# Patient Record
Sex: MALE | Race: WHITE | NOT HISPANIC OR LATINO
[De-identification: names, ages, dates, MRNs, and addresses within clinical notes are randomized per-mention and may not be internally consistent; named-entity substitution may affect disease eponyms.]

---

## 2019-03-07 PROBLEM — Z00.00 ENCOUNTER FOR PREVENTIVE HEALTH EXAMINATION: Status: ACTIVE | Noted: 2019-03-07

## 2019-03-11 ENCOUNTER — APPOINTMENT (OUTPATIENT)
Dept: PULMONOLOGY | Facility: CLINIC | Age: 54
End: 2019-03-11
Payer: COMMERCIAL

## 2019-03-11 VITALS
DIASTOLIC BLOOD PRESSURE: 80 MMHG | OXYGEN SATURATION: 97 % | BODY MASS INDEX: 33.64 KG/M2 | TEMPERATURE: 98.8 F | RESPIRATION RATE: 18 BRPM | WEIGHT: 235 LBS | SYSTOLIC BLOOD PRESSURE: 130 MMHG | HEIGHT: 70 IN | HEART RATE: 84 BPM

## 2019-03-11 DIAGNOSIS — Z86.19 PERSONAL HISTORY OF OTHER INFECTIOUS AND PARASITIC DISEASES: ICD-10-CM

## 2019-03-11 DIAGNOSIS — Z87.891 PERSONAL HISTORY OF NICOTINE DEPENDENCE: ICD-10-CM

## 2019-03-11 DIAGNOSIS — Z82.0 FAMILY HISTORY OF EPILEPSY AND OTHER DISEASES OF THE NERVOUS SYSTEM: ICD-10-CM

## 2019-03-11 PROCEDURE — 99205 OFFICE O/P NEW HI 60 MIN: CPT

## 2019-03-11 NOTE — ASSESSMENT
[FreeTextEntry1] : Most likely this patient has a viral upper respiratory tract infection with subsequent irritation of the lungs with a prolonged asthmatic response. Patient did get moderately better with steroids that were given for 5 days. We'll give prednisone 10 mg daily for 2 weeks then reevaluate him on Singulair. Patient has been asked to stop using Symbicort for now. CT of chest on and swelling of this year revealed some enlarged pulmonary arteries. Patient is to be seen soon by cardiology.

## 2019-03-11 NOTE — REVIEW OF SYSTEMS
[Cough] : cough [Sputum] : not coughing up ~M sputum [Hemoptysis] : no hemoptysis [Dyspnea] : no dyspnea [Chest Tightness] : no chest tightness [Pleuritic Pain] : no pleuritic pain [Negative] : Sleep Disorder

## 2019-03-14 ENCOUNTER — APPOINTMENT (OUTPATIENT)
Dept: CARDIOLOGY | Facility: CLINIC | Age: 54
End: 2019-03-14
Payer: COMMERCIAL

## 2019-03-14 ENCOUNTER — NON-APPOINTMENT (OUTPATIENT)
Age: 54
End: 2019-03-14

## 2019-03-14 VITALS
WEIGHT: 234 LBS | BODY MASS INDEX: 33.5 KG/M2 | HEART RATE: 91 BPM | OXYGEN SATURATION: 96 % | RESPIRATION RATE: 16 BRPM | HEIGHT: 70 IN | DIASTOLIC BLOOD PRESSURE: 94 MMHG | SYSTOLIC BLOOD PRESSURE: 149 MMHG

## 2019-03-14 VITALS — SYSTOLIC BLOOD PRESSURE: 150 MMHG | DIASTOLIC BLOOD PRESSURE: 110 MMHG | HEART RATE: 92 BPM

## 2019-03-14 VITALS — SYSTOLIC BLOOD PRESSURE: 160 MMHG | DIASTOLIC BLOOD PRESSURE: 105 MMHG

## 2019-03-14 DIAGNOSIS — I27.20 PULMONARY HYPERTENSION, UNSPECIFIED: ICD-10-CM

## 2019-03-14 PROCEDURE — 93306 TTE W/DOPPLER COMPLETE: CPT

## 2019-03-14 PROCEDURE — 93000 ELECTROCARDIOGRAM COMPLETE: CPT

## 2019-03-14 PROCEDURE — 99245 OFF/OP CONSLTJ NEW/EST HI 55: CPT

## 2019-03-14 NOTE — REASON FOR VISIT
[Consultation] : a consultation regarding [FreeTextEntry1] : This is a 53-year-old white man who presents for cardiac evaluation. The patient around the end of January developed pneumonia and was ultimately treated. He still however has some persistent cough and some persistent shortness of breath with exertion with very occasional chest tightness occurring at the time of his cough. He he has a history of smoking one pack a day of cigarettes for 20 years stopping 17 years ago he is a rare alcohol user. He has no history of hypertension or diabetes. He does however have severe dyslipidemia a recent nonfasting lipid profile revealed a triglyceride level of 845, total cholesterol of 243 and HDL of 29 family history significant for a mother with hyperlipidemia. He is unaware of his father's health is all as he has not seen him since the age of 1. He has one sister who is alive and well. As part of his pneumonia workup he underwent CAT scanning. The CAT scan was suggestive of foreign hypertension. It was also read as showing "extensive LAD disease", as well as a distally dilated aortic arch  4 cm x 4 cm. Old patient is recovering from a pneumonia, he does not feel as if he would be able to walk on a treadmill. The patient denies palpitations dizziness or syncope there is no history of rheumatic fever myocardial infarction or congestive heart failure

## 2019-03-14 NOTE — ASSESSMENT
[FreeTextEntry1] : The patient was referred to the echocardiography laboratory. Echocardiogram revealed normal left ventricular systolic function, no evidence of significant valvular stenosis or insufficiency, and no evidence of pulmonary hypertension\par \par In summary, the patient is a complex case with multiple issues.\par \par Firstly his dyslipidemia with a markedly elevated triglycerides of 845 even in a nonfasting specimen is quite concerning, more for pancreatitis then for a coronary event. I have urged him to return to his primary physician to get a repeat fasting profile and most probably be started on medication for his triglycerides in order to prevent pancreatitis\par \par Secondly we went over the issue of "extensive LAD disease". I explained to the patient that a CAT scan of the chest uses a different protocol than that used to assess the coronary arteries. Although given the CAT scan result he no doubt does have evidence of coronary disease, the CAT scan is not powered to assess the severity. We have discussed the modalities of coronary disease evaluation and have decided upon vasodilator myocardial perfusion imaging as the best modality. Once again he states he would be unable to walk on a treadmill 2 to his current pulmonary condition. Given the fact that based on the CAT scan he is already diagnosed with coronary disease, the vasodilator study with Regadenosan will assess his physiology and further risk stratify him.\par \par After this is done then consideration could be given to assessing his aorta, all of the aortic size at this time would not appear to be critical.\par \par He has already started on aspirin therapy which is appropriate. He was very reluctant to go on statin therapy I explained to him that it would be indicated given his underlying coronary disease. I have deferred prescribing this however until his repeat lipid profile so that we can assess his triglycerides first. \par \par All questions were answered. He has been scheduled for vasodilator myocardial perfusion imaging at [facility] on March 20, 2019

## 2019-03-14 NOTE — PHYSICAL EXAM
[General Appearance - Well Developed] : well developed [Normal Appearance] : normal appearance [Well Groomed] : well groomed [General Appearance - Well Nourished] : well nourished [No Deformities] : no deformities [General Appearance - In No Acute Distress] : no acute distress [Normal Oral Mucosa] : normal oral mucosa [No Oral Pallor] : no oral pallor [No Oral Cyanosis] : no oral cyanosis [Normal Jugular Venous A Waves Present] : normal jugular venous A waves present [Normal Jugular Venous V Waves Present] : normal jugular venous V waves present [No Jugular Venous Randall A Waves] : no jugular venous randall A waves [Normal Rate] : normal [Normal S1] : normal S1 [Normal S2] : normal S2 [No Murmur] : no murmurs heard [2+] : left 2+ [No Abnormalities] : the abdominal aorta was not enlarged and no bruit was heard [No Pitting Edema] : no pitting edema present [Respiration, Rhythm And Depth] : normal respiratory rhythm and effort [Exaggerated Use Of Accessory Muscles For Inspiration] : no accessory muscle use [Auscultation Breath Sounds / Voice Sounds] : lungs were clear to auscultation bilaterally [Abdomen Soft] : soft [Abdomen Tenderness] : non-tender [Abdomen Mass (___ Cm)] : no abdominal mass palpated [Abnormal Walk] : normal gait [Nail Clubbing] : no clubbing of the fingernails [Cyanosis, Localized] : no localized cyanosis [Petechial Hemorrhages (___cm)] : no petechial hemorrhages [Skin Color & Pigmentation] : normal skin color and pigmentation [] : no rash [No Venous Stasis] : no venous stasis [Skin Lesions] : no skin lesions [No Skin Ulcers] : no skin ulcer [No Xanthoma] : no  xanthoma was observed [Oriented To Time, Place, And Person] : oriented to person, place, and time [Affect] : the affect was normal [Mood] : the mood was normal [No Anxiety] : not feeling anxious [S3] : no S3 [S4] : no S4 [Right Carotid Bruit] : no bruit heard over the right carotid [Left Carotid Bruit] : no bruit heard over the left carotid [Right Femoral Bruit] : no bruit heard over the right femoral artery [Left Femoral Bruit] : no bruit heard over the left femoral artery [FreeTextEntry1] : patient states he would be unable to walk on a treadmill

## 2019-03-19 ENCOUNTER — APPOINTMENT (OUTPATIENT)
Dept: CARDIOLOGY | Facility: CLINIC | Age: 54
End: 2019-03-19
Payer: COMMERCIAL

## 2019-03-19 PROCEDURE — A9500: CPT

## 2019-03-19 PROCEDURE — 78452 HT MUSCLE IMAGE SPECT MULT: CPT

## 2019-03-19 PROCEDURE — 93015 CV STRESS TEST SUPVJ I&R: CPT

## 2019-04-09 ENCOUNTER — FORM ENCOUNTER (OUTPATIENT)
Age: 54
End: 2019-04-09

## 2019-04-10 ENCOUNTER — OUTPATIENT (OUTPATIENT)
Dept: OUTPATIENT SERVICES | Facility: HOSPITAL | Age: 54
LOS: 1 days | End: 2019-04-10
Payer: COMMERCIAL

## 2019-04-10 ENCOUNTER — APPOINTMENT (OUTPATIENT)
Dept: CARDIOLOGY | Facility: CLINIC | Age: 54
End: 2019-04-10

## 2019-04-10 DIAGNOSIS — E78.5 HYPERLIPIDEMIA, UNSPECIFIED: ICD-10-CM

## 2019-04-10 DIAGNOSIS — R07.9 CHEST PAIN, UNSPECIFIED: ICD-10-CM

## 2019-04-10 DIAGNOSIS — I25.10 ATHEROSCLEROTIC HEART DISEASE OF NATIVE CORONARY ARTERY WITHOUT ANGINA PECTORIS: ICD-10-CM

## 2019-04-10 PROCEDURE — 75574 CT ANGIO HRT W/3D IMAGE: CPT | Mod: 26

## 2019-04-10 PROCEDURE — 75574 CT ANGIO HRT W/3D IMAGE: CPT

## 2019-04-23 ENCOUNTER — NON-APPOINTMENT (OUTPATIENT)
Age: 54
End: 2019-04-23

## 2019-04-23 ENCOUNTER — APPOINTMENT (OUTPATIENT)
Dept: CARDIOLOGY | Facility: CLINIC | Age: 54
End: 2019-04-23
Payer: COMMERCIAL

## 2019-04-23 VITALS — DIASTOLIC BLOOD PRESSURE: 108 MMHG | SYSTOLIC BLOOD PRESSURE: 156 MMHG

## 2019-04-23 VITALS
BODY MASS INDEX: 33.5 KG/M2 | OXYGEN SATURATION: 99 % | WEIGHT: 234 LBS | HEIGHT: 70 IN | SYSTOLIC BLOOD PRESSURE: 178 MMHG | HEART RATE: 89 BPM | DIASTOLIC BLOOD PRESSURE: 119 MMHG | RESPIRATION RATE: 15 BRPM

## 2019-04-23 VITALS — SYSTOLIC BLOOD PRESSURE: 142 MMHG | DIASTOLIC BLOOD PRESSURE: 94 MMHG | HEART RATE: 88 BPM

## 2019-04-23 PROCEDURE — 93000 ELECTROCARDIOGRAM COMPLETE: CPT

## 2019-04-23 PROCEDURE — 99215 OFFICE O/P EST HI 40 MIN: CPT

## 2019-04-23 RX ORDER — PREDNISONE 10 MG/1
10 TABLET ORAL DAILY
Qty: 30 | Refills: 0 | Status: DISCONTINUED | COMMUNITY
Start: 2019-03-11 | End: 2019-04-23

## 2019-04-23 RX ORDER — MONTELUKAST 10 MG/1
10 TABLET, FILM COATED ORAL DAILY
Qty: 30 | Refills: 0 | Status: DISCONTINUED | COMMUNITY
Start: 2019-03-11 | End: 2019-04-23

## 2019-04-23 NOTE — REASON FOR VISIT
[Follow-Up - Clinic] : a clinic follow-up of [Coronary Artery Disease] : coronary artery disease [Abnormal Test Result] : an abnormal test result [Fatigue] : feeling tired (fatigue) [FreeTextEntry1] : Patient returns for followup. In general he feels well. His shortness of breath is gone. He still has some fatigue which he  believes may be residual from his pneumonia. He denies chest heaviness. He wishes to return to the gym. He is concerned however over his abnormal CTAwhich revealed a calcium score of 1030 and "at least" moderate narrowing in the LAD and RCA. 'CONSIDER FURTHER TESTING"

## 2019-04-23 NOTE — PHYSICAL EXAM
[General Appearance - Well Developed] : well developed [Normal Appearance] : normal appearance [No Deformities] : no deformities [Well Groomed] : well groomed [General Appearance - Well Nourished] : well nourished [Normal Oral Mucosa] : normal oral mucosa [General Appearance - In No Acute Distress] : no acute distress [No Oral Pallor] : no oral pallor [No Oral Cyanosis] : no oral cyanosis [Normal Jugular Venous A Waves Present] : normal jugular venous A waves present [Normal Jugular Venous V Waves Present] : normal jugular venous V waves present [No Jugular Venous Randall A Waves] : no jugular venous randall A waves [Respiration, Rhythm And Depth] : normal respiratory rhythm and effort [Exaggerated Use Of Accessory Muscles For Inspiration] : no accessory muscle use [Auscultation Breath Sounds / Voice Sounds] : lungs were clear to auscultation bilaterally [Abdomen Soft] : soft [Abdomen Tenderness] : non-tender [FreeTextEntry1] : patient states he would be unable to walk on a treadmill [Abnormal Walk] : normal gait [Abdomen Mass (___ Cm)] : no abdominal mass palpated [Nail Clubbing] : no clubbing of the fingernails [Cyanosis, Localized] : no localized cyanosis [Petechial Hemorrhages (___cm)] : no petechial hemorrhages [Skin Color & Pigmentation] : normal skin color and pigmentation [] : no rash [No Venous Stasis] : no venous stasis [No Skin Ulcers] : no skin ulcer [Skin Lesions] : no skin lesions [Oriented To Time, Place, And Person] : oriented to person, place, and time [No Xanthoma] : no  xanthoma was observed [Mood] : the mood was normal [Affect] : the affect was normal [No Anxiety] : not feeling anxious [Normal S1] : normal S1 [Normal Rate] : normal [S4] : no S4 [S3] : no S3 [Normal S2] : normal S2 [No Murmur] : no murmurs heard [Right Carotid Bruit] : no bruit heard over the right carotid [Left Carotid Bruit] : no bruit heard over the left carotid [Left Femoral Bruit] : no bruit heard over the left femoral artery [Right Femoral Bruit] : no bruit heard over the right femoral artery [No Abnormalities] : the abdominal aorta was not enlarged and no bruit was heard [2+] : left 2+ [No Pitting Edema] : no pitting edema present

## 2019-04-23 NOTE — ASSESSMENT
[FreeTextEntry1] : Impression:\par 1. I've had a long discussion with the patient we have discussed the fact that he does have extensive coronary disease, although nuclear imaging was normal. The patient however does wish to embark on a significant exercise program and would like to have more definitive assessment of his coronary disease and see if he needs any intervention. The possibility that his fatigue may be anginal equivalent is a concern as well. The patient understands the risks and benefits of proceeding in this manner and wishes to do so.

## 2019-04-26 ENCOUNTER — INPATIENT (INPATIENT)
Facility: HOSPITAL | Age: 54
LOS: 0 days | Discharge: HOME | DRG: 247 | End: 2019-04-27
Attending: INTERNAL MEDICINE | Admitting: INTERNAL MEDICINE
Payer: COMMERCIAL

## 2019-04-26 ENCOUNTER — TRANSCRIPTION ENCOUNTER (OUTPATIENT)
Age: 54
End: 2019-04-26

## 2019-04-26 VITALS
RESPIRATION RATE: 16 BRPM | HEIGHT: 70 IN | SYSTOLIC BLOOD PRESSURE: 161 MMHG | HEART RATE: 78 BPM | WEIGHT: 225.97 LBS | TEMPERATURE: 98 F | OXYGEN SATURATION: 98 % | DIASTOLIC BLOOD PRESSURE: 105 MMHG

## 2019-04-26 DIAGNOSIS — Z90.49 ACQUIRED ABSENCE OF OTHER SPECIFIED PARTS OF DIGESTIVE TRACT: Chronic | ICD-10-CM

## 2019-04-26 DIAGNOSIS — R94.39 ABNORMAL RESULT OF OTHER CARDIOVASCULAR FUNCTION STUDY: ICD-10-CM

## 2019-04-26 LAB
ALBUMIN SERPL ELPH-MCNC: 4.9 G/DL — SIGNIFICANT CHANGE UP (ref 3.3–5)
ALP SERPL-CCNC: 50 U/L — SIGNIFICANT CHANGE UP (ref 40–120)
ALT FLD-CCNC: 100 U/L — HIGH (ref 10–45)
ANION GAP SERPL CALC-SCNC: 15 MMOL/L — SIGNIFICANT CHANGE UP (ref 5–17)
AST SERPL-CCNC: 48 U/L — HIGH (ref 10–40)
BILIRUB SERPL-MCNC: 0.6 MG/DL — SIGNIFICANT CHANGE UP (ref 0.2–1.2)
BUN SERPL-MCNC: 12 MG/DL — SIGNIFICANT CHANGE UP (ref 7–23)
CALCIUM SERPL-MCNC: 10.1 MG/DL — SIGNIFICANT CHANGE UP (ref 8.4–10.5)
CHLORIDE SERPL-SCNC: 101 MMOL/L — SIGNIFICANT CHANGE UP (ref 96–108)
CO2 SERPL-SCNC: 23 MMOL/L — SIGNIFICANT CHANGE UP (ref 22–31)
CREAT SERPL-MCNC: 0.88 MG/DL — SIGNIFICANT CHANGE UP (ref 0.5–1.3)
GLUCOSE SERPL-MCNC: 123 MG/DL — HIGH (ref 70–99)
HCT VFR BLD CALC: 40.2 % — SIGNIFICANT CHANGE UP (ref 39–50)
HGB BLD-MCNC: 14.5 G/DL — SIGNIFICANT CHANGE UP (ref 13–17)
MCHC RBC-ENTMCNC: 31.6 PG — SIGNIFICANT CHANGE UP (ref 27–34)
MCHC RBC-ENTMCNC: 36 GM/DL — SIGNIFICANT CHANGE UP (ref 32–36)
MCV RBC AUTO: 87.8 FL — SIGNIFICANT CHANGE UP (ref 80–100)
PLATELET # BLD AUTO: 223 K/UL — SIGNIFICANT CHANGE UP (ref 150–400)
POTASSIUM SERPL-MCNC: 4 MMOL/L — SIGNIFICANT CHANGE UP (ref 3.5–5.3)
POTASSIUM SERPL-SCNC: 4 MMOL/L — SIGNIFICANT CHANGE UP (ref 3.5–5.3)
PROT SERPL-MCNC: 7.7 G/DL — SIGNIFICANT CHANGE UP (ref 6–8.3)
RBC # BLD: 4.58 M/UL — SIGNIFICANT CHANGE UP (ref 4.2–5.8)
RBC # FLD: 13.1 % — SIGNIFICANT CHANGE UP (ref 10.3–14.5)
SODIUM SERPL-SCNC: 139 MMOL/L — SIGNIFICANT CHANGE UP (ref 135–145)
WBC # BLD: 5.2 K/UL — SIGNIFICANT CHANGE UP (ref 3.8–10.5)
WBC # FLD AUTO: 5.2 K/UL — SIGNIFICANT CHANGE UP (ref 3.8–10.5)

## 2019-04-26 PROCEDURE — 93010 ELECTROCARDIOGRAM REPORT: CPT | Mod: 76

## 2019-04-26 RX ORDER — ATORVASTATIN CALCIUM 80 MG/1
20 TABLET, FILM COATED ORAL AT BEDTIME
Qty: 0 | Refills: 0 | Status: DISCONTINUED | OUTPATIENT
Start: 2019-04-26 | End: 2019-04-27

## 2019-04-26 RX ORDER — SODIUM CHLORIDE 9 MG/ML
3 INJECTION INTRAMUSCULAR; INTRAVENOUS; SUBCUTANEOUS EVERY 8 HOURS
Qty: 0 | Refills: 0 | Status: DISCONTINUED | OUTPATIENT
Start: 2019-04-26 | End: 2019-04-27

## 2019-04-26 RX ORDER — METOPROLOL TARTRATE 50 MG
12.5 TABLET ORAL
Qty: 0 | Refills: 0 | Status: DISCONTINUED | OUTPATIENT
Start: 2019-04-26 | End: 2019-04-27

## 2019-04-26 RX ORDER — INFLUENZA VIRUS VACCINE 15; 15; 15; 15 UG/.5ML; UG/.5ML; UG/.5ML; UG/.5ML
0.5 SUSPENSION INTRAMUSCULAR ONCE
Qty: 0 | Refills: 0 | Status: COMPLETED | OUTPATIENT
Start: 2019-04-26 | End: 2019-04-26

## 2019-04-26 RX ORDER — ASPIRIN/CALCIUM CARB/MAGNESIUM 324 MG
81 TABLET ORAL DAILY
Qty: 0 | Refills: 0 | Status: DISCONTINUED | OUTPATIENT
Start: 2019-04-26 | End: 2019-04-27

## 2019-04-26 RX ORDER — CLOPIDOGREL BISULFATE 75 MG/1
75 TABLET, FILM COATED ORAL DAILY
Qty: 0 | Refills: 0 | Status: DISCONTINUED | OUTPATIENT
Start: 2019-04-26 | End: 2019-04-27

## 2019-04-26 RX ORDER — ZOLPIDEM TARTRATE 10 MG/1
5 TABLET ORAL AT BEDTIME
Qty: 0 | Refills: 0 | Status: DISCONTINUED | OUTPATIENT
Start: 2019-04-26 | End: 2019-04-27

## 2019-04-26 RX ADMIN — SODIUM CHLORIDE 3 MILLILITER(S): 9 INJECTION INTRAMUSCULAR; INTRAVENOUS; SUBCUTANEOUS at 14:14

## 2019-04-26 RX ADMIN — CLOPIDOGREL BISULFATE 75 MILLIGRAM(S): 75 TABLET, FILM COATED ORAL at 14:13

## 2019-04-26 RX ADMIN — Medication 81 MILLIGRAM(S): at 14:13

## 2019-04-26 RX ADMIN — SODIUM CHLORIDE 3 MILLILITER(S): 9 INJECTION INTRAMUSCULAR; INTRAVENOUS; SUBCUTANEOUS at 20:19

## 2019-04-26 RX ADMIN — ATORVASTATIN CALCIUM 20 MILLIGRAM(S): 80 TABLET, FILM COATED ORAL at 21:23

## 2019-04-26 RX ADMIN — Medication 12.5 MILLIGRAM(S): at 17:28

## 2019-04-26 RX ADMIN — ZOLPIDEM TARTRATE 5 MILLIGRAM(S): 10 TABLET ORAL at 21:57

## 2019-04-26 NOTE — H&P CARDIOLOGY - HISTORY OF PRESENT ILLNESS
52 yo male pt with PMHX of recent pneumonia   Stress test on 3/19/19: no evidence of infarction or ineduceable ischemia EF 54%  < from: CT Heart with Coronaries (04.10.19 @ 17:06) >  At least moderate (60-70%) narrowing within the LAD and distal RCA from   mixed plaque. Consider further testing.      NONCARDIAC FINDINGS:     No potentially significant noncardiac findings   in the visualized portions of the chest and upper abdomen    < end of copied text > 52 yo male pt with PMHX of recent pneumonia, former smoker, obesity (BMI>32) presents for cardiac cath. Pt reports he is continuously having AMAYA post resolving pneumonia in January, evaluated by Dr. Bey and had abnormal cardiac CT scan (LAD lesion). Stress test on 3/19/19: no evidence of infarction or inducible ischemia EF 54%. Pt denies chest pain, dizziness or palpitation.     < from: CT Heart with Coronaries (04.10.19 @ 17:06) >  At least moderate (60-70%) narrowing within the LAD and distal RCA from mixed plaque. Consider further testing.  NONCARDIAC FINDINGS:     No potentially significant noncardiac findings in the visualized portions of the chest and upper abdomen < end of copied text >

## 2019-04-26 NOTE — H&P CARDIOLOGY - PMH
Cough    AMAYA (dyspnea on exertion)    Pneumonia Cough    AMAYA (dyspnea on exertion)    Former smoker    HTN (hypertension)    Obesity (BMI 30-39.9)    Pneumonia

## 2019-04-27 ENCOUNTER — TRANSCRIPTION ENCOUNTER (OUTPATIENT)
Age: 54
End: 2019-04-27

## 2019-04-27 VITALS
OXYGEN SATURATION: 97 % | TEMPERATURE: 98 F | HEART RATE: 88 BPM | SYSTOLIC BLOOD PRESSURE: 137 MMHG | DIASTOLIC BLOOD PRESSURE: 93 MMHG | RESPIRATION RATE: 17 BRPM

## 2019-04-27 LAB
ANION GAP SERPL CALC-SCNC: 14 MMOL/L — SIGNIFICANT CHANGE UP (ref 5–17)
BUN SERPL-MCNC: 13 MG/DL — SIGNIFICANT CHANGE UP (ref 7–23)
CALCIUM SERPL-MCNC: 9.2 MG/DL — SIGNIFICANT CHANGE UP (ref 8.4–10.5)
CHLORIDE SERPL-SCNC: 101 MMOL/L — SIGNIFICANT CHANGE UP (ref 96–108)
CO2 SERPL-SCNC: 19 MMOL/L — LOW (ref 22–31)
CREAT SERPL-MCNC: 0.82 MG/DL — SIGNIFICANT CHANGE UP (ref 0.5–1.3)
GLUCOSE SERPL-MCNC: 142 MG/DL — HIGH (ref 70–99)
HBA1C BLD-MCNC: 6.2 % — HIGH (ref 4–5.6)
HCT VFR BLD CALC: 38.6 % — LOW (ref 39–50)
HGB BLD-MCNC: 13.5 G/DL — SIGNIFICANT CHANGE UP (ref 13–17)
MCHC RBC-ENTMCNC: 30.8 PG — SIGNIFICANT CHANGE UP (ref 27–34)
MCHC RBC-ENTMCNC: 35.1 GM/DL — SIGNIFICANT CHANGE UP (ref 32–36)
MCV RBC AUTO: 87.7 FL — SIGNIFICANT CHANGE UP (ref 80–100)
PLATELET # BLD AUTO: 198 K/UL — SIGNIFICANT CHANGE UP (ref 150–400)
POTASSIUM SERPL-MCNC: 3.8 MMOL/L — SIGNIFICANT CHANGE UP (ref 3.5–5.3)
POTASSIUM SERPL-SCNC: 3.8 MMOL/L — SIGNIFICANT CHANGE UP (ref 3.5–5.3)
RBC # BLD: 4.4 M/UL — SIGNIFICANT CHANGE UP (ref 4.2–5.8)
RBC # FLD: 13.1 % — SIGNIFICANT CHANGE UP (ref 10.3–14.5)
SODIUM SERPL-SCNC: 134 MMOL/L — LOW (ref 135–145)
WBC # BLD: 5.9 K/UL — SIGNIFICANT CHANGE UP (ref 3.8–10.5)
WBC # FLD AUTO: 5.9 K/UL — SIGNIFICANT CHANGE UP (ref 3.8–10.5)

## 2019-04-27 PROCEDURE — C1725: CPT

## 2019-04-27 PROCEDURE — C1887: CPT

## 2019-04-27 PROCEDURE — 99153 MOD SED SAME PHYS/QHP EA: CPT

## 2019-04-27 PROCEDURE — 83036 HEMOGLOBIN GLYCOSYLATED A1C: CPT

## 2019-04-27 PROCEDURE — 93005 ELECTROCARDIOGRAM TRACING: CPT

## 2019-04-27 PROCEDURE — 99231 SBSQ HOSP IP/OBS SF/LOW 25: CPT

## 2019-04-27 PROCEDURE — C1769: CPT

## 2019-04-27 PROCEDURE — C1874: CPT

## 2019-04-27 PROCEDURE — 80053 COMPREHEN METABOLIC PANEL: CPT

## 2019-04-27 PROCEDURE — 99152 MOD SED SAME PHYS/QHP 5/>YRS: CPT

## 2019-04-27 PROCEDURE — 93458 L HRT ARTERY/VENTRICLE ANGIO: CPT | Mod: XU

## 2019-04-27 PROCEDURE — 80048 BASIC METABOLIC PNL TOTAL CA: CPT

## 2019-04-27 PROCEDURE — 85027 COMPLETE CBC AUTOMATED: CPT

## 2019-04-27 PROCEDURE — C1894: CPT

## 2019-04-27 PROCEDURE — C9600: CPT | Mod: LD

## 2019-04-27 RX ORDER — CLOPIDOGREL BISULFATE 75 MG/1
1 TABLET, FILM COATED ORAL
Qty: 90 | Refills: 3
Start: 2019-04-27 | End: 2020-04-20

## 2019-04-27 RX ORDER — ATORVASTATIN CALCIUM 80 MG/1
1 TABLET, FILM COATED ORAL
Qty: 30 | Refills: 0
Start: 2019-04-27 | End: 2019-05-26

## 2019-04-27 RX ORDER — METOPROLOL TARTRATE 50 MG
0.5 TABLET ORAL
Qty: 30 | Refills: 0 | OUTPATIENT
Start: 2019-04-27 | End: 2019-05-26

## 2019-04-27 RX ORDER — ACETAMINOPHEN 500 MG
2 TABLET ORAL
Qty: 0 | Refills: 0 | DISCHARGE
Start: 2019-04-27

## 2019-04-27 RX ORDER — ACETAMINOPHEN 500 MG
650 TABLET ORAL EVERY 6 HOURS
Qty: 0 | Refills: 0 | Status: DISCONTINUED | OUTPATIENT
Start: 2019-04-27 | End: 2019-04-27

## 2019-04-27 RX ORDER — ASPIRIN/CALCIUM CARB/MAGNESIUM 324 MG
1 TABLET ORAL
Qty: 0 | Refills: 0 | COMMUNITY
Start: 2019-04-27

## 2019-04-27 RX ADMIN — Medication 650 MILLIGRAM(S): at 05:44

## 2019-04-27 RX ADMIN — Medication 12.5 MILLIGRAM(S): at 05:07

## 2019-04-27 RX ADMIN — Medication 650 MILLIGRAM(S): at 05:14

## 2019-04-27 RX ADMIN — Medication 81 MILLIGRAM(S): at 05:07

## 2019-04-27 RX ADMIN — CLOPIDOGREL BISULFATE 75 MILLIGRAM(S): 75 TABLET, FILM COATED ORAL at 05:07

## 2019-04-27 RX ADMIN — SODIUM CHLORIDE 3 MILLILITER(S): 9 INJECTION INTRAMUSCULAR; INTRAVENOUS; SUBCUTANEOUS at 05:03

## 2019-04-27 NOTE — DISCHARGE NOTE PROVIDER - NSDCCPCAREPLAN_GEN_ALL_CORE_FT
PRINCIPAL DISCHARGE DIAGNOSIS  Diagnosis: CAD (coronary artery disease), native coronary artery  Assessment and Plan of Treatment: Do not stop your aspirin or Plavix unless instructed to do so by your cardiologist, they help keep your stented arteries open.   No heavy lifting or pushing/pulling with procedure arm for 2 weeks. No driving for 2 days. You may shower 24 hours following the procedure but avoid baths/swimming for 1 week. Check your wrist site for bleeding and/or swelling daily following procedure and call your doctor immediately if it occurs or if you experience increased pain at the site. Follow up with your cardiologist in 1-2 weeks. You may call [facility] if you have any questions/concerns regarding your procedure [phone].      SECONDARY DISCHARGE DIAGNOSES  Diagnosis: HTN (hypertension)  Assessment and Plan of Treatment: Continue with your blood pressure medications; eat a heart healthy diet with low salt diet; exercise regularly (consult with your physician or cardiologist first); maintain a heart healthy weight; if you smoke - quit (A resource to help you stop smoking is the [facility] [employer] Control – phone number [phone].); include healthy ways to manage stress. Continue to follow with your primary care physician or cardiologist.    Diagnosis: HLD (hyperlipidemia)  Assessment and Plan of Treatment: Continue with your cholesterol medications. Eat a heart healthy diet that is low in saturated fats and salt, and includes whole grains, fruits, vegetables and lean protein; exercise regularly (consult with your physician or cardiologist first); maintain a heart healthy weight; if you smoke - quit (A resource to help you stop smoking is the [facility] [employer] Tobacco Control – phone number [phone].). Continue to follow with your primary physician or cardiologist.

## 2019-04-27 NOTE — DISCHARGE NOTE PROVIDER - HOSPITAL COURSE
HPI:    54 yo male pt with PMHX of recent pneumonia, former smoker, obesity (BMI>32) presents for cardiac cath. Pt reports he is continuously having AMAYA post resolving pneumonia in January, evaluated by Dr. Bey and had abnormal cardiac CT scan (LAD lesion). Stress test on 3/19/19: no evidence of infarction or inducible ischemia EF 54%. Pt denies chest pain, dizziness or palpitation.         4/26 cardiac cath with stent to the mid LAD. Right radial site without swelling, bleeding.

## 2019-04-27 NOTE — DISCHARGE NOTE PROVIDER - CARE PROVIDER_API CALL
John Bey)  Cardiology; Internal Medicine  [address], [address]  [place] [address]  Phone: [phone]  Fax: [phone]  Follow Up Time:

## 2019-04-27 NOTE — DISCHARGE NOTE PROVIDER - NSDCCPTREATMENT_GEN_ALL_CORE_FT
PRINCIPAL PROCEDURE  Procedure: Placement of coronary artery stent  Findings and Treatment: one mid LAD stent

## 2019-04-27 NOTE — DISCHARGE NOTE NURSING/CASE MANAGEMENT/SOCIAL WORK - NSDCDPATPORTLINK_GEN_ALL_CORE
You can access the [email or web address], offered by [facility], by registering with the following website: [email or web address]

## 2019-04-27 NOTE — DISCHARGE NOTE PROVIDER - NSDCPNSUBOBJ_GEN_ALL_CORE
Patient is a 53y old  Male who presents with a chief complaint of abnormal cardiac CT (2019 00:35)                    Allergies        sulfa drugs (Unknown)        Intolerances                Medications:    acetaminophen   Tablet .. 650 milliGRAM(s) Oral every 6 hours PRN    aspirin enteric coated 81 milliGRAM(s) Oral daily    atorvastatin 20 milliGRAM(s) Oral at bedtime    clopidogrel Tablet 75 milliGRAM(s) Oral daily    metoprolol tartrate 12.5 milliGRAM(s) Oral two times a day    sodium chloride 0.9% lock flush 3 milliLiter(s) IV Push every 8 hours    zolpidem 5 milliGRAM(s) Oral at bedtime PRN            Vitals:    T(C): 36.8 (19 @ 05:02), Max: 36.8 (19 @ 05:02)    HR: 88 (19 @ 05:02) (71 - 99)    BP: 137/93 (19 @ 05:02) (137/93 - 164/115)    BP(mean): 123 (19 @ 11:32) (123 - 123)    RR: 17 (19 @ 05:02) (16 - 18)    SpO2: 97% (19 @ 05:02) (95% - 99%)    Wt(kg): --    Daily Height in cm: 177.8 (2019 13:30)      Daily Weight in k.5 (2019 11:32)    I&O's Summary                Physical Exam:    Appearance: Normal    Eyes: PERRL, EOMI    HENT: Normal oral mus[place]a, NC/AT    Cardiovascular: S1S2, RRR, No M/R/G, no JVD, No Lower extremity edema    Procedural Access Site: No hematoma, Non-tender to palpation, 2+ pulse, No bruit, No Ecchymosis    Respiratory: Clear to auscultation bilaterally    Gastrointestinal: Soft, Non tender, Normal Bowel Sounds    Musculoskeletal: No clubbing, No joint deformity     Neurologic: Non-focal    Lymphatic: No lymphadenopathy    Psychiatry: AAOx3, Mood & affect appropriate    Skin: No rashes, No ecchymoses, No cyanosis                134<L>  |  101  |  13    ----------------------------<  142<H>    3.8   |  19<L>  |  0.82        Ca    9.2      2019 05:18        TPro  7.7  /  Alb  4.9  /  TBili  0.6  /  DBili  x   /  AST  48<H>  /  ALT  100<H>  /  AlkPhos  50  04-26                        Lipid panel     Hgb A1c                             13.5     5.9   )-----------( 198      ( 2019 05:18 )               38.6                 ECG: SR 78 bpm        Cath: one mid LAD stent        Imaging:        Interpretation of Telemetry: SR 70-80 bpm

## 2019-04-28 ENCOUNTER — INPATIENT (INPATIENT)
Facility: HOSPITAL | Age: 54
LOS: 0 days | Discharge: HOME | End: 2019-04-29
Attending: FAMILY MEDICINE | Admitting: INTERNAL MEDICINE
Payer: COMMERCIAL

## 2019-04-28 VITALS — HEIGHT: 70 IN | WEIGHT: 225.97 LBS

## 2019-04-28 DIAGNOSIS — Z90.49 ACQUIRED ABSENCE OF OTHER SPECIFIED PARTS OF DIGESTIVE TRACT: Chronic | ICD-10-CM

## 2019-04-28 DIAGNOSIS — Z87.828 PERSONAL HISTORY OF OTHER (HEALED) PHYSICAL INJURY AND TRAUMA: Chronic | ICD-10-CM

## 2019-04-28 DIAGNOSIS — Z98.890 OTHER SPECIFIED POSTPROCEDURAL STATES: Chronic | ICD-10-CM

## 2019-04-28 LAB
ALBUMIN SERPL ELPH-MCNC: 4.3 G/DL — SIGNIFICANT CHANGE UP (ref 3.3–5)
ALP SERPL-CCNC: 58 U/L — SIGNIFICANT CHANGE UP (ref 40–120)
ALT FLD-CCNC: 92 U/L — HIGH (ref 12–78)
ANION GAP SERPL CALC-SCNC: 9 MMOL/L — SIGNIFICANT CHANGE UP (ref 5–17)
APPEARANCE UR: CLEAR — SIGNIFICANT CHANGE UP
AST SERPL-CCNC: 40 U/L — HIGH (ref 15–37)
BACTERIA # UR AUTO: ABNORMAL
BILIRUB SERPL-MCNC: 0.5 MG/DL — SIGNIFICANT CHANGE UP (ref 0.2–1.2)
BILIRUB UR-MCNC: NEGATIVE — SIGNIFICANT CHANGE UP
BUN SERPL-MCNC: 12 MG/DL — SIGNIFICANT CHANGE UP (ref 7–23)
CALCIUM SERPL-MCNC: 9.2 MG/DL — SIGNIFICANT CHANGE UP (ref 8.5–10.1)
CHLORIDE SERPL-SCNC: 105 MMOL/L — SIGNIFICANT CHANGE UP (ref 96–108)
CO2 SERPL-SCNC: 25 MMOL/L — SIGNIFICANT CHANGE UP (ref 22–31)
COLOR SPEC: YELLOW — SIGNIFICANT CHANGE UP
CREAT SERPL-MCNC: 1.1 MG/DL — SIGNIFICANT CHANGE UP (ref 0.5–1.3)
DIFF PNL FLD: ABNORMAL
EPI CELLS # UR: NEGATIVE — SIGNIFICANT CHANGE UP
GLUCOSE SERPL-MCNC: 149 MG/DL — HIGH (ref 70–99)
GLUCOSE UR QL: NEGATIVE MG/DL — SIGNIFICANT CHANGE UP
HCT VFR BLD CALC: 41.1 % — SIGNIFICANT CHANGE UP (ref 39–50)
HGB BLD-MCNC: 14 G/DL — SIGNIFICANT CHANGE UP (ref 13–17)
KETONES UR-MCNC: NEGATIVE — SIGNIFICANT CHANGE UP
LEUKOCYTE ESTERASE UR-ACNC: NEGATIVE — SIGNIFICANT CHANGE UP
LIDOCAIN IGE QN: 107 U/L — SIGNIFICANT CHANGE UP (ref 73–393)
MCHC RBC-ENTMCNC: 30 PG — SIGNIFICANT CHANGE UP (ref 27–34)
MCHC RBC-ENTMCNC: 34.1 GM/DL — SIGNIFICANT CHANGE UP (ref 32–36)
MCV RBC AUTO: 88.2 FL — SIGNIFICANT CHANGE UP (ref 80–100)
NITRITE UR-MCNC: NEGATIVE — SIGNIFICANT CHANGE UP
NRBC # BLD: 0 /100 WBCS — SIGNIFICANT CHANGE UP (ref 0–0)
PH UR: 6 — SIGNIFICANT CHANGE UP (ref 5–8)
PLATELET # BLD AUTO: 217 K/UL — SIGNIFICANT CHANGE UP (ref 150–400)
POTASSIUM SERPL-MCNC: 3.6 MMOL/L — SIGNIFICANT CHANGE UP (ref 3.5–5.3)
POTASSIUM SERPL-SCNC: 3.6 MMOL/L — SIGNIFICANT CHANGE UP (ref 3.5–5.3)
PROT SERPL-MCNC: 7.8 GM/DL — SIGNIFICANT CHANGE UP (ref 6–8.3)
PROT UR-MCNC: NEGATIVE MG/DL — SIGNIFICANT CHANGE UP
RBC # BLD: 4.66 M/UL — SIGNIFICANT CHANGE UP (ref 4.2–5.8)
RBC # FLD: 13.9 % — SIGNIFICANT CHANGE UP (ref 10.3–14.5)
RBC CASTS # UR COMP ASSIST: NEGATIVE /HPF — SIGNIFICANT CHANGE UP (ref 0–4)
SODIUM SERPL-SCNC: 139 MMOL/L — SIGNIFICANT CHANGE UP (ref 135–145)
SP GR SPEC: 1.02 — SIGNIFICANT CHANGE UP (ref 1.01–1.02)
TROPONIN I SERPL-MCNC: 0.13 NG/ML — HIGH (ref 0.01–0.04)
TROPONIN I SERPL-MCNC: 0.14 NG/ML — HIGH (ref 0.01–0.04)
TROPONIN I SERPL-MCNC: 0.14 NG/ML — HIGH (ref 0.01–0.04)
UROBILINOGEN FLD QL: NEGATIVE MG/DL — SIGNIFICANT CHANGE UP
WBC # BLD: 7.16 K/UL — SIGNIFICANT CHANGE UP (ref 3.8–10.5)
WBC # FLD AUTO: 7.16 K/UL — SIGNIFICANT CHANGE UP (ref 3.8–10.5)
WBC UR QL: NEGATIVE — SIGNIFICANT CHANGE UP

## 2019-04-28 PROCEDURE — 93010 ELECTROCARDIOGRAM REPORT: CPT

## 2019-04-28 PROCEDURE — 99285 EMERGENCY DEPT VISIT HI MDM: CPT

## 2019-04-28 PROCEDURE — 71045 X-RAY EXAM CHEST 1 VIEW: CPT | Mod: 26

## 2019-04-28 PROCEDURE — 71046 X-RAY EXAM CHEST 2 VIEWS: CPT | Mod: 26

## 2019-04-28 RX ORDER — METOPROLOL TARTRATE 50 MG
12.5 TABLET ORAL
Qty: 0 | Refills: 0 | Status: DISCONTINUED | OUTPATIENT
Start: 2019-04-28 | End: 2019-04-29

## 2019-04-28 RX ORDER — NITROGLYCERIN 6.5 MG
0.4 CAPSULE, EXTENDED RELEASE ORAL
Qty: 0 | Refills: 0 | Status: COMPLETED | OUTPATIENT
Start: 2019-04-28 | End: 2019-04-28

## 2019-04-28 RX ORDER — ASPIRIN/CALCIUM CARB/MAGNESIUM 324 MG
324 TABLET ORAL ONCE
Qty: 0 | Refills: 0 | Status: COMPLETED | OUTPATIENT
Start: 2019-04-28 | End: 2019-04-28

## 2019-04-28 RX ORDER — METOPROLOL TARTRATE 50 MG
12.5 TABLET ORAL ONCE
Qty: 0 | Refills: 0 | Status: COMPLETED | OUTPATIENT
Start: 2019-04-28 | End: 2019-04-28

## 2019-04-28 RX ORDER — ATORVASTATIN CALCIUM 80 MG/1
20 TABLET, FILM COATED ORAL ONCE
Qty: 0 | Refills: 0 | Status: COMPLETED | OUTPATIENT
Start: 2019-04-28 | End: 2019-04-28

## 2019-04-28 RX ORDER — NITROGLYCERIN 6.5 MG
1 CAPSULE, EXTENDED RELEASE ORAL ONCE
Qty: 0 | Refills: 0 | Status: COMPLETED | OUTPATIENT
Start: 2019-04-28 | End: 2019-04-28

## 2019-04-28 RX ORDER — CLOPIDOGREL BISULFATE 75 MG/1
75 TABLET, FILM COATED ORAL ONCE
Qty: 0 | Refills: 0 | Status: COMPLETED | OUTPATIENT
Start: 2019-04-28 | End: 2019-04-28

## 2019-04-28 RX ORDER — ACETAMINOPHEN 500 MG
650 TABLET ORAL ONCE
Qty: 0 | Refills: 0 | Status: COMPLETED | OUTPATIENT
Start: 2019-04-28 | End: 2019-04-28

## 2019-04-28 RX ORDER — ASPIRIN/CALCIUM CARB/MAGNESIUM 324 MG
81 TABLET ORAL DAILY
Qty: 0 | Refills: 0 | Status: DISCONTINUED | OUTPATIENT
Start: 2019-04-28 | End: 2019-04-29

## 2019-04-28 RX ORDER — CLOPIDOGREL BISULFATE 75 MG/1
75 TABLET, FILM COATED ORAL AT BEDTIME
Qty: 0 | Refills: 0 | Status: DISCONTINUED | OUTPATIENT
Start: 2019-04-28 | End: 2019-04-29

## 2019-04-28 RX ORDER — HEPARIN SODIUM 5000 [USP'U]/ML
5000 INJECTION INTRAVENOUS; SUBCUTANEOUS EVERY 8 HOURS
Qty: 0 | Refills: 0 | Status: DISCONTINUED | OUTPATIENT
Start: 2019-04-28 | End: 2019-04-29

## 2019-04-28 RX ORDER — ACETAMINOPHEN 500 MG
650 TABLET ORAL EVERY 6 HOURS
Qty: 0 | Refills: 0 | Status: DISCONTINUED | OUTPATIENT
Start: 2019-04-28 | End: 2019-04-29

## 2019-04-28 RX ORDER — ATORVASTATIN CALCIUM 80 MG/1
20 TABLET, FILM COATED ORAL AT BEDTIME
Qty: 0 | Refills: 0 | Status: DISCONTINUED | OUTPATIENT
Start: 2019-04-28 | End: 2019-04-29

## 2019-04-28 RX ORDER — INFLUENZA VIRUS VACCINE 15; 15; 15; 15 UG/.5ML; UG/.5ML; UG/.5ML; UG/.5ML
0.5 SUSPENSION INTRAMUSCULAR ONCE
Qty: 0 | Refills: 0 | Status: COMPLETED | OUTPATIENT
Start: 2019-04-28 | End: 2019-04-28

## 2019-04-28 RX ADMIN — Medication 0.4 MILLIGRAM(S): at 16:36

## 2019-04-28 RX ADMIN — Medication 12.5 MILLIGRAM(S): at 21:39

## 2019-04-28 RX ADMIN — ATORVASTATIN CALCIUM 20 MILLIGRAM(S): 80 TABLET, FILM COATED ORAL at 19:59

## 2019-04-28 RX ADMIN — Medication 1 INCH(S): at 17:50

## 2019-04-28 RX ADMIN — Medication 650 MILLIGRAM(S): at 17:45

## 2019-04-28 RX ADMIN — Medication 324 MILLIGRAM(S): at 16:03

## 2019-04-28 RX ADMIN — Medication 0.4 MILLIGRAM(S): at 16:54

## 2019-04-28 RX ADMIN — CLOPIDOGREL BISULFATE 75 MILLIGRAM(S): 75 TABLET, FILM COATED ORAL at 19:59

## 2019-04-28 RX ADMIN — HEPARIN SODIUM 5000 UNIT(S): 5000 INJECTION INTRAVENOUS; SUBCUTANEOUS at 21:39

## 2019-04-28 RX ADMIN — Medication 0.4 MILLIGRAM(S): at 16:48

## 2019-04-28 NOTE — ED PROVIDER NOTE - MUSCULOSKELETAL, MLM
Spine appears normal, range of motion is not limited, no muscle or joint tenderness. NEFF x4, no focal swelling tenderness

## 2019-04-28 NOTE — ED ADULT NURSE REASSESSMENT NOTE - NS ED NURSE REASSESS COMMENT FT1
patient 2nd troponin resulted at 0.145. Dr Campbell notified. No orders received. Patient unchanged from previous assessment, chest pain still present 2/10. Pt still cleared for tele bed.
Assumed care of patient from Nate Salmon RN at 1955. Patient AxOx4. Patient resting comfortably in bed. Patient in no acute signs of distress. Patient complaining of 2/10 left chest pressure, which is improving since arrival to ED. Dr Campbell notified orders received to give stat medications and administered. All needs addressed at this time. Safety and comfort maintained. Will continue to monitor.

## 2019-04-28 NOTE — ED ADULT TRIAGE NOTE - CHIEF COMPLAINT QUOTE
pt presents to ED c/o dizziness and chest pain. pt had stent placement on Friday at [facility]. pt w/ difficulty ambulating at triage 2/2 dizziness, provided w/ wheelchair and sent directly to the main.

## 2019-04-28 NOTE — PATIENT PROFILE ADULT - NSPROMEDSADMININFO_GEN_A_NUR
Writer met with patient over 45 min. Patient took distress and functional tool home with him. Patient will fill out and bring back with him on Thursdays infusion day.
no concerns

## 2019-04-28 NOTE — ED PROVIDER NOTE - CLINICAL SUMMARY MEDICAL DECISION MAKING FREE TEXT BOX
54 y/o male with hx of HTN, HLD, CAD s/p stent placed at [place] by Dr. Dagoberto Toney on 4/26/19 presents to the ED with 2 episodes recurrent left sided chest pressure, radiating down left arm. +associated palpitations, SOB. +near syncopal episode upon ED arrival at [facility]. Pt presently feels better but not chest pain free. Plan EKG, CXR, labs including serial troponin, lipase, PO ASA to chew, sublingual Nitro, monitor, observe, reassess, discuss with cardiology.

## 2019-04-28 NOTE — H&P ADULT - NSICDXPASTSURGICALHX_GEN_ALL_CORE_FT
PAST SURGICAL HISTORY:  History of appendectomy 1994    History of tonsillectomy and adenoidectomy 1968    History of torn meniscus of left knee 1995

## 2019-04-28 NOTE — H&P ADULT - NEUROLOGICAL DETAILS
responds to pain/sensation intact/deep reflexes intact/cranial nerves intact/alert and oriented x 3/responds to verbal commands/normal strength

## 2019-04-28 NOTE — H&P ADULT - NSHPPHYSICALEXAM_GEN_ALL_CORE
Vital Signs Last 24 Hrs  T(C): 37.1 (28 Apr 2019 15:45), Max: 37.1 (28 Apr 2019 15:45)  T(F): 98.8 (28 Apr 2019 15:45), Max: 98.8 (28 Apr 2019 15:45)  HR: 82 (28 Apr 2019 16:37) (80 - 88)  BP: 141/99 (28 Apr 2019 16:37) (138/93 - 173/108)  RR: 16 (28 Apr 2019 16:37) (16 - 20)  SpO2: 98% (28 Apr 2019 16:37) (97% - 98%)

## 2019-04-28 NOTE — ED PROVIDER NOTE - CARE PLAN
Principal Discharge DX:	Chest pain with high risk of acute coronary syndrome  Secondary Diagnosis:	CAD (coronary artery disease)

## 2019-04-28 NOTE — ED PROVIDER NOTE - PROGRESS NOTE DETAILS
Dr. Larry:  Troponin 0.132.  Pt still w/ mild L upper chest pressure discomfort, RN administering SL NTG.  Paging Interventional Cardio MD on call, Dr. Villarreal. Patricia AS for Dr. Larry: case (including EKG and labs) discussed with Teresita Plata NP of cardiology, she will relay info to Dr. Villarreal for him to call ED, so as to discuss further management. I Estevan Jackson attest that this documentation has been prepared under the direction and in the presence of Dr. Larry. Patricia AS for Dr. Larry: case discussed with Dr. Villarreal advises to give the pt Nitro paste, reassess in 1 hour, he will call back in an hour to check in on pt's status so as to determine disposition and further management. Patricia AS for Dr. Larry: pt's chest pain improved, from a 5 to 3 s/p NTG SL, currently down to a 2 on NTP, pt otherwise feels well, this was further discussed with Dr. Villarreal who states he does not feel pt requires emergent cardiac cath this evening, advises tele admission with serial troponin, he will consult in am, will notify hospitalist of tele admission.

## 2019-04-28 NOTE — ED PROVIDER NOTE - ENMT, MLM
Airway patent, Nasal mucosa clear. Throat has no vesicles, no oropharyngeal exudates and uvula is midline. +mucous membranes slightly dry

## 2019-04-28 NOTE — H&P ADULT - NSICDXPASTMEDICALHX_GEN_ALL_CORE_FT
PAST MEDICAL HISTORY:  CAD (coronary artery disease)     HLD (hyperlipidemia)     HTN (hypertension)

## 2019-04-28 NOTE — H&P ADULT - HISTORY OF PRESENT ILLNESS
54 y/o male with PMHx of HTN, HLD, CAD s/p stent placed by Dr. Dagoberto Toney at [place] on 4/26/19 presents to the ED woth complain of chest pain today. States he was moving around this morning when he started to feel flushed, lightheaded, palpitations. Pt felt chest discomfort radiating down LUE, episode lasting 5-10 minutes. Chest discomfort is described as a mild pressure, LUE pain described as burning sensation. Denies SOB. Pt reports feeling near syncope at [facility] near ED pivot. Per pt's wife, pt had an angiogram done at [place] on 4/26/19 with sent placed, discharged on ASA and Plavix.

## 2019-04-28 NOTE — ED ADULT NURSE NOTE - CHIEF COMPLAINT QUOTE
pt presents to ED c/o dizziness and chest pain. pt had stent placement on Friday at [facility]. pt w/ difficulty ambulating at triage 2/2 dizziness, provided w/ wheelchair and sent directly to the main.

## 2019-04-28 NOTE — ED PROVIDER NOTE - OBJECTIVE STATEMENT
54 y/o male with PMHx of HTN, HLD, CAD s/p stent placed by Dr. Dagoberto Toney at [place] on 4/26/19 presents to the ED c/o chest pain today. States he was moving around this morning when he started to feel flushed, lightheaded, palpitations. Pt felt chest discomfort radiating down LUE, episode lasting 5-10 minutes. Chest discomfort is described as a mild pressure, LUE pain described as burning sensation. Denies SOB. Pt reports feeling near syncope at ED near ED pivot. Per pt's wife, pt had an angiogram done at [place] on 4/26/19 with sent placed, discharged on ASA and Plavix. Allergic to Sulfa. Cardio: [facility]. PCP: John Grimm.

## 2019-04-28 NOTE — H&P ADULT - ASSESSMENT
52 yo male presented with chest pain.    A/P:    1.  Chest pain  Recent PCI with stent placement(on last Friday)  Elevated troponin  -chest pain is better with Nitro  -will trend troponin  -continue Aspirin, Statin, plavix and BB  -follow clinically in Telemetry unit  -follow cardiology consult    2.  HTN  HLD  -continue BB, statin.    3.  Heparin for DVT ppx    4.  Code status: Full code. 52 yo male presented with chest pain.    A/P:    1.  Chest pain  Recent PCI with stent placement(on last Friday)  Elevated troponin  near syncope  -chest pain is better with Nitro  -will trend troponin  -continue Aspirin, Statin, plavix and BB  -follow clinically in Telemetry unit  -follow cardiology consult    2.  HTN  HLD  -continue BB, statin.    3.  Heparin for DVT ppx    4.  Code status: Full code.

## 2019-04-28 NOTE — PATIENT PROFILE ADULT - STATED REASON FOR ADMISSION
I felt off today after sitting for an hour after I was supervising moving things around the house and my wife said I should come to the ER and I almost passed out in the waiting room.

## 2019-04-29 ENCOUNTER — TRANSCRIPTION ENCOUNTER (OUTPATIENT)
Age: 54
End: 2019-04-29

## 2019-04-29 VITALS
OXYGEN SATURATION: 96 % | TEMPERATURE: 99 F | SYSTOLIC BLOOD PRESSURE: 152 MMHG | DIASTOLIC BLOOD PRESSURE: 88 MMHG | HEART RATE: 90 BPM

## 2019-04-29 PROBLEM — R05 COUGH: Chronic | Status: ACTIVE | Noted: 2019-04-26

## 2019-04-29 PROBLEM — J18.9 PNEUMONIA, UNSPECIFIED ORGANISM: Chronic | Status: ACTIVE | Noted: 2019-04-26

## 2019-04-29 PROBLEM — E66.9 OBESITY, UNSPECIFIED: Chronic | Status: ACTIVE | Noted: 2019-04-26

## 2019-04-29 PROBLEM — Z87.891 PERSONAL HISTORY OF NICOTINE DEPENDENCE: Chronic | Status: ACTIVE | Noted: 2019-04-26

## 2019-04-29 LAB
HCV AB S/CO SERPL IA: 0.04 S/CO — SIGNIFICANT CHANGE UP (ref 0–0.99)
HCV AB SERPL-IMP: SIGNIFICANT CHANGE UP
TROPONIN I SERPL-MCNC: 0.12 NG/ML — HIGH (ref 0.01–0.04)

## 2019-04-29 RX ORDER — INFLUENZA VIRUS VACCINE 15; 15; 15; 15 UG/.5ML; UG/.5ML; UG/.5ML; UG/.5ML
0.5 SUSPENSION INTRAMUSCULAR ONCE
Qty: 0 | Refills: 0 | Status: DISCONTINUED | OUTPATIENT
Start: 2019-04-29 | End: 2019-04-29

## 2019-04-29 RX ORDER — METOPROLOL TARTRATE 50 MG
12.5 TABLET ORAL
Qty: 0 | Refills: 0 | COMMUNITY

## 2019-04-29 RX ORDER — METOPROLOL TARTRATE 50 MG
1 TABLET ORAL
Qty: 60 | Refills: 0
Start: 2019-04-29 | End: 2019-05-28

## 2019-04-29 RX ORDER — METOPROLOL TARTRATE 50 MG
0.5 TABLET ORAL
Qty: 0 | Refills: 0 | COMMUNITY

## 2019-04-29 RX ORDER — CLOPIDOGREL BISULFATE 75 MG/1
1 TABLET, FILM COATED ORAL
Qty: 0 | Refills: 0 | COMMUNITY

## 2019-04-29 RX ORDER — ATORVASTATIN CALCIUM 80 MG/1
20 TABLET, FILM COATED ORAL
Qty: 0 | Refills: 0 | COMMUNITY

## 2019-04-29 RX ORDER — ATORVASTATIN CALCIUM 80 MG/1
1 TABLET, FILM COATED ORAL
Qty: 0 | Refills: 0 | COMMUNITY

## 2019-04-29 RX ORDER — METOPROLOL TARTRATE 50 MG
25 TABLET ORAL
Qty: 0 | Refills: 0 | Status: DISCONTINUED | OUTPATIENT
Start: 2019-04-29 | End: 2019-04-29

## 2019-04-29 RX ORDER — ATORVASTATIN CALCIUM 80 MG/1
0 TABLET, FILM COATED ORAL
Qty: 0 | Refills: 0 | COMMUNITY

## 2019-04-29 RX ORDER — METOPROLOL TARTRATE 50 MG
0 TABLET ORAL
Qty: 0 | Refills: 0 | COMMUNITY

## 2019-04-29 RX ADMIN — Medication 12.5 MILLIGRAM(S): at 06:18

## 2019-04-29 RX ADMIN — HEPARIN SODIUM 5000 UNIT(S): 5000 INJECTION INTRAVENOUS; SUBCUTANEOUS at 06:18

## 2019-04-29 RX ADMIN — Medication 25 MILLIGRAM(S): at 16:34

## 2019-04-29 RX ADMIN — Medication 81 MILLIGRAM(S): at 13:23

## 2019-04-29 NOTE — DISCHARGE NOTE PROVIDER - NSDCCPCAREPLAN_GEN_ALL_CORE_FT
PRINCIPAL DISCHARGE DIAGNOSIS  Diagnosis: Chest pain with high risk of acute coronary syndrome  Assessment and Plan of Treatment: continue current medicaitons follow up with cardiologist within 1 week      SECONDARY DISCHARGE DIAGNOSES  Diagnosis: Hyperlipidemia  Assessment and Plan of Treatment: take atorvastatin,  follow up with PCP within 1 week    Diagnosis: Prediabetes  Assessment and Plan of Treatment: diet, repeat A1C in 2-3 month    Diagnosis: HTN (hypertension)  Assessment and Plan of Treatment: metoprolol dose increased to 25 twice daily    Diagnosis: CAD (coronary artery disease)  Assessment and Plan of Treatment: s/p LHC - patent stent, follow up with PCP and cardiologist within 1 week

## 2019-04-29 NOTE — DISCHARGE NOTE NURSING/CASE MANAGEMENT/SOCIAL WORK - NSDCDPATPORTLINK_GEN_ALL_CORE
You can access the [facility] Patient Portal, offered by [facility], by registering with the following website: [email or web address]

## 2019-04-29 NOTE — CONSULT NOTE ADULT - SUBJECTIVE AND OBJECTIVE BOX
CHIEF COMPLAINT: Patient is a 53y old  Male who presents with a chief complaint of     HPI: HPI:  54 y/o male with PMHx of HTN, HLD, CAD s/p stent placed by Dr. Dagoberto Toney at [facility] on 4/26/19 presents to the ED woth complain of chest pain today. States he was moving around this morning when he started to feel flushed, lightheaded, palpitations. Pt felt chest discomfort radiating down LUE, episode lasting 5-10 minutes. Chest discomfort is described as a mild pressure, LUE pain described as burning sensation. Denies SOB. Pt reports feeling near syncope at ED near ED pivot. Per pt's wife, pt had an angiogram done at [facility] on 4/26/19 with sent placed, discharged on ASA and Plavix. (28 Apr 2019 19:33)      PMHx: PAST MEDICAL & SURGICAL HISTORY:  CAD (coronary artery disease)  HLD (hyperlipidemia)  HTN (hypertension)  History of tonsillectomy and adenoidectomy: 1968  History of torn meniscus of left knee: 1995  History of appendectomy: 1994        Soc Hx:     FAMILY HISTORY:  No pertinent family history in first degree relatives      Allergies: Allergies    sulfa drugs (Unknown)    Intolerances          REVIEW OF SYSTEMS:    As above  No chest pain or shortness of breath  No lightheadeness or syncope  No leg swelling  No palpitations  No claudication-like symptoms    Vital Signs Last 24 Hrs  T(C): 36.7 (29 Apr 2019 05:08), Max: 37.1 (28 Apr 2019 15:45)  T(F): 98 (29 Apr 2019 05:08), Max: 98.8 (28 Apr 2019 15:45)  HR: 70 (29 Apr 2019 05:08) (70 - 88)  BP: 136/91 (29 Apr 2019 05:08) (118/83 - 173/108)  BP(mean): --  RR: 18 (29 Apr 2019 05:08) (16 - 20)  SpO2: 97% (29 Apr 2019 05:08) (96% - 98%)    I&O's Summary      CAPILLARY BLOOD GLUCOSE          PHYSICAL EXAM:   Patient in NAD  Neck: No JVD; Carotids:  2+ without bruits  Respiratory:  Clear to A&P  Cardiovascular: S1 and S2, regular rate and rhythm, no Murmurs, gallops or rubs  Gastrointestinal:  Soft, non-tender; BS positive  Extremities: No peripheral edema  Vascular: 2+ peripheral pulses  Neurological: A/O x 3, no focal deficits      MEDICATIONS:  MEDICATIONS  (STANDING):  aspirin enteric coated 81 milliGRAM(s) Oral daily  atorvastatin 20 milliGRAM(s) Oral at bedtime  clopidogrel Tablet 75 milliGRAM(s) Oral at bedtime  heparin  Injectable 5000 Unit(s) SubCutaneous every 8 hours  influenza   Vaccine 0.5 milliLiter(s) IntraMuscular once  metoprolol tartrate 12.5 milliGRAM(s) Oral two times a day      LABS: All Labs Reviewed:  Blood Culture:   BNP   CBC             WBC Count: 7.16 K/uL (04-28 @ 15:40)              Hemoglobin: 14.0 g/dL (04-28 @ 15:40)              Hematocrit: 41.1 % (04-28 @ 15:40)              Mean Cell Volume: 88.2 fl (04-28 @ 15:40)              Platelet Count - Automated: 217 K/uL (04-28 @ 15:40)                            Cardiac markers             Troponin I, Serum: 0.121 ng/mL (04-29 @ 05:55)  Troponin I, Serum: 0.144 ng/mL (04-28 @ 22:28)  Troponin I, Serum: 0.145 ng/mL (04-28 @ 19:13)  Troponin I, Serum: 0.132 ng/mL (04-28 @ 15:40)                             Chems        Sodium, Serum: 139 mmol/L (04-28 @ 15:40)          Potassium, Serum: 3.6 mmol/L (04-28 @ 15:40)          Blood Urea Nitrogen, Serum: 12 mg/dL (04-28 @ 15:40)          Creatinine 1.10                  Protein Total, Serum: 7.8 gm/dL (04-28 @ 15:40)                  Calcium, Total Serum: 9.2 mg/dL (04-28 @ 15:40)                  Bilirubin Total, Serum: 0.5 mg/dL (04-28 @ 15:40)          Alanine Aminotransferase (ALT/SGPT): 92 U/L (04-28 @ 15:40)          Aspartate Aminotransferase (AST/SGOT): 40 U/L (04-28 @ 15:40)                            RADIOLOGY:    EKG:  NSR; mild t changes    Telemetry:    ECHO:

## 2019-04-29 NOTE — PACU DISCHARGE NOTE - COMMENTS
report to Mahin rn/pt verbalizes understanding of limitations in activity. Right groin without bleeding or hematoma

## 2019-04-29 NOTE — PROGRESS NOTE ADULT - SUBJECTIVE AND OBJECTIVE BOX
Cath Lab Nurse Practitioner Note  HPI:  52 y/o male with PMHx of HTN, HLD, CAD s/p stent placed by Dr. Dagoberto Toney at [facility] on 4/26/19 presents to the ED woth complain of chest pain today. States he was moving around this morning when he started to feel flushed, lightheaded, palpitations. Pt felt chest discomfort radiating down LUE, episode lasting 5-10 minutes. Chest discomfort is described as a mild pressure, LUE pain described as burning sensation. Denies SOB. Pt reports feeling near syncope at [facility] near ED pivot. Per pt's wife, pt had an angiogram done at [facility] on 4/26/19 with sent placed, discharged on ASA and Plavix.  Pt referred for LHC with possible intervention.    s/p LHC : patent stent in LAD  Pt denies chest pain/SOB/palpitations post cath.    Physical exam:  Vital Signs Last 24 Hrs  T(C): 36.1 (29 Apr 2019 10:03), Max: 37.1 (28 Apr 2019 15:45)  T(F): 97 (29 Apr 2019 10:03), Max: 98.8 (28 Apr 2019 15:45)  HR: 79 (29 Apr 2019 10:03) (70 - 88)  BP: 155/99 (29 Apr 2019 10:03) (118/83 - 173/108)  RR: 84 (29 Apr 2019 10:03) (16 - 84)  SpO2: 95% (29 Apr 2019 10:03) (94% - 98%)  Cardiac : (+)S1S2, RRR  Lungs: CTA B/L  Abd: soft, NT, (+)BS  Ext: Rt groin, soft, no hematoma, 2+ Rt DP    Labs:                        14.0   7.16  )-----------( 217      ( 28 Apr 2019 15:40 )             41.1     04-28    139  |  105  |  12  ----------------------------<  149<H>  3.6   |  25  |  1.10    Ca    9.2      28 Apr 2019 15:40    TPro  7.8  /  Alb  4.3  /  TBili  0.5  /  DBili  x   /  AST  40<H>  /  ALT  92<H>  /  AlkPhos  58  04-28      MEDICATIONS  (STANDING):  aspirin enteric coated 81 milliGRAM(s) Oral daily  atorvastatin 20 milliGRAM(s) Oral at bedtime  clopidogrel Tablet 75 milliGRAM(s) Oral at bedtime  heparin  Injectable 5000 Unit(s) SubCutaneous every 8 hours  influenza   Vaccine 0.5 milliLiter(s) IntraMuscular once  metoprolol tartrate 12.5 milliGRAM(s) Oral two times a day      A/P : CAD, s/p LHC: patent stent in LAD  -encourage po hydration  -medical Mx for CAD, continue ASA/plavix/b-blocker/statin  -f/u with PMD in 1-2 weeks  -Plan discussed with pt/Dr. Rebolledo/hospitalist. Cath Lab Nurse Practitioner Note  HPI:  52 y/o male with PMHx of HTN, HLD, CAD s/p stent placed by Dr. Dagoberto Toney at [facility] on 4/26/19 presents to the ED woth complain of chest pain today. States he was moving around this morning when he started to feel flushed, lightheaded, palpitations. Pt felt chest discomfort radiating down LUE, episode lasting 5-10 minutes. Chest discomfort is described as a mild pressure, LUE pain described as burning sensation. Denies SOB. Pt reports feeling near syncope at [facility] near ED pivot. Per pt's wife, pt had an angiogram done at [facility] on 4/26/19 with sent placed, discharged on ASA and Plavix.  Pt referred for LHC with possible intervention.  Calculated pt's bleeding risk score is 0.6%.      s/p LHC : patent stent in LAD  Pt denies chest pain/SOB/palpitations post cath.    Physical exam:  Vital Signs Last 24 Hrs  T(C): 36.1 (29 Apr 2019 10:03), Max: 37.1 (28 Apr 2019 15:45)  T(F): 97 (29 Apr 2019 10:03), Max: 98.8 (28 Apr 2019 15:45)  HR: 79 (29 Apr 2019 10:03) (70 - 88)  BP: 155/99 (29 Apr 2019 10:03) (118/83 - 173/108)  RR: 84 (29 Apr 2019 10:03) (16 - 84)  SpO2: 95% (29 Apr 2019 10:03) (94% - 98%)  Cardiac : (+)S1S2, RRR  Lungs: CTA B/L  Abd: soft, NT, (+)BS  Ext: Rt groin, soft, no hematoma, 2+ Rt DP    Labs:                        14.0   7.16  )-----------( 217      ( 28 Apr 2019 15:40 )             41.1     04-28    139  |  105  |  12  ----------------------------<  149<H>  3.6   |  25  |  1.10    Ca    9.2      28 Apr 2019 15:40    TPro  7.8  /  Alb  4.3  /  TBili  0.5  /  DBili  x   /  AST  40<H>  /  ALT  92<H>  /  AlkPhos  58  04-28      MEDICATIONS  (STANDING):  aspirin enteric coated 81 milliGRAM(s) Oral daily  atorvastatin 20 milliGRAM(s) Oral at bedtime  clopidogrel Tablet 75 milliGRAM(s) Oral at bedtime  heparin  Injectable 5000 Unit(s) SubCutaneous every 8 hours  influenza   Vaccine 0.5 milliLiter(s) IntraMuscular once  metoprolol tartrate 12.5 milliGRAM(s) Oral two times a day      A/P : CAD, s/p LHC: patent stent in LAD  -encourage po hydration  -medical Mx for CAD, continue ASA/plavix/b-blocker/statin  -f/u with PMD in 1-2 weeks  -Plan discussed with pt/Dr. Rebolledo/hospitalist.

## 2019-04-29 NOTE — DISCHARGE NOTE PROVIDER - HOSPITAL COURSE
Subjective:    Patient is a 53y old  Male who presents with a chief complaint of chest pain     HPI:        54 y/o male with PMHx of HTN, HLD, CAD s/p stent placed by Dr. Dagoberto Toney at [facility] on 4/26/19 presents to the ED on 4/28/19 with complain of chest pain . States he was moving around this morning when he started to feel flushed, lightheaded, palpitations. Pt felt chest discomfort radiating down LUE, episode lasting 5-10 minutes. Chest discomfort is described as a mild pressure, LUE pain described as burning sensation. Denies SOB. Pt reports feeling near syncope at ED near ED pivot. Per pt's wife, pt had an angiogram done at [facility] on 4/26/19 with sent placed, discharged on ASA and Plavix.         4/29 - Patient seen and examined at bedside earlier today, s/p LHC - stent is patent, cleared for discharge by cardiology team, has slight left sided chest discomfort, denies dyspnea, dizziness, palpitations         Review of system- Rest of the review of system are negative except mentioned in HPI        OBJECTIVE:     T(C): 36.3 (04-29-19 @ 13:00), Max: 37.1 (04-28-19 @ 15:45)    HR: 75 (04-29-19 @ 13:30) (70 - 88)    BP: 155/94 (04-29-19 @ 13:30) (118/83 - 173/108)    RR: 18 (04-29-19 @ 13:00) (16 - 20)    SpO2: 98% (04-29-19 @ 13:00) (94% - 98%)    Wt(kg): --    Daily Height in cm: 180.34 (29 Apr 2019 09:58)      Daily         PHYSICAL EXAM:    GENERAL: NAD    NERVOUS SYSTEM:  Alert & Oriented X3, non- focal exam, Motor Strength 5/5 B/L upper and lower extremities; DTRs 2+ intact and symmetric    HEAD:  Atraumatic, Normocephalic    EYES: EOMI, PERRLA, conjunctiva and sclera clear    HEENT: Moist mucous membranes    NECK: Supple, No JVD    CHEST/LUNG: Clear to auscultation bilaterally; No rales, no rhonchi, no wheezing, or rubs    HEART: Regular rate and rhythm; No murmurs, rubs, or gallops    ABDOMEN: Soft, Nontender, Nondistended; Bowel sounds present    GENITOURINARY- Voiding, no suprapubic tenderness    EXTREMITIES:  2+ Peripheral Pulses, No clubbing, cyanosis, or edema    MUSCULOSKELETAL:- No muscle tenderness, Muscle tone normal, No joint tenderness, no Joint swelling, Joint range of motion-normal    SKIN-no rash, no lesion        0.144 ng/mL / x     / x     / x     / x        CARDIAC MARKERS ( 28 Apr 2019 19:13 )    0.145 ng/mL / x     / x     / x     / x        CARDIAC MARKERS ( 28 Apr 2019 15:40 )    0.132 ng/mL / x     / x     / x     / x        < from: 12 Lead ECG (04.28.19 @ 15:32) >         Line Normal sinus rhythm    Nonspecific T wave abnormality    Abnormal ECG    No previous ECGs available    < end of copied text >        * Chest pain s/p Recent PCI with stent placement(on last Friday)    * Mildly Elevated troponin due to demand ischemia s/p recent [facility]  rulled out MI     * Near syncope    -chest pain is better with Nitro    -continue Aspirin, Statin, plavix and BB    -follow clinically in Telemetry unit - no events    - 4/29 - s/p LHC - stent patent, d/w Dr. Rebolledo stable for discharge     -follow cardiology consult        * HTN, poorly controlled    - increase BB to 25 bid        * HLD    -statin.         * Prediabetes with A1C 6.2    - diet, repeat in 3 month         Heparin for DVT ppx    Code status: Full code.         Disposition - medically optimized to be discharged home with close follow up with PCP     return to ED if fever, abdominal pain, nausea, vomiting, chest pain, dyspnea    Discharge plan discussed with patient, RN    Patient advised to follow up with PCP within 3-7 days    time spend 40 min    Discharge note faxed to PCP with my contact information to call me back     PCP Dr. Grimm cardiology Dr. Cook

## 2019-04-29 NOTE — DISCHARGE NOTE PROVIDER - CARE PROVIDER_API CALL
John Grimm (MD)  Internal Medicine  [facility]ly [facility], 1930[facility] [address]  Phone: [phone]  Fax: [phone]  Follow Up Time:     cardiologist,   Phone: (   )    -  Fax: (   )    -  Follow Up Time:

## 2019-05-07 ENCOUNTER — NON-APPOINTMENT (OUTPATIENT)
Age: 54
End: 2019-05-07

## 2019-05-07 ENCOUNTER — APPOINTMENT (OUTPATIENT)
Dept: CARDIOLOGY | Facility: CLINIC | Age: 54
End: 2019-05-07
Payer: COMMERCIAL

## 2019-05-07 VITALS
DIASTOLIC BLOOD PRESSURE: 117 MMHG | SYSTOLIC BLOOD PRESSURE: 159 MMHG | BODY MASS INDEX: 33.79 KG/M2 | HEART RATE: 89 BPM | OXYGEN SATURATION: 99 % | WEIGHT: 236 LBS | RESPIRATION RATE: 15 BRPM | HEIGHT: 70 IN

## 2019-05-07 VITALS — DIASTOLIC BLOOD PRESSURE: 112 MMHG | SYSTOLIC BLOOD PRESSURE: 160 MMHG

## 2019-05-07 VITALS — DIASTOLIC BLOOD PRESSURE: 80 MMHG | SYSTOLIC BLOOD PRESSURE: 148 MMHG

## 2019-05-07 VITALS — HEART RATE: 90 BPM

## 2019-05-07 PROBLEM — I10 ESSENTIAL (PRIMARY) HYPERTENSION: Chronic | Status: ACTIVE | Noted: 2019-04-28

## 2019-05-07 PROBLEM — E78.5 HYPERLIPIDEMIA, UNSPECIFIED: Chronic | Status: ACTIVE | Noted: 2019-04-28

## 2019-05-07 PROBLEM — I25.10 ATHEROSCLEROTIC HEART DISEASE OF NATIVE CORONARY ARTERY WITHOUT ANGINA PECTORIS: Chronic | Status: ACTIVE | Noted: 2019-04-28

## 2019-05-07 PROCEDURE — 93000 ELECTROCARDIOGRAM COMPLETE: CPT

## 2019-05-07 PROCEDURE — 99214 OFFICE O/P EST MOD 30 MIN: CPT

## 2019-05-07 NOTE — ASSESSMENT
[FreeTextEntry1] : Impression:\par 1. Coronary disease with obstructive disease in the LAD status post stenting currently doing well\par 2. Dyslipidemia with significantly elevated triglycerides necessitating gemfibrozil therapy and now on low-dose statin therapy due to his CAD\par \par Plan:\par 1. Continue current medical regimen

## 2019-05-07 NOTE — PHYSICAL EXAM
[General Appearance - Well Developed] : well developed [Normal Appearance] : normal appearance [General Appearance - Well Nourished] : well nourished [Well Groomed] : well groomed [General Appearance - In No Acute Distress] : no acute distress [No Deformities] : no deformities [Normal Oral Mucosa] : normal oral mucosa [No Oral Pallor] : no oral pallor [No Oral Cyanosis] : no oral cyanosis [Normal Jugular Venous V Waves Present] : normal jugular venous V waves present [No Jugular Venous Randall A Waves] : no jugular venous randall A waves [Normal Jugular Venous A Waves Present] : normal jugular venous A waves present [Exaggerated Use Of Accessory Muscles For Inspiration] : no accessory muscle use [Auscultation Breath Sounds / Voice Sounds] : lungs were clear to auscultation bilaterally [Respiration, Rhythm And Depth] : normal respiratory rhythm and effort [Abdomen Soft] : soft [Abdomen Tenderness] : non-tender [Abnormal Walk] : normal gait [FreeTextEntry1] : patient states he would be unable to walk on a treadmill [Abdomen Mass (___ Cm)] : no abdominal mass palpated [Nail Clubbing] : no clubbing of the fingernails [Petechial Hemorrhages (___cm)] : no petechial hemorrhages [Cyanosis, Localized] : no localized cyanosis [] : no rash [Skin Color & Pigmentation] : normal skin color and pigmentation [No Skin Ulcers] : no skin ulcer [Skin Lesions] : no skin lesions [No Xanthoma] : no  xanthoma was observed [No Venous Stasis] : no venous stasis [Oriented To Time, Place, And Person] : oriented to person, place, and time [Affect] : the affect was normal [No Anxiety] : not feeling anxious [Normal Rate] : normal [Mood] : the mood was normal [Normal S1] : normal S1 [S3] : no S3 [Normal S2] : normal S2 [No Murmur] : no murmurs heard [S4] : no S4 [Right Carotid Bruit] : no bruit heard over the right carotid [Left Carotid Bruit] : no bruit heard over the left carotid [Right Femoral Bruit] : no bruit heard over the right femoral artery [Left Femoral Bruit] : no bruit heard over the left femoral artery [2+] : left 2+ [No Abnormalities] : the abdominal aorta was not enlarged and no bruit was heard [No Pitting Edema] : no pitting edema present

## 2019-05-07 NOTE — REASON FOR VISIT
[Follow-Up - Clinic] : a clinic follow-up of [Dizziness] : dizziness [Coronary Artery Disease] : coronary artery disease [Hyperlipidemia] : hyperlipidemia [FreeTextEntry1] : Patient returns for followup. He is now status post stenting to an 80% LAD lesion. He has been placed on Plavix and atorvastatin in addition to his other medications. Of note is the fact that he developed some dizziness about a week post procedure and went to [facility]. He underwent repeat cardiac catheterization at that time which revealed patency of the stent.

## 2019-05-08 DIAGNOSIS — I25.10 ATHEROSCLEROTIC HEART DISEASE OF NATIVE CORONARY ARTERY WITHOUT ANGINA PECTORIS: ICD-10-CM

## 2019-05-08 DIAGNOSIS — R07.9 CHEST PAIN, UNSPECIFIED: ICD-10-CM

## 2019-05-08 DIAGNOSIS — Z95.5 PRESENCE OF CORONARY ANGIOPLASTY IMPLANT AND GRAFT: ICD-10-CM

## 2019-05-08 DIAGNOSIS — I24.8 OTHER FORMS OF ACUTE ISCHEMIC HEART DISEASE: ICD-10-CM

## 2019-05-08 DIAGNOSIS — R55 SYNCOPE AND COLLAPSE: ICD-10-CM

## 2019-05-08 DIAGNOSIS — E78.5 HYPERLIPIDEMIA, UNSPECIFIED: ICD-10-CM

## 2019-05-08 DIAGNOSIS — I10 ESSENTIAL (PRIMARY) HYPERTENSION: ICD-10-CM

## 2019-05-08 DIAGNOSIS — Z88.2 ALLERGY STATUS TO SULFONAMIDES: ICD-10-CM

## 2019-05-08 DIAGNOSIS — R73.03 PREDIABETES: ICD-10-CM

## 2019-06-18 ENCOUNTER — APPOINTMENT (OUTPATIENT)
Dept: CARDIOLOGY | Facility: CLINIC | Age: 54
End: 2019-06-18

## 2021-09-03 NOTE — PATIENT PROFILE ADULT - NSTRANSFERBELONGINGSDISPO_GEN_A_NUR
with patient Finasteride Pregnancy And Lactation Text: This medication is absolutely contraindicated during pregnancy. It is unknown if it is excreted in breast milk.

## 2022-12-28 ENCOUNTER — NON-APPOINTMENT (OUTPATIENT)
Age: 57
End: 2022-12-28

## 2024-02-04 ENCOUNTER — INPATIENT (INPATIENT)
Facility: HOSPITAL | Age: 59
LOS: 2 days | Discharge: HOME | DRG: 657 | End: 2024-02-07
Attending: HOSPITALIST | Admitting: INTERNAL MEDICINE
Payer: COMMERCIAL

## 2024-02-04 ENCOUNTER — RESULT REVIEW (OUTPATIENT)
Age: 59
End: 2024-02-04

## 2024-02-04 VITALS — HEIGHT: 70 IN | WEIGHT: 195.11 LBS

## 2024-02-04 DIAGNOSIS — Z90.49 ACQUIRED ABSENCE OF OTHER SPECIFIED PARTS OF DIGESTIVE TRACT: Chronic | ICD-10-CM

## 2024-02-04 DIAGNOSIS — Z87.828 PERSONAL HISTORY OF OTHER (HEALED) PHYSICAL INJURY AND TRAUMA: Chronic | ICD-10-CM

## 2024-02-04 DIAGNOSIS — Z98.890 OTHER SPECIFIED POSTPROCEDURAL STATES: Chronic | ICD-10-CM

## 2024-02-04 DIAGNOSIS — N28.89 OTHER SPECIFIED DISORDERS OF KIDNEY AND URETER: ICD-10-CM

## 2024-02-04 LAB
ADD ON TEST-SPECIMEN IN LAB: SIGNIFICANT CHANGE UP
ALBUMIN SERPL ELPH-MCNC: 3.6 G/DL — SIGNIFICANT CHANGE UP (ref 3.3–5)
ALP SERPL-CCNC: 63 U/L — SIGNIFICANT CHANGE UP (ref 40–120)
ALT FLD-CCNC: 21 U/L — SIGNIFICANT CHANGE UP (ref 12–78)
ANION GAP SERPL CALC-SCNC: 6 MMOL/L — SIGNIFICANT CHANGE UP (ref 5–17)
APPEARANCE UR: ABNORMAL
AST SERPL-CCNC: 12 U/L — LOW (ref 15–37)
BACTERIA # UR AUTO: NEGATIVE /HPF — SIGNIFICANT CHANGE UP
BASOPHILS # BLD AUTO: 0.03 K/UL — SIGNIFICANT CHANGE UP (ref 0–0.2)
BASOPHILS NFR BLD AUTO: 0.5 % — SIGNIFICANT CHANGE UP (ref 0–2)
BILIRUB SERPL-MCNC: 0.5 MG/DL — SIGNIFICANT CHANGE UP (ref 0.2–1.2)
BILIRUB UR-MCNC: NEGATIVE — SIGNIFICANT CHANGE UP
BUN SERPL-MCNC: 13 MG/DL — SIGNIFICANT CHANGE UP (ref 7–23)
CALCIUM SERPL-MCNC: 9.8 MG/DL — SIGNIFICANT CHANGE UP (ref 8.5–10.1)
CAST: 0 /LPF — SIGNIFICANT CHANGE UP (ref 0–4)
CHLORIDE SERPL-SCNC: 104 MMOL/L — SIGNIFICANT CHANGE UP (ref 96–108)
CO2 SERPL-SCNC: 26 MMOL/L — SIGNIFICANT CHANGE UP (ref 22–31)
COLOR SPEC: ABNORMAL
CREAT SERPL-MCNC: 1.28 MG/DL — SIGNIFICANT CHANGE UP (ref 0.5–1.3)
DIFF PNL FLD: ABNORMAL
EGFR: 64 ML/MIN/1.73M2 — SIGNIFICANT CHANGE UP
EOSINOPHIL # BLD AUTO: 0.02 K/UL — SIGNIFICANT CHANGE UP (ref 0–0.5)
EOSINOPHIL NFR BLD AUTO: 0.3 % — SIGNIFICANT CHANGE UP (ref 0–6)
GLUCOSE BLDC GLUCOMTR-MCNC: 128 MG/DL — HIGH (ref 70–99)
GLUCOSE BLDC GLUCOMTR-MCNC: 178 MG/DL — HIGH (ref 70–99)
GLUCOSE BLDC GLUCOMTR-MCNC: 205 MG/DL — HIGH (ref 70–99)
GLUCOSE BLDC GLUCOMTR-MCNC: 242 MG/DL — HIGH (ref 70–99)
GLUCOSE BLDC GLUCOMTR-MCNC: 259 MG/DL — HIGH (ref 70–99)
GLUCOSE BLDC GLUCOMTR-MCNC: 267 MG/DL — HIGH (ref 70–99)
GLUCOSE BLDC GLUCOMTR-MCNC: 272 MG/DL — HIGH (ref 70–99)
GLUCOSE SERPL-MCNC: 300 MG/DL — HIGH (ref 70–99)
GLUCOSE UR QL: 500 MG/DL
HCT VFR BLD CALC: 34.4 % — LOW (ref 39–50)
HGB BLD-MCNC: 11.8 G/DL — LOW (ref 13–17)
IMM GRANULOCYTES NFR BLD AUTO: 0.2 % — SIGNIFICANT CHANGE UP (ref 0–0.9)
KETONES UR-MCNC: 15 MG/DL
LEUKOCYTE ESTERASE UR-ACNC: NEGATIVE — SIGNIFICANT CHANGE UP
LIDOCAIN IGE QN: 58 U/L — SIGNIFICANT CHANGE UP (ref 13–75)
LYMPHOCYTES # BLD AUTO: 0.97 K/UL — LOW (ref 1–3.3)
LYMPHOCYTES # BLD AUTO: 15.7 % — SIGNIFICANT CHANGE UP (ref 13–44)
MCHC RBC-ENTMCNC: 29.3 PG — SIGNIFICANT CHANGE UP (ref 27–34)
MCHC RBC-ENTMCNC: 34.3 GM/DL — SIGNIFICANT CHANGE UP (ref 32–36)
MCV RBC AUTO: 85.4 FL — SIGNIFICANT CHANGE UP (ref 80–100)
MONOCYTES # BLD AUTO: 0.65 K/UL — SIGNIFICANT CHANGE UP (ref 0–0.9)
MONOCYTES NFR BLD AUTO: 10.6 % — SIGNIFICANT CHANGE UP (ref 2–14)
NEUTROPHILS # BLD AUTO: 4.48 K/UL — SIGNIFICANT CHANGE UP (ref 1.8–7.4)
NEUTROPHILS NFR BLD AUTO: 72.7 % — SIGNIFICANT CHANGE UP (ref 43–77)
NITRITE UR-MCNC: NEGATIVE — SIGNIFICANT CHANGE UP
PH UR: 6 — SIGNIFICANT CHANGE UP (ref 5–8)
PLATELET # BLD AUTO: 196 K/UL — SIGNIFICANT CHANGE UP (ref 150–400)
POTASSIUM SERPL-MCNC: 3.9 MMOL/L — SIGNIFICANT CHANGE UP (ref 3.5–5.3)
POTASSIUM SERPL-SCNC: 3.9 MMOL/L — SIGNIFICANT CHANGE UP (ref 3.5–5.3)
PROT SERPL-MCNC: 7.4 GM/DL — SIGNIFICANT CHANGE UP (ref 6–8.3)
PROT UR-MCNC: 30 MG/DL
RBC # BLD: 4.03 M/UL — LOW (ref 4.2–5.8)
RBC # FLD: 12.8 % — SIGNIFICANT CHANGE UP (ref 10.3–14.5)
RBC CASTS # UR COMP ASSIST: 794 /HPF — HIGH (ref 0–4)
SODIUM SERPL-SCNC: 136 MMOL/L — SIGNIFICANT CHANGE UP (ref 135–145)
SP GR SPEC: 1.02 — SIGNIFICANT CHANGE UP (ref 1–1.03)
SQUAMOUS # UR AUTO: 0 /HPF — SIGNIFICANT CHANGE UP (ref 0–5)
UROBILINOGEN FLD QL: 0.2 MG/DL — SIGNIFICANT CHANGE UP (ref 0.2–1)
WBC # BLD: 6.16 K/UL — SIGNIFICANT CHANGE UP (ref 3.8–10.5)
WBC # FLD AUTO: 6.16 K/UL — SIGNIFICANT CHANGE UP (ref 3.8–10.5)
WBC UR QL: 2 /HPF — SIGNIFICANT CHANGE UP (ref 0–5)

## 2024-02-04 PROCEDURE — 76000 FLUOROSCOPY <1 HR PHYS/QHP: CPT

## 2024-02-04 PROCEDURE — 71250 CT THORAX DX C-: CPT | Mod: 26

## 2024-02-04 PROCEDURE — 36415 COLL VENOUS BLD VENIPUNCTURE: CPT

## 2024-02-04 PROCEDURE — 93306 TTE W/DOPPLER COMPLETE: CPT

## 2024-02-04 PROCEDURE — 88108 CYTOPATH CONCENTRATE TECH: CPT

## 2024-02-04 PROCEDURE — 83550 IRON BINDING TEST: CPT

## 2024-02-04 PROCEDURE — 82746 ASSAY OF FOLIC ACID SERUM: CPT

## 2024-02-04 PROCEDURE — 93010 ELECTROCARDIOGRAM REPORT: CPT

## 2024-02-04 PROCEDURE — 82728 ASSAY OF FERRITIN: CPT

## 2024-02-04 PROCEDURE — 88108 CYTOPATH CONCENTRATE TECH: CPT | Mod: 26

## 2024-02-04 PROCEDURE — 71250 CT THORAX DX C-: CPT

## 2024-02-04 PROCEDURE — 84443 ASSAY THYROID STIM HORMONE: CPT

## 2024-02-04 PROCEDURE — 83540 ASSAY OF IRON: CPT

## 2024-02-04 PROCEDURE — 83036 HEMOGLOBIN GLYCOSYLATED A1C: CPT

## 2024-02-04 PROCEDURE — 99223 1ST HOSP IP/OBS HIGH 75: CPT

## 2024-02-04 PROCEDURE — 85014 HEMATOCRIT: CPT

## 2024-02-04 PROCEDURE — 74177 CT ABD & PELVIS W/CONTRAST: CPT | Mod: 26,MA

## 2024-02-04 PROCEDURE — 82607 VITAMIN B-12: CPT

## 2024-02-04 PROCEDURE — 74183 MRI ABD W/O CNTR FLWD CNTR: CPT

## 2024-02-04 PROCEDURE — 93005 ELECTROCARDIOGRAM TRACING: CPT

## 2024-02-04 PROCEDURE — 83735 ASSAY OF MAGNESIUM: CPT

## 2024-02-04 PROCEDURE — 85018 HEMOGLOBIN: CPT

## 2024-02-04 PROCEDURE — 99285 EMERGENCY DEPT VISIT HI MDM: CPT

## 2024-02-04 PROCEDURE — A9579: CPT

## 2024-02-04 PROCEDURE — 82962 GLUCOSE BLOOD TEST: CPT

## 2024-02-04 PROCEDURE — 84480 ASSAY TRIIODOTHYRONINE (T3): CPT

## 2024-02-04 PROCEDURE — 52332 CYSTOSCOPY AND TREATMENT: CPT | Mod: 79,RT

## 2024-02-04 PROCEDURE — 84436 ASSAY OF TOTAL THYROXINE: CPT

## 2024-02-04 PROCEDURE — 84100 ASSAY OF PHOSPHORUS: CPT

## 2024-02-04 PROCEDURE — C2617: CPT

## 2024-02-04 PROCEDURE — 85027 COMPLETE CBC AUTOMATED: CPT

## 2024-02-04 PROCEDURE — 80048 BASIC METABOLIC PNL TOTAL CA: CPT

## 2024-02-04 RX ORDER — METOPROLOL TARTRATE 50 MG
25 TABLET ORAL
Refills: 0 | Status: DISCONTINUED | OUTPATIENT
Start: 2024-02-04 | End: 2024-02-07

## 2024-02-04 RX ORDER — ASPIRIN/CALCIUM CARB/MAGNESIUM 324 MG
81 TABLET ORAL DAILY
Refills: 0 | Status: DISCONTINUED | OUTPATIENT
Start: 2024-02-04 | End: 2024-02-07

## 2024-02-04 RX ORDER — INSULIN LISPRO 100/ML
3 VIAL (ML) SUBCUTANEOUS ONCE
Refills: 0 | Status: COMPLETED | OUTPATIENT
Start: 2024-02-04 | End: 2024-02-04

## 2024-02-04 RX ORDER — DEXTROSE 50 % IN WATER 50 %
25 SYRINGE (ML) INTRAVENOUS ONCE
Refills: 0 | Status: DISCONTINUED | OUTPATIENT
Start: 2024-02-04 | End: 2024-02-07

## 2024-02-04 RX ORDER — GLUCAGON INJECTION, SOLUTION 0.5 MG/.1ML
1 INJECTION, SOLUTION SUBCUTANEOUS ONCE
Refills: 0 | Status: DISCONTINUED | OUTPATIENT
Start: 2024-02-04 | End: 2024-02-07

## 2024-02-04 RX ORDER — FENTANYL CITRATE 50 UG/ML
50 INJECTION INTRAVENOUS
Refills: 0 | Status: DISCONTINUED | OUTPATIENT
Start: 2024-02-04 | End: 2024-02-04

## 2024-02-04 RX ORDER — SODIUM CHLORIDE 9 MG/ML
1000 INJECTION, SOLUTION INTRAVENOUS
Refills: 0 | Status: DISCONTINUED | OUTPATIENT
Start: 2024-02-04 | End: 2024-02-07

## 2024-02-04 RX ORDER — ONDANSETRON 8 MG/1
4 TABLET, FILM COATED ORAL ONCE
Refills: 0 | Status: COMPLETED | OUTPATIENT
Start: 2024-02-04 | End: 2024-02-04

## 2024-02-04 RX ORDER — FENTANYL CITRATE 50 UG/ML
25 INJECTION INTRAVENOUS
Refills: 0 | Status: DISCONTINUED | OUTPATIENT
Start: 2024-02-04 | End: 2024-02-04

## 2024-02-04 RX ORDER — MORPHINE SULFATE 50 MG/1
4 CAPSULE, EXTENDED RELEASE ORAL ONCE
Refills: 0 | Status: DISCONTINUED | OUTPATIENT
Start: 2024-02-04 | End: 2024-02-04

## 2024-02-04 RX ORDER — KETOROLAC TROMETHAMINE 30 MG/ML
15 SYRINGE (ML) INJECTION ONCE
Refills: 0 | Status: DISCONTINUED | OUTPATIENT
Start: 2024-02-04 | End: 2024-02-04

## 2024-02-04 RX ORDER — MORPHINE SULFATE 50 MG/1
4 CAPSULE, EXTENDED RELEASE ORAL EVERY 4 HOURS
Refills: 0 | Status: DISCONTINUED | OUTPATIENT
Start: 2024-02-04 | End: 2024-02-07

## 2024-02-04 RX ORDER — ONDANSETRON 8 MG/1
4 TABLET, FILM COATED ORAL EVERY 4 HOURS
Refills: 0 | Status: DISCONTINUED | OUTPATIENT
Start: 2024-02-04 | End: 2024-02-07

## 2024-02-04 RX ORDER — ACETAMINOPHEN 500 MG
650 TABLET ORAL EVERY 6 HOURS
Refills: 0 | Status: DISCONTINUED | OUTPATIENT
Start: 2024-02-04 | End: 2024-02-07

## 2024-02-04 RX ORDER — INSULIN LISPRO 100/ML
5 VIAL (ML) SUBCUTANEOUS ONCE
Refills: 0 | Status: COMPLETED | OUTPATIENT
Start: 2024-02-04 | End: 2024-02-04

## 2024-02-04 RX ORDER — SODIUM CHLORIDE 9 MG/ML
1000 INJECTION INTRAMUSCULAR; INTRAVENOUS; SUBCUTANEOUS ONCE
Refills: 0 | Status: COMPLETED | OUTPATIENT
Start: 2024-02-04 | End: 2024-02-04

## 2024-02-04 RX ORDER — ONDANSETRON 8 MG/1
4 TABLET, FILM COATED ORAL ONCE
Refills: 0 | Status: DISCONTINUED | OUTPATIENT
Start: 2024-02-04 | End: 2024-02-04

## 2024-02-04 RX ORDER — INSULIN LISPRO 100/ML
VIAL (ML) SUBCUTANEOUS
Refills: 0 | Status: DISCONTINUED | OUTPATIENT
Start: 2024-02-04 | End: 2024-02-07

## 2024-02-04 RX ORDER — DEXTROSE 50 % IN WATER 50 %
15 SYRINGE (ML) INTRAVENOUS ONCE
Refills: 0 | Status: DISCONTINUED | OUTPATIENT
Start: 2024-02-04 | End: 2024-02-07

## 2024-02-04 RX ORDER — DEXTROSE 50 % IN WATER 50 %
12.5 SYRINGE (ML) INTRAVENOUS ONCE
Refills: 0 | Status: DISCONTINUED | OUTPATIENT
Start: 2024-02-04 | End: 2024-02-07

## 2024-02-04 RX ORDER — LANOLIN ALCOHOL/MO/W.PET/CERES
3 CREAM (GRAM) TOPICAL AT BEDTIME
Refills: 0 | Status: DISCONTINUED | OUTPATIENT
Start: 2024-02-04 | End: 2024-02-07

## 2024-02-04 RX ORDER — SODIUM CHLORIDE 9 MG/ML
1000 INJECTION, SOLUTION INTRAVENOUS
Refills: 0 | Status: DISCONTINUED | OUTPATIENT
Start: 2024-02-04 | End: 2024-02-04

## 2024-02-04 RX ADMIN — Medication 5 UNIT(S): at 12:49

## 2024-02-04 RX ADMIN — MORPHINE SULFATE 4 MILLIGRAM(S): 50 CAPSULE, EXTENDED RELEASE ORAL at 19:43

## 2024-02-04 RX ADMIN — MORPHINE SULFATE 4 MILLIGRAM(S): 50 CAPSULE, EXTENDED RELEASE ORAL at 20:13

## 2024-02-04 RX ADMIN — ONDANSETRON 4 MILLIGRAM(S): 8 TABLET, FILM COATED ORAL at 03:33

## 2024-02-04 RX ADMIN — MORPHINE SULFATE 4 MILLIGRAM(S): 50 CAPSULE, EXTENDED RELEASE ORAL at 10:59

## 2024-02-04 RX ADMIN — Medication 25 MILLIGRAM(S): at 22:08

## 2024-02-04 RX ADMIN — MORPHINE SULFATE 4 MILLIGRAM(S): 50 CAPSULE, EXTENDED RELEASE ORAL at 11:14

## 2024-02-04 RX ADMIN — Medication 15 MILLIGRAM(S): at 03:33

## 2024-02-04 RX ADMIN — ONDANSETRON 4 MILLIGRAM(S): 8 TABLET, FILM COATED ORAL at 10:59

## 2024-02-04 RX ADMIN — Medication 3 UNIT(S): at 15:23

## 2024-02-04 RX ADMIN — Medication 3: at 12:36

## 2024-02-04 RX ADMIN — MORPHINE SULFATE 4 MILLIGRAM(S): 50 CAPSULE, EXTENDED RELEASE ORAL at 04:39

## 2024-02-04 RX ADMIN — SODIUM CHLORIDE 2000 MILLILITER(S): 9 INJECTION INTRAMUSCULAR; INTRAVENOUS; SUBCUTANEOUS at 03:33

## 2024-02-04 RX ADMIN — MORPHINE SULFATE 4 MILLIGRAM(S): 50 CAPSULE, EXTENDED RELEASE ORAL at 15:24

## 2024-02-04 RX ADMIN — MORPHINE SULFATE 4 MILLIGRAM(S): 50 CAPSULE, EXTENDED RELEASE ORAL at 15:39

## 2024-02-04 NOTE — ED PROVIDER NOTE - IV ALTEPLASE ADMIN OUTSIDE HIDDEN
A&O x4.  ABC's intact.      Pt arrives with c/o leg & feet redness x 3-4 months, swelling 2 weeks ago and blisters started couple weeks ago. Sent from his doctors office for issue.   
show

## 2024-02-04 NOTE — H&P ADULT - ASSESSMENT
57 yo male with Hx. of CAD s/p stent placed on 4/26/2019, readmitted on 4/28/2019 with CP had  LHC stent patent,  The patient denies taking his medications exept ASA 81 mg, Hx of kidney stone  10 years ago, HTN, HLD , Prediabetes  last A1c 6.2 in 2019, presented in the ED for R flank pain, blood in the urine. The patient symptoms started 6 days ago with mild pain, noted some blood in the urine , flew to [place], returned yesterday, he developed worsened R flank pain, nausea, and blood in the urine.  CT abd shows R renal mass , hydroureteronephrosis , portocaval ,retroperitoneal lymphadenopathy,  RLL 9mm pulmonary nodule.   assessment Dx:                       1. Renal mass                       2. Gross hematuria                        3. Pulmonary metastasis suspected                       4. Cystitis                       5. DM2  not on meds                        6. CAD s/p stent,                       7. HTN                       8. HLD       Plan:    admit to medicine                medications Started on Morphine for pain  and Zofran for nausea, start insulin coverage,                VTEP: Venodynes                Labs: cbc ,bmp,               Radiology: CT abd / pelvis                Cardiac diagnostics: EKG done NSR               Consults: Urology, Oncology, Cardiology                Advance Directive: full code,                                  59 yo male with Hx. of CAD s/p stent placed on 4/26/2019, readmitted on 4/28/2019 with CP had  LHC stent patent,  The patient denies taking his medications exept ASA 81 mg, Hx of kidney stone  10 years ago, HTN, HLD , Prediabetes  last A1c 6.2 in 2019, presented in the ED for R flank pain, blood in the urine. The patient symptoms started 6 days ago with mild pain, noted some blood in the urine , flew to [place], returned yesterday, he developed worsened R flank pain, nausea, and blood in the urine.  CT abd shows R renal mass , hydroureteronephrosis , portocaval ,retroperitoneal lymphadenopathy,  RLL 9mm pulmonary nodule.   assessment Dx:                       1. Renal mass                       2. Gross hematuria                        3. Pulmonary metastasis suspected                       4. Cystitis                       5. DM2  not on meds                        6. CAD s/p stent,                       7. HTN                       8. HLD       Plan:    admit to medicine                medications Started on Morphine for pain  and Zofran for nausea, start insulin coverage,                VTEP: Venodynes                Labs: cbc ,bmp,               Radiology: CT abd / pelvis                Cardiac diagnostics: EKG done NSR               Total time spent 75 minutes.               Consults: Urology, Oncology, Cardiology                Advance Directive: full code,

## 2024-02-04 NOTE — PATIENT PROFILE ADULT - VISION (WITH CORRECTIVE LENSES IF THE PATIENT USUALLY WEARS THEM):
Normal vision: sees adequately in most situations; can see medication labels, newsprint Valtrex Counseling: I discussed with the patient the risks of valacyclovir including but not limited to kidney damage, nausea, vomiting and severe allergy.  The patient understands that if the infection seems to be worsening or is not improving, they are to call.

## 2024-02-04 NOTE — ED ADULT NURSE NOTE - OBJECTIVE STATEMENT
The patient is a 58y Male complaining of flank pain. Pt also endorsing blood in urine. Pt has hx of kidney stones. 20 g IV inserted labs drawn.

## 2024-02-04 NOTE — ED PROVIDER NOTE - OBJECTIVE STATEMENT
58-year-old man past medical history of renal stones presents with right flank pain.  Patient also notes blood in the urine for the past few days.  No fevers or chills.  No dysuria frequency or urgency.  Feels similar to previous stones.

## 2024-02-04 NOTE — ED PROVIDER NOTE - CLINICAL SUMMARY MEDICAL DECISION MAKING FREE TEXT BOX
Patient with concern for renal stones will CT pain control reassess.    I discussed the case with hospitalist, Dr Alonso, Patient accepted for admission for renal mass ureteral mass and lymphadenopathy, evaluation by urology.

## 2024-02-04 NOTE — H&P ADULT - NSHPLABSRESULTS_GEN_ALL_CORE
11.8   6.16  )-----------( 196      ( 04 Feb 2024 03:27 )             34.4       02-04    136  |  104  |  13  ----------------------------<  300<H>  3.9   |  26  |  1.28    Ca    9.8      04 Feb 2024 03:27    TPro  7.4  /  Alb  3.6  /  TBili  0.5  /  DBili  x   /  AST  12<L>  /  ALT  21  /  AlkPhos  63  02-04      UA Lg bld  CT A/P :1. Moderate to large heterogeneous enhancing mass in the right renal   midpole concerning for a renal neoplasm possibly renal cell carcinoma.  2. Mild hydroureteronephrosis to the level of the distal right ureter   with moderate right perinephric inflammatory change and a delayed   nephrogram. Questionable lesion within the distal right ureter which   could represent a ureteral mass.  3. Circumferential urinary bladder wall thickening which could be due to   underdistention versus a cystitis. Correlate with urinalysis.  4. Portacaval and right retroperitoneal lymphadenopathy.  5. Right lower lobe 9 mm pulmonary nodule for which a metastatic lesion   is not excluded.      EKG NSR

## 2024-02-04 NOTE — ED PROVIDER NOTE - PHYSICAL EXAMINATION
GEN - Appears uncomfortable  HEAD - NC/AT    EYES - EOMI, no conjunctival pallor, no scleral icterus  ENT -   mucous membranes  moist , no discharge  PULM - CTA b/l,  symmetric breath sounds  COR -  RRR, S1 S2, no murmurs  ABD - ND, NT, soft, no guarding, no rebound, no masses  , No CVA tenderness  EXTREMS -no edema, no deformity, warm and well perfused   SKIN - no rash or bruising      NEUROLOGIC - alert, sensation nl, motor 5/5 RUE/LUE/RLE/LLE

## 2024-02-04 NOTE — H&P ADULT - HISTORY OF PRESENT ILLNESS
HPI:   The patient is a 57 yo male with Hx. of CAD s/p stent placed on 4/26/2019, readmitted on 4/28/2019 with CP had  LHC stent patent,  The patient denies taking his medications exept ASA 81 mg, Hx of kidney stone  10 years ago, HTN, HLD , Prediabetes  last A1c 6.2 in 2019, presented in the ED for R flank pain, blood in the urine. The patient symptoms started 6 days ago with mild pain, noted some blood in the urine , flew to [place], returned yesterday, he developed worsened R flank pain, nausea, and blood in the urine.     PMHx:  CAD s/p stent placed on 4/26/2019, readmitted on 4/28/2019 with CP had  LHC stent patent, HTN, HLD, prediabetes,     PSHx: appendectomy    Family Hx: mother 82 Dx. with breast CA last week, Father medical Hx. unknown.    Social Hx.: not smoking, no alcohol use

## 2024-02-04 NOTE — PATIENT PROFILE ADULT - FALL HARM RISK - UNIVERSAL INTERVENTIONS
Bed in lowest position, wheels locked, appropriate side rails in place/Call bell, personal items and telephone in reach/Instruct patient to call for assistance before getting out of bed or chair/Non-slip footwear when patient is out of bed/[place] to call system/Physically safe environment - no spills, clutter or unnecessary equipment/Purposeful Proactive Rounding/Room/bathroom lighting operational, light cord in reach

## 2024-02-04 NOTE — H&P ADULT - NSHPREVIEWOFSYSTEMS_GEN_ALL_CORE
ROS: as above   Eyes: no changes in vision  ENT/Mouth: no changes    Cardiovascular: no chest pain    Respiratory: no SOB    Gastrointestinal: no diarrhea, no nausea, no vomiting    Genitourinary: blood in the urine , R flak pain,    Musculoskeletal: no pain    Integumentary: no itching    Neurological: No Headache, no tremor,    Endocrine: no excessive thirst,     Allergic/Immunologic: no itching

## 2024-02-04 NOTE — CONSULT NOTE ADULT - ASSESSMENT
57 y/o male with MHx of renal stones, HTN, HLD, CAD s/p stent p/w progressive right flank pain and now with hematuria. CT imaging noted moderate to large heterogeneous enhancing mass in the right renal midpole concerning for a renal neoplasm, questionable lesion within the distal right ureter which could represent a ureteral mass and portacaval and right retroperitoneal lymphadenopathy.    # Right renal midpole heterogeneous mass with associated portacaval and right retroperitoneal lymphadenopathy.    -p/w right flank pain and hematuria, with Hx of renal stones  -CT-A/P- 2/4/24-->Moderate to large heterogeneous enhancing mass in the right renal midpole concerning for a renal neoplasm possibly renal cell carcinoma. Mild hydroureteronephrosis to the level of the distal right ureter with moderate right perinephric inflammatory change and a delayed nephrogram. Questionable lesion within the distal right ureter which could represent a ureteral mass. Portacaval and right retroperitoneal lymphadenopathy. Right lower lobe 9 mm pulmonary nodule for which a metastatic lesion   is not excluded.  -CT-chest ordered for staging and completion purposes  -Urology following-->per patient Dr. Ovalle and team plans for a procedure today likely Ureteroscopy  -Will need a tissue Bx for a definitive diagnosis  -Hem/Onc will follow closely. await full w/u    # Anemia    -Hgb-11.8, chart review noted last normal at 14 g/dl in 4/2019  -Likely acute from hematuria, questionable underlying malignancy  -Unlikely from non hypoproliferative causes such as hemolysis or hypoproliferative due to nutritional deficiencies or bone marrow failure  -Serial CBC  -Supportive measures  -Transfuse if Hgb continue to drop or patient becomes symptomatic or hemodynamically unstable       57 y/o male with MHx of renal stones, HTN, HLD, CAD s/p stent p/w progressive right flank pain and now with hematuria. CT imaging noted moderate to large heterogeneous enhancing mass in the right renal midpole concerning for a renal neoplasm, questionable lesion within the distal right ureter which could represent a ureteral mass and portacaval and right retroperitoneal lymphadenopathy.    # Right renal midpole heterogeneous mass with associated portacaval and right retroperitoneal lymphadenopathy.    -p/w right flank pain and hematuria, with Hx of renal stones  -CT-A/P- 2/4/24-->Moderate to large heterogeneous enhancing mass in the right renal midpole concerning for a renal neoplasm possibly renal cell carcinoma. Mild hydroureteronephrosis to the level of the distal right ureter with moderate right perinephric inflammatory change and a delayed nephrogram. Questionable lesion within the distal right ureter which could represent a ureteral mass. Portacaval and right retroperitoneal lymphadenopathy. Right lower lobe 9 mm pulmonary nodule for which a metastatic lesion   is not excluded.  -CT-chest ordered for staging and completion purposes  -CT-a/p also noted enlarged prostate--->PSA ordered.  -Urology following-->per patient Dr. Ovalle and team plans for a procedure today likely Ureteroscopy  -Will need a tissue Bx for a definitive diagnosis  -Hem/Onc will follow closely. await full w/u    # Anemia    -Hgb-11.8, chart review noted last normal at 14 g/dl in 4/2019  -Likely acute from hematuria, questionable underlying malignancy  -Unlikely from non hypoproliferative causes such as hemolysis or hypoproliferative due to nutritional deficiencies or bone marrow failure  -Serial CBC  -Supportive measures  -Transfuse if Hgb continue to drop or patient becomes symptomatic or hemodynamically unstable       57 y/o male with MHx of renal stones, HTN, HLD, CAD s/p stent p/w progressive right flank pain and now with hematuria. CT imaging noted moderate to large heterogeneous enhancing mass in the right renal midpole concerning for a renal neoplasm, questionable lesion within the distal right ureter which could represent a ureteral mass and portacaval and right retroperitoneal lymphadenopathy.    # Right renal midpole heterogeneous mass with associated portacaval and right retroperitoneal lymphadenopathy.    -p/w right flank pain and hematuria, with Hx of renal stones  -CT-A/P- 2/4/24-->Moderate to large heterogeneous enhancing mass in the right renal midpole concerning for a renal neoplasm possibly renal cell carcinoma. Mild hydroureteronephrosis to the level of the distal right ureter with moderate right perinephric inflammatory change and a delayed nephrogram. Questionable lesion within the distal right ureter which could represent a ureteral mass. Portacaval and right retroperitoneal lymphadenopathy. Right lower lobe 9 mm pulmonary nodule for which a metastatic lesion   is not excluded.  -CT-chest ordered for staging and completion purposes  -CT-a/p also noted enlarged prostate--->PSA ordered.  -Urology following-->per patient Dr. Ovalle and team plans for a procedure today likely Ureteroscopy  -Will need a tissue Bx for a definitive diagnosis  -Hem/Onc will follow closely    # Anemia    -Hgb-11.8, chart review noted last normal at 14 g/dl in 4/2019  -Likely acute from hematuria, questionable underlying malignancy  -Unlikely from non hypoproliferative causes such as hemolysis or hypoproliferative due to nutritional deficiencies or bone marrow failure  -Serial CBC  -Supportive measures  -Transfuse if Hgb continue to drop or patient becomes symptomatic or hemodynamically unstable       57 y/o male with MHx of renal stones, HTN, HLD, CAD s/p stent p/w progressive right flank pain and now with hematuria. CT imaging noted moderate to large heterogeneous enhancing mass in the right renal midpole concerning for a renal neoplasm, questionable lesion within the distal right ureter which could represent a ureteral mass and portacaval and right retroperitoneal lymphadenopathy.    # Right renal midpole heterogeneous mass with associated portacaval and right retroperitoneal lymphadenopathy.  -p/w right flank pain and hematuria, with Hx of renal stones  -CT-A/P- 2/4/24-->Moderate to large heterogeneous enhancing mass in the right renal midpole concerning for a renal neoplasm possibly renal cell carcinoma. Mild hydroureteronephrosis to the level of the distal right ureter with moderate right perinephric inflammatory change and a delayed nephrogram. Questionable lesion within the distal right ureter which could represent a ureteral mass. Portacaval and right retroperitoneal lymphadenopathy. Right lower lobe 9 mm pulmonary nodule for which a metastatic lesion   is not excluded.  -CT-chest ordered for staging and completion purposes  -CT-a/p also noted enlarged prostate--->PSA ordered.  -Urology following-->per patient Dr. Ovalle and team plans for a procedure today likely Ureteroscopy  -Will need a tissue Bx for a definitive diagnosis  -Hem/Onc will follow closely    # Anemia  -Hgb-11.8, chart review noted last normal at 14 g/dl in 4/2019  -Likely acute from hematuria, questionable underlying malignancy  -Unlikely from non hypoproliferative causes such as hemolysis or hypoproliferative due to nutritional deficiencies or bone marrow failure  -Serial CBC  -Supportive measures  -Transfuse if Hgb continue to drop or patient becomes symptomatic or hemodynamically unstable

## 2024-02-04 NOTE — BRIEF OPERATIVE NOTE - NSICDXBRIEFPROCEDURE_GEN_ALL_CORE_FT
PROCEDURES:  Cystoscopy, with retrograde pyelogram and ureteral stent insertion 04-Feb-2024 18:00:59  Paramjit Ovalle

## 2024-02-04 NOTE — ED PROVIDER NOTE - CCCP TRG CHIEF CMPLNT
History


Report prepared by Samantha:  Joshua Sims


Under the Supervision of:  Dr. Riley Zhang D.O.


First contact with patient:  18:19


Chief Complaint:  HYPERTENSION


Stated Complaint:  HPB





History of Present Illness


The patient is a 78 year old female who presents to the Emergency Room with 

complaints of a worsening headache that began 90 minutes ago. She describes the 

pain as a pounding sensation in her forehead region. The patient states it is 

not the worst headache of her life and reports it is not as severe as a 7/10 in 

severity.  She states that since the onset, she believes the pain may have 

become slightly worse. She notes that she does not typically experience 

headaches. The patient states that after developing her headache, she checked 

her blood pressure. She notes that her blood pressure was "rose high". The 

patient denies change in vision, fevers, chest pain, shortness of breath, nausea

, vomiting, numbness, lower extremity swelling, diarrhea, pain with urination, 

and melena. The patient reports she is unsure if she took her medications 

today. The patient's son notes that the patient does have memory issues at 

baseline.





   Source of History:  patient, family


   Onset:  90 minutes ago


   Position:  head (forehead region)


   Quality:  other (pounding)


   Timing:  worsening


   Associated Symptoms:  No fevers, No chest pain, No SOB, No nausea, No 

vomiting, No melena, No urinary symptoms, No numbness


Note:


Associated symptoms: hypertension


Denies: change in vision, lower extremity swelling





Review of Systems


See HPI for pertinent positives & negatives. A total of 10 systems reviewed and 

were otherwise negative.





Past Medical & Surgical


Medical Problems:


(1) Atrial Fibrillation


(2) Breast cancer


(3) Esophageal Reflux


(4) Hyperlipidemia Nec/Nos


(5) Hypertension


(6) Hypertension Nos


(7) Hyponatremia


(8) Stenosis of right carotid artery without infarction








Family History





Cancer


Diabetes mellitus


Gallbladder disease


Hypertension


Lung disease





Social History


Smoking Status:  Never Smoker


Alcohol Use:  none


Drug Use:  none


Marital Status:  


Housing Status:  lives with significant other


Occupation Status:  retired





Current/Historical Medications


Scheduled


Aspirin (Aspirin Ec), 81 MG PO QAM


Clopidogrel Bisulfate (Clopidogrel), 75 MG PO DAILY


Diltiazem Hcl Extended Release (Tiazac 360 Mg), 360 MG PO QAM


Hydralazine Hcl (Apresoline), 25 MG PO TID


Lisinopril (Lisinopril), 20 MG PO AMHS


Simvastatin (Simvastatin), 20 MG PO Q2D





Allergies


Coded Allergies:  


     Penicillins (Verified  Allergy, Intermediate, "swelling" (pt does not 

remember where), 5/13/18)





Physical Exam


Vital Signs











  Date Time  Temp Pulse Resp B/P (MAP) Pulse Ox O2 Delivery O2 Flow Rate FiO2


 


7/25/18 21:16  68 18 187/87 97 Room Air  


 


7/25/18 19:58    178/92    


 


7/25/18 18:15 36.8 70 18 205/79 97 Room Air  











Physical Exam


GENERAL: Sitting up in bed, alert, well appearing, well nourished, no distress, 

non-toxic 


EYE EXAM: normal conjunctiva. PERRL and EOM's intact.


OROPHARYNX: no exudate, no erythema, lips, buccal mucosa, and tongue normal and 

mucous membranes are moist


NECK: supple, no nuchal rigidity, no adenopathy, non-tender


LUNGS: Clear to auscultation. Normal chest wall mechanics


HEART:  Systolic ejection murmur, S1 normal and S2 normal 


ABDOMEN: abdomen soft, non-tender, normo-active bowel sounds, no masses, no 

rebound or guarding. 


BACK: Back is symmetrical on inspection and there is no deformity, no midline 

tenderness, no CVA tenderness. 


SKIN: no rashes and no bruising 


UPPER EXTREMITIES: upper extremities are grossly normal. 


LOWER EXTREMITIES: No pitting edema. 


NEURO EXAM: Awake, alert, and oriented to person, place, but not year. Does not 

remember taking medications baseline per family. cranial nerves II-XII intact, 

normal speech,  no weakness of arms, no weakness of legs. No drift. Finger to 

nose intact. Gross sensation intact. Ambulated without difficulty.





Medical Decision & Procedures


ER Provider


Diagnostic Interpretation:


Radiology results as stated below per my review and the radiologist's 

interpretation: 





HEAD WITHOUT CONTRAST (CT)





CLINICAL HISTORY: 78 years-old Female with HA .  Acute headache  





TECHNIQUE: Multiple axial CT images of the head were obtained without contrast. 


A dose lowering technique was utilized adhering to the principles of ALARA.





CT DOSE:  537.48 mGy.cm





COMPARISON: CT head 8/29/2017.





FINDINGS: 





No acute intracranial hemorrhage, midline shift, intracranial mass,


hydrocephalus, territorial ischemia or abnormal extra-axial collection.


Age-related involutional changes. Patchy areas of low-attenuation throughout the


white matter redemonstrated suggesting chronic microvascular ischemic changes.


Remote appearing lacunar infarction about the left caudate head.





The calvarium is intact.  The paranasal sinuses, mastoid air cells, and middle


ear cavities are clear.





IMPRESSION:  No acute intracranial abnormality.











The above report was generated using voice recognition software. It may contain


grammatical, syntax or spelling errors.











Electronically signed by:  Francisco Charles M.D.


7/25/2018 7:10 PM





Dictated Date/Time:  7/25/2018 7:07 PM





CHEST ONE VIEW PORTABLE





HISTORY:  78 years-old Female htn  acute hypertension





COMPARISON: Chest radiograph 8/11/2017





TECHNIQUE: Portable AP view of the chest





FINDINGS: 


Cardiomediastinal and hilar silhouettes are within normal limits. Calcification


of the aorta. Linear subsegmental left basilar opacities suggest atelectasis.


There is no pneumothorax, pleural effusion or overt pulmonary edema.


Periarticular calcifications about the bilateral shoulders suggests calcific


tendinosis or calcific bursitis. Degenerative changes of the shoulders and


spine.





IMPRESSION: No acute process. 











The above report was generated using voice recognition software. It may contain


grammatical, syntax or spelling errors.











Electronically signed by:  Francisco Charles M.D.


7/25/2018 7:03 PM





Dictated Date/Time:  7/25/2018 7:02 PM





Laboratory Results


7/25/18 18:42








Red Blood Count 4.31, Mean Corpuscular Volume 86.8, Mean Corpuscular Hemoglobin 

29.7, Mean Corpuscular Hemoglobin Concent 34.2, Mean Platelet Volume 9.1, 

Neutrophils (%) (Auto) 54.1, Lymphocytes (%) (Auto) 37.1, Monocytes (%) (Auto) 

7.7, Eosinophils (%) (Auto) 0.9, Basophils (%) (Auto) 0.1, Neutrophils # (Auto) 

3.66, Lymphocytes # (Auto) 2.51, Monocytes # (Auto) 0.52, Eosinophils # (Auto) 

0.06, Basophils # (Auto) 0.01





7/25/18 18:42

















Test


  7/25/18


18:42 7/25/18


19:11


 


White Blood Count


  6.77 K/uL


(4.8-10.8) 


 


 


Red Blood Count


  4.31 M/uL


(4.2-5.4) 


 


 


Hemoglobin


  12.8 g/dL


(12.0-16.0) 


 


 


Hematocrit 37.4 % (37-47)  


 


Mean Corpuscular Volume


  86.8 fL


() 


 


 


Mean Corpuscular Hemoglobin


  29.7 pg


(25-34) 


 


 


Mean Corpuscular Hemoglobin


Concent 34.2 g/dl


(32-36) 


 


 


Platelet Count


  279 K/uL


(130-400) 


 


 


Mean Platelet Volume


  9.1 fL


(7.4-10.4) 


 


 


Neutrophils (%) (Auto) 54.1 %  


 


Lymphocytes (%) (Auto) 37.1 %  


 


Monocytes (%) (Auto) 7.7 %  


 


Eosinophils (%) (Auto) 0.9 %  


 


Basophils (%) (Auto) 0.1 %  


 


Neutrophils # (Auto)


  3.66 K/uL


(1.4-6.5) 


 


 


Lymphocytes # (Auto)


  2.51 K/uL


(1.2-3.4) 


 


 


Monocytes # (Auto)


  0.52 K/uL


(0.11-0.59) 


 


 


Eosinophils # (Auto)


  0.06 K/uL


(0-0.5) 


 


 


Basophils # (Auto)


  0.01 K/uL


(0-0.2) 


 


 


RDW Standard Deviation


  40.7 fL


(36.4-46.3) 


 


 


RDW Coefficient of Variation


  12.6 %


(11.5-14.5) 


 


 


Immature Granulocyte % (Auto) 0.1 %  


 


Immature Granulocyte # (Auto)


  0.01 K/uL


(0.00-0.02) 


 


 


Anion Gap


  11.0 mmol/L


(3-11) 


 


 


Est Creatinine Clear Calc


Drug Dose 29.7 ml/min 


  


 


 


Estimated GFR (


American) 59.5 


  


 


 


Estimated GFR (Non-


American 51.3 


  


 


 


BUN/Creatinine Ratio 12.6 (10-20)  


 


Osmolality


  251 mOsm/kg


(280-300) 


 


 


Calcium Level


  8.9 mg/dl


(8.5-10.1) 


 


 


Total Bilirubin


  0.8 mg/dl


(0.2-1) 


 


 


Direct Bilirubin


  0.2 mg/dl


(0-0.2) 


 


 


Aspartate Amino Transf


(AST/SGOT) 22 U/L (15-37) 


  


 


 


Alanine Aminotransferase


(ALT/SGPT) 19 U/L (12-78) 


  


 


 


Alkaline Phosphatase


  53 U/L


() 


 


 


Total Protein


  7.4 gm/dl


(6.4-8.2) 


 


 


Albumin


  4.3 gm/dl


(3.4-5.0) 


 


 


Ethyl Alcohol mg/dL


  


  < 3.0 mg/dl


(0-3)





Laboratory results per my review.





Medications Administered











 Medications


  (Trade)  Dose


 Ordered  Sig/Queenie


 Route  Start Time


 Stop Time Status Last Admin


Dose Admin


 


 Acetaminophen


  (Tylenol Tab)  1,000 mg  NOW  STAT


 PO  7/25/18 18:33


 7/25/18 18:34 DC 7/25/18 21:12


1,000 MG


 


 Sodium Chloride  500 ml @ 


 999 mls/hr  Q31M STAT


 IV  7/25/18 20:47


 7/25/18 21:17 DC 7/25/18 21:14


999 MLS/HR











ECG Per My Interpretation


Indication:  other (Hypertension)


Rate (beats per minute):  68


Rhythm:  sinus rhythm


Findings:  RBBB (incomplete), other (Normal axis, No PVCs)





ED Course


ED COURSE: 


Vital signs were reviewed and showed hypertension


The patients medical record was reviewed


The above diagnostic studies were performed and reviewed.


ED treatments and interventions as stated above. 





1820: The patient was evaluated in room B06. A complete history and physical 

examination was performed.





1833: Ordered Tylenol Tab 1000 mg PO.





1951: I reevaluated the patient and updated her. Her blood pressure is down to 

170s.





2047: Ordered Sodium Chloride 500 ml @ 999 mls/hr IV.





2051: Upon reevaluation, the patient is resting comfortably. I discussed my 

findings with the patient and she understands and agrees with the treatment 

plan.   


Based on the patients age, coexisting illnesses, exam and lab findings the 

decision to treat as an inpatient was made.


The patient remained stable while under my care.


The patient will be evaluated for further management.





2125: I discussed the patients case with Dr. Abdul, [facility] Hospitalist. He 

understands the patients condition and agrees to accept the patient. The 

patient will be evaluated for further management and care.





Medical Decision


Differential diagnosis benign hypertension, hypertensive emergency, 

cardiovascular pathology, pheochromocytoma, electrolyte abnormality, renal 

disease, endorgan damage, as well as others were entertained.





Patient is an 78-year-old female who presents the ER for an elevated blood 

pressure.  Patient has no other complaints at this time.  She does have 

dementia.  Patient does not remember taking her medications and son notes that 

this is baseline.  CBC and BMP were obtained.  She was given her oral dose of 

her hydralazine that she normally takes.  Sodium was 123.  This is down from 

140.  Patient was given fluids and was discussed with internal medicine for 

observation for hyponatremia.





Medication Reconcilliation


Current Medication List:  was personally reviewed by me





Blood Pressure Screening


Patient's blood pressure:  Elevated blood pressure


Referred to Hospitalist





Consults


Time Called:  2053


Consulting Physician:  Dr. Abdul [facility] Hospitalist


Returned Call:  2125


 I discussed the patients case with Dr. Abdul [facility] Hospitalist. He 

understands the patients condition and agrees to accept the patient. The 

patient will be evaluated for further management and care.





Impression





 Primary Impression:  


 Hyponatremia


 Additional Impression:  


 Hypertension





Scribe Attestation


The scribe's documentation has been prepared under my direction and personally 

reviewed by me in its entirety. I confirm that the note above accurately 

reflects all work, treatment, procedures, and medical decision making performed 

by me.





Departure Information


Dispostion


Being Evaluated By Hospitalist





Referrals


Burak Monique III, CRNP (PCP)





Patient Instructions


[facility]





Problem Qualifiers








 Additional Impression:  


 Hypertension


 Hypertension type:  unspecified  Qualified Codes:  I10 - Essential (primary) 

hypertension flank pain

## 2024-02-04 NOTE — ED ADULT NURSE NOTE - NSFALLUNIVINTERV_ED_ALL_ED
Bed/Stretcher in lowest position, wheels locked, appropriate side rails in place/Call bell, personal items and telephone in reach/Instruct patient to call for assistance before getting out of bed/chair/stretcher/Non-slip footwear applied when patient is off stretcher/[place] to call system/Physically safe environment - no spills, clutter or unnecessary equipment/Purposeful proactive rounding/Room/bathroom lighting operational, light cord in reach

## 2024-02-04 NOTE — H&P ADULT - NSHPPHYSICALEXAM_GEN_ALL_CORE
Physical Exam: Vital Signs Last 24 Hrs  T(C): 37.1 (04 Feb 2024 08:57), Max: 37.6 (04 Feb 2024 02:38)  T(F): 98.7 (04 Feb 2024 08:57), Max: 99.7 (04 Feb 2024 06:17)  HR: 73 (04 Feb 2024 08:57) (73 - 94)  BP: 132/79 (04 Feb 2024 08:57) (132/79 - 165/95)  BP(mean): 114 (04 Feb 2024 02:38) (114 - 114)  RR: 18 (04 Feb 2024 08:57) (17 - 20)  SpO2: 97% (04 Feb 2024 08:57) (94% - 100%)    Parameters below as of 04 Feb 2024 08:57  Patient On (Oxygen Delivery Method): room air            HEENT: PRRL EOMI    MOUTH/TEETH/GUMS: Clear    NECK: no JVD    LUNGS: Clear    HEART: S1,S2 RR    ABDOMEN: soft nontender    EXTREMITIES:  no pedal edema    MUSCULOSKELETAL: no joint swelling     NEURO: no tremor, no focal signs.    SKIN: no rash    : R CVA tenderness

## 2024-02-04 NOTE — CONSULT NOTE ADULT - NS ATTEND AMEND GEN_ALL_CORE FT
57 y/o male with MHx of renal stones, HTN, HLD, CAD s/p stent p/w progressive right flank pain and now with hematuria.  CT A/P on 2/4/24 revealed a 7.3cm R renal mass and RP lymphadenopathy. RLL small nodule also seen.   Concern for renal neoplasm.   Urology following- plan for procedure today- note pending for final recommendations.   Renal function is normal.   Oncology will continue to follow up after urology evaluation.

## 2024-02-04 NOTE — ED ADULT TRIAGE NOTE - CHIEF COMPLAINT QUOTE
pt bib spouse c/o right flank pain and blood in urine x 1 week, but worse tonight. - meds pta. Pt A&ox4, ambulatory, no s/s of distress. Hx of kidney stones

## 2024-02-05 DIAGNOSIS — I25.10 ATHEROSCLEROTIC HEART DISEASE OF NATIVE CORONARY ARTERY WITHOUT ANGINA PECTORIS: ICD-10-CM

## 2024-02-05 DIAGNOSIS — Z01.818 ENCOUNTER FOR OTHER PREPROCEDURAL EXAMINATION: ICD-10-CM

## 2024-02-05 DIAGNOSIS — N28.89 OTHER SPECIFIED DISORDERS OF KIDNEY AND URETER: ICD-10-CM

## 2024-02-05 LAB
A1C WITH ESTIMATED AVERAGE GLUCOSE RESULT: 10.4 % — HIGH (ref 4–5.6)
ANION GAP SERPL CALC-SCNC: 2 MMOL/L — LOW (ref 5–17)
BUN SERPL-MCNC: 18 MG/DL — SIGNIFICANT CHANGE UP (ref 7–23)
CALCIUM SERPL-MCNC: 9.2 MG/DL — SIGNIFICANT CHANGE UP (ref 8.5–10.1)
CHLORIDE SERPL-SCNC: 104 MMOL/L — SIGNIFICANT CHANGE UP (ref 96–108)
CO2 SERPL-SCNC: 29 MMOL/L — SIGNIFICANT CHANGE UP (ref 22–31)
CREAT SERPL-MCNC: 1.11 MG/DL — SIGNIFICANT CHANGE UP (ref 0.5–1.3)
CULTURE RESULTS: SIGNIFICANT CHANGE UP
EGFR: 76 ML/MIN/1.73M2 — SIGNIFICANT CHANGE UP
ESTIMATED AVERAGE GLUCOSE: 252 MG/DL — HIGH (ref 68–114)
FERRITIN SERPL-MCNC: 611 NG/ML — HIGH (ref 30–400)
FOLATE SERPL-MCNC: 8.1 NG/ML — SIGNIFICANT CHANGE UP
GLUCOSE BLDC GLUCOMTR-MCNC: 169 MG/DL — HIGH (ref 70–99)
GLUCOSE BLDC GLUCOMTR-MCNC: 187 MG/DL — HIGH (ref 70–99)
GLUCOSE BLDC GLUCOMTR-MCNC: 220 MG/DL — HIGH (ref 70–99)
GLUCOSE BLDC GLUCOMTR-MCNC: 257 MG/DL — HIGH (ref 70–99)
GLUCOSE SERPL-MCNC: 273 MG/DL — HIGH (ref 70–99)
HCT VFR BLD CALC: 35.3 % — LOW (ref 39–50)
HGB BLD-MCNC: 11.6 G/DL — LOW (ref 13–17)
IRON SATN MFR SERPL: 14 % — LOW (ref 16–55)
IRON SATN MFR SERPL: 29 UG/DL — LOW (ref 45–165)
MCHC RBC-ENTMCNC: 29.1 PG — SIGNIFICANT CHANGE UP (ref 27–34)
MCHC RBC-ENTMCNC: 32.9 GM/DL — SIGNIFICANT CHANGE UP (ref 32–36)
MCV RBC AUTO: 88.5 FL — SIGNIFICANT CHANGE UP (ref 80–100)
NON-GYNECOLOGICAL CYTOLOGY STUDY: SIGNIFICANT CHANGE UP
PLATELET # BLD AUTO: 201 K/UL — SIGNIFICANT CHANGE UP (ref 150–400)
POTASSIUM SERPL-MCNC: 4.2 MMOL/L — SIGNIFICANT CHANGE UP (ref 3.5–5.3)
POTASSIUM SERPL-SCNC: 4.2 MMOL/L — SIGNIFICANT CHANGE UP (ref 3.5–5.3)
RBC # BLD: 3.99 M/UL — LOW (ref 4.2–5.8)
RBC # FLD: 13.3 % — SIGNIFICANT CHANGE UP (ref 10.3–14.5)
SODIUM SERPL-SCNC: 135 MMOL/L — SIGNIFICANT CHANGE UP (ref 135–145)
SPECIMEN SOURCE: SIGNIFICANT CHANGE UP
TIBC SERPL-MCNC: 205 UG/DL — LOW (ref 220–430)
UIBC SERPL-MCNC: 176 UG/DL — SIGNIFICANT CHANGE UP (ref 110–370)
VIT B12 SERPL-MCNC: 543 PG/ML — SIGNIFICANT CHANGE UP (ref 232–1245)
WBC # BLD: 6.45 K/UL — SIGNIFICANT CHANGE UP (ref 3.8–10.5)
WBC # FLD AUTO: 6.45 K/UL — SIGNIFICANT CHANGE UP (ref 3.8–10.5)

## 2024-02-05 PROCEDURE — 99221 1ST HOSP IP/OBS SF/LOW 40: CPT

## 2024-02-05 PROCEDURE — 99233 SBSQ HOSP IP/OBS HIGH 50: CPT

## 2024-02-05 PROCEDURE — 99223 1ST HOSP IP/OBS HIGH 75: CPT

## 2024-02-05 PROCEDURE — 74183 MRI ABD W/O CNTR FLWD CNTR: CPT | Mod: 26

## 2024-02-05 RX ORDER — PANTOPRAZOLE SODIUM 20 MG/1
40 TABLET, DELAYED RELEASE ORAL
Refills: 0 | Status: DISCONTINUED | OUTPATIENT
Start: 2024-02-05 | End: 2024-02-07

## 2024-02-05 RX ADMIN — MORPHINE SULFATE 4 MILLIGRAM(S): 50 CAPSULE, EXTENDED RELEASE ORAL at 23:40

## 2024-02-05 RX ADMIN — Medication 2: at 08:20

## 2024-02-05 RX ADMIN — Medication 1: at 11:35

## 2024-02-05 RX ADMIN — Medication 650 MILLIGRAM(S): at 22:09

## 2024-02-05 RX ADMIN — Medication 30 MILLILITER(S): at 11:31

## 2024-02-05 RX ADMIN — Medication 25 MILLIGRAM(S): at 09:20

## 2024-02-05 RX ADMIN — Medication 650 MILLIGRAM(S): at 23:39

## 2024-02-05 RX ADMIN — Medication 25 MILLIGRAM(S): at 22:09

## 2024-02-05 RX ADMIN — MORPHINE SULFATE 4 MILLIGRAM(S): 50 CAPSULE, EXTENDED RELEASE ORAL at 11:47

## 2024-02-05 RX ADMIN — Medication 1: at 18:33

## 2024-02-05 RX ADMIN — Medication 81 MILLIGRAM(S): at 09:20

## 2024-02-05 RX ADMIN — MORPHINE SULFATE 4 MILLIGRAM(S): 50 CAPSULE, EXTENDED RELEASE ORAL at 12:17

## 2024-02-05 RX ADMIN — MORPHINE SULFATE 4 MILLIGRAM(S): 50 CAPSULE, EXTENDED RELEASE ORAL at 22:10

## 2024-02-05 RX ADMIN — PANTOPRAZOLE SODIUM 40 MILLIGRAM(S): 20 TABLET, DELAYED RELEASE ORAL at 11:30

## 2024-02-05 NOTE — CONSULT NOTE ADULT - ASSESSMENT
57yo M with right renal mass, lymphadenopathy referred to IR for biopsy 57yo M with right renal mass, lymphadenopathy referred to IR for biopsy  - Pt had ASA today at 9AM  - RP LAD is centrally located  - MRI Pending

## 2024-02-05 NOTE — DIETITIAN INITIAL EVALUATION ADULT - PERTINENT MEDS FT
MEDICATIONS  (STANDING):  aspirin enteric coated 81 milliGRAM(s) Oral daily  dextrose 5%. 1000 milliLiter(s) (50 mL/Hr) IV Continuous <Continuous>  dextrose 5%. 1000 milliLiter(s) (100 mL/Hr) IV Continuous <Continuous>  dextrose 50% Injectable 12.5 Gram(s) IV Push once  dextrose 50% Injectable 25 Gram(s) IV Push once  dextrose 50% Injectable 25 Gram(s) IV Push once  glucagon  Injectable 1 milliGRAM(s) IntraMuscular once  insulin lispro (ADMELOG) corrective regimen sliding scale   SubCutaneous three times a day before meals  metoprolol tartrate 25 milliGRAM(s) Oral two times a day  pantoprazole    Tablet 40 milliGRAM(s) Oral before breakfast    MEDICATIONS  (PRN):  acetaminophen     Tablet .. 650 milliGRAM(s) Oral every 6 hours PRN Temp greater or equal to 38C (100.4F), Mild Pain (1 - 3)  aluminum hydroxide/magnesium hydroxide/simethicone Suspension 30 milliLiter(s) Oral every 4 hours PRN Dyspepsia  dextrose Oral Gel 15 Gram(s) Oral once PRN Blood Glucose LESS THAN 70 milliGRAM(s)/deciliter  melatonin 3 milliGRAM(s) Oral at bedtime PRN Insomnia  morphine  - Injectable 4 milliGRAM(s) IV Push every 4 hours PRN Moderate Pain (4 - 6)  ondansetron Injectable 4 milliGRAM(s) IV Push every 4 hours PRN Nausea and/or Vomiting  ondansetron Injectable 4 milliGRAM(s) IV Push every 4 hours PRN Nausea and/or Vomiting

## 2024-02-05 NOTE — CONSULT NOTE ADULT - PROBLEM SELECTOR RECOMMENDATION 9
-tolerated surgery w/o problems  -no cardiac symptoms postop.  -no further reccs.
- Reviewed with Dr. Lei, recommend completing MRI, as RCC is imaging diagnostic and may not require a biopsy. If a biopsy is still needed post MRI, Lymphadenopathy is not accessible due to overlying organs/vessels. Biopsy of left pulmonary lesion is possible, but pt would need to hold ASA x5 days prior. Please refer pt to IR for outpatient biopsy after discharge if it is deemed necessary. Heme/Onc NP aware.

## 2024-02-05 NOTE — CONSULT NOTE ADULT - PROBLEM SELECTOR RECOMMENDATION 2
-cont. ASA  -Should followup w/ cardio 2-3 weeks post d/c  to consider PCSK9 inhibitor based on lipids as he cannot tolerate statin  -may follow w/ n[facility] cardio at [facility] or [place].  Will sign off

## 2024-02-05 NOTE — DIETITIAN INITIAL EVALUATION ADULT - OTHER INFO
The patient is a 57 yo male with Hx. of CAD s/p stent placed on 4/26/2019, readmitted on 4/28/2019 with CP had  LHC stent patent,  The patient denies taking his medications exept ASA 81 mg, Hx of kidney stone  10 years ago, HTN, HLD , Prediabetes  last A1c 6.2 in 2019, presented in the ED for R flank pain, blood in the urine. The patient symptoms started 6 days ago with mild pain, noted some blood in the urine , flew to [place], returned yesterday, he developed worsened R flank pain, nausea, and blood in the urine. Admit for renal mass.     Upon RD visit this morning, reports improved appetite, ate dinner last night and breakfast this morning, meeting at least 75% ENN. Currently on consistent carb diet. Labs reviewed: POCT variable x24 hrs and HgA1c elevated at 10.4% on 2/5; received 15U Admelog x24 hours - continue to monitor and optimize BG levels between 140-180 mg/dL by medical management. Reports # x10 mo ago w/ ~40# intentional wt loss. RD unable to otbain bed scale wt 2/2 bed scale error upon visit. EMR wt 195# obtained by RN on 2/4, appears accurate. Intentional wt loss 42# / 17.7% x10 mo - not clin sig at this time. Appears overweight w/ NFPE revealing no muscle/fat wasting at this time. Recommend to c/w consistent carb diet for now 2/2 variable POCT, however, if maintained between 140-180 mg/dL, recommend to liberalize diet to regular to maximize caloric and protein intake. Will trial Premier protein shake once daily to optimize nutritional needs (provides 160 kcal, 30 g protein/ shake), pt receptive. Oncology following, as per MD, CT shows renal mass "concerning for a renal neoplasm possibly renal cell CA," now s/p cystoscopy w/ retrograde pyelogram and ureteral stent insertion on 2/4. RD did not feel DM education appropriate at time of visit 2/2 new findings of CA upon admit - please re-consult RD if pt requesting DM education, otherwise, strongly recommend pt f/u w/ outpt RD and endocrinologist to assist w/ glycemic control. See below for other recs.

## 2024-02-05 NOTE — DIETITIAN INITIAL EVALUATION ADULT - ORAL NUTRITION SUPPLEMENTS
Premier protein shake once daily to optimize nutritional needs (provides 160 kcal, 30 g protein/ shake)

## 2024-02-05 NOTE — PROGRESS NOTE ADULT - SUBJECTIVE AND OBJECTIVE BOX
HPI:  57 y/o male with MHx of renal stones, HTN, HLD, CAD s/p stent p/w progressive right flank pain and now with hematuria.    2/4/24; Seen at bedside accompanied by spouse, reports right flank pain of 10/10, when pain medications wear off. Currently pain upon coughing. Also reports hematuria fo few days and at times also passes clots.     2/5/24: Seen at bedside along with Dr. Senior, in no acute distress.    PAST MEDICAL & SURGICAL HISTORY:    Pneumonia    HTN (hypertension)    HLD (hyperlipidemia)    CAD (coronary artery disease)    appendectomy-1994    torn meniscus of left knee  1995    tonsillectomy and adenoidectomy  1968    Former Smoker    FAMILY HISTORY:    No pertinent family history in first degree relatives      MEDICATIONS  (STANDING):    metoprolol tartrate 25 milliGRAM(s) Oral two times a day    MEDICATIONS  (PRN):    aspirin enteric coated 81 milliGRAM(s) Oral daily  dextrose 5%. 1000 milliLiter(s) (50 mL/Hr) IV Continuous <Continuous>  dextrose 5%. 1000 milliLiter(s) (100 mL/Hr) IV Continuous <Continuous>  dextrose 50% Injectable 12.5 Gram(s) IV Push once  dextrose 50% Injectable 25 Gram(s) IV Push once  dextrose 50% Injectable 25 Gram(s) IV Push once  glucagon  Injectable 1 milliGRAM(s) IntraMuscular once  insulin lispro (ADMELOG) corrective regimen sliding scale   SubCutaneous three times a day before meals  metoprolol tartrate 25 milliGRAM(s) Oral two times a day  pantoprazole    Tablet 40 milliGRAM(s) Oral before breakfast    MEDICATIONS  (PRN):  acetaminophen     Tablet .. 650 milliGRAM(s) Oral every 6 hours PRN Temp greater or equal to 38C (100.4F), Mild Pain (1 - 3)  aluminum hydroxide/magnesium hydroxide/simethicone Suspension 30 milliLiter(s) Oral every 4 hours PRN Dyspepsia  dextrose Oral Gel 15 Gram(s) Oral once PRN Blood Glucose LESS THAN 70 milliGRAM(s)/deciliter  melatonin 3 milliGRAM(s) Oral at bedtime PRN Insomnia  morphine  - Injectable 4 milliGRAM(s) IV Push every 4 hours PRN Moderate Pain (4 - 6)  ondansetron Injectable 4 milliGRAM(s) IV Push every 4 hours PRN Nausea and/or Vomiting  ondansetron Injectable 4 milliGRAM(s) IV Push every 4 hours PRN Nausea and/or Vomiting      Allergies    sulfa drugs (Unknown)    REVIEW OF SYSTEM:    Constitutional, Eyes, ENT, Cardiovascular, Respiratory, Gastrointestinal, Genitourinary, Musculoskeletal, Integumentary, Neurological, Psychiatric, Endocrine, Heme/Lymph and Allergic/Immunologic review of systems are otherwise negative except as noted in HPI.     Vital Signs Last 24 Hrs  T(C): 36.7 (05 Feb 2024 09:09), Max: 37.4 (04 Feb 2024 18:46)  T(F): 98 (05 Feb 2024 09:09), Max: 99.4 (04 Feb 2024 18:46)  HR: 98 (05 Feb 2024 09:09) (78 - 98)  BP: 153/81 (05 Feb 2024 09:09) (115/81 - 153/81)  BP(mean): --  RR: 18 (05 Feb 2024 09:09) (14 - 20)  SpO2: 99% (05 Feb 2024 09:09) (94% - 99%)    Parameters below as of 05 Feb 2024 09:09  Patient On (Oxygen Delivery Method): room air    PHYSICAL EXAM:    Constitutional: no acute distress, but appears in distress and anxious about the unknown  Eyes: no conjunctival infection, anicteric.   ENT: pharynx is unremarkable  Neck: supple without JVD  Pulmonary: clear to auscultation bilaterally   Cardiac: RRR  Vascular: no calf tenderness, venous stasis changes, varices  Abdomen: normoactive bowel sounds, soft and nontender, no hepatosplenomegaly or masses appreciated  Lymphatic: no peripheral adenopathy appreciated  Musculoskeletal: full range of motion and no deformities appreciated  Skin: normal appearance, no rash/erythema  Neurology: awake, alert, oriented; no focal deficits    LABS:                          11.6   6.45  )-----------( 201      ( 05 Feb 2024 06:37 )             35.3     02-05    135  |  104  |  18  ----------------------------<  273<H>  4.2   |  29  |  1.11    Ca    9.2      05 Feb 2024 06:37    TPro  7.4  /  Alb  3.6  /  TBili  0.5  /  DBili  x   /  AST  12<L>  /  ALT  21  /  AlkPhos  63  02-04      Urinalysis Basic - ( 04 Feb 2024 03:27 )    Color: x / Appearance: x / SG: x / pH: x  Gluc: 300 mg/dL / Ketone: x  / Bili: x / Urobili: x   Blood: x / Protein: x / Nitrite: x   Leuk Esterase: x / RBC: x / WBC x   Sq Epi: x / Non Sq Epi: x / Bacteria: x    RADIOLOGY & ADDITIONAL STUDIES:    EXAM:  CT ABDOMEN AND PELVIS IC     PROCEDURE DATE:  02/04/2024      INTERPRETATION:  CLINICAL INFORMATION: Right flank pain and blood in the   urine for one week, worse tonight. History of kidney stones. Rule out   pyelonephritis.    COMPARISON: None.    CONTRAST/COMPLICATIONS:  IV Contrast: Omnipaque 350  90 cc administered   0 cc discarded  Oral Contrast: NONE  Complications: None reported at time of study completion    PROCEDURE:  CT of the Abdomen and Pelvis was performed.  Sagittal and coronal reformats were performed.    FINDINGS:  LOWER CHEST: 9 mm nodule in the right lower lobe (series 2, image 10).   Bibasilar subsegmental atelectasis.    LIVER: Within normal limits.  BILE DUCTS: Normal caliber.  GALLBLADDER: Cholelithiasis.  SPLEEN: Within normal limits.  PANCREAS: Within normal limits.  ADRENALS: Within normal limits.  KIDNEYS/URETERS: Heterogeneous enhancing mass measuring 7.3 x 5.8 x 7.2   cm (AP x TRV x CC) in the right renal midpole concerning for a renal   neoplasm. Mild hydroureteronephrosis to the level of the distal right   ureter with moderate right perinephric inflammatory change and a delayed   nephrogram. Questionable 4 mm lesion within the distal right ureter. No   left hydronephrosis. Mild nonspecific left perinephric fat stranding.    BLADDER: Circumferential wall thickening.  REPRODUCTIVE ORGANS: Enlarged prostate to 5.0 cm.    BOWEL: Small hiatal hernia. No bowel obstruction or inflammation. Colonic   diverticulosis without diverticulitis. Appendix is not visualized. No   evidence of inflammation in the pericecal region.  PERITONEUM: No ascites.  VESSELS: Within normal limits.  RETROPERITONEUM/LYMPH NODES: Enlarged portacaval lymph node measuring 2.2   x 1.0 cm. Enlarged right retroperitoneal lymph node measuring 1.5 x 1.2   cm.  ABDOMINAL WALL: Small fat-containing umbilical and right inguinal hernias.  BONES: Degenerative changes of the spine. Bilateral femoral head bone   islands.    IMPRESSION:  1. Moderate to large heterogeneous enhancing mass in the right renal   midpole concerning for a renal neoplasm possibly renal cell carcinoma.  2. Mild hydroureteronephrosis to the level of the distal right ureter   with moderate right perinephric inflammatory change and a delayed   nephrogram. Questionable lesion within the distal right ureter which   could represent a ureteral mass.  3. Circumferential urinary bladder wall thickening which could be due to   underdistention versus a cystitis. Correlate with urinalysis.  4. Portacaval and right retroperitoneal lymphadenopathy.  5. Right lower lobe 9 mm pulmonary nodule for which a metastatic lesion   is not excluded.      < from: CT Chest No Cont (02.04.24 @ 11:35) >    ACC: [id number] EXAM:  CT CHEST   ORDERED BY: DAYAMI FIERRO     PROCEDURE DATE:  02/04/2024          INTERPRETATION:  CLINICAL INDICATION: RENAL MASS, STAGING.    Axial CT images of the chest are obtained without intravenous   administration of contrast.    Correlation is made with the prior cardiac CT of April 10, 2019. No prior   chest CTs are available for comparison.    No enlarged axillary, mediastinal or hilar lymph nodes.    Bilateral gynecomastia.    No pericardial effusion. Left atrium is enlarged measuring about 5 cm in   anteroposterior dimension. Coronary artery calcifications and stent. The   aorta is tortuous with focal area of kinking involving the aortic arch.    For complete evaluation of the abdomen, please refer to dedicated   contrast-enhanced abdominal CT performed earlier on the same day. Delayed   renal parenchymal enhancement which is partially imaged sequela of the   prior contrast-enhanced CT images suggestive of renal dysfunction.   Partially imaged right renal mass is noted.    Evaluation of the lungs demonstrate left upper lobe lobulated pulmonary   nodule measuring up to about 1.6 cm new since April 10, 2019.  2 adjacent pulmonary nodules within the peripheral aspect of the right   lower lobe largest measuring about 5 mm. This area was not completely   imaged on the prior cardiac CT of April 10, 2019.    No central endobronchial lesions.    Degenerative changes of the spine.    IMPRESSION: Dominant left upper lobe lobulated 1.6 cm nodule new since   April 10, 2019. Primary differential diagnostic consideration is   metastatic disease given the abdominal findings.    2 adjacent right lower lobe pulmonary nodules measuring up to about 5 mm   are indeterminate.

## 2024-02-05 NOTE — DIETITIAN INITIAL EVALUATION ADULT - DATE OF WEIGHT PRIOR TO ADM
----- Message from Jimbo Reynolds sent at 7/17/2022 12:10 PM EDT -----  Regarding: Refill   Dr Joseph    Not sure you got the refill request. Another doctor in your office wrote the last 1 for 1 month and thought I had a refill. Could you check and call it in on Monday (my shot day). Thank you sir  
05-Apr-2023

## 2024-02-05 NOTE — DIETITIAN INITIAL EVALUATION ADULT - NSFNSGIIOFT_GEN_A_CORE
I&O's Detail    04 Feb 2024 07:01  -  05 Feb 2024 07:00  --------------------------------------------------------  IN:    Oral Fluid: 120 mL    Other (mL): 600 mL  Total IN: 720 mL    OUT:    Indwelling Catheter - Urethral (mL): 600 mL  Total OUT: 600 mL    Total NET: 120 mL

## 2024-02-05 NOTE — CONSULT NOTE ADULT - SUBJECTIVE AND OBJECTIVE BOX
HPI:  59 y/o male with MHx of renal stones, HTN, HLD, CAD s/p stent p/w progressive right flank pain and now with hematuria.    2/4/24; Seen at bedside accompanied by spouse, reports right flank pain of 10/10, when pain medications wear off. Currently pain upon coughing. Also reports hematuria fo few days and at times also passes clots.     PAST MEDICAL & SURGICAL HISTORY:    Pneumonia    HTN (hypertension)    HLD (hyperlipidemia)    CAD (coronary artery disease)    appendectomy-1994    torn meniscus of left knee  1995    tonsillectomy and adenoidectomy  1968    Former Smoker    FAMILY HISTORY:    No pertinent family history in first degree relatives      MEDICATIONS  (STANDING):    metoprolol tartrate 25 milliGRAM(s) Oral two times a day    MEDICATIONS  (PRN):    acetaminophen     Tablet .. 650 milliGRAM(s) Oral every 6 hours PRN Temp greater or equal to 38C (100.4F), Mild Pain (1 - 3)  aluminum hydroxide/magnesium hydroxide/simethicone Suspension 30 milliLiter(s) Oral every 4 hours PRN Dyspepsia  melatonin 3 milliGRAM(s) Oral at bedtime PRN Insomnia  morphine  - Injectable 4 milliGRAM(s) IV Push every 4 hours PRN Moderate Pain (4 - 6)  ondansetron Injectable 4 milliGRAM(s) IV Push every 4 hours PRN Nausea and/or Vomiting  ondansetron Injectable 4 milliGRAM(s) IV Push every 4 hours PRN Nausea and/or Vomiting    Allergies    sulfa drugs (Unknown)    REVIEW OF SYSTEM:    Constitutional, Eyes, ENT, Cardiovascular, Respiratory, Gastrointestinal, Genitourinary, Musculoskeletal, Integumentary, Neurological, Psychiatric, Endocrine, Heme/Lymph and Allergic/Immunologic review of systems are otherwise negative except as noted in HPI.     Vital Signs Last 24 Hrs  T(C): 37.1 (04 Feb 2024 08:57), Max: 37.6 (04 Feb 2024 02:38)  T(F): 98.7 (04 Feb 2024 08:57), Max: 99.7 (04 Feb 2024 06:17)  HR: 73 (04 Feb 2024 08:57) (73 - 94)  BP: 132/79 (04 Feb 2024 08:57) (132/79 - 165/95)  BP(mean): 114 (04 Feb 2024 02:38) (114 - 114)  RR: 18 (04 Feb 2024 08:57) (17 - 20)  SpO2: 97% (04 Feb 2024 08:57) (94% - 100%)    Parameters below as of 04 Feb 2024 08:57  Patient On (Oxygen Delivery Method): room air    PHYSICAL EXAM:    Constitutional: no acute distress, but appears in distress and anxious about the unknown  Eyes: no conjunctival infection, anicteric.   ENT: pharynx is unremarkable  Neck: supple without JVD  Pulmonary: clear to auscultation bilaterally   Cardiac: RRR  Vascular: no calf tenderness, venous stasis changes, varices  Abdomen: normoactive bowel sounds, soft and nontender, no hepatosplenomegaly or masses appreciated  Lymphatic: no peripheral adenopathy appreciated  Musculoskeletal: full range of motion and no deformities appreciated  Skin: normal appearance, no rash/erythema  Neurology: awake, alert, oriented; no focal deficits    LABS:    CBC Full  -  ( 04 Feb 2024 03:27 )  WBC Count : 6.16 K/uL  RBC Count : 4.03 M/uL  Hemoglobin : 11.8 g/dL  Hematocrit : 34.4 %  Platelet Count - Automated : 196 K/uL  Mean Cell Volume : 85.4 fl  Mean Cell Hemoglobin : 29.3 pg  Mean Cell Hemoglobin Concentration : 34.3 gm/dL  Auto Neutrophil # : 4.48 K/uL  Auto Lymphocyte # : 0.97 K/uL  Auto Monocyte # : 0.65 K/uL  Auto Eosinophil # : 0.02 K/uL  Auto Basophil # : 0.03 K/uL  Auto Neutrophil % : 72.7 %  Auto Lymphocyte % : 15.7 %  Auto Monocyte % : 10.6 %  Auto Eosinophil % : 0.3 %  Auto Basophil % : 0.5 %    02-04    136  |  104  |  13  ----------------------------<  300<H>  3.9   |  26  |  1.28    Ca    9.8      04 Feb 2024 03:27    TPro  7.4  /  Alb  3.6  /  TBili  0.5  /  DBili  x   /  AST  12<L>  /  ALT  21  /  AlkPhos  63  02-04      Urinalysis Basic - ( 04 Feb 2024 03:27 )    Color: x / Appearance: x / SG: x / pH: x  Gluc: 300 mg/dL / Ketone: x  / Bili: x / Urobili: x   Blood: x / Protein: x / Nitrite: x   Leuk Esterase: x / RBC: x / WBC x   Sq Epi: x / Non Sq Epi: x / Bacteria: x    RADIOLOGY & ADDITIONAL STUDIES:    EXAM:  CT ABDOMEN AND PELVIS IC     PROCEDURE DATE:  02/04/2024      INTERPRETATION:  CLINICAL INFORMATION: Right flank pain and blood in the   urine for one week, worse tonight. History of kidney stones.Rule out   pyelonephritis.    COMPARISON: None.    CONTRAST/COMPLICATIONS:  IV Contrast: Omnipaque 350  90 cc administered   0 cc discarded  Oral Contrast: NONE  Complications: None reported at time of study completion    PROCEDURE:  CT of the Abdomen and Pelvis was performed.  Sagittal and coronal reformats were performed.    FINDINGS:  LOWER CHEST: 9 mm nodule in the right lower lobe (series 2, image 10).   Bibasilar subsegmental atelectasis.    LIVER: Within normal limits.  BILE DUCTS: Normal caliber.  GALLBLADDER: Cholelithiasis.  SPLEEN: Within normal limits.  PANCREAS: Within normal limits.  ADRENALS: Within normal limits.  KIDNEYS/URETERS: Heterogeneous enhancing mass measuring 7.3 x 5.8 x 7.2   cm (AP x TRV x CC) in the right renal midpole concerning for a renal   neoplasm. Mild hydroureteronephrosis to the level of the distal right   ureter with moderate right perinephric inflammatory change and a delayed   nephrogram. Questionable 4 mm lesion within the distal right ureter. No   left hydronephrosis. Mild nonspecific left perinephric fat stranding.    BLADDER: Circumferential wall thickening.  REPRODUCTIVE ORGANS: Enlarged prostate to 5.0 cm.    BOWEL: Small hiatal hernia. No bowel obstruction or inflammation. Colonic   diverticulosis without diverticulitis. Appendix is not visualized. No   evidence of inflammation in the pericecal region.  PERITONEUM: No ascites.  VESSELS: Within normal limits.  RETROPERITONEUM/LYMPH NODES: Enlarged portacaval lymph node measuring 2.2   x 1.0 cm. Enlarged right retroperitoneal lymph node measuring 1.5 x 1.2   cm.  ABDOMINAL WALL: Small fat-containing umbilical and right inguinal hernias.  BONES: Degenerative changes of the spine. Bilateral femoral head bone   islands.    IMPRESSION:  1. Moderate to large heterogeneous enhancing mass in the right renal   midpole concerning for a renal neoplasm possibly renal cell carcinoma.  2. Mild hydroureteronephrosis to the level of the distal right ureter   with moderate right perinephric inflammatory change and a delayed   nephrogram. Questionable lesion within the distal right ureter which   could represent a ureteral mass.  3. Circumferential urinary bladder wall thickening which could be due to   underdistention versus a cystitis. Correlate with urinalysis.  4. Portacaval and right retroperitoneal lymphadenopathy.  5. Right lower lobe 9 mm pulmonary nodule for which a metastatic lesion   is not excluded.      
Chief Complaint:  Patient is a 58y old  Male who presents with a chief complaint of R renal mass , Gross hematuria      HPI:   The patient is a 57 yo male with Hx. of CAD s/p stent placed on 4/26/2019, readmitted on 4/28/2019 with CP had  LHC stent patent,  The patient denies taking his medications exept ASA 81 mg, Hx of kidney stone  10 years ago, HTN, HLD , Prediabetes  last A1c 6.2 in 2019, presented in the ED for R flank pain, blood in the urine. The patient symptoms started 6 days ago with mild pain, noted some blood in the urine , flew to [place], returned yesterday, he developed worsened R flank pain, nausea, and blood in the urine. Imaging significant for right renal mass, LAD, and left pulmonary lesion. IR consulted for biopsy.     Allergies  sulfa drugs (Unknown)      MEDICATIONS  (STANDING):  aspirin enteric coated 81 milliGRAM(s) Oral daily  dextrose 5%. 1000 milliLiter(s) (100 mL/Hr) IV Continuous <Continuous>  dextrose 5%. 1000 milliLiter(s) (50 mL/Hr) IV Continuous <Continuous>  dextrose 50% Injectable 25 Gram(s) IV Push once  dextrose 50% Injectable 25 Gram(s) IV Push once  dextrose 50% Injectable 12.5 Gram(s) IV Push once  glucagon  Injectable 1 milliGRAM(s) IntraMuscular once  insulin lispro (ADMELOG) corrective regimen sliding scale   SubCutaneous three times a day before meals  metoprolol tartrate 25 milliGRAM(s) Oral two times a day  pantoprazole    Tablet 40 milliGRAM(s) Oral before breakfast    MEDICATIONS  (PRN):  acetaminophen     Tablet .. 650 milliGRAM(s) Oral every 6 hours PRN Temp greater or equal to 38C (100.4F), Mild Pain (1 - 3)  aluminum hydroxide/magnesium hydroxide/simethicone Suspension 30 milliLiter(s) Oral every 4 hours PRN Dyspepsia  dextrose Oral Gel 15 Gram(s) Oral once PRN Blood Glucose LESS THAN 70 milliGRAM(s)/deciliter  melatonin 3 milliGRAM(s) Oral at bedtime PRN Insomnia  morphine  - Injectable 4 milliGRAM(s) IV Push every 4 hours PRN Moderate Pain (4 - 6)  ondansetron Injectable 4 milliGRAM(s) IV Push every 4 hours PRN Nausea and/or Vomiting  ondansetron Injectable 4 milliGRAM(s) IV Push every 4 hours PRN Nausea and/or Vomiting      PAST MEDICAL & SURGICAL HISTORY:  Pneumonia      Cough      AMAYA (dyspnea on exertion)      HTN (hypertension)      Former smoker      Obesity (BMI 30-39.9)      HTN (hypertension)      HLD (hyperlipidemia)      CAD (coronary artery disease)      History of appendectomy      History of appendectomy  1994      History of torn meniscus of left knee  1995      History of tonsillectomy and adenoidectomy  1968          FAMILY HISTORY:  No pertinent family history in first degree relatives    No pertinent family history in first degree relatives      Vital Signs Last 24 Hrs  T(C): 36.7 (05 Feb 2024 09:09), Max: 37.4 (04 Feb 2024 18:46)  T(F): 98 (05 Feb 2024 09:09), Max: 99.4 (04 Feb 2024 18:46)  HR: 98 (05 Feb 2024 09:09) (78 - 98)  BP: 153/81 (05 Feb 2024 09:09) (115/81 - 153/81)  BP(mean): --  RR: 18 (05 Feb 2024 09:09) (14 - 20)  SpO2: 99% (05 Feb 2024 09:09) (94% - 99%)    Parameters below as of 05 Feb 2024 09:09  Patient On (Oxygen Delivery Method): room air    CBC                        11.6   6.45  )-----------( 201      ( 05 Feb 2024 06:37 )             35.3       Chemistry  02-05    135  |  104  |  18  ----------------------------<  273<H>  4.2   |  29  |  1.11    Ca    9.2      05 Feb 2024 06:37    TPro  7.4  /  Alb  3.6  /  TBili  0.5  /  DBili  x   /  AST  12<L>  /  ALT  21  /  AlkPhos  63  02-04      < from: CT Abdomen and Pelvis w/ IV Cont (02.04.24 @ 04:11) >  IMPRESSION:  1. Moderate to large heterogeneous enhancing mass in the right renal   midpole concerning for a renal neoplasm possibly renal cell carcinoma.  2. Mild hydroureteronephrosis to the level of the distal right ureter   with moderate right perinephric inflammatory change and a delayed   nephrogram. Questionable lesion within the distal right ureter which   could represent a ureteral mass.  3. Circumferential urinary bladder wall thickening which could be due to   underdistention versus a cystitis. Correlate with urinalysis.  4. Portacaval and right retroperitoneal lymphadenopathy.  5. Right lower lobe 9 mm pulmonary nodule for which a metastatic lesion   is not excluded.    < end of copied text >      
The patient is a 59 yo male with Hx. of CAD s/p stent placed on 4/26/2019, readmitted on 4/28/2019 with CP had  LHC stent patent,  The patient denies taking his medications exept ASA 81 mg, Hx of kidney stone  10 years ago, HTN, HLD , Prediabetes  last A1c 6.2 in 2019, presented in the ED for R flank pain, blood in the urine. The patient symptoms started 6 days ago with mild pain, noted some blood in the urine , flew to [place], returned yesterday, he developed worsened R flank pain, nausea, and blood in the urine.     PMHx:  CAD s/p stent placed on 4/26/2019, readmitted on 4/28/2019 with CP had  LHC stent patent, HTN, HLD, prediabetes,     PSHx: appendectomy    Family Hx: mother 82 Dx. with breast CA last week, Father medical Hx. unknown.    Social Hx.: not smoking, no alcohol use   (04 Feb 2024 14:04)    Consulted for preop clearance  went to OR prior to cardiac evaluation.  no cardiac symptoms post op.  H/o PCI as noted above not following w/ cardio.  not on statin due to intolerance - tried multiple statins.    PAST MEDICAL AND SURGICAL HISTORY:  PAST MEDICAL & SURGICAL HISTORY:  Pneumonia      Cough      AMAYA (dyspnea on exertion)      HTN (hypertension)      Former smoker      Obesity (BMI 30-39.9)      HTN (hypertension)      HLD (hyperlipidemia)      CAD (coronary artery disease)      History of appendectomy      History of appendectomy  1994      History of torn meniscus of left knee  1995      History of tonsillectomy and adenoidectomy  1968          ALLERGIES:  Allergies    sulfa drugs (Unknown)    Intolerances        SOCIAL HISTORY:  Social History:      FAMILY  HISTORY:  FAMILY HISTORY:  No pertinent family history in first degree relatives    No pertinent family history in first degree relatives        MEDICATIONS:  OUTPATIENT:  Home Medications:  Aspir 81 oral delayed release tablet: 1 tab(s) orally once a day (04 Feb 2024 16:11)      INPATIENT:  MEDICATIONS  (STANDING):  aspirin enteric coated 81 milliGRAM(s) Oral daily  dextrose 5%. 1000 milliLiter(s) (100 mL/Hr) IV Continuous <Continuous>  dextrose 5%. 1000 milliLiter(s) (50 mL/Hr) IV Continuous <Continuous>  dextrose 50% Injectable 25 Gram(s) IV Push once  dextrose 50% Injectable 25 Gram(s) IV Push once  dextrose 50% Injectable 12.5 Gram(s) IV Push once  glucagon  Injectable 1 milliGRAM(s) IntraMuscular once  insulin lispro (ADMELOG) corrective regimen sliding scale   SubCutaneous three times a day before meals  metoprolol tartrate 25 milliGRAM(s) Oral two times a day    MEDICATIONS  (PRN):  acetaminophen     Tablet .. 650 milliGRAM(s) Oral every 6 hours PRN Temp greater or equal to 38C (100.4F), Mild Pain (1 - 3)  aluminum hydroxide/magnesium hydroxide/simethicone Suspension 30 milliLiter(s) Oral every 4 hours PRN Dyspepsia  dextrose Oral Gel 15 Gram(s) Oral once PRN Blood Glucose LESS THAN 70 milliGRAM(s)/deciliter  melatonin 3 milliGRAM(s) Oral at bedtime PRN Insomnia  morphine  - Injectable 4 milliGRAM(s) IV Push every 4 hours PRN Moderate Pain (4 - 6)  ondansetron Injectable 4 milliGRAM(s) IV Push every 4 hours PRN Nausea and/or Vomiting  ondansetron Injectable 4 milliGRAM(s) IV Push every 4 hours PRN Nausea and/or Vomiting    MEDICATIONS  (PRN):  acetaminophen     Tablet .. 650 milliGRAM(s) Oral every 6 hours PRN Temp greater or equal to 38C (100.4F), Mild Pain (1 - 3)  aluminum hydroxide/magnesium hydroxide/simethicone Suspension 30 milliLiter(s) Oral every 4 hours PRN Dyspepsia  dextrose Oral Gel 15 Gram(s) Oral once PRN Blood Glucose LESS THAN 70 milliGRAM(s)/deciliter  melatonin 3 milliGRAM(s) Oral at bedtime PRN Insomnia  morphine  - Injectable 4 milliGRAM(s) IV Push every 4 hours PRN Moderate Pain (4 - 6)  ondansetron Injectable 4 milliGRAM(s) IV Push every 4 hours PRN Nausea and/or Vomiting  ondansetron Injectable 4 milliGRAM(s) IV Push every 4 hours PRN Nausea and/or Vomiting      REVIEW OF SYSTEMS:  ===============================  ===============================  CONSTITUTIONAL: No weakness, fevers or chills  EYES/ENT: No visual changes;  No vertigo or throat pain   NECK: No pain or stiffness  RESPIRATORY: No cough, wheezing, hemoptysis; No shortness of breath  CARDIOVASCULAR: No chest pain or palpitations  GASTROINTESTINAL: No abdominal or epigastric pain. No nausea, vomiting, or hematemesis;   No diarrhea or constipation. No melena or hematochezia.  GENITOURINARY: No dysuria, frequency or hematuria  NEUROLOGICAL: No numbness or weakness  SKIN: No itching, burning, rashes, or lesions   All other review of systems is negative unless indicated above    Vital Signs Last 24 Hrs  T(C): 36.7 (05 Feb 2024 09:09), Max: 37.4 (04 Feb 2024 18:46)  T(F): 98 (05 Feb 2024 09:09), Max: 99.4 (04 Feb 2024 18:46)  HR: 98 (05 Feb 2024 09:09) (78 - 98)  BP: 153/81 (05 Feb 2024 09:09) (115/81 - 153/81)  BP(mean): --  RR: 18 (05 Feb 2024 09:09) (14 - 20)  SpO2: 99% (05 Feb 2024 09:09) (94% - 99%)    Parameters below as of 05 Feb 2024 09:09  Patient On (Oxygen Delivery Method): room air        I&O's Summary    04 Feb 2024 07:01  -  05 Feb 2024 07:00  --------------------------------------------------------  IN: 720 mL / OUT: 600 mL / NET: 120 mL        I&O's Detail    04 Feb 2024 07:01  -  05 Feb 2024 07:00  --------------------------------------------------------  IN:    Oral Fluid: 120 mL    Other (mL): 600 mL  Total IN: 720 mL    OUT:    Indwelling Catheter - Urethral (mL): 600 mL  Total OUT: 600 mL    Total NET: 120 mL          PHYSICAL EXAM:    Constitutional: NAD, awake and alert, well-developed  HEENT: PERR, EOMI,  No oral cyananosis.  Neck:  supple,  No JVD  Respiratory: Breath sounds are clear bilaterally, No wheezing, rales or rhonchi  Cardiovascular: S1 and S2, regular rate and rhythm, no Murmurs, gallops or rubs  Gastrointestinal: Bowel Sounds present, soft, nontender.   Extremities: No peripheral edema. No clubbing or cyanosis.  Vascular: 2+ peripheral pulses  Neurological: A/O x 3, no focal deficits  Musculoskeletal: no calf tenderness.  Skin: No rashes.    ===============================  ===============================  LABS:                         11.6   6.45  )-----------( 201      ( 05 Feb 2024 06:37 )             35.3                         11.8   6.16  )-----------( 196      ( 04 Feb 2024 03:27 )             34.4     05 Feb 2024 06:37    135    |  104    |  18     ----------------------------<  273    4.2     |  29     |  1.11   04 Feb 2024 03:27    136    |  104    |  13     ----------------------------<  300    3.9     |  26     |  1.28     Ca    9.2        05 Feb 2024 06:37  Ca    9.8        04 Feb 2024 03:27    TPro  7.4    /  Alb  3.6    /  TBili  0.5    /  DBili  x      /  AST  12     /  ALT  21     /  AlkPhos  63     04 Feb 2024 03:27        THYROID STUDIES:    ===============================  ===============================  CARDIAC BIOMARKERS:  -------  -BNP VALUES:    -------  -TROPONIN VALUES:   Troponin I, Serum: 0.121 ng/mL *H* [0.015 - 0.045] (04-29-19 @ 05:55)  Troponin I, Serum: 0.144 ng/mL *H* [0.015 - 0.045] (04-28-19 @ 22:28)  Troponin I, Serum: 0.145 ng/mL *H* [0.015 - 0.045] (04-28-19 @ 19:13)  Troponin I, Serum: 0.132 ng/mL *H* [0.015 - 0.045] (04-28-19 @ 15:40)      ===============================  ===============================  EKG: NSR no ischemic changes.

## 2024-02-05 NOTE — PROGRESS NOTE ADULT - ASSESSMENT
57 yo male with right renal mass with hydronephrosis, retroperitoneal lymphadenopathy is POD 1 s/p Cystoscopy right retrograde pyelogram and right ureteral stent placement by Dr. Ovalle. Pt is doing well post op.   CT imaging- "moderate to large heterogeneous enhancing mass in the right renal midpole concerning for a renal neoplasm, questionable lesion within the distal right ureter which could represent a ureteral mass and portacaval and right retroperitoneal lymphadenopathy."    Pt being followed by oncology to assess for metastatic disease.    Recommend  - D/C chapman, perform trial of void  - Trend H/H and creatinine  - F/U MR renal mass protocol  - Patient can follow up with Dr. Kaplan as outpatient to discuss further management    Case discussed with Dr. Ovalle

## 2024-02-05 NOTE — PROGRESS NOTE ADULT - SUBJECTIVE AND OBJECTIVE BOX
Reason for Admission: R renal mass , Gross hematuria  History of Present Illness:   HPI:   The patient is a 57 yo male with Hx. of CAD s/p stent placed on 4/26/2019, readmitted on 4/28/2019 with CP had  LHC stent patent,  The patient denies taking his medications exept ASA 81 mg, Hx of kidney stone  10 years ago, HTN, HLD , Prediabetes  last A1c 6.2 in 2019, presented in the ED for R flank pain, blood in the urine. The patient symptoms started 6 days ago with mild pain, noted some blood in the urine , flew to [place], returned yesterday, he developed worsened R flank pain, nausea, and blood in the urine.     2/5 - pt s/p Cystoscopy, with retrograde pyelogram and ureteral stent insertion yesterday.  hematuria without clots in chapman bag today. no fever or chills    ROS:   All 10 systems reviewed and found to be negative with the exception of what has been described above.    Vital Signs Last 24 Hrs  T(C): 36.7 (05 Feb 2024 09:09), Max: 37.4 (04 Feb 2024 18:46)  T(F): 98 (05 Feb 2024 09:09), Max: 99.4 (04 Feb 2024 18:46)  HR: 98 (05 Feb 2024 09:09) (78 - 98)  BP: 153/81 (05 Feb 2024 09:09) (115/81 - 153/81)  BP(mean): --  RR: 18 (05 Feb 2024 09:09) (14 - 20)  SpO2: 99% (05 Feb 2024 09:09) (94% - 99%)    Parameters below as of 05 Feb 2024 09:09  Patient On (Oxygen Delivery Method): room air    GEN: lying in bed, NAD  HEENT:   NC/AT, pupils equal and reactive, EOMI  CV:  +S1, +S2, RRR  RESP:   lungs clear to auscultation bilaterally, no wheeze, rales, rhonchi   BREAST:  not examined  GI:  abdomen soft, non-tender, non-distended, normoactive BS  : chapman with hematuria no clots  MSK:   normal muscle tone  EXT:  no edema  NEURO:  AAOX3, no focal neurological deficits  SKIN:  no rashes                              11.6   6.45  )-----------( 201      ( 05 Feb 2024 06:37 )             35.3     02-05    135  |  104  |  18  ----------------------------<  273<H>  4.2   |  29  |  1.11    Ca    9.2      05 Feb 2024 06:37    TPro  7.4  /  Alb  3.6  /  TBili  0.5  /  DBili  x   /  AST  12<L>  /  ALT  21  /  AlkPhos  63  02-04        LIVER FUNCTIONS - ( 04 Feb 2024 03:27 )  Alb: 3.6 g/dL / Pro: 7.4 gm/dL / ALK PHOS: 63 U/L / ALT: 21 U/L / AST: 12 U/L / GGT: x             Urinalysis Basic - ( 05 Feb 2024 06:37 )    Color: x / Appearance: x / SG: x / pH: x  Gluc: 273 mg/dL / Ketone: x  / Bili: x / Urobili: x   Blood: x / Protein: x / Nitrite: x   Leuk Esterase: x / RBC: x / WBC x   Sq Epi: x / Non Sq Epi: x / Bacteria: x              CT A/P :1. Moderate to large heterogeneous enhancing mass in the right renal   midpole concerning for a renal neoplasm possibly renal cell carcinoma.  2. Mild hydroureteronephrosis to the level of the distal right ureter   with moderate right perinephric inflammatory change and a delayed   nephrogram. Questionable lesion within the distal right ureter which   could represent a ureteral mass.  3. Circumferential urinary bladder wall thickening which could be due to   underdistention versus a cystitis. Correlate with urinalysis.  4. Portacaval and right retroperitoneal lymphadenopathy.  5. Right lower lobe 9 mm pulmonary nodule for which a metastatic lesion   is not excluded.      EKG NSR       Assessment:  · Assessment	  57 yo male with Hx. of CAD s/p stent placed on 4/26/2019, readmitted on 4/28/2019 with CP had  LHC stent patent,  The patient denies taking his medications exept ASA 81 mg, Hx of kidney stone  10 years ago, HTN, HLD , Prediabetes  last A1c 6.2 in 2019, presented in the ED for R flank pain, blood in the urine. The patient symptoms started 6 days ago with mild pain, noted some blood in the urine , flew to [place], returned yesterday, he developed worsened R flank pain, nausea, and blood in the urine.  CT abd shows R renal mass , hydroureteronephrosis , portocaval ,retroperitoneal lymphadenopathy,  RLL 9mm pulmonary nodule.   assessment Dx:    # renal mass/gross hematuria/ABL anemia   - pt s/y cysto yesterday  - urology input ntoed and plan to dc chapman and TOV today  - plan for MRI abd to eval renal mass with outpt f/u wtih dr vora for management.  possible bx based on results.   - pulm nodule. heme/onc eval noted and outtp f/u. possible metastatic disease     # CAD s/p stent/HTN/HL  - cardio input noted  - continue aspirin upon dc  - outpt cardio f/u re lipid management  - continue home meds    # DM 2  - continue ISS  - likely dc on metformin        Advance Directive: full code,

## 2024-02-05 NOTE — PROGRESS NOTE ADULT - SUBJECTIVE AND OBJECTIVE BOX
Patient seen at bedside pt is POD 1 s/p Cystoscopy right retrograde pyelogram and right ureteral stent placement by Dr. Ovalle. Pt is doing well post op. He notes some right flank discomfort but denies any severe abd/flank pain, fevers, chills, n/v. Pt reports he started noticing gross hematuria about 10 days ago and it was intermittent. He also notes a 45 lb weight loss but attributes to being very active at work over the past few months.     PE    VITALS  T(C): 36.7 (02-05-24 @ 09:09), Max: 37.4 (02-04-24 @ 18:46)  HR: 98 (02-05-24 @ 09:09) (78 - 98)  BP: 153/81 (02-05-24 @ 09:09) (115/81 - 153/81)  RR: 18 (02-05-24 @ 09:09) (14 - 20)  SpO2: 99% (02-05-24 @ 09:09) (94% - 99%)               Lung    : No resp distress  Abdo:   : Soft, Non tender, No guarding, No distension   Back    : No CVAT b/l  Genitalia Male: chapman with pink to red tinge urine no clots  Neuro   : A&Ox3      LABS                           11.6   6.45  )-----------( 201      ( 05 Feb 2024 06:37 )             35.3   02-05    135  |  104  |  18  ----------------------------<  273<H>  4.2   |  29  |  1.11    Ca    9.2      05 Feb 2024 06:37    TPro  7.4  /  Alb  3.6  /  TBili  0.5  /  DBili  x   /  AST  12<L>  /  ALT  21  /  AlkPhos  63  02-04

## 2024-02-05 NOTE — DIETITIAN INITIAL EVALUATION ADULT - NSFNSGIASSESSMENTFT_GEN_A_CORE
Last Bowel Movement: 04-Feb-2024 (02-05-24 @ 08:32)  Last Bowel Movement: 03-Feb-2024 (02-04-24 @ 19:45)  *Not on bowel regimen

## 2024-02-05 NOTE — DIETITIAN INITIAL EVALUATION ADULT - ADD RECOMMEND
1. C/w consistent carb diet for now 2/2 variable POCTs, however, once BG maintained between 140-180 mg/dL consider to liberalize diet to regular to maximize caloric and protein intake   2. Encourage protein-rich foods, maximize food preferences   3. Trial Premier protein shake once daily to optimize nutritional needs (provides 160 kcal, 30 g protein/ shake)   4. Monitor and optimize BG levels between 140-180 mg/dL by medical management   5. Consider obtaining vitamin D 25OH level to assess nutriture   6. Monitor bowel movements, if no BM for >3 days, consider implementing bowel regimen.   7. Recommend MVI w/ minerals daily to ensure 100% RDA met   8. Confirm goals of care regarding nutrition support   9. Recommend pt f/u w/ outpt RD and endocrinologist to assist w/ glycemic control   RD will continue to monitor PO intake, labs, hydration, and wt prn.

## 2024-02-05 NOTE — DIETITIAN INITIAL EVALUATION ADULT - PERTINENT LABORATORY DATA
02-05    135  |  104  |  18  ----------------------------<  273<H>  4.2   |  29  |  1.11    Ca    9.2      05 Feb 2024 06:37    TPro  7.4  /  Alb  3.6  /  TBili  0.5  /  DBili  x   /  AST  12<L>  /  ALT  21  /  AlkPhos  63  02-04  POCT Blood Glucose.: 169 mg/dL (02-05-24 @ 11:29)  A1C with Estimated Average Glucose Result: 10.4 % (02-05-24 @ 06:37)

## 2024-02-05 NOTE — DIETITIAN INITIAL EVALUATION ADULT - ORAL INTAKE PTA/DIET HISTORY
Reports "fine" appetite, eating 2 meals/day plus snacks and states he was intentionally attempting to lose wt by being "busier at work" which lead to initial wt loss and then reports "eating healthier." C/o vomiting 2/2 severe abdominal pain x1 day PTA, consuming <50% ENN.

## 2024-02-05 NOTE — PROGRESS NOTE ADULT - ASSESSMENT
59 y/o male with MHx of renal stones, HTN, HLD, CAD s/p stent p/w progressive right flank pain and now with hematuria. CT imaging noted moderate to large heterogeneous enhancing mass in the right renal midpole concerning for a renal neoplasm, questionable lesion within the distal right ureter which could represent a ureteral mass and portacaval and right retroperitoneal lymphadenopathy.    # Right renal midpole heterogeneous mass with associated portacaval and right retroperitoneal lymphadenopathy.  -p/w right flank pain and hematuria, with Hx of renal stones  -CT-A/P- 2/4/24-->Moderate to large heterogeneous enhancing mass in the right renal midpole concerning for a renal neoplasm possibly renal cell carcinoma. Mild hydroureteronephrosis to the level of the distal right ureter with moderate right perinephric inflammatory change and a delayed nephrogram. Questionable lesion within the distal right ureter which could represent a ureteral mass. Portacaval and right retroperitoneal lymphadenopathy. Right lower lobe 9 mm pulmonary nodule for which a metastatic lesion   is not excluded.  -CT-chest-2/4/24: Dominant left upper lobe lobulated 1.6 cm nodule new since April 10, 2019. Primary differential diagnostic consideration is   metastatic disease given the abdominal findings. Adjacent right lower lobe pulmonary nodules measuring up to about 5 mm are indeterminate.  -CT-a/p also noted enlarged prostate--->PSA ordered.  -Urology following-->s/p Cystoscopy, with retrograde pyelogram and ureteral stent insertion-2/4/24  -Will need a tissue Bx for a definitive diagnosis--IR consulted for possible RP ELSI or PRISCILLA lung nodule  -Hem/Onc will follow closely    # Anemia  -Hgb-11.6, chart review noted last normal at 14 g/dl in 4/2019  -Likely acute from hematuria, questionable underlying malignancy  -Unlikely from non hypoproliferative causes such as hemolysis or hypoproliferative due to nutritional deficiencies or bone marrow failure- but will send for cmpletion and record purposes.  -Serial CBC  -Supportive measures  -Transfuse if Hgb continue to drop or patient becomes symptomatic or hemodynamically unstable       59 y/o male with MHx of renal stones, HTN, HLD, CAD s/p stent p/w progressive right flank pain and now with hematuria. CT imaging noted moderate to large heterogeneous enhancing mass in the right renal midpole concerning for a renal neoplasm, questionable lesion within the distal right ureter which could represent a ureteral mass and portacaval and right retroperitoneal lymphadenopathy.    # Right renal midpole heterogeneous mass with associated portacaval and right retroperitoneal lymphadenopathy.  -p/w right flank pain and hematuria, with Hx of renal stones  -CT-A/P- 2/4/24-->Moderate to large heterogeneous enhancing mass in the right renal midpole concerning for a renal neoplasm possibly renal cell carcinoma. Mild hydroureteronephrosis to the level of the distal right ureter with moderate right perinephric inflammatory change and a delayed nephrogram. Questionable lesion within the distal right ureter which could represent a ureteral mass. Portacaval and right retroperitoneal lymphadenopathy. Right lower lobe 9 mm pulmonary nodule for which a metastatic lesion   is not excluded.  -CT-chest-2/4/24: Dominant left upper lobe lobulated 1.6 cm nodule new since April 10, 2019. Primary differential diagnostic consideration is   metastatic disease given the abdominal findings. Adjacent right lower lobe pulmonary nodules measuring up to about 5 mm are indeterminate.  -CT-a/p also noted enlarged prostate--->PSA ordered.  -Urology following-->s/p Cystoscopy, with retrograde pyelogram and ureteral stent insertion-2/4/24  -Will need a tissue Bx for a definitive diagnosis--IR consulted for possible RP ELSI or PRISCILLA lung nodule  -Hem/Onc will follow closely    # Anemia  -Hgb-11.6, chart review noted last normal at 14 g/dl in 4/2019  -Likely acute from hematuria, questionable underlying malignancy, cannot rule out iron deficiency   - will send anemia work up  -Serial CBC  -Supportive measures  -Transfuse if Hgb continue to drop or patient becomes symptomatic or hemodynamically unstable       59 y/o male with MHx of renal stones, HTN, HLD, CAD s/p stent p/w progressive right flank pain and now with hematuria. CT imaging noted moderate to large heterogeneous enhancing mass in the right renal midpole concerning for a renal neoplasm, questionable lesion within the distal right ureter which could represent a ureteral mass and portacaval and right retroperitoneal lymphadenopathy.    # Right renal midpole heterogeneous mass with associated portacaval and right retroperitoneal lymphadenopathy.  -p/w right flank pain and hematuria, with Hx of renal stones  -CT-A/P- 2/4/24-->Moderate to large heterogeneous enhancing mass in the right renal midpole concerning for a renal neoplasm possibly renal cell carcinoma. Mild hydroureteronephrosis to the level of the distal right ureter with moderate right perinephric inflammatory change and a delayed nephrogram. Questionable lesion within the distal right ureter which could represent a ureteral mass. Portacaval and right retroperitoneal lymphadenopathy. Right lower lobe 9 mm pulmonary nodule for which a metastatic lesion   is not excluded.  -CT-chest-2/4/24: Dominant left upper lobe lobulated 1.6 cm nodule new since April 10, 2019. Primary differential diagnostic consideration is   metastatic disease given the abdominal findings. Adjacent right lower lobe pulmonary nodules measuring up to about 5 mm are indeterminate.  -CT-a/p also noted enlarged prostate--->PSA ordered.  -Urology following-->s/p Cystoscopy, with retrograde pyelogram and ureteral stent insertion-2/4/24, follow up o/p  -awaiting MRI-renal protocol  -Will need a tissue Bx for a definitive diagnosis--IR consulted for possible RP ELSI or PRISCILLA lung nodule  -Hem/Onc will follow closely    # Anemia  -Hgb-11.6, chart review noted last normal at 14 g/dl in 4/2019  -Likely acute from hematuria, questionable underlying malignancy, cannot rule out iron deficiency   - will send anemia work up  -Serial CBC  -Supportive measures  -Transfuse if Hgb continue to drop or patient becomes symptomatic or hemodynamically unstable

## 2024-02-05 NOTE — DIETITIAN INITIAL EVALUATION ADULT - ETIOLOGY
r/t decreased ability to meet increased nutrient needs 2/2 CA w/ associated vomiting and abdominal pain

## 2024-02-06 DIAGNOSIS — I48.91 UNSPECIFIED ATRIAL FIBRILLATION: ICD-10-CM

## 2024-02-06 LAB
ANION GAP SERPL CALC-SCNC: 5 MMOL/L — SIGNIFICANT CHANGE UP (ref 5–17)
BUN SERPL-MCNC: 16 MG/DL — SIGNIFICANT CHANGE UP (ref 7–23)
CALCIUM SERPL-MCNC: 9.5 MG/DL — SIGNIFICANT CHANGE UP (ref 8.5–10.1)
CHLORIDE SERPL-SCNC: 103 MMOL/L — SIGNIFICANT CHANGE UP (ref 96–108)
CO2 SERPL-SCNC: 26 MMOL/L — SIGNIFICANT CHANGE UP (ref 22–31)
CREAT SERPL-MCNC: 1.04 MG/DL — SIGNIFICANT CHANGE UP (ref 0.5–1.3)
EGFR: 82 ML/MIN/1.73M2 — SIGNIFICANT CHANGE UP
GLUCOSE BLDC GLUCOMTR-MCNC: 234 MG/DL — HIGH (ref 70–99)
GLUCOSE BLDC GLUCOMTR-MCNC: 239 MG/DL — HIGH (ref 70–99)
GLUCOSE BLDC GLUCOMTR-MCNC: 239 MG/DL — HIGH (ref 70–99)
GLUCOSE BLDC GLUCOMTR-MCNC: 260 MG/DL — HIGH (ref 70–99)
GLUCOSE BLDC GLUCOMTR-MCNC: 300 MG/DL — HIGH (ref 70–99)
GLUCOSE SERPL-MCNC: 234 MG/DL — HIGH (ref 70–99)
HCT VFR BLD CALC: 38 % — LOW (ref 39–50)
HGB BLD-MCNC: 12.5 G/DL — LOW (ref 13–17)
MCHC RBC-ENTMCNC: 28.9 PG — SIGNIFICANT CHANGE UP (ref 27–34)
MCHC RBC-ENTMCNC: 32.9 GM/DL — SIGNIFICANT CHANGE UP (ref 32–36)
MCV RBC AUTO: 88 FL — SIGNIFICANT CHANGE UP (ref 80–100)
PLATELET # BLD AUTO: 217 K/UL — SIGNIFICANT CHANGE UP (ref 150–400)
POTASSIUM SERPL-MCNC: 3.7 MMOL/L — SIGNIFICANT CHANGE UP (ref 3.5–5.3)
POTASSIUM SERPL-SCNC: 3.7 MMOL/L — SIGNIFICANT CHANGE UP (ref 3.5–5.3)
RBC # BLD: 4.32 M/UL — SIGNIFICANT CHANGE UP (ref 4.2–5.8)
RBC # FLD: 13.3 % — SIGNIFICANT CHANGE UP (ref 10.3–14.5)
SODIUM SERPL-SCNC: 134 MMOL/L — LOW (ref 135–145)
WBC # BLD: 6.15 K/UL — SIGNIFICANT CHANGE UP (ref 3.8–10.5)
WBC # FLD AUTO: 6.15 K/UL — SIGNIFICANT CHANGE UP (ref 3.8–10.5)

## 2024-02-06 PROCEDURE — 99233 SBSQ HOSP IP/OBS HIGH 50: CPT

## 2024-02-06 PROCEDURE — 93010 ELECTROCARDIOGRAM REPORT: CPT

## 2024-02-06 PROCEDURE — 93306 TTE W/DOPPLER COMPLETE: CPT | Mod: 26

## 2024-02-06 RX ORDER — INSULIN GLARGINE 100 [IU]/ML
8 INJECTION, SOLUTION SUBCUTANEOUS EVERY MORNING
Refills: 0 | Status: DISCONTINUED | OUTPATIENT
Start: 2024-02-06 | End: 2024-02-07

## 2024-02-06 RX ORDER — INSULIN LISPRO 100/ML
VIAL (ML) SUBCUTANEOUS AT BEDTIME
Refills: 0 | Status: DISCONTINUED | OUTPATIENT
Start: 2024-02-06 | End: 2024-02-07

## 2024-02-06 RX ORDER — INSULIN LISPRO 100/ML
3 VIAL (ML) SUBCUTANEOUS
Refills: 0 | Status: DISCONTINUED | OUTPATIENT
Start: 2024-02-06 | End: 2024-02-07

## 2024-02-06 RX ORDER — DILTIAZEM HCL 120 MG
5 CAPSULE, EXT RELEASE 24 HR ORAL
Qty: 125 | Refills: 0 | Status: DISCONTINUED | OUTPATIENT
Start: 2024-02-06 | End: 2024-02-06

## 2024-02-06 RX ORDER — DILTIAZEM HCL 120 MG
30 CAPSULE, EXT RELEASE 24 HR ORAL EVERY 6 HOURS
Refills: 0 | Status: DISCONTINUED | OUTPATIENT
Start: 2024-02-06 | End: 2024-02-07

## 2024-02-06 RX ORDER — METOPROLOL TARTRATE 50 MG
5 TABLET ORAL ONCE
Refills: 0 | Status: COMPLETED | OUTPATIENT
Start: 2024-02-06 | End: 2024-02-06

## 2024-02-06 RX ADMIN — Medication 3 MILLIGRAM(S): at 22:46

## 2024-02-06 RX ADMIN — Medication 81 MILLIGRAM(S): at 09:59

## 2024-02-06 RX ADMIN — Medication 25 MILLIGRAM(S): at 09:59

## 2024-02-06 RX ADMIN — Medication 3 UNIT(S): at 18:11

## 2024-02-06 RX ADMIN — PANTOPRAZOLE SODIUM 40 MILLIGRAM(S): 20 TABLET, DELAYED RELEASE ORAL at 05:21

## 2024-02-06 RX ADMIN — Medication 2: at 18:11

## 2024-02-06 RX ADMIN — Medication 2: at 12:44

## 2024-02-06 RX ADMIN — Medication 5 MILLIGRAM(S): at 12:02

## 2024-02-06 RX ADMIN — INSULIN GLARGINE 8 UNIT(S): 100 INJECTION, SOLUTION SUBCUTANEOUS at 10:00

## 2024-02-06 RX ADMIN — Medication 1: at 23:10

## 2024-02-06 RX ADMIN — Medication 25 MILLIGRAM(S): at 22:46

## 2024-02-06 RX ADMIN — Medication 2: at 08:21

## 2024-02-06 RX ADMIN — Medication 5 MG/HR: at 12:37

## 2024-02-06 NOTE — CHART NOTE - NSCHARTNOTEFT_GEN_A_CORE
Received a call that patient converted to NSR about 1 hour ago patients HR 90's. Will D/C Cardizem drip and start Cardizem 30mg PO q 6 hours.   Discussed case with Dr. Diaz.

## 2024-02-06 NOTE — PROGRESS NOTE ADULT - PROBLEM SELECTOR PLAN 1
new onset , rapid rate , without active cardiac symptoms , started on IV cardizem drip , titrate as needed ,   patient was explained about prevention of thromboembolic events  recommend IV heparin  , as patient had hematuria , anemia ,  however patient refused any AC , follow up echo ,     patient anticipating to have biopsy    tried to explain about risk of stroke vs benefits , he does not want to take any AC

## 2024-02-06 NOTE — PROGRESS NOTE ADULT - SUBJECTIVE AND OBJECTIVE BOX
HPI:  59 y/o male with MHx of renal stones, HTN, HLD, CAD s/p stent p/w progressive right flank pain and now with hematuria.    2/4/24; Seen at bedside accompanied by spouse, reports right flank pain of 10/10, when pain medications wear off. Currently pain upon coughing. Also reports hematuria fo few days and at times also passes clots.     2/5/24: Seen at bedside along with Dr. Senior, in no acute distress.    2/6/24: Seen at bedside along with our team, in no acute distress, OOB to chair, underwent MRI-Renal protocol yesterday.    PAST MEDICAL & SURGICAL HISTORY:    Pneumonia    HTN (hypertension)    HLD (hyperlipidemia)    CAD (coronary artery disease)    appendectomy-1994    torn meniscus of left knee  1995    tonsillectomy and adenoidectomy  1968    Former Smoker    FAMILY HISTORY:    No pertinent family history in first degree relatives      MEDICATIONS  (STANDING):    aspirin enteric coated 81 milliGRAM(s) Oral daily  dextrose 5%. 1000 milliLiter(s) (50 mL/Hr) IV Continuous <Continuous>  dextrose 5%. 1000 milliLiter(s) (100 mL/Hr) IV Continuous <Continuous>  dextrose 50% Injectable 12.5 Gram(s) IV Push once  dextrose 50% Injectable 25 Gram(s) IV Push once  dextrose 50% Injectable 25 Gram(s) IV Push once  glucagon  Injectable 1 milliGRAM(s) IntraMuscular once  insulin glargine Injectable (LANTUS) 8 Unit(s) SubCutaneous every morning  insulin lispro (ADMELOG) corrective regimen sliding scale   SubCutaneous three times a day before meals  metoprolol tartrate 25 milliGRAM(s) Oral two times a day  pantoprazole    Tablet 40 milliGRAM(s) Oral before breakfast    MEDICATIONS  (PRN):    acetaminophen     Tablet .. 650 milliGRAM(s) Oral every 6 hours PRN Temp greater or equal to 38C (100.4F), Mild Pain (1 - 3)  aluminum hydroxide/magnesium hydroxide/simethicone Suspension 30 milliLiter(s) Oral every 4 hours PRN Dyspepsia  dextrose Oral Gel 15 Gram(s) Oral once PRN Blood Glucose LESS THAN 70 milliGRAM(s)/deciliter  melatonin 3 milliGRAM(s) Oral at bedtime PRN Insomnia  morphine  - Injectable 4 milliGRAM(s) IV Push every 4 hours PRN Moderate Pain (4 - 6)  ondansetron Injectable 4 milliGRAM(s) IV Push every 4 hours PRN Nausea and/or Vomiting  ondansetron Injectable 4 milliGRAM(s) IV Push every 4 hours PRN Nausea and/or Vomiting    Allergies    sulfa drugs (Unknown)    REVIEW OF SYSTEM:    Constitutional, Eyes, ENT, Cardiovascular, Respiratory, Gastrointestinal, Genitourinary, Musculoskeletal, Integumentary, Neurological, Psychiatric, Endocrine, Heme/Lymph and Allergic/Immunologic review of systems are otherwise negative except as noted in HPI.     Vital Signs Last 24 Hrs    Vital Signs Last 24 Hrs  T(C): 37.1 (06 Feb 2024 09:35), Max: 38.1 (05 Feb 2024 21:40)  T(F): 98.7 (06 Feb 2024 09:35), Max: 100.6 (05 Feb 2024 21:40)  HR: 129 (06 Feb 2024 09:35) (77 - 129)  BP: 100/77 (06 Feb 2024 09:35) (100/77 - 128/84)  BP(mean): --  RR: 18 (06 Feb 2024 09:35) (18 - 18)  SpO2: 99% (06 Feb 2024 09:35) (95% - 99%)    Parameters below as of 06 Feb 2024 09:35  Patient On (Oxygen Delivery Method): room air      PHYSICAL EXAM:    Constitutional: no acute distress, but appears in distress and anxious about the unknown  Eyes: no conjunctival infection, anicteric.   ENT: pharynx is unremarkable  Neck: supple without JVD  Pulmonary: clear to auscultation bilaterally   Cardiac: RRR  Vascular: no calf tenderness, venous stasis changes, varices  Abdomen: normoactive bowel sounds, soft and nontender, no hepatosplenomegaly or masses appreciated  Lymphatic: no peripheral adenopathy appreciated  Musculoskeletal: full range of motion and no deformities appreciated  Skin: normal appearance, no rash/erythema  Neurology: awake, alert, oriented; no focal deficits    LABS:                                     12.5   6.15  )-----------( 217      ( 06 Feb 2024 06:38 )             38.0     02-06    134<L>  |  103  |  16  ----------------------------<  234<H>  3.7   |  26  |  1.04    Ca    9.5      06 Feb 2024 06:38          Urinalysis Basic - ( 04 Feb 2024 03:27 )    Color: x / Appearance: x / SG: x / pH: x  Gluc: 300 mg/dL / Ketone: x  / Bili: x / Urobili: x   Blood: x / Protein: x / Nitrite: x   Leuk Esterase: x / RBC: x / WBC x   Sq Epi: x / Non Sq Epi: x / Bacteria: x    RADIOLOGY & ADDITIONAL STUDIES:    EXAM:  MR ABDOMEN WAW IC     PROCEDURE DATE:  02/05/2024      INTERPRETATION:  CLINICAL INFORMATION: Right renal mass    COMPARISON: Abdominal CT scan from February 04, 2024    CONTRAST/COMPLICATIONS:  IV Contrast: Gadavist  8.5 cc administered   1.5 cc discarded  Oral Contrast: NONE  Complications: None reported at time of study completion    PROCEDURE:  MRI of the abdomen was performed.    FINDINGS:  LOWER CHEST: Within normal limits.    LIVER: Normal morphology. No steatosis or liver lesion.  BILE DUCTS: Normal caliber.  GALLBLADDER: Small stones in the gallbladder neck.  SPLEEN: Within normal limits.  PANCREAS: Within normal limits.  ADRENALS: Within normal limits.  KIDNEYS/URETERS: 9.2 x 6.2 cm heterogeneous mass in the interpolar region   of the right kidney with a small exophytic component and no macroscopic   fat. The lesion primarily enhances, however there are irregular   nonenhancing areas compatible with necrosis. The right renal vein and   inferior vena cava are patent. The mass is inseparable from the posterior   surface of the right hepatic lobe. The left kidney is normal.    VISUALIZED PORTIONS:  BOWEL: Moderate colonic diverticulosis.  PERITONEUM: No ascites.  VESSELS: Within normal limits.  RETROPERITONEUM/LYMPH NODES: There are a few retrocaval lymph nodes   measuring up to 1.1 x 1.5 cm.  ABDOMINAL WALL: Within normal limits.  BONES: There is no suspicious or aggressive bony lesion.    IMPRESSION:  9.6 cm solid partially necrotic right renal mass compatible with renal   cell carcinoma.    Several retroperitoneal lymph nodes suspect for metastatic disease.        EXAM:  CT ABDOMEN AND PELVIS IC     PROCEDURE DATE:  02/04/2024      INTERPRETATION:  CLINICAL INFORMATION: Right flank pain and blood in the   urine for one week, worse tonight. History of kidney stones. Rule out   pyelonephritis.    COMPARISON: None.    CONTRAST/COMPLICATIONS:  IV Contrast: Omnipaque 350  90 cc administered   0 cc discarded  Oral Contrast: NONE  Complications: None reported at time of study completion    PROCEDURE:  CT of the Abdomen and Pelvis was performed.  Sagittal and coronal reformats were performed.    FINDINGS:  LOWER CHEST: 9 mm nodule in the right lower lobe (series 2, image 10).   Bibasilar subsegmental atelectasis.    LIVER: Within normal limits.  BILE DUCTS: Normal caliber.  GALLBLADDER: Cholelithiasis.  SPLEEN: Within normal limits.  PANCREAS: Within normal limits.  ADRENALS: Within normal limits.  KIDNEYS/URETERS: Heterogeneous enhancing mass measuring 7.3 x 5.8 x 7.2   cm (AP x TRV x CC) in the right renal midpole concerning for a renal   neoplasm. Mild hydroureteronephrosis to the level of the distal right   ureter with moderate right perinephric inflammatory change and a delayed   nephrogram. Questionable 4 mm lesion within the distal right ureter. No   left hydronephrosis. Mild nonspecific left perinephric fat stranding.    BLADDER: Circumferential wall thickening.  REPRODUCTIVE ORGANS: Enlarged prostate to 5.0 cm.    BOWEL: Small hiatal hernia. No bowel obstruction or inflammation. Colonic   diverticulosis without diverticulitis. Appendix is not visualized. No   evidence of inflammation in the pericecal region.  PERITONEUM: No ascites.  VESSELS: Within normal limits.  RETROPERITONEUM/LYMPH NODES: Enlarged portacaval lymph node measuring 2.2   x 1.0 cm. Enlarged right retroperitoneal lymph node measuring 1.5 x 1.2   cm.  ABDOMINAL WALL: Small fat-containing umbilical and right inguinal hernias.  BONES: Degenerative changes of the spine. Bilateral femoral head bone   islands.    IMPRESSION:  1. Moderate to large heterogeneous enhancing mass in the right renal   midpole concerning for a renal neoplasm possibly renal cell carcinoma.  2. Mild hydroureteronephrosis to the level of the distal right ureter   with moderate right perinephric inflammatory change and a delayed   nephrogram. Questionable lesion within the distal right ureter which   could represent a ureteral mass.  3. Circumferential urinary bladder wall thickening which could be due to   underdistention versus a cystitis. Correlate with urinalysis.  4. Portacaval and right retroperitoneal lymphadenopathy.  5. Right lower lobe 9 mm pulmonary nodule for which a metastatic lesion   is not excluded.      < from: CT Chest No Cont (02.04.24 @ 11:35) >    ACC: [id number] EXAM:  CT CHEST   ORDERED BY: DAYAMI FIERRO     PROCEDURE DATE:  02/04/2024          INTERPRETATION:  CLINICAL INDICATION: RENAL MASS, STAGING.    Axial CT images of the chest are obtained without intravenous   administration of contrast.    Correlation is made with the prior cardiac CT of April 10, 2019. No prior   chest CTs are available for comparison.    No enlarged axillary, mediastinal or hilar lymph nodes.    Bilateral gynecomastia.    No pericardial effusion. Left atrium is enlarged measuring about 5 cm in   anteroposterior dimension. Coronary artery calcifications and stent. The   aorta is tortuous with focal area of kinking involving the aortic arch.    For complete evaluation of the abdomen, please refer to dedicated   contrast-enhanced abdominal CT performed earlier on the same day. Delayed   renal parenchymal enhancement which is partially imaged sequela of the   prior contrast-enhanced CT images suggestive of renal dysfunction.   Partially imaged right renal mass is noted.    Evaluation of the lungs demonstrate left upper lobe lobulated pulmonary   nodule measuring up to about 1.6 cm new since April 10, 2019.  2 adjacent pulmonary nodules within the peripheral aspect of the right   lower lobe largest measuring about 5 mm. This area was not completely   imaged on the prior cardiac CT of April 10, 2019.    No central endobronchial lesions.    Degenerative changes of the spine.    IMPRESSION: Dominant left upper lobe lobulated 1.6 cm nodule new since   April 10, 2019. Primary differential diagnostic consideration is   metastatic disease given the abdominal findings.    2 adjacent right lower lobe pulmonary nodules measuring up to about 5 mm   are indeterminate.

## 2024-02-06 NOTE — PROGRESS NOTE ADULT - SUBJECTIVE AND OBJECTIVE BOX
Reason for Admission: R renal mass , Gross hematuria  History of Present Illness:   HPI:   The patient is a 59 yo male with Hx. of CAD s/p stent placed on 4/26/2019, readmitted on 4/28/2019 with CP had  LHC stent patent,  The patient denies taking his medications exept ASA 81 mg, Hx of kidney stone  10 years ago, HTN, HLD , Prediabetes  last A1c 6.2 in 2019, presented in the ED for R flank pain, blood in the urine. The patient symptoms started 6 days ago with mild pain, noted some blood in the urine , flew to [place], returned yesterday, he developed worsened R flank pain, nausea, and blood in the urine.     2/5 - pt s/p Cystoscopy, with retrograde pyelogram and ureteral stent insertion yesterday.  hematuria without clots in chapman bag today. no fever or chills    2/6: Tachycardi->EKG->Afib RVR->transfer to [facility]->PO lopressor->IVP lopressor->Cardizem ggt started. TTE ordered. Cardiology reconsulted. Patient refused AC.  MRI finds suggestive of RCC. Biopsy site TBD. Uptitrate insulin for better glycemic control,     ROS:   All 10 systems reviewed and found to be negative with the exception of what has been described above.    Vital Signs Last 24 Hrs  T(C): 36.7 (02-06-24 @ 12:40), Max: 38.1 (02-05-24 @ 21:40)  HR: 116 (02-06-24 @ 13:40) (77 - 172)  BP: 119/75 (02-06-24 @ 13:40) (100/77 - 128/84)  RR: 16 (02-06-24 @ 13:40) (16 - 18)  SpO2: 98% (02-06-24 @ 13:40) (95% - 99%)  Parameters below as of 05 Feb 2024 09:09  Patient On (Oxygen Delivery Method): room air    GEN: lying in bed, NAD  HEENT:   NC/AT, pupils equal and reactive, EOMI  CV:  +S1, +S2, RRR  RESP:   lungs clear to auscultation bilaterally, no wheeze, rales, rhonchi   BREAST:  not examined  GI:  abdomen soft, non-tender, non-distended, normoactive BS  : chapman with hematuria no clots  MSK:   normal muscle tone  EXT:  no edema  NEURO:  AAOX3, no focal neurological deficits  SKIN:  no rashes                            12.5   6.15  )-----------( 217      ( 06 Feb 2024 06:38 )             38.0     02-06    134<L>  |  103  |  16  ----------------------------<  234<H>  3.7   |  26  |  1.04    Ca    9.5      06 Feb 2024 06:38        CAPILLARY BLOOD GLUCOSE      POCT Blood Glucose.: 239 mg/dL (06 Feb 2024 11:50)  POCT Blood Glucose.: 260 mg/dL (06 Feb 2024 09:58)  POCT Blood Glucose.: 234 mg/dL (06 Feb 2024 08:01)  POCT Blood Glucose.: 257 mg/dL (05 Feb 2024 22:15)  POCT Blood Glucose.: 187 mg/dL (05 Feb 2024 18:00)      Culture - Urine (collected 04 Feb 2024 03:23)  Source: Clean Catch Clean Catch (Midstream)  Final Report (05 Feb 2024 09:40):    <10,000 CFU/mL Normal Urogenital Carito                            11.6   6.45  )-----------( 201      ( 05 Feb 2024 06:37 )             35.3     02-05    135  |  104  |  18  ----------------------------<  273<H>  4.2   |  29  |  1.11    Ca    9.2      05 Feb 2024 06:37    TPro  7.4  /  Alb  3.6  /  TBili  0.5  /  DBili  x   /  AST  12<L>  /  ALT  21  /  AlkPhos  63  02-04        LIVER FUNCTIONS - ( 04 Feb 2024 03:27 )  Alb: 3.6 g/dL / Pro: 7.4 gm/dL / ALK PHOS: 63 U/L / ALT: 21 U/L / AST: 12 U/L / GGT: x             Urinalysis Basic - ( 05 Feb 2024 06:37 )    Color: x / Appearance: x / SG: x / pH: x  Gluc: 273 mg/dL / Ketone: x  / Bili: x / Urobili: x   Blood: x / Protein: x / Nitrite: x   Leuk Esterase: x / RBC: x / WBC x   Sq Epi: x / Non Sq Epi: x / Bacteria: x              CT A/P :1. Moderate to large heterogeneous enhancing mass in the right renal   midpole concerning for a renal neoplasm possibly renal cell carcinoma.  2. Mild hydroureteronephrosis to the level of the distal right ureter   with moderate right perinephric inflammatory change and a delayed   nephrogram. Questionable lesion within the distal right ureter which   could represent a ureteral mass.  3. Circumferential urinary bladder wall thickening which could be due to   underdistention versus a cystitis. Correlate with urinalysis.  4. Portacaval and right retroperitoneal lymphadenopathy.  5. Right lower lobe 9 mm pulmonary nodule for which a metastatic lesion   is not excluded.    < from: MR Abdomen w/wo IV Cont (02.05.24 @ 15:11) >    IMPRESSION:  9.6 cm solid partially necrotic right renal mass compatible with renal   cell carcinoma.    Several retroperitoneal lymph nodes suspect for metastatic disease.    < end of copied text >  < from: 12 Lead ECG (02.06.24 @ 09:45) >  Diagnosis Line Atrial fibrillation with rapid ventricular response with premature ventricular or aberrantly conducted complexes  Nonspecific ST abnormality  Abnormal ECG  When compared with ECG of 04-FEB-2024 13:31,  Atrial fibrillation has replaced Sinus rhythm  Vent. rate has increased BY  83 BPM  ST now depressed in Anterolateral leads  Nonspecific T wave abnormality now evident in Inferior leads    < end of copied text >

## 2024-02-06 NOTE — PROGRESS NOTE ADULT - ASSESSMENT
57 y/o male with MHx of renal stones, HTN, HLD, CAD s/p stent p/w progressive right flank pain and now with hematuria. CT imaging noted moderate to large heterogeneous enhancing mass in the right renal midpole concerning for a renal neoplasm, questionable lesion within the distal right ureter which could represent a ureteral mass and portacaval and right retroperitoneal lymphadenopathy.    # Right renal midpole heterogeneous mass with associated portacaval and right retroperitoneal lymphadenopathy.    -p/w right flank pain and hematuria, with Hx of renal stones  -CT-A/P- 2/4/24-->Moderate to large heterogeneous enhancing mass in the right renal midpole concerning for a renal neoplasm possibly renal cell carcinoma. Mild hydroureteronephrosis to the level of the distal right ureter with moderate right perinephric inflammatory change and a delayed nephrogram. Questionable lesion within the distal right ureter which could represent a ureteral mass. Portacaval and right retroperitoneal lymphadenopathy. Right lower lobe 9 mm pulmonary nodule for which a metastatic lesion   is not excluded.  -CT-chest-2/4/24: Dominant left upper lobe lobulated 1.6 cm nodule new since April 10, 2019. Primary differential diagnostic consideration is   metastatic disease given the abdominal findings. Adjacent right lower lobe pulmonary nodules measuring up to about 5 mm are indeterminate.  -CT-a/p also noted enlarged prostate--->PSA ordered.  -Urology following-->s/p Cystoscopy, with retrograde pyelogram and ureteral stent insertion-2/4/24, ureter was-Negative for high grade urothelial carcinoma.  -MRI-2/5/24-->9.6 cm solid partially necrotic right renal mass compatible with renal cell carcinoma. Several retroperitoneal lymph nodes suspect for metastatic disease.  -Will need a tissue Bx to r/o metastases--->Per IR Lymphadenopathy is not accessible due to overlying organs/vessels. Biopsy of left pulmonary lesion is possible, but pt would need to hold ASA x 5 days prior. Will need outpatient appointment IR procedure.   -will need a Hem/Onc outpatient follow up.    # Anemia    -Hgb-12.5<---11.6 g/dl, chart review noted last normal at 14 g/dl in 4/2019  -Likely acute from hematuria, concern underlying malignancy, iron deficiency   -can add PO Ferrous Sulfate  -Serial CBC  -Supportive measures  -Transfuse if Hgb continue to drop or patient becomes symptomatic or hemodynamically unstable

## 2024-02-06 NOTE — PROGRESS NOTE ADULT - SUBJECTIVE AND OBJECTIVE BOX
The patient is a 57 yo male with Hx. of CAD s/p stent placed on 4/26/2019, readmitted on 4/28/2019 with CP had  LHC stent patent,  The patient denies taking his medications exept ASA 81 mg, Hx of kidney stone  10 years ago, HTN, HLD , Prediabetes  last A1c 6.2 in 2019, presented in the ED for R flank pain, blood in the urine. The patient symptoms started 6 days ago with mild pain, noted some blood in the urine , flew to [place], returned yesterday, he developed worsened R flank pain, nausea, and blood in the urine.           Consulted for preop clearance  went to OR prior to cardiac evaluation.  no cardiac symptoms post op.  H/o PCI as noted above not following w/ cardio.  not on statin due to intolerance - tried multiple statins.    2/6/24 Patient is feeling ok , developed new onset atrial fibrillation with rVR  patient denies any chest pain or shortness of breath or dizziness   patient was given cardizem ,  his bP is stable .      PAST MEDICAL & SURGICAL HISTORY:    CAD s/p stent placed on 4/26/2019, readmitted on 4/28/2019 with CP had  LHC stent patent, HTN, HLD, prediabetes        AMAYA (dyspnea on exertion)      HTN (hypertension)      Former smoker      Obesity (BMI 30-39.9)      HTN (hypertension)      HLD (hyperlipidemia)      CAD (coronary artery disease)      History of appendectomy      History of appendectomy  1994      History of torn meniscus of left knee  1995      History of tonsillectomy and adenoidectomy  1968          ALLERGIES:  Allergies    sulfa drugs (Unknown)    Intolerances        Family Hx: mother 82 Dx. with breast CA last week, Father medical Hx. unknown.    Social Hx.: not smoking, no alcohol use    MEDICATIONS Home Medications:  Aspir 81 oral delayed release tablet: 1 tab(s) orally once a day (04 Feb 2024 16:11)       (STANDING):  aspirin enteric coated 81 milliGRAM(s) Oral daily  dextrose 5%. 1000 milliLiter(s) (100 mL/Hr) IV Continuous <Continuous>  dextrose 5%. 1000 milliLiter(s) (50 mL/Hr) IV Continuous <Continuous>  dextrose 50% Injectable 25 Gram(s) IV Push once  dextrose 50% Injectable 25 Gram(s) IV Push once  dextrose 50% Injectable 12.5 Gram(s) IV Push once  diltiazem Infusion 5 mG/Hr (5 mL/Hr) IV Continuous <Continuous>  glucagon  Injectable 1 milliGRAM(s) IntraMuscular once  insulin glargine Injectable (LANTUS) 8 Unit(s) SubCutaneous every morning  insulin lispro (ADMELOG) corrective regimen sliding scale   SubCutaneous three times a day before meals  insulin lispro Injectable (ADMELOG) 3 Unit(s) SubCutaneous three times a day before meals  metoprolol tartrate 25 milliGRAM(s) Oral two times a day  pantoprazole    Tablet 40 milliGRAM(s) Oral before breakfast    MEDICATIONS  (PRN):  acetaminophen     Tablet .. 650 milliGRAM(s) Oral every 6 hours PRN Temp greater or equal to 38C (100.4F), Mild Pain (1 - 3)  aluminum hydroxide/magnesium hydroxide/simethicone Suspension 30 milliLiter(s) Oral every 4 hours PRN Dyspepsia  dextrose Oral Gel 15 Gram(s) Oral once PRN Blood Glucose LESS THAN 70 milliGRAM(s)/deciliter  melatonin 3 milliGRAM(s) Oral at bedtime PRN Insomnia  morphine  - Injectable 4 milliGRAM(s) IV Push every 4 hours PRN Moderate Pain (4 - 6)  ondansetron Injectable 4 milliGRAM(s) IV Push every 4 hours PRN Nausea and/or Vomiting  ondansetron Injectable 4 milliGRAM(s) IV Push every 4 hours PRN Nausea and/or Vomiting      Vital Signs Last 24 Hrs  T(C): 36.7 (06 Feb 2024 12:40), Max: 38.1 (05 Feb 2024 21:40)  T(F): 98 (06 Feb 2024 12:40), Max: 100.6 (05 Feb 2024 21:40)  HR: 126 (06 Feb 2024 12:40) (77 - 172)  BP: 117/81 (06 Feb 2024 12:40) (100/77 - 128/84)  BP(mean): --  RR: 16 (06 Feb 2024 12:40) (16 - 18)  SpO2: 97% (06 Feb 2024 12:40) (95% - 99%)    Parameters below as of 06 Feb 2024 12:40  Patient On (Oxygen Delivery Method): room air        I&O's Summary      Vital Signs Last 24 Hrs  T(C): 36.7 (06 Feb 2024 12:40), Max: 38.1 (05 Feb 2024 21:40)  T(F): 98 (06 Feb 2024 12:40), Max: 100.6 (05 Feb 2024 21:40)  HR: 126 (06 Feb 2024 12:40) (77 - 172)  BP: 117/81 (06 Feb 2024 12:40) (100/77 - 128/84)  BP(mean): --  RR: 16 (06 Feb 2024 12:40) (16 - 18)  SpO2: 97% (06 Feb 2024 12:40) (95% - 99%)    Parameters below as of 06 Feb 2024 12:40  Patient On (Oxygen Delivery Method): room air        I&O's Summary            PHYSICAL EXAM:    Constitutional: NAD, awake and alert, well-developed  HEENT: PERR, EOMI,  No oral cyananosis.  Neck:  supple,  No JVD  Respiratory: Breath sounds are clear bilaterally, No wheezing, rales or rhonchi  Cardiovascular: S1 and S2,IR IR tachycardic   Gastrointestinal: Bowel Sounds present, soft, nontender.   Extremities: No peripheral edema. No clubbing or cyanosis.  Vascular: 2+ peripheral pulses  Neurological: A/O x 3, no focal deficits  Musculoskeletal: no calf tenderness.  Skin: No rashes.    ===============================  ===============================  LABS:                                      12.5   6.15  )-----------( 217      ( 06 Feb 2024 06:38 )             38.0     02-06    134<L>  |  103  |  16  ----------------------------<  234<H>  3.7   |  26  |  1.04    Ca    9.5      06 Feb 2024 06:38      Urinalysis Basic - ( 06 Feb 2024 06:38 )    Color: x / Appearance: x / SG: x / pH: x  Gluc: 234 mg/dL / Ketone: x  / Bili: x / Urobili: x   Blood: x / Protein: x / Nitrite: x   Leuk Esterase: x / RBC: x / WBC x   Sq Epi: x / Non Sq Epi: x / Bacteria: x        Culture Results:   <10,000 CFU/mL Normal Urogenital Carito (02-04-24 @ 03:23)          ===============================  ===============================  EKG: NSR no ischemic changes.    Monitor atrial fibrillation with RVR

## 2024-02-06 NOTE — PROGRESS NOTE ADULT - ASSESSMENT
Assessment:  · Assessment	  59 yo male with Hx. of CAD s/p stent placed on 4/26/2019, readmitted on 4/28/2019 with CP had  LHC stent patent,  The patient denies taking his medications exept ASA 81 mg, Hx of kidney stone  10 years ago, HTN, HLD , Prediabetes  last A1c 6.2 in 2019, presented in the ED for R flank pain, blood in the urine. The patient symptoms started 6 days ago with mild pain, noted some blood in the urine , flew to [place], returned yesterday, he developed worsened R flank pain, nausea, and blood in the urine.  CT abd shows R renal mass , hydroureteronephrosis , portocaval ,retroperitoneal lymphadenopathy,  RLL 9mm pulmonary nodule.   assessment Dx:    # renal mass/gross hematuria/ABL anemia   #Imaging findings consistent with Metastatic RCC. Tissue biopsy site TBD  - pt s/y cysto 2/4  - urology input ntoed and plan to dc chapman and TOV today->Still with hematuria with interval improvement. Cr stable. Continue to montir.   - MRI findings suggestive of RCC  - pulm nodule. heme/onc eval noted and outtp f/u. possible metastatic disease     #New onset Afib with RVR  # CAD s/p stent/HTN/HL  - cardio input noted  - ASA continued. Needs to be held for 5 days for biopsy?  - New onset Afib with RVR identified 2/6->transferred to [facility]  -[facility]  -Patient defers AC  - outpt cardio f/u re lipid management  - continue home meds    # DM 2  - continue ISS  - A1c 10.4%  -Lantus/Premeals/ISS. Titrate accordingly.        Advance Directive: full code,

## 2024-02-06 NOTE — PROGRESS NOTE ADULT - PROBLEM SELECTOR PLAN 2
cont. ASA  -Should followup w/ cardio 2-3 weeks post d/c  to consider PCSK9 inhibitor based on lipids as he cannot tolerate statin

## 2024-02-07 ENCOUNTER — TRANSCRIPTION ENCOUNTER (OUTPATIENT)
Age: 59
End: 2024-02-07

## 2024-02-07 VITALS
RESPIRATION RATE: 16 BRPM | TEMPERATURE: 98 F | SYSTOLIC BLOOD PRESSURE: 123 MMHG | OXYGEN SATURATION: 97 % | HEART RATE: 75 BPM | DIASTOLIC BLOOD PRESSURE: 87 MMHG

## 2024-02-07 LAB
ANION GAP SERPL CALC-SCNC: 5 MMOL/L — SIGNIFICANT CHANGE UP (ref 5–17)
BUN SERPL-MCNC: 18 MG/DL — SIGNIFICANT CHANGE UP (ref 7–23)
CALCIUM SERPL-MCNC: 9.9 MG/DL — SIGNIFICANT CHANGE UP (ref 8.5–10.1)
CHLORIDE SERPL-SCNC: 106 MMOL/L — SIGNIFICANT CHANGE UP (ref 96–108)
CO2 SERPL-SCNC: 25 MMOL/L — SIGNIFICANT CHANGE UP (ref 22–31)
CREAT SERPL-MCNC: 0.93 MG/DL — SIGNIFICANT CHANGE UP (ref 0.5–1.3)
EGFR: 94 ML/MIN/1.73M2 — SIGNIFICANT CHANGE UP
GLUCOSE BLDC GLUCOMTR-MCNC: 190 MG/DL — HIGH (ref 70–99)
GLUCOSE BLDC GLUCOMTR-MCNC: 220 MG/DL — HIGH (ref 70–99)
GLUCOSE SERPL-MCNC: 224 MG/DL — HIGH (ref 70–99)
HCT VFR BLD CALC: 35.5 % — LOW (ref 39–50)
HGB BLD-MCNC: 11.8 G/DL — LOW (ref 13–17)
MAGNESIUM SERPL-MCNC: 1.8 MG/DL — SIGNIFICANT CHANGE UP (ref 1.6–2.6)
PHOSPHATE SERPL-MCNC: 3.6 MG/DL — SIGNIFICANT CHANGE UP (ref 2.5–4.5)
POTASSIUM SERPL-MCNC: 3.5 MMOL/L — SIGNIFICANT CHANGE UP (ref 3.5–5.3)
POTASSIUM SERPL-SCNC: 3.5 MMOL/L — SIGNIFICANT CHANGE UP (ref 3.5–5.3)
SODIUM SERPL-SCNC: 136 MMOL/L — SIGNIFICANT CHANGE UP (ref 135–145)
T3 SERPL-MCNC: 100 NG/DL — SIGNIFICANT CHANGE UP (ref 80–200)
T4 AB SER-ACNC: 10 UG/DL — SIGNIFICANT CHANGE UP (ref 4.6–12)
TSH SERPL-MCNC: 4.52 UU/ML — SIGNIFICANT CHANGE UP (ref 0.34–4.82)

## 2024-02-07 PROCEDURE — 93010 ELECTROCARDIOGRAM REPORT: CPT

## 2024-02-07 PROCEDURE — 99239 HOSP IP/OBS DSCHRG MGMT >30: CPT

## 2024-02-07 PROCEDURE — 99233 SBSQ HOSP IP/OBS HIGH 50: CPT

## 2024-02-07 RX ORDER — PANTOPRAZOLE SODIUM 20 MG/1
1 TABLET, DELAYED RELEASE ORAL
Qty: 30 | Refills: 0
Start: 2024-02-07 | End: 2024-03-07

## 2024-02-07 RX ORDER — INSULIN GLARGINE 100 [IU]/ML
10 INJECTION, SOLUTION SUBCUTANEOUS
Qty: 3 | Refills: 0
Start: 2024-02-07

## 2024-02-07 RX ORDER — DILTIAZEM HCL 120 MG
120 CAPSULE, EXT RELEASE 24 HR ORAL DAILY
Refills: 0 | Status: DISCONTINUED | OUTPATIENT
Start: 2024-02-07 | End: 2024-02-07

## 2024-02-07 RX ORDER — METOPROLOL TARTRATE 50 MG
1 TABLET ORAL
Qty: 60 | Refills: 0
Start: 2024-02-07 | End: 2024-03-07

## 2024-02-07 RX ORDER — DILTIAZEM HCL 120 MG
1 CAPSULE, EXT RELEASE 24 HR ORAL
Qty: 30 | Refills: 0
Start: 2024-02-07 | End: 2024-03-07

## 2024-02-07 RX ORDER — METFORMIN HYDROCHLORIDE 850 MG/1
1 TABLET ORAL
Qty: 60 | Refills: 0
Start: 2024-02-07 | End: 2024-03-07

## 2024-02-07 RX ORDER — INSULIN GLARGINE 100 [IU]/ML
15 INJECTION, SOLUTION SUBCUTANEOUS
Qty: 3 | Refills: 0 | DISCHARGE
Start: 2024-02-07

## 2024-02-07 RX ORDER — INSULIN GLARGINE 100 [IU]/ML
10 INJECTION, SOLUTION SUBCUTANEOUS EVERY MORNING
Refills: 0 | Status: DISCONTINUED | OUTPATIENT
Start: 2024-02-07 | End: 2024-02-07

## 2024-02-07 RX ORDER — METOPROLOL TARTRATE 100 MG/1
1 TABLET ORAL
Qty: 60 | Refills: 0 | DISCHARGE
Start: 2024-02-07 | End: 2024-03-07

## 2024-02-07 RX ORDER — APIXABAN 2.5 MG/1
1 TABLET, FILM COATED ORAL
Qty: 60 | Refills: 0
Start: 2024-02-07 | End: 2024-03-07

## 2024-02-07 RX ADMIN — Medication 3 UNIT(S): at 11:42

## 2024-02-07 RX ADMIN — INSULIN GLARGINE 10 UNIT(S): 100 INJECTION, SOLUTION SUBCUTANEOUS at 08:38

## 2024-02-07 RX ADMIN — Medication 25 MILLIGRAM(S): at 09:32

## 2024-02-07 RX ADMIN — Medication 1: at 11:41

## 2024-02-07 RX ADMIN — Medication 30 MILLIGRAM(S): at 06:30

## 2024-02-07 RX ADMIN — Medication 30 MILLIGRAM(S): at 03:20

## 2024-02-07 RX ADMIN — Medication 3 UNIT(S): at 08:11

## 2024-02-07 RX ADMIN — PANTOPRAZOLE SODIUM 40 MILLIGRAM(S): 20 TABLET, DELAYED RELEASE ORAL at 06:30

## 2024-02-07 RX ADMIN — Medication 2: at 08:10

## 2024-02-07 RX ADMIN — Medication 120 MILLIGRAM(S): at 09:32

## 2024-02-07 NOTE — PHARMACOTHERAPY INTERVENTION NOTE - COMMENTS
Medication history complete, reviewed medications with patient spouse and confirmed with doctor first med hx.
New medications reviewed w/ patient, provided w/ educational handouts, all questions answered 
As per Macarena @ [facility]'s pharmacy: Eliquis copay $40, Lantus $64.64, CGM-74.99

## 2024-02-07 NOTE — DISCHARGE NOTE PROVIDER - CARE PROVIDERS DIRECT ADDRESSES
[email or web address],DirectAddress_Unknown,DirectAddress_Unknown

## 2024-02-07 NOTE — DISCHARGE NOTE NURSING/CASE MANAGEMENT/SOCIAL WORK - NSDCPEFALRISK_GEN_ALL_CORE
For information on Fall & Injury Prevention, visit: [email or web address] OR  [email or web address] OR  https://www.cdc.gov/steadi/patient.html

## 2024-02-07 NOTE — DISCHARGE NOTE NURSING/CASE MANAGEMENT/SOCIAL WORK - PATIENT PORTAL LINK FT
You can access the FollowMyHealth Patient Portal offered by [facility] by registering at the following website: [email or web address]. By joining [facility]’s FollowMyHealth portal, you will also be able to view your health information using other applications (apps) compatible with our system.

## 2024-02-07 NOTE — PROGRESS NOTE ADULT - NS ATTEND AMEND GEN_ALL_CORE FT
Patient found to have a large right renal midpole mass, also with portacaval and right RP LAD.  CT chest with PRISCILLA nodule-1.6cm, RLL nodule 5mm.     MRI of his abdomen with 9.6 cm solid partially necrotic right renal mass compatible with renal cell carcinoma.  The right renal vein and inferior vena cava are patent. The mass is inseparable from the posterior surface of the right hepatic lobe. The left kidney is normal. There are a few retrocaval lymph nodes measuring up to 1.1 x 1.5 cm.    IR consult placed, was relayed that the lymphadenopathy was not amenable to biopsy.  He should have a biopsy of the PRISCILLA nodule to document if positive metastatic disease, but will need to be off aspirin for 5 days.      Outpatient follow up.
Patient found to have a large right renal midpole mass, also with portacaval and right RP LAD.  CT chest with PRISCILLA nodule-1.6cm, RLL nodule 5mm.   Underwent cystoscopy yesterday with ureteral washings sent.  He likely will need tissue diagnosis/confirmation of metastatic disease.    Awaiting MRI of his abdomen, would also consult IR to see if RP node/lung nodule are amenable to biopsy.
Plan of care discussed with NP. Agree with plan of care.

## 2024-02-07 NOTE — DISCHARGE NOTE NURSING/CASE MANAGEMENT/SOCIAL WORK - NSDCPEELIQUISREACT_GEN_ALL_CORE
Critical Care Critical Care ENT Pain Medicine Pain Medicine Thoracic Surgery Critical Care Critical Care Critical Care Critical Care Pain Medicine Pain Medicine Pain Medicine Pain Medicine Pain Medicine Pain Medicine Thoracic Surgery Critical Care Infectious Disease Pain Medicine Pain Medicine Thoracic Surgery Thoracic Surgery CT Surgery Critical Care Apixaban/Eliquis increases your risk for bleeding. Notify your doctor if you experience any of the following side effects: bleeding, coughing or vomiting blood, red or black stool, unexpected pain or swelling, itching or hives, chest pain, chest tightness, trouble breathing, changes in how much or how often you urinate, red or pink urine, numbness or tingling in your feet, or unusual muscle weakness. When Apixaban/Eliquis is taken with other medicines, they can affect how it works. Taking other medications such as aspirin, blood thinners, nonsteroidal anti-inflammatories, and medications that treat depression can increase your risk of bleeding. It is very important to tell your health care provider about all of the other medicines, including over-the-counter medications, herbs, and vitamins you are taking. DO NOT start, stop, or change the dosage of any medicine, including over-the-counter medicines, vitamins, and herbal products without your doctor’s approval. Any products containing aspirin or are nonsteroidal anti-inflammatories lessen the blood’s ability to form clots and add to the effect of Apixaban/Eliquis. Never take aspirin or medicines that contain aspirin without speaking to your doctor.

## 2024-02-07 NOTE — DISCHARGE NOTE PROVIDER - NSDCCPCAREPLAN_GEN_ALL_CORE_FT
PRINCIPAL DISCHARGE DIAGNOSIS  Diagnosis: Renal mass  Assessment and Plan of Treatment: Large Mass in the right kidney highly suggestive of underlying malignancy/cancer. Imaging also suggests that the suspected cancer has traveled to other locations likey the lung. Requires biopsy for further evaluation. Coordinate with your Oncologist/Hematologist to coordinate outpatient biopsy. Hold aspirin for 5 days prior to planned biopsy. In addition started on other blood thinner (eliquis) for stroke prevention for underlying atrial fibrillation. This medication is to be held 48 hours before any planned procedure/biopsy.      SECONDARY DISCHARGE DIAGNOSES  Diagnosis: Paroxysmal atrial fibrillation with RVR  Assessment and Plan of Treatment:     Diagnosis: Type 2 diabetes mellitus with hyperglycemia  Assessment and Plan of Treatment:     Diagnosis: Hyperlipidemia  Assessment and Plan of Treatment:     Diagnosis: Gross hematuria  Assessment and Plan of Treatment:     Diagnosis: CAD (coronary artery disease)  Assessment and Plan of Treatment:      PRINCIPAL DISCHARGE DIAGNOSIS  Diagnosis: Renal mass  Assessment and Plan of Treatment: Large Mass in the right kidney highly suggestive of underlying malignancy/cancer. Imaging also suggests that the suspected cancer has traveled to other locations likey the lung. Requires biopsy for further evaluation. Coordinate with your Oncologist/Hematologist to coordinate outpatient biopsy. Hold aspirin for 5 days prior to planned biopsy. In addition started on other blood thinner (eliquis) for stroke prevention for underlying atrial fibrillation. This medication is to be held 48 hours before any planned procedure/biopsy.      SECONDARY DISCHARGE DIAGNOSES  Diagnosis: Paroxysmal atrial fibrillation with RVR  Assessment and Plan of Treatment: Abnormal Heart Rythym leading to more inefficient blood flow at higher heart rates. Medications adjusted to control heart rate. Take medications as prescribed. In addition, with atrial fibrillation, increased risk of developing clots in the heart which can travel to the rest of the body and cause trouble (example is stroke). In order to prevent blood clots, you are on a blood thinner that helps prevent blood clot formation. Take medication as prescribed.   Being on a blood thinner will make cuts bleeding quicker/easier/longer and can cause bruising. Maintain prolonged pressure on any bleeding cuts. Caution and avoid trauma as major and minor bleeding can be potentiated by being on a blood thinner. If you notice any prolonged/persistent bleeding, seek medical attention.  Eliquis has been prescribed to you. When your urine grossly doesn't appear to have any blood, start taking eliquis as prescribed. As stated above, it can potentiate bleeding including recurrence of hematuria (blood in the urine) and if this happens, hydrate well and hold blood thinner eliquis and seek further evaluation with your urology and cardiologist.    Diagnosis: Type 2 diabetes mellitus with hyperglycemia  Assessment and Plan of Treatment: The blood test we use to check for diabetes is called A1c. Value equal to or greater than 6.5% gives the diagnosis of diabetes. Value at and between 5.7% and 6.4% is considered to be prediabetes. Your blood test resulted with 10.3%. Individuals with A1c above 9.0%, it is recommended to be on insulin therapy to adequately control your blood sugar/diabetes.   For your diabetes you are going to be using long acting insulin (Name).  The dose of which will be a moving target based on your diet and lifestyle.   Starting dose is: 10 Units once daily  Take your blood sugar 4 times daily  -Once in the morning FASTING (FASTING defined as no calorie intake for 8 hours) before breakfast  -Once before lunch  -Once before dinner   -Once before bedtime  Maintain a log of your blood sugar readings to be reviewed by the outpatient doctor/primary medical doctor managing your diabetes   For each day your FASTING blood sugar is greater than 150, increase the subsequent/next dose of long acting insulin by 1 unit  For each day your FASTING blood sugar is less than 100, decrease the subsequent/next dose of long acting insulin by 1 unit  In addition started on oral diabetic medication (metformin). Take as prescribed. Most common adverse effect of this medication is GI upset. It is usually better tolerated when taken with meals. If you do not tolerate, discontinue taking and follow up with your primary medical doctor for further evaluation/directions.  Continue current medications as instructed/prescribed and maintain low carbohydrate diet and follow up with your primary medical doctor.    Diagnosis: Hyperlipidemia  Assessment and Plan of Treatment: With a history of coronary artery disease and diabetes, it is recommended to be on a medication that controls your high cholesterol. You have reported intolerance to class of medications called statins so this has not be started. Follow up with your primary medical doctor and cardiologist for possible initiation of other form of cholesterol control (example: Repatha; Class of medication PCSK9 inhibitor). Follow up accordingly.    Diagnosis: Gross hematuria  Assessment and Plan of Treatment: Gross hematuria=Grossly visualized blood in the urine. Suspected to be from right kidney mass. This has improved after undergoing procedure (cystoscopy) with urologist.    Diagnosis: CAD (coronary artery disease)  Assessment and Plan of Treatment: Continue aspirin daily (hold 5 days prior to any planned biopsy for further evaluation of suspected underlying malignancy/cancer). Follow up in regards to cholesterol management as stated above. Follow up with your Cardiologist for continued evaluation.

## 2024-02-07 NOTE — DISCHARGE NOTE PROVIDER - NSDCMRMEDTOKEN_GEN_ALL_CORE_FT
Aspir 81 oral delayed release tablet: 1 tab(s) orally once a day Hold for 5 days before any planned biopsy (discuss with your oncologist as to when that is planned for)  dilTIAZem 120 mg/24 hours oral capsule, extended release: 1 cap(s) orally once a day  Eliquis 5 mg oral tablet: 1 tab(s) orally every 12 hours  Freestyle Markie 2 Augusta: Dispense 1 Augusta  Freestyle Markie 2 Sensors: Apply 1 sensor every 14 days  Freestyle Precision Arnoldo Strips: Test blood sugar four times a day when you see check blood glucose symbol or when symptoms don&#x27;t match Markie reading.  glucose tablets: Follow instructions on bottle when sugar is low.  Insulin Pen Needles, 4mm: 1 application subcutaneously once day. ** Use with insulin pen **  Lantus Solostar Pen 100 units/mL subcutaneous solution: 10 unit(s) subcutaneous once a day (in the morning)  metFORMIN 500 mg oral tablet: 1 tab(s) orally 2 times a day (with meals)  metoprolol tartrate 25 mg oral tablet: 1 tab(s) orally 2 times a day  pantoprazole 40 mg oral delayed release tablet: 1 tab(s) orally once a day (before a meal) as needed for  heartburn

## 2024-02-07 NOTE — DISCHARGE NOTE PROVIDER - CARE PROVIDER_API CALL
Lucas Mai)  Cardiology  [address] [facility] [address]  [place] [address]  Phone: [phone]  Fax: [phone]  Established Patient  Follow Up Time: 2 weeks    Paramjit Ovalle  Urology  [address] [place] [address]  Phone: [phone]  Fax: [phone]  Established Patient  Follow Up Time: 2 weeks    John Grimm  Internal Medicine  [address] [place] [address]  Phone: [phone]  Fax: [phone]  Established Patient  Follow Up Time: 1 week    Haven Bell Nury  Medical Oncology  [address] [place] [address]  Phone: [phone]  Fax: [phone]  Established Patient  Follow Up Time: 1 week

## 2024-02-07 NOTE — DISCHARGE NOTE PROVIDER - NSDCCAREPROVSEEN_GEN_ALL_CORE_FT
Quique, Deepak Ovalle, Paramjit Fontenot, Emma Clayton, Yomi Lowry, Cecile Mai, Anil G Palla, Varun Crain, Amadeo Avila, Alina Senior, Gaye Harley, Bruce Olmstead, Brittny Chen, Lan

## 2024-02-07 NOTE — PROGRESS NOTE ADULT - REASON FOR ADMISSION
R renal mass , Gross hematuria

## 2024-02-07 NOTE — PROGRESS NOTE ADULT - PROBLEM SELECTOR PLAN 2
cont. ASA  -Should followup w/ cardio 2-3 weeks post d/c  to consider PCSK9 inhibitor based on lipids as he cannot tolerate statin cont. ASA  -Should followup w/ cardio 2-3 weeks post d/c  to consider PCSK9 inhibitor based on lipids as he cannot tolerate statin    Will sign off. Please call with any questions

## 2024-02-07 NOTE — DISCHARGE NOTE PROVIDER - PROVIDER TOKENS
PROVIDER:[TOKEN:[[id number]],FOLLOWUP:[2 weeks],ESTABLISHEDPATIENT:[T]],PROVIDER:[TOKEN:[[id number]],FOLLOWUP:[2 weeks],ESTABLISHEDPATIENT:[T]],PROVIDER:[TOKEN:[[id number]],FOLLOWUP:[1 week],ESTABLISHEDPATIENT:[T]],PROVIDER:[TOKEN:[[id number]],FOLLOWUP:[1 week],ESTABLISHEDPATIENT:[T]]

## 2024-02-07 NOTE — DISCHARGE NOTE PROVIDER - HOSPITAL COURSE
FROM H&P:    "The patient is a 59 yo male with Hx. of CAD s/p stent placed on 4/26/2019, readmitted on 4/28/2019 with CP had  LHC stent patent,  The patient denies taking his medications exept ASA 81 mg, Hx of kidney stone  10 years ago, HTN, HLD , Prediabetes  last A1c 6.2 in 2019, presented in the ED for R flank pain, blood in the urine. The patient symptoms started 6 days ago with mild pain, noted some blood in the urine , flew to [place], returned yesterday, he developed worsened R flank pain, nausea, and blood in the urine.     PMHx:  CAD s/p stent placed on 4/26/2019, readmitted on 4/28/2019 with CP had  LHC stent patent, HTN, HLD, prediabetes, "    2/5 - pt s/p Cystoscopy, with retrograde pyelogram and ureteral stent insertion yesterday.  hematuria without clots in chapman bag today. no fever or chills    2/6: Tachycardi->EKG->Afib RVR->transfer to tele->PO lopressor->IVP lopressor->Cardizem ggt started. TTE ordered. Cardiology reconsulted. Patient refused AC.  MRI finds suggestive of RCC. Biopsy site TBD. Uptitrate insulin for better glycemic control."    # renal mass/gross hematuria/ABL anemia   #Imaging findings consistent with Metastatic RCC. Tissue biopsy site TBD  - pt s/y cysto 2/4  - urology input ntoed and plan to dc chapman and TOV today->Still with hematuria with interval improvement. Cr stable. Continue to montir.   - MRI findings suggestive of RCC. Biopsy of lung likely outpatient. OUtpatient follow up with Hematology/Oncology and Urology.   - pulm nodule. heme/onc eval noted and outtp f/u. possible metastatic disease     #New onset Paroxysmal Afib with RVR. Back to normal sinus Rhythm   # CAD s/p stent/HTN/HL  - cardio input noted  - ASA continued. Needs to be held for 5 days for biopsy?  - New onset Afib with RVR identified 2/6->transferred to tele  -Cardizem ggt->Converted to sinus->PO Cardizem IR->PO Cardizem CD  -Patient defers AC initially. Now agreeable to AC. Still with some hematuria. Patient instructed to start eliquis when hematuria visually resolves.   - outpt cardio f/u re lipid management  - continue home meds    # DM 2  - continue ISS  - A1c 10.4%  -Lantus/Premeals/ISS. Titrate accordingly.     Back in NSR and stable. Labs stable. Gluc improved. Cleared by Cardiology. Cleared by Hem/Onc for outpatient follow up. Discharge home in stable condition and close outpatient follow up with PMD, Cardiology, Urology, Hematology/Oncology. Diabetes education (diet, lifestyle, glucose monitoring, insulin administration, hypoglycemia, etc) printed and provided to patient.;     T(C): 36.6 (02-07-24 @ 08:34), Max: 36.7 (02-06-24 @ 12:40)  HR: 75 (02-07-24 @ 08:34) (68 - 172)  BP: 123/87 (02-07-24 @ 08:34) (104/79 - 136/89)  RR: 16 (02-07-24 @ 08:34) (16 - 18)  SpO2: 97% (02-07-24 @ 08:34) (97% - 98%)  AAOx3; NAD  No JVD  RRR  Lungs CTA bilaterally; Normal respiratory effort  Abdomen benign   No edema  Discharge Management: 51 minutes  Date of Discharge/Service: 2/7/2024

## 2024-02-07 NOTE — PROGRESS NOTE ADULT - SUBJECTIVE AND OBJECTIVE BOX
The patient is a 59 yo male with Hx. of CAD s/p stent placed on 4/26/2019, readmitted on 4/28/2019 with CP had  LHC stent patent,  The patient denies taking his medications exept ASA 81 mg, Hx of kidney stone  10 years ago, HTN, HLD , Prediabetes  last A1c 6.2 in 2019, presented in the ED for R flank pain, blood in the urine. The patient symptoms started 6 days ago with mild pain, noted some blood in the urine , flew to [place], returned yesterday, he developed worsened R flank pain, nausea, and blood in the urine.           Consulted for preop clearance  went to OR prior to cardiac evaluation.  no cardiac symptoms post op.  H/o PCI as noted above not following w/ cardio.  not on statin due to intolerance - tried multiple statins.    2/6/24 Patient is feeling ok , developed new onset atrial fibrillation with rVR  patient denies any chest pain or shortness of breath or dizziness   patient was given cardizem ,  his bP is stable .  2/7/24: Pt denies cp or sob.  Converted to SR 2/6/24.  Tele: RSR      PAST MEDICAL & SURGICAL HISTORY:    CAD s/p stent placed on 4/26/2019, readmitted on 4/28/2019 with CP had  LHC stent patent, HTN, HLD, prediabetes        AMAYA (dyspnea on exertion)      HTN (hypertension)      Former smoker      Obesity (BMI 30-39.9)      HTN (hypertension)      HLD (hyperlipidemia)      CAD (coronary artery disease)      History of appendectomy      History of appendectomy  1994      History of torn meniscus of left knee  1995      History of tonsillectomy and adenoidectomy  1968          ALLERGIES:  Allergies    sulfa drugs (Unknown)    Intolerances        Family Hx: mother 82 Dx. with breast CA last week, Father medical Hx. unknown.    Social Hx.: not smoking, no alcohol use    MEDICATIONS Home Medications:  Aspir 81 oral delayed release tablet: 1 tab(s) orally once a day (04 Feb 2024 16:11)    MEDICATIONS  (STANDING):  aspirin enteric coated 81 milliGRAM(s) Oral daily  dextrose 5%. 1000 milliLiter(s) (50 mL/Hr) IV Continuous <Continuous>  dextrose 5%. 1000 milliLiter(s) (100 mL/Hr) IV Continuous <Continuous>  dextrose 50% Injectable 12.5 Gram(s) IV Push once  dextrose 50% Injectable 25 Gram(s) IV Push once  dextrose 50% Injectable 25 Gram(s) IV Push once  diltiazem    milliGRAM(s) Oral daily  glucagon  Injectable 1 milliGRAM(s) IntraMuscular once  insulin glargine Injectable (LANTUS) 10 Unit(s) SubCutaneous every morning  insulin lispro (ADMELOG) corrective regimen sliding scale   SubCutaneous at bedtime  insulin lispro (ADMELOG) corrective regimen sliding scale   SubCutaneous three times a day before meals  insulin lispro Injectable (ADMELOG) 3 Unit(s) SubCutaneous three times a day before meals  metoprolol tartrate 25 milliGRAM(s) Oral two times a day  pantoprazole    Tablet 40 milliGRAM(s) Oral before breakfast    MEDICATIONS  (PRN):  acetaminophen     Tablet .. 650 milliGRAM(s) Oral every 6 hours PRN Temp greater or equal to 38C (100.4F), Mild Pain (1 - 3)  aluminum hydroxide/magnesium hydroxide/simethicone Suspension 30 milliLiter(s) Oral every 4 hours PRN Dyspepsia  dextrose Oral Gel 15 Gram(s) Oral once PRN Blood Glucose LESS THAN 70 milliGRAM(s)/deciliter  melatonin 3 milliGRAM(s) Oral at bedtime PRN Insomnia  morphine  - Injectable 4 milliGRAM(s) IV Push every 4 hours PRN Moderate Pain (4 - 6)  ondansetron Injectable 4 milliGRAM(s) IV Push every 4 hours PRN Nausea and/or Vomiting  ondansetron Injectable 4 milliGRAM(s) IV Push every 4 hours PRN Nausea and/or Vomiting      Vital Signs Last 24 Hrs  T(C): 36.7 (06 Feb 2024 12:40), Max: 38.1 (05 Feb 2024 21:40)  T(F): 98 (06 Feb 2024 12:40), Max: 100.6 (05 Feb 2024 21:40)  HR: 126 (06 Feb 2024 12:40) (77 - 172)  BP: 117/81 (06 Feb 2024 12:40) (100/77 - 128/84)  BP(mean): --  RR: 16 (06 Feb 2024 12:40) (16 - 18)  SpO2: 97% (06 Feb 2024 12:40) (95% - 99%)    Parameters below as of 06 Feb 2024 12:40  Patient On (Oxygen Delivery Method): room air        I&O's Summary      Vital Signs Last 24 Hrs  T(C): 36.7 (06 Feb 2024 12:40), Max: 38.1 (05 Feb 2024 21:40)  T(F): 98 (06 Feb 2024 12:40), Max: 100.6 (05 Feb 2024 21:40)  HR: 126 (06 Feb 2024 12:40) (77 - 172)  BP: 117/81 (06 Feb 2024 12:40) (100/77 - 128/84)  BP(mean): --  RR: 16 (06 Feb 2024 12:40) (16 - 18)  SpO2: 97% (06 Feb 2024 12:40) (95% - 99%)    Parameters below as of 06 Feb 2024 12:40  Patient On (Oxygen Delivery Method): room air        I&O's Summary            PHYSICAL EXAM:    Constitutional: NAD, awake and alert, well-developed  HEENT: PERR, EOMI,  No oral cyananosis.  Neck:  supple,  No JVD  Respiratory: Breath sounds are clear bilaterally, No wheezing, rales or rhonchi  Cardiovascular: S1 and S2,IR IR tachycardic   Gastrointestinal: Bowel Sounds present, soft, nontender.   Extremities: No peripheral edema. No clubbing or cyanosis.  Vascular: 2+ peripheral pulses  Neurological: A/O x 3, no focal deficits  Musculoskeletal: no calf tenderness.  Skin: No rashes.    ===============================  ===============================  LABS:                                      12.5   6.15  )-----------( 217      ( 06 Feb 2024 06:38 )             38.0     02-06    134<L>  |  103  |  16  ----------------------------<  234<H>  3.7   |  26  |  1.04    Ca    9.5      06 Feb 2024 06:38      Urinalysis Basic - ( 06 Feb 2024 06:38 )    Color: x / Appearance: x / SG: x / pH: x  Gluc: 234 mg/dL / Ketone: x  / Bili: x / Urobili: x   Blood: x / Protein: x / Nitrite: x   Leuk Esterase: x / RBC: x / WBC x   Sq Epi: x / Non Sq Epi: x / Bacteria: x        Culture Results:   <10,000 CFU/mL Normal Urogenital Carito (02-04-24 @ 03:23)          ===============================  ===============================  EKG: NSR no ischemic changes.    Monitor atrial fibrillation with RVR      The patient is a 57 yo male with Hx. of CAD s/p stent placed on 4/26/2019, readmitted on 4/28/2019 with CP had  LHC stent patent,  The patient denies taking his medications exept ASA 81 mg, Hx of kidney stone  10 years ago, HTN, HLD , Prediabetes  last A1c 6.2 in 2019, presented in the ED for R flank pain, blood in the urine. The patient symptoms started 6 days ago with mild pain, noted some blood in the urine , flew to [place], returned yesterday, he developed worsened R flank pain, nausea, and blood in the urine.           Consulted for preop clearance  went to OR prior to cardiac evaluation.  no cardiac symptoms post op.  H/o PCI as noted above not following w/ cardio.  not on statin due to intolerance - tried multiple statins.    2/6/24 Patient is feeling ok , developed new onset atrial fibrillation with rVR  patient denies any chest pain or shortness of breath or dizziness   patient was given cardizem ,  his bP is stable .  2/7/24: Pt denies cp or sob.  Converted to SR 2/6/24.  Tele: RSR      PAST MEDICAL & SURGICAL HISTORY:    CAD s/p stent placed on 4/26/2019, readmitted on 4/28/2019 with CP had  LHC stent patent, HTN, HLD, prediabetes        AMAYA (dyspnea on exertion)      HTN (hypertension)      Former smoker      Obesity (BMI 30-39.9)      HTN (hypertension)      HLD (hyperlipidemia)      CAD (coronary artery disease)      History of appendectomy      History of appendectomy  1994      History of torn meniscus of left knee  1995      History of tonsillectomy and adenoidectomy  1968          ALLERGIES:  Allergies    sulfa drugs (Unknown)    Intolerances        Family Hx: mother 82 Dx. with breast CA last week, Father medical Hx. unknown.    Social Hx.: not smoking, no alcohol use    MEDICATIONS Home Medications:  Aspir 81 oral delayed release tablet: 1 tab(s) orally once a day (04 Feb 2024 16:11)    MEDICATIONS  (STANDING):  aspirin enteric coated 81 milliGRAM(s) Oral daily  dextrose 5%. 1000 milliLiter(s) (50 mL/Hr) IV Continuous <Continuous>  dextrose 5%. 1000 milliLiter(s) (100 mL/Hr) IV Continuous <Continuous>  dextrose 50% Injectable 12.5 Gram(s) IV Push once  dextrose 50% Injectable 25 Gram(s) IV Push once  dextrose 50% Injectable 25 Gram(s) IV Push once  diltiazem    milliGRAM(s) Oral daily  glucagon  Injectable 1 milliGRAM(s) IntraMuscular once  insulin glargine Injectable (LANTUS) 10 Unit(s) SubCutaneous every morning  insulin lispro (ADMELOG) corrective regimen sliding scale   SubCutaneous at bedtime  insulin lispro (ADMELOG) corrective regimen sliding scale   SubCutaneous three times a day before meals  insulin lispro Injectable (ADMELOG) 3 Unit(s) SubCutaneous three times a day before meals  metoprolol tartrate 25 milliGRAM(s) Oral two times a day  pantoprazole    Tablet 40 milliGRAM(s) Oral before breakfast    MEDICATIONS  (PRN):  acetaminophen     Tablet .. 650 milliGRAM(s) Oral every 6 hours PRN Temp greater or equal to 38C (100.4F), Mild Pain (1 - 3)  aluminum hydroxide/magnesium hydroxide/simethicone Suspension 30 milliLiter(s) Oral every 4 hours PRN Dyspepsia  dextrose Oral Gel 15 Gram(s) Oral once PRN Blood Glucose LESS THAN 70 milliGRAM(s)/deciliter  melatonin 3 milliGRAM(s) Oral at bedtime PRN Insomnia  morphine  - Injectable 4 milliGRAM(s) IV Push every 4 hours PRN Moderate Pain (4 - 6)  ondansetron Injectable 4 milliGRAM(s) IV Push every 4 hours PRN Nausea and/or Vomiting  ondansetron Injectable 4 milliGRAM(s) IV Push every 4 hours PRN Nausea and/or Vomiting    Vital Signs Last 24 Hrs  T(C): 36.6 (07 Feb 2024 08:34), Max: 37.1 (06 Feb 2024 09:35)  T(F): 97.8 (07 Feb 2024 08:34), Max: 98.7 (06 Feb 2024 09:35)  HR: 75 (07 Feb 2024 08:34) (68 - 172)  BP: 123/87 (07 Feb 2024 08:34) (100/77 - 136/89)  BP(mean): --  RR: 16 (07 Feb 2024 08:34) (16 - 18)  SpO2: 97% (07 Feb 2024 08:34) (97% - 99%)    Parameters below as of 07 Feb 2024 08:34  Patient On (Oxygen Delivery Method): room air        I&O's Summary        I&O's Summary        PHYSICAL EXAM:    Constitutional: NAD, awake and alert, well-developed  HEENT: PERR, EOMI,  No oral cyananosis.  Neck:  supple,  No JVD  Respiratory: Breath sounds are clear bilaterally, No wheezing, rales or rhonchi  Cardiovascular: NL S1 and S2, No S3/S4, No R/C/G/M   Gastrointestinal: Bowel Sounds present, soft, nontender.   Extremities: No peripheral edema. No clubbing or cyanosis.  Vascular: 2+ peripheral pulses  Neurological: A/O x 3, no focal deficits  Musculoskeletal: no calf tenderness.  Skin: No rashes.    ===============================  ===============================  LABS:                           11.8   x     )-----------( x        ( 07 Feb 2024 08:16 )             35.5                                        12.5   6.15  )-----------( 217      ( 06 Feb 2024 06:38 )             38.0       02-06    134<L>  |  103  |  16  ----------------------------<  234<H>  3.7   |  26  |  1.04    Ca    9.5      06 Feb 2024 06:38      Urinalysis Basic - ( 06 Feb 2024 06:38 )    Color: x / Appearance: x / SG: x / pH: x  Gluc: 234 mg/dL / Ketone: x  / Bili: x / Urobili: x   Blood: x / Protein: x / Nitrite: x   Leuk Esterase: x / RBC: x / WBC x   Sq Epi: x / Non Sq Epi: x / Bacteria: x        Culture Results:   <10,000 CFU/mL Normal Urogenital Carito (02-04-24 @ 03:23)      ===============================  ===============================  EKG: NSR no ischemic changes.    Monitor atrial fibrillation with RVR     < from: TTE Echo Complete w/o Contrast w/ Doppler (02.06.24 @ 14:02) >   Impression     Summary     Estimated left ventricular ejection fraction is 55-60%.   The left ventricle is normal in size, wall motion and contractility as   seen in limited views.   Mild concentric left ventricular hypertrophy is present.   The left atrium is mildly dilated.   The aortic valve is trileaflet with thin pliable leaflets.   Mild to Moderate aortic regurgitation is present.   The mitral valve leaflets appear thin and normal.   Mild (1+) mitral regurgitation is present.   Normal appearing tricuspid valve structure and function.   Trace tricuspid valve regurgitation is present.    < end of copied text >

## 2024-02-07 NOTE — PROGRESS NOTE ADULT - PROBLEM SELECTOR PLAN 1
new onset , rapid rate , without active cardiac symptoms , started on IV cardizem drip , titrate as needed ,   patient was explained about prevention of thromboembolic events  recommend IV heparin  , as patient had hematuria , anemia ,  however patient refused any AC , follow up echo ,     patient anticipating to have biopsy    tried to explain about risk of stroke vs benefits , he does not want to take any AC new onset , rapid rate , without active cardiac symptoms , started on IV cardizem drip , converted to sinus rhythm 2/6/24.  Cardizem drip DC'd.  Now on cardizem CD 120mg QD and metoprolol tartrate 25mg PO BID.       patient was explained about prevention of thromboembolic events.  IV heparin was recommended however pt refused.  Discussed again today and he is willing to consider PO AC upon DC.  Does not want IV heparin though.  Pt currently still with hematuria.  Anticipating to have biopsy.    Echo shows NL LVF.       D/W hospitalist new onset , rapid rate , without active cardiac symptoms , started on IV cardizem drip , converted to sinus rhythm 2/6/24.  Cardizem drip DC'd.  Now on cardizem CD 120mg QD and metoprolol tartrate 25mg PO BID.       patient was explained about prevention of thromboembolic events.  IV heparin was recommended however pt refused.  Discussed again today and he is willing to consider PO AC upon DC.  Does not want IV heparin though.  Pt currently still with hematuria.  Anticipating to have biopsy.  If pt agrees to start oral AC prior to biopsy, AC may be held for 2 days prior to procedure.  It is preferable that aspirin be continued uninterrupted however if absolutely necessary, ASA may be held for 5 days prior to biopsy as PCI was in 2019.  Asa should be resumed post biopsy.    Echo shows NL LVF.       D/W hospitalist

## 2024-02-12 ENCOUNTER — OUTPATIENT (OUTPATIENT)
Dept: OUTPATIENT SERVICES | Facility: HOSPITAL | Age: 59
LOS: 1 days | Discharge: HOME | End: 2024-02-12

## 2024-02-12 DIAGNOSIS — Z98.890 OTHER SPECIFIED POSTPROCEDURAL STATES: Chronic | ICD-10-CM

## 2024-02-12 DIAGNOSIS — Z87.828 PERSONAL HISTORY OF OTHER (HEALED) PHYSICAL INJURY AND TRAUMA: Chronic | ICD-10-CM

## 2024-02-12 DIAGNOSIS — Z90.49 ACQUIRED ABSENCE OF OTHER SPECIFIED PARTS OF DIGESTIVE TRACT: Chronic | ICD-10-CM

## 2024-02-12 DIAGNOSIS — N28.89 OTHER SPECIFIED DISORDERS OF KIDNEY AND URETER: ICD-10-CM

## 2024-02-12 PROBLEM — N40.0 ENLARGED PROSTATE: Status: ACTIVE | Noted: 2024-02-12

## 2024-02-12 PROBLEM — Z95.5 H/O HEART ARTERY STENT: Status: ACTIVE | Noted: 2024-02-12

## 2024-02-12 PROBLEM — E78.5 DYSLIPIDEMIA: Status: RESOLVED | Noted: 2019-03-14 | Resolved: 2024-02-12

## 2024-02-12 PROBLEM — I25.10 ARTERIOSCLEROTIC CORONARY ARTERY DISEASE: Status: RESOLVED | Noted: 2019-03-14 | Resolved: 2024-02-12

## 2024-02-12 PROBLEM — I77.819 AORTIC DILATATION: Status: RESOLVED | Noted: 2019-03-14 | Resolved: 2024-02-12

## 2024-02-13 ENCOUNTER — RESULT REVIEW (OUTPATIENT)
Age: 59
End: 2024-02-13

## 2024-02-13 ENCOUNTER — APPOINTMENT (OUTPATIENT)
Dept: HEMATOLOGY ONCOLOGY | Facility: CLINIC | Age: 59
End: 2024-02-13
Payer: COMMERCIAL

## 2024-02-13 VITALS
DIASTOLIC BLOOD PRESSURE: 90 MMHG | BODY MASS INDEX: 28.35 KG/M2 | HEART RATE: 76 BPM | OXYGEN SATURATION: 97 % | TEMPERATURE: 97 F | HEIGHT: 70 IN | SYSTOLIC BLOOD PRESSURE: 143 MMHG | WEIGHT: 198 LBS

## 2024-02-13 DIAGNOSIS — E78.5 HYPERLIPIDEMIA, UNSPECIFIED: ICD-10-CM

## 2024-02-13 DIAGNOSIS — N40.0 BENIGN PROSTATIC HYPERPLASIA WITHOUT LOWER URINARY TRACT SYMPMS: ICD-10-CM

## 2024-02-13 DIAGNOSIS — I77.819 AORTIC ECTASIA, UNSPECIFIED SITE: ICD-10-CM

## 2024-02-13 DIAGNOSIS — Z80.3 FAMILY HISTORY OF MALIGNANT NEOPLASM OF BREAST: ICD-10-CM

## 2024-02-13 DIAGNOSIS — I25.10 ATHEROSCLEROTIC HEART DISEASE OF NATIVE CORONARY ARTERY W/OUT ANGINA PECTORIS: ICD-10-CM

## 2024-02-13 DIAGNOSIS — Z95.5 PRESENCE OF CORONARY ANGIOPLASTY IMPLANT AND GRAFT: ICD-10-CM

## 2024-02-13 LAB
ALBUMIN SERPL ELPH-MCNC: 4.8 G/DL — SIGNIFICANT CHANGE UP (ref 3.3–5)
ALP SERPL-CCNC: 65 U/L — SIGNIFICANT CHANGE UP (ref 40–120)
ALT FLD-CCNC: 13 U/L — SIGNIFICANT CHANGE UP (ref 10–45)
ANION GAP SERPL CALC-SCNC: 17 MMOL/L — SIGNIFICANT CHANGE UP (ref 5–17)
AST SERPL-CCNC: 13 U/L — SIGNIFICANT CHANGE UP (ref 10–40)
BASOPHILS # BLD AUTO: 0.03 K/UL — SIGNIFICANT CHANGE UP (ref 0–0.2)
BASOPHILS NFR BLD AUTO: 0.5 % — SIGNIFICANT CHANGE UP (ref 0–2)
BILIRUB SERPL-MCNC: 0.4 MG/DL — SIGNIFICANT CHANGE UP (ref 0.2–1.2)
BUN SERPL-MCNC: 14 MG/DL — SIGNIFICANT CHANGE UP (ref 7–23)
CALCIUM SERPL-MCNC: 10.4 MG/DL — SIGNIFICANT CHANGE UP (ref 8.4–10.5)
CHLORIDE SERPL-SCNC: 102 MMOL/L — SIGNIFICANT CHANGE UP (ref 96–108)
CO2 SERPL-SCNC: 20 MMOL/L — LOW (ref 22–31)
CREAT SERPL-MCNC: 0.87 MG/DL — SIGNIFICANT CHANGE UP (ref 0.5–1.3)
EGFR: 99 ML/MIN/1.73M2 — SIGNIFICANT CHANGE UP
EOSINOPHIL # BLD AUTO: 0.1 K/UL — SIGNIFICANT CHANGE UP (ref 0–0.5)
EOSINOPHIL NFR BLD AUTO: 1.7 % — SIGNIFICANT CHANGE UP (ref 0–6)
GLUCOSE SERPL-MCNC: 161 MG/DL — HIGH (ref 70–99)
HCT VFR BLD CALC: 35.9 % — LOW (ref 39–50)
HGB BLD-MCNC: 12.3 G/DL — LOW (ref 13–17)
IMM GRANULOCYTES NFR BLD AUTO: 0.5 % — SIGNIFICANT CHANGE UP (ref 0–0.9)
LDH SERPL L TO P-CCNC: 174 U/L — SIGNIFICANT CHANGE UP (ref 50–242)
LYMPHOCYTES # BLD AUTO: 1.32 K/UL — SIGNIFICANT CHANGE UP (ref 1–3.3)
LYMPHOCYTES # BLD AUTO: 22.4 % — SIGNIFICANT CHANGE UP (ref 13–44)
MCHC RBC-ENTMCNC: 29.1 PG — SIGNIFICANT CHANGE UP (ref 27–34)
MCHC RBC-ENTMCNC: 34.3 GM/DL — SIGNIFICANT CHANGE UP (ref 32–36)
MCV RBC AUTO: 84.9 FL — SIGNIFICANT CHANGE UP (ref 80–100)
MONOCYTES # BLD AUTO: 0.36 K/UL — SIGNIFICANT CHANGE UP (ref 0–0.9)
MONOCYTES NFR BLD AUTO: 6.1 % — SIGNIFICANT CHANGE UP (ref 2–14)
NEUTROPHILS # BLD AUTO: 4.04 K/UL — SIGNIFICANT CHANGE UP (ref 1.8–7.4)
NEUTROPHILS NFR BLD AUTO: 68.8 % — SIGNIFICANT CHANGE UP (ref 43–77)
NRBC # BLD: 0 /100 WBCS — SIGNIFICANT CHANGE UP (ref 0–0)
PLATELET # BLD AUTO: 261 K/UL — SIGNIFICANT CHANGE UP (ref 150–400)
POTASSIUM SERPL-MCNC: 4.4 MMOL/L — SIGNIFICANT CHANGE UP (ref 3.5–5.3)
POTASSIUM SERPL-SCNC: 4.4 MMOL/L — SIGNIFICANT CHANGE UP (ref 3.5–5.3)
PROT SERPL-MCNC: 7.4 G/DL — SIGNIFICANT CHANGE UP (ref 6–8.3)
PSA SERPL-MCNC: 1.06 NG/ML — SIGNIFICANT CHANGE UP (ref 0–4)
RBC # BLD: 4.23 M/UL — SIGNIFICANT CHANGE UP (ref 4.2–5.8)
RBC # FLD: 12.7 % — SIGNIFICANT CHANGE UP (ref 10.3–14.5)
SODIUM SERPL-SCNC: 139 MMOL/L — SIGNIFICANT CHANGE UP (ref 135–145)
WBC # BLD: 5.88 K/UL — SIGNIFICANT CHANGE UP (ref 3.8–10.5)
WBC # FLD AUTO: 5.88 K/UL — SIGNIFICANT CHANGE UP (ref 3.8–10.5)

## 2024-02-13 PROCEDURE — 99215 OFFICE O/P EST HI 40 MIN: CPT

## 2024-02-14 ENCOUNTER — NON-APPOINTMENT (OUTPATIENT)
Age: 59
End: 2024-02-14

## 2024-02-14 ENCOUNTER — APPOINTMENT (OUTPATIENT)
Dept: CARDIOLOGY | Facility: CLINIC | Age: 59
End: 2024-02-14
Payer: COMMERCIAL

## 2024-02-14 VITALS
WEIGHT: 199 LBS | HEART RATE: 83 BPM | SYSTOLIC BLOOD PRESSURE: 122 MMHG | BODY MASS INDEX: 28.55 KG/M2 | OXYGEN SATURATION: 98 % | DIASTOLIC BLOOD PRESSURE: 70 MMHG

## 2024-02-14 DIAGNOSIS — D62 ACUTE POSTHEMORRHAGIC ANEMIA: ICD-10-CM

## 2024-02-14 DIAGNOSIS — Z79.02 LONG TERM (CURRENT) USE OF ANTITHROMBOTICS/ANTIPLATELETS: ICD-10-CM

## 2024-02-14 DIAGNOSIS — I10 ESSENTIAL (PRIMARY) HYPERTENSION: ICD-10-CM

## 2024-02-14 DIAGNOSIS — Z79.82 LONG TERM (CURRENT) USE OF ASPIRIN: ICD-10-CM

## 2024-02-14 DIAGNOSIS — E66.9 OBESITY, UNSPECIFIED: ICD-10-CM

## 2024-02-14 DIAGNOSIS — N28.9 DISORDER OF KIDNEY AND URETER, UNSPECIFIED: ICD-10-CM

## 2024-02-14 DIAGNOSIS — C77.2 SECONDARY AND UNSPECIFIED MALIGNANT NEOPLASM OF INTRA-ABDOMINAL LYMPH NODES: ICD-10-CM

## 2024-02-14 DIAGNOSIS — Z87.442 PERSONAL HISTORY OF URINARY CALCULI: ICD-10-CM

## 2024-02-14 DIAGNOSIS — N40.0 BENIGN PROSTATIC HYPERPLASIA WITHOUT LOWER URINARY TRACT SYMPTOMS: ICD-10-CM

## 2024-02-14 DIAGNOSIS — R63.4 ABNORMAL WEIGHT LOSS: ICD-10-CM

## 2024-02-14 DIAGNOSIS — I25.10 ATHEROSCLEROTIC HEART DISEASE OF NATIVE CORONARY ARTERY WITHOUT ANGINA PECTORIS: ICD-10-CM

## 2024-02-14 DIAGNOSIS — R31.0 GROSS HEMATURIA: ICD-10-CM

## 2024-02-14 DIAGNOSIS — E78.5 HYPERLIPIDEMIA, UNSPECIFIED: ICD-10-CM

## 2024-02-14 DIAGNOSIS — Z90.49 ACQUIRED ABSENCE OF OTHER SPECIFIED PARTS OF DIGESTIVE TRACT: ICD-10-CM

## 2024-02-14 DIAGNOSIS — E11.65 TYPE 2 DIABETES MELLITUS WITH HYPERGLYCEMIA: ICD-10-CM

## 2024-02-14 DIAGNOSIS — C64.1 MALIGNANT NEOPLASM OF RIGHT KIDNEY, EXCEPT RENAL PELVIS: ICD-10-CM

## 2024-02-14 DIAGNOSIS — Z87.891 PERSONAL HISTORY OF NICOTINE DEPENDENCE: ICD-10-CM

## 2024-02-14 DIAGNOSIS — Z87.01 PERSONAL HISTORY OF PNEUMONIA (RECURRENT): ICD-10-CM

## 2024-02-14 DIAGNOSIS — R00.0 TACHYCARDIA, UNSPECIFIED: ICD-10-CM

## 2024-02-14 DIAGNOSIS — Z88.2 ALLERGY STATUS TO SULFONAMIDES: ICD-10-CM

## 2024-02-14 DIAGNOSIS — Z95.5 PRESENCE OF CORONARY ANGIOPLASTY IMPLANT AND GRAFT: ICD-10-CM

## 2024-02-14 DIAGNOSIS — Q54.9 HYPOSPADIAS, UNSPECIFIED: ICD-10-CM

## 2024-02-14 DIAGNOSIS — C78.01 SECONDARY MALIGNANT NEOPLASM OF RIGHT LUNG: ICD-10-CM

## 2024-02-14 DIAGNOSIS — N13.30 UNSPECIFIED HYDRONEPHROSIS: ICD-10-CM

## 2024-02-14 DIAGNOSIS — I48.0 PAROXYSMAL ATRIAL FIBRILLATION: ICD-10-CM

## 2024-02-14 PROBLEM — Z80.3 FAMILY HISTORY OF BREAST CANCER: Status: ACTIVE | Noted: 2024-02-14

## 2024-02-14 PROCEDURE — 93000 ELECTROCARDIOGRAM COMPLETE: CPT

## 2024-02-14 PROCEDURE — 99215 OFFICE O/P EST HI 40 MIN: CPT

## 2024-02-14 PROCEDURE — G2211 COMPLEX E/M VISIT ADD ON: CPT

## 2024-02-14 RX ORDER — ASPIRIN 81 MG/1
81 TABLET, CHEWABLE ORAL
Refills: 0 | Status: DISCONTINUED | COMMUNITY
Start: 2019-04-23 | End: 2024-02-14

## 2024-02-14 RX ORDER — GEMFIBROZIL 600 MG/1
600 TABLET, FILM COATED ORAL
Qty: 180 | Refills: 3 | Status: DISCONTINUED | COMMUNITY
Start: 2019-03-19 | End: 2024-02-14

## 2024-02-14 RX ORDER — CLOPIDOGREL BISULFATE 75 MG/1
75 TABLET, FILM COATED ORAL DAILY
Qty: 90 | Refills: 0 | Status: DISCONTINUED | COMMUNITY
Start: 2019-04-30 | End: 2024-02-14

## 2024-02-14 NOTE — PHYSICAL EXAM
[Fully active, able to carry on all pre-disease performance without restriction] : Status 0 - Fully active, able to carry on all pre-disease performance without restriction [Normal] : affect appropriate [de-identified] : regular , no murmur

## 2024-02-14 NOTE — RESULTS/DATA
[FreeTextEntry1] : Ferritin: 611, serum iron:29, TSAT: 14%, B12: 543, Folate: 8.1 2/7/24 - Hgb: 11.8, Hct: 35.5, Creat: 0.93

## 2024-02-14 NOTE — HISTORY OF PRESENT ILLNESS
[de-identified] : BHANU REEDER is a 58 y.o. M with a PMH significant for HTN, HLD, CAD -> stent 4/2019, diabetes, who we saw in  for a renal mass alg lung nodule concerning for metastatic RCC.   2/4/24 - 2/7/24 - Admitted to  with hematuria.  CT C/A/P - Dominant PRISCILLA lobulated 1.6 cm lung nodule new since 4/10/19. 2 adjacent pulmonary nodules within the peripheral aspect of the RLL, largest measuring about 5 mm, indeterminate. Heterogeneous enhancing mass measuring 7.3 x 5.8 x 7.2 cm (AP x TRV x CC) in the right renal midpole concerning for a renal neoplasm. Mild hydroureteronephrosis to the level of the distal right ureter with moderate right perinephric inflammatory change and a delayed nephrogram. Questionable 4 mm lesion within the distal right ureter. No left hydronephrosis. Mild nonspecific left perinephric fat stranding.  Enlarged portacaval LN measuring 2.2 x 1.0 cm. Enlarged right retroperitoneal LN measuring 1.5 x 1.2 cm.  2/424 - Cystoscopy with retrograde pyelogram and ureteral stent insertion. PATH - ureter wash negative for high grade UCC.   2/5/24 - MRI Abd - 9.6 cm solid partially necrotic right renal mass compatible with renal cell carcinoma. Several retroperitoneal lymph nodes suspect for metastatic disease.  During hospitalization  he was also found to have intermittent Afib. Started on Diltiazem and metoprolol.  Eliquis recommedned but not started yet due to persistent hematuria. Due to high blood sugar levels- started on insulin and oral hypoglycemics.  Feels OK. Still has intermittent blood tinged urine but better. Occasional right flank discomfort.

## 2024-02-14 NOTE — REASON FOR VISIT
[Follow-Up Visit] : a follow-up [FreeTextEntry2] : follow up after  consultation for kidney mass and lung nodule

## 2024-02-14 NOTE — ASSESSMENT
[FreeTextEntry1] : 57 y/o male  presented with hematuria and found to  have large partially necrotic right renal mass concerning for RCC. Also  has slightly enlarged retroperitoneal LNs and peripheral PRISCILLA 1.6 cm pulmonary nodule. S/p cystoscopy and right ureteral stent placement. Urine cytology negative. Clinical presentation suggestive of  right renal cell cancer with possible noal and pulmoanry metastasis. Needs tissue diagnosis. Will d/w IR re CT guided biopsy of PRISCILLA nodule. Discussed previously with IR- retroperitoneal LNs and relatively small and not accessible to IR biopsy. If lung biopsy is confirms metastatic  RCC- he will be a candidate for  systemic therapy ( IO plus TKI). Because of oligometastatic disease and relatively large primary tumor- he  probably will beneft from resection of primary- right nephrectomy. Will need  systemic therapy regardless , and at some point might also consider wedge resection of lung nodule if remains only area of active disease and primary removed. Recommend consultation with  oncologic urology surgery Dr Kaplan. If lung  biopsy is  negative for malignancy- will need  right nephrectomy anyway. Plan : IR referral for CT guided biopsy of PRISCILLA nodule Urology evaluation- Dr Kaplan.   D/w patient  and his wife.  CC: Dr RANDALL Grimm

## 2024-02-14 NOTE — REVIEW OF SYSTEMS
[Diarrhea: Grade 0] : Diarrhea: Grade 0 [Negative] : Allergic/Immunologic [FreeTextEntry8] : per HPI

## 2024-02-15 ENCOUNTER — APPOINTMENT (OUTPATIENT)
Dept: UROLOGY | Facility: CLINIC | Age: 59
End: 2024-02-15
Payer: COMMERCIAL

## 2024-02-15 VITALS
DIASTOLIC BLOOD PRESSURE: 96 MMHG | RESPIRATION RATE: 16 BRPM | BODY MASS INDEX: 28.44 KG/M2 | HEART RATE: 78 BPM | WEIGHT: 192 LBS | OXYGEN SATURATION: 98 % | SYSTOLIC BLOOD PRESSURE: 150 MMHG | HEIGHT: 69 IN

## 2024-02-15 PROCEDURE — 99214 OFFICE O/P EST MOD 30 MIN: CPT

## 2024-02-15 NOTE — HISTORY OF PRESENT ILLNESS
[FreeTextEntry1] : here for hospital followup. Chart reviewed in detail.  Presented Feb 4th for R flank pain & blood in urine. Cardio consulted for clearance had h/o PCI but taken to procedure prior to evaluation. saw postop, reviewed cardiac history: Hx. of CAD s/p stent placed on 4/26/2019,  readmitted on 4/28/2019 with CP had LHC stent patent Lost to followup w/ cardio reccomended restablishing care. h/o statin intolerance discussed PCKS9 inhibitor. next day pt went into afib w/ RVR transferred to tele. cardizem started. pt "refused anticoag" per note.  spontaneously converted to sinus. instructed to start eliquis when hematuria resolves visually.   Renal mass noted on imaging c/w metastatic RCC Biopsy of lesion in lung planned. Discharged Feb 7th.   Pt states he was given Rx for eliquis 5 mg BID but is not taking it. await instructions re: starting.  He states his hematuria has resolved. I instructed him that as long has hematuria has resolved OK to take eliquis. as per discharge instructions. Currently on metoprolol 25 mg BID and diltiazam 120 mg daily. Notes heart rate is blunted w/ minimal activity & feels somewhat tired. currently NSR.  < from: TTE Echo Complete w/o Contrast w/ Doppler (02.06.24 @ 14:02) > Impression  Summary  Estimated left ventricular ejection fraction is 55-60%. The left ventricle is normal in size, wall motion and contractility as seen in limited views. Mild concentric left ventricular hypertrophy is present. The left atrium is mildly dilated. The aortic valve is trileaflet with thin pliable leaflets. Mild to Moderate aortic regurgitation is present. The mitral valve leaflets appear thin and normal. Mild (1+) mitral regurgitation is present. Normal appearing tricuspid valve structure and function. Trace tricuspid valve regurgitation is present.  -TROPONIN VALUES: Troponin I, Serum: 0.121 ng/mL *H* [0.015 - 0.045] (04-29-19 @ 05:55) Troponin I, Serum: 0.144 ng/mL *H* [0.015 - 0.045] (04-28-19 @ 22:28) Troponin I, Serum: 0.145 ng/mL *H* [0.015 - 0.045] (04-28-19 @ 19:13) Troponin I, Serum: 0.132 ng/mL *H* [0.015 - 0.045] (04-28-19 @ 15:40)

## 2024-02-15 NOTE — HISTORY OF PRESENT ILLNESS
[FreeTextEntry1] : 59 y/o M is here with his wife. He had a right ureteral stent placed for clot obstruction of his ureter. He has since had several nodules noted on the CAT scan of the lung and is planning to have these biopsied.

## 2024-02-15 NOTE — ADDENDUM
[FreeTextEntry1] : Documented by [place] acting as a scribe for Dr. Paramjit Ovalle.02/15/2024   All medical record entries made by the Scribe were at my, Dr. Paramjit Ovalle, direction and personally dictated by me on 02/15/2024 . I have reviewed the chart and agree that the record accurately reflects my personal performance of the history, physical exam, assessment andplan. I have also personally directed, reviewed, and agreed with the chart.

## 2024-02-15 NOTE — ASSESSMENT
[FreeTextEntry1] : A/P:  *Afib-paroxysmal -d/c diltiazem -cont. metoprolol. -restart eliquis 5 mg BID as pt no longer has hematuria OK to stop if bleeding recurs  *CAD s/p PCI -cont. ASA -will hold off starting PCSK9 until after lung biopsy  *preop -cleared for lung biopsy without further cardiac testing. -low risk for cardiac events periop given excellent functional capacity, lack of symptoms & normal ECG.  Return Mar '24

## 2024-02-15 NOTE — END OF VISIT
[FreeTextEntry3] : He is going to have a biopsy of the lung nodules done, and following this I would advise him to see Gomez Kaplan MD

## 2024-02-15 NOTE — PHYSICAL EXAM
[Well Groomed] : well groomed [Normal Appearance] : normal appearance [General Appearance - In No Acute Distress] : no acute distress [Edema] : no peripheral edema [Respiration, Rhythm And Depth] : normal respiratory rhythm and effort [Exaggerated Use Of Accessory Muscles For Inspiration] : no accessory muscle use [Abdomen Soft] : soft [Abdomen Tenderness] : non-tender [Urinary Bladder Findings] : the bladder was normal on palpation [Costovertebral Angle Tenderness] : no ~M costovertebral angle tenderness [Normal Station and Gait] : the gait and station were normal for the patient's age [] : no rash [No Focal Deficits] : no focal deficits [Oriented To Time, Place, And Person] : oriented to person, place, and time [Affect] : the affect was normal [Mood] : the mood was normal [No Palpable Adenopathy] : no palpable adenopathy

## 2024-02-22 ENCOUNTER — TRANSCRIPTION ENCOUNTER (OUTPATIENT)
Age: 59
End: 2024-02-22

## 2024-02-22 ENCOUNTER — RESULT REVIEW (OUTPATIENT)
Age: 59
End: 2024-02-22

## 2024-02-22 ENCOUNTER — OUTPATIENT (OUTPATIENT)
Dept: INPATIENT UNIT | Facility: HOSPITAL | Age: 59
LOS: 1 days | Discharge: HOME | End: 2024-02-22
Payer: COMMERCIAL

## 2024-02-22 ENCOUNTER — NON-APPOINTMENT (OUTPATIENT)
Age: 59
End: 2024-02-22

## 2024-02-22 VITALS
HEART RATE: 67 BPM | SYSTOLIC BLOOD PRESSURE: 122 MMHG | RESPIRATION RATE: 15 BRPM | OXYGEN SATURATION: 100 % | TEMPERATURE: 98 F | DIASTOLIC BLOOD PRESSURE: 87 MMHG | HEIGHT: 70 IN | WEIGHT: 192.02 LBS

## 2024-02-22 VITALS
SYSTOLIC BLOOD PRESSURE: 120 MMHG | OXYGEN SATURATION: 100 % | RESPIRATION RATE: 15 BRPM | HEART RATE: 72 BPM | DIASTOLIC BLOOD PRESSURE: 76 MMHG | TEMPERATURE: 98 F

## 2024-02-22 DIAGNOSIS — Z87.828 PERSONAL HISTORY OF OTHER (HEALED) PHYSICAL INJURY AND TRAUMA: Chronic | ICD-10-CM

## 2024-02-22 DIAGNOSIS — C78.01 SECONDARY MALIGNANT NEOPLASM OF RIGHT LUNG: ICD-10-CM

## 2024-02-22 DIAGNOSIS — Z90.49 ACQUIRED ABSENCE OF OTHER SPECIFIED PARTS OF DIGESTIVE TRACT: Chronic | ICD-10-CM

## 2024-02-22 DIAGNOSIS — Z96.0 PRESENCE OF UROGENITAL IMPLANTS: Chronic | ICD-10-CM

## 2024-02-22 DIAGNOSIS — Z98.890 OTHER SPECIFIED POSTPROCEDURAL STATES: Chronic | ICD-10-CM

## 2024-02-22 LAB
GLUCOSE BLDC GLUCOMTR-MCNC: 142 MG/DL — HIGH (ref 70–99)
INR BLD: 1.04 RATIO — SIGNIFICANT CHANGE UP (ref 0.85–1.18)
PROTHROM AB SERPL-ACNC: 11.7 SEC — SIGNIFICANT CHANGE UP (ref 9.5–13)

## 2024-02-22 PROCEDURE — 71045 X-RAY EXAM CHEST 1 VIEW: CPT

## 2024-02-22 PROCEDURE — 36415 COLL VENOUS BLD VENIPUNCTURE: CPT

## 2024-02-22 PROCEDURE — 71045 X-RAY EXAM CHEST 1 VIEW: CPT | Mod: 26

## 2024-02-22 PROCEDURE — 82962 GLUCOSE BLOOD TEST: CPT

## 2024-02-22 PROCEDURE — 88305 TISSUE EXAM BY PATHOLOGIST: CPT | Mod: 26

## 2024-02-22 PROCEDURE — 88305 TISSUE EXAM BY PATHOLOGIST: CPT

## 2024-02-22 PROCEDURE — 88172 CYTP DX EVAL FNA 1ST EA SITE: CPT

## 2024-02-22 PROCEDURE — 85610 PROTHROMBIN TIME: CPT

## 2024-02-22 PROCEDURE — 32408 CORE NDL BX LNG/MED PERQ: CPT

## 2024-02-22 RX ORDER — OXYCODONE HYDROCHLORIDE 5 MG/1
10 TABLET ORAL ONCE
Refills: 0 | Status: DISCONTINUED | OUTPATIENT
Start: 2024-02-22 | End: 2024-02-22

## 2024-02-22 RX ORDER — ONDANSETRON 8 MG/1
4 TABLET, FILM COATED ORAL ONCE
Refills: 0 | Status: DISCONTINUED | OUTPATIENT
Start: 2024-02-22 | End: 2024-02-22

## 2024-02-22 RX ORDER — FENTANYL CITRATE 50 UG/ML
50 INJECTION INTRAVENOUS
Refills: 0 | Status: DISCONTINUED | OUTPATIENT
Start: 2024-02-22 | End: 2024-02-22

## 2024-02-22 RX ORDER — SODIUM CHLORIDE 9 MG/ML
1000 INJECTION, SOLUTION INTRAVENOUS
Refills: 0 | Status: DISCONTINUED | OUTPATIENT
Start: 2024-02-22 | End: 2024-02-22

## 2024-02-22 RX ADMIN — OXYCODONE HYDROCHLORIDE 10 MILLIGRAM(S): 5 TABLET ORAL at 11:30

## 2024-02-22 RX ADMIN — FENTANYL CITRATE 50 MICROGRAM(S): 50 INJECTION INTRAVENOUS at 11:02

## 2024-02-22 RX ADMIN — OXYCODONE HYDROCHLORIDE 10 MILLIGRAM(S): 5 TABLET ORAL at 09:18

## 2024-02-22 RX ADMIN — FENTANYL CITRATE 50 MICROGRAM(S): 50 INJECTION INTRAVENOUS at 11:30

## 2024-02-22 NOTE — ASU PATIENT PROFILE, ADULT - NSICDXPASTSURGICALHX_GEN_ALL_CORE_FT
PAST SURGICAL HISTORY:  History of appendectomy     History of appendectomy 1994    History of tonsillectomy and adenoidectomy 1968    History of torn meniscus of left knee 1995    Ureteral stent present

## 2024-02-22 NOTE — ASU DISCHARGE PLAN (ADULT/PEDIATRIC) - ASU DC REMOVE DRESSINGFT
COVID Pre-Visit Screening     1  Is this a family member screening? NO  2  Have you traveled outside of your state in the past 2 weeks? No  3  Do you presently have a fever or flu-like symptoms? NO  4  Do you have symptoms of an upper respiratory infection like runny nose, sore throat, or cough? NO  5  Are you suffering from new headache that you have not had in the past?  NO  6  Do you have/have you experienced any new shortness of breath recently? YES COPD  7  Do you have any new diarrhea, nausea or vomiting? NO  8  Have you been in contact with anyone who has been sick or diagnosed with COVID-19? NO  9  Do you have any new loss of taste or smell? NO  10  Are you able to wear a mask without a valve for the entire visit?  NO 46

## 2024-02-22 NOTE — ASU PATIENT PROFILE, ADULT - NSICDXPASTMEDICALHX_GEN_ALL_CORE_FT
PAST MEDICAL HISTORY:  CAD (coronary artery disease)     Cough     Diabetes     AMAYA (dyspnea on exertion)     Former smoker     H/O pulmonary hypertension     HLD (hyperlipidemia)     HTN (hypertension)     HTN (hypertension)     Lung nodule     Obesity (BMI 30-39.9)     Pneumonia     Renal mass

## 2024-02-22 NOTE — ASU DISCHARGE PLAN (ADULT/PEDIATRIC) - NS MD DC FALL RISK RISK
For information on Fall & Injury Prevention, visit: [email or web address] OR  [email or web address] OR  https://www.cdc.gov/steadi/patient.html

## 2024-02-23 PROBLEM — N28.89 OTHER SPECIFIED DISORDERS OF KIDNEY AND URETER: Chronic | Status: ACTIVE | Noted: 2024-02-22

## 2024-02-23 PROBLEM — E11.9 TYPE 2 DIABETES MELLITUS WITHOUT COMPLICATIONS: Chronic | Status: ACTIVE | Noted: 2024-02-22

## 2024-02-23 PROBLEM — R91.1 SOLITARY PULMONARY NODULE: Chronic | Status: ACTIVE | Noted: 2024-02-22

## 2024-02-23 PROBLEM — Z86.79 PERSONAL HISTORY OF OTHER DISEASES OF THE CIRCULATORY SYSTEM: Chronic | Status: ACTIVE | Noted: 2024-02-22

## 2024-02-23 LAB — SURGICAL PATHOLOGY STUDY: SIGNIFICANT CHANGE UP

## 2024-02-28 ENCOUNTER — APPOINTMENT (OUTPATIENT)
Dept: UROLOGY | Facility: CLINIC | Age: 59
End: 2024-02-28
Payer: COMMERCIAL

## 2024-02-28 VITALS
DIASTOLIC BLOOD PRESSURE: 81 MMHG | RESPIRATION RATE: 15 BRPM | WEIGHT: 192 LBS | BODY MASS INDEX: 28.44 KG/M2 | SYSTOLIC BLOOD PRESSURE: 120 MMHG | HEART RATE: 73 BPM | OXYGEN SATURATION: 98 % | HEIGHT: 69 IN

## 2024-02-28 DIAGNOSIS — R91.8 OTHER NONSPECIFIC ABNORMAL FINDING OF LUNG FIELD: ICD-10-CM

## 2024-02-28 PROCEDURE — 99214 OFFICE O/P EST MOD 30 MIN: CPT

## 2024-02-28 NOTE — HISTORY OF PRESENT ILLNESS
[FreeTextEntry1] : 57 yo male with large renal mass presents for evaluation. Referred by medical oncology.  Has pulmonary nodule s/p biopsy which confirmed metastatic clear cell RCC.   2/4/24 - 2/7/24 - Admitted to  with hematuria. CT C/A/P - Dominant PRISCILLA lobulated 1.6 cm lung nodule new since 4/10/19. 2 adjacent pulmonary nodules within the peripheral aspect of the RLL, largest measuring about 5 mm, indeterminate. Heterogeneous enhancing mass measuring 7.3 x 5.8 x 7.2 cm (AP x TRV x CC) in the right renal midpole concerning for a renal neoplasm. Mild hydroureteronephrosis to the level of the distal right ureter with moderate right perinephric inflammatory change and a delayed nephrogram. Questionable 4 mm lesion within the distal right ureter. No left hydronephrosis. Mild nonspecific left perinephric fat stranding. Enlarged portacaval LN measuring 2.2 x 1.0 cm. Enlarged right retroperitoneal LN measuring 1.5 x 1.2 cm.  2/424 - Cystoscopy with retrograde pyelogram and ureteral stent insertion. PATH - ureter wash negative for high grade UCC.  2/5/24 - MRI Abd - 9.6 cm solid partially necrotic right renal mass compatible with renal cell carcinoma. Several retroperitoneal lymph nodes suspect for metastatic disease. renal vein and vena cava patent.

## 2024-02-28 NOTE — ASSESSMENT
[FreeTextEntry1] : 59 yo male with newly diagnosed metastatic RCC  Has ~9cm right renal mass, +right hydro s/p stent  CT chest with left 1.6cm pulmonary nodule and two other small 5mm nodule on right I personally reviewed all images Obtain PET SCAN to complete whole-body staging.  Referral to CT surgery Dr. Sutherland for evaluation and potential wedge resection of pulmonary nodules sweetie the one on the left  We discussed with patient that he needs a robotic right radical nephrectomy. Consent obtained in office today. We discussed risks of surgery including bleeding, damage to surrounding structures. We also discussed possibility of open right radical nephrectomy.  Patient has A-fib followed by cardiology. Eliquis on hold given hematuria. Will need cards clearance prior to surgery.   He also has a high A1C but needs the operation Consent was signed.

## 2024-03-05 ENCOUNTER — APPOINTMENT (OUTPATIENT)
Dept: HEMATOLOGY ONCOLOGY | Facility: CLINIC | Age: 59
End: 2024-03-05
Payer: COMMERCIAL

## 2024-03-05 VITALS
TEMPERATURE: 98.1 F | HEIGHT: 69 IN | RESPIRATION RATE: 16 BRPM | BODY MASS INDEX: 28.58 KG/M2 | OXYGEN SATURATION: 98 % | SYSTOLIC BLOOD PRESSURE: 132 MMHG | WEIGHT: 193 LBS | DIASTOLIC BLOOD PRESSURE: 84 MMHG | HEART RATE: 70 BPM

## 2024-03-05 PROCEDURE — 99215 OFFICE O/P EST HI 40 MIN: CPT

## 2024-03-06 DIAGNOSIS — C78.00 SECONDARY MALIGNANT NEOPLASM OF UNSPECIFIED LUNG: ICD-10-CM

## 2024-03-06 DIAGNOSIS — C64.1 MALIGNANT NEOPLASM OF RIGHT KIDNEY, EXCEPT RENAL PELVIS: ICD-10-CM

## 2024-03-06 NOTE — REVIEW OF SYSTEMS
[Diarrhea: Grade 0] : Diarrhea: Grade 0 [Negative] : Allergic/Immunologic [Patient Intake Form Reviewed] : Patient intake form was reviewed [FreeTextEntry8] : per HPI

## 2024-03-06 NOTE — ASSESSMENT
[FreeTextEntry1] : 59 y/o male presented with hematuria and found to have large partially necrotic right renal mass concerning for RCC. Also has slightly enlarged retroperitoneal LNs and peripheral PRISCILLA 1.6 cm pulmonary nodule. S/p cystoscopy and right ureteral stent placement. Urine cytology negative. Clinical presentation suggestive of right renal cell cancer with possible larry and pulmnary  metastasis. CT guided biopsy of PRISCILLA nodule by IR  2/22/24 : clear cell carcinoma c/w renal primary   Because of oligometastatic disease and relatively large primary tumor- he will beneft from resection of primary- right nephrectomy. He is scheduled for radical right nephrectomy by Dr Kaplan on 3/19/24.  Might also consider wedge resection of lung nodule if remains only area of active disease and primary removed. He has appointment with Dr Sutherland next week.  After surgery  would recommend systemic therapy for minimal volume residual microscopic metastatic disease  ( IO +/- TKI ).  Long d/w patient and his wife. Return visit  ~ 3 weeks after surgery.   CC: Dr RANDALL Grimm.

## 2024-03-06 NOTE — HISTORY OF PRESENT ILLNESS
[Disease: _____________________] : Disease: [unfilled] [M: ___] : M[unfilled] [de-identified] : BHANU REEDER is a 58 y.o. M with a PMH significant for HTN, HLD, CAD -> stent 4/2019, diabetes, who we saw in  for a renal mass alg lung nodule concerning for metastatic RCC.   2/4/24 - 2/7/24 - Admitted to  with hematuria.  CT C/A/P - Dominant PRISCILLA lobulated 1.6 cm lung nodule new since 4/10/19. 2 adjacent pulmonary nodules within the peripheral aspect of the RLL, largest measuring about 5 mm, indeterminate. Heterogeneous enhancing mass measuring 7.3 x 5.8 x 7.2 cm (AP x TRV x CC) in the right renal midpole concerning for a renal neoplasm. Mild hydroureteronephrosis to the level of the distal right ureter with moderate right perinephric inflammatory change and a delayed nephrogram. Questionable 4 mm lesion within the distal right ureter. No left hydronephrosis. Mild nonspecific left perinephric fat stranding.  Enlarged portacaval LN measuring 2.2 x 1.0 cm. Enlarged right retroperitoneal LN measuring 1.5 x 1.2 cm.  2/424 - Cystoscopy with retrograde pyelogram and ureteral stent insertion. PATH - ureter wash negative for high grade UCC.   2/5/24 - MRI Abd - 9.6 cm solid partially necrotic right renal mass compatible with renal cell carcinoma. Several retroperitoneal lymph nodes suspect for metastatic disease.  During hospitalization  he was also found to have intermittent Afib. Started on Diltiazem and metoprolol.  Eliquis recommedned but not started yet due to persistent hematuria. Due to high blood sugar levels- started on insulin and oral hypoglycemics.  Feels OK. Still has intermittent blood tinged urine. Occasional right flank discomfort.   IR biopsy of PRISCILLA pulmonary nodule  2/22/24 : clear cell carcinoma c/w renal primary  Saw urology Dr Kaplan and   nephrectomy is scheduled for 3/19/24.  [de-identified] : clear cell carcinoma

## 2024-03-06 NOTE — PHYSICAL EXAM
[Fully active, able to carry on all pre-disease performance without restriction] : Status 0 - Fully active, able to carry on all pre-disease performance without restriction [Normal] : well developed, well nourished, in no acute distress [de-identified] : looks well

## 2024-03-08 ENCOUNTER — OUTPATIENT (OUTPATIENT)
Dept: OUTPATIENT SERVICES | Facility: HOSPITAL | Age: 59
LOS: 1 days | End: 2024-03-08
Payer: COMMERCIAL

## 2024-03-08 ENCOUNTER — NON-APPOINTMENT (OUTPATIENT)
Age: 59
End: 2024-03-08

## 2024-03-08 ENCOUNTER — APPOINTMENT (OUTPATIENT)
Dept: CARDIOLOGY | Facility: CLINIC | Age: 59
End: 2024-03-08
Payer: COMMERCIAL

## 2024-03-08 VITALS
OXYGEN SATURATION: 98 % | DIASTOLIC BLOOD PRESSURE: 80 MMHG | BODY MASS INDEX: 28.58 KG/M2 | WEIGHT: 193 LBS | SYSTOLIC BLOOD PRESSURE: 112 MMHG | HEART RATE: 71 BPM | HEIGHT: 69 IN

## 2024-03-08 VITALS
TEMPERATURE: 98 F | SYSTOLIC BLOOD PRESSURE: 131 MMHG | DIASTOLIC BLOOD PRESSURE: 83 MMHG | HEART RATE: 80 BPM | WEIGHT: 194.01 LBS | OXYGEN SATURATION: 99 % | HEIGHT: 68.5 IN | RESPIRATION RATE: 16 BRPM

## 2024-03-08 DIAGNOSIS — Z96.0 PRESENCE OF UROGENITAL IMPLANTS: Chronic | ICD-10-CM

## 2024-03-08 DIAGNOSIS — Z01.818 ENCOUNTER FOR OTHER PREPROCEDURAL EXAMINATION: ICD-10-CM

## 2024-03-08 DIAGNOSIS — Z98.890 OTHER SPECIFIED POSTPROCEDURAL STATES: Chronic | ICD-10-CM

## 2024-03-08 DIAGNOSIS — Z90.49 ACQUIRED ABSENCE OF OTHER SPECIFIED PARTS OF DIGESTIVE TRACT: Chronic | ICD-10-CM

## 2024-03-08 DIAGNOSIS — Z87.828 PERSONAL HISTORY OF OTHER (HEALED) PHYSICAL INJURY AND TRAUMA: Chronic | ICD-10-CM

## 2024-03-08 DIAGNOSIS — N28.9 DISORDER OF KIDNEY AND URETER, UNSPECIFIED: ICD-10-CM

## 2024-03-08 LAB
A1C WITH ESTIMATED AVERAGE GLUCOSE RESULT: 8.2 % — HIGH (ref 4–5.6)
ABO RH CONFIRMATION: SIGNIFICANT CHANGE UP
ANION GAP SERPL CALC-SCNC: 8 MMOL/L — SIGNIFICANT CHANGE UP (ref 5–17)
APPEARANCE UR: SIGNIFICANT CHANGE UP
APTT BLD: 42.5 SEC — HIGH (ref 24.5–35.6)
BACTERIA # UR AUTO: ABNORMAL /HPF
BASOPHILS # BLD AUTO: 0.03 K/UL — SIGNIFICANT CHANGE UP (ref 0–0.2)
BASOPHILS NFR BLD AUTO: 0.4 % — SIGNIFICANT CHANGE UP (ref 0–2)
BILIRUB UR-MCNC: NEGATIVE — SIGNIFICANT CHANGE UP
BLD GP AB SCN SERPL QL: SIGNIFICANT CHANGE UP
BUN SERPL-MCNC: 18 MG/DL — SIGNIFICANT CHANGE UP (ref 7–23)
CALCIUM SERPL-MCNC: 10.6 MG/DL — HIGH (ref 8.5–10.1)
CHLORIDE SERPL-SCNC: 105 MMOL/L — SIGNIFICANT CHANGE UP (ref 96–108)
CO2 SERPL-SCNC: 25 MMOL/L — SIGNIFICANT CHANGE UP (ref 22–31)
COLOR SPEC: ABNORMAL
COMMENT - URINE: SIGNIFICANT CHANGE UP
CREAT SERPL-MCNC: 1.09 MG/DL — SIGNIFICANT CHANGE UP (ref 0.5–1.3)
DIFF PNL FLD: ABNORMAL
EGFR: 78 ML/MIN/1.73M2 — SIGNIFICANT CHANGE UP
EOSINOPHIL # BLD AUTO: 0.07 K/UL — SIGNIFICANT CHANGE UP (ref 0–0.5)
EOSINOPHIL NFR BLD AUTO: 0.9 % — SIGNIFICANT CHANGE UP (ref 0–6)
EPI CELLS # UR: SIGNIFICANT CHANGE UP
ESTIMATED AVERAGE GLUCOSE: 189 MG/DL — HIGH (ref 68–114)
GLUCOSE SERPL-MCNC: 116 MG/DL — HIGH (ref 70–99)
GLUCOSE UR QL: NEGATIVE MG/DL — SIGNIFICANT CHANGE UP
HCT VFR BLD CALC: 36.2 % — LOW (ref 39–50)
HGB BLD-MCNC: 11.8 G/DL — LOW (ref 13–17)
HYALINE CASTS # UR AUTO: SIGNIFICANT CHANGE UP
IMM GRANULOCYTES NFR BLD AUTO: 0.3 % — SIGNIFICANT CHANGE UP (ref 0–0.9)
INR BLD: 1.07 RATIO — SIGNIFICANT CHANGE UP (ref 0.85–1.18)
KETONES UR-MCNC: NEGATIVE MG/DL — SIGNIFICANT CHANGE UP
LEUKOCYTE ESTERASE UR-ACNC: ABNORMAL
LYMPHOCYTES # BLD AUTO: 1.66 K/UL — SIGNIFICANT CHANGE UP (ref 1–3.3)
LYMPHOCYTES # BLD AUTO: 22.5 % — SIGNIFICANT CHANGE UP (ref 13–44)
MCHC RBC-ENTMCNC: 28.4 PG — SIGNIFICANT CHANGE UP (ref 27–34)
MCHC RBC-ENTMCNC: 32.6 GM/DL — SIGNIFICANT CHANGE UP (ref 32–36)
MCV RBC AUTO: 87.2 FL — SIGNIFICANT CHANGE UP (ref 80–100)
MONOCYTES # BLD AUTO: 0.48 K/UL — SIGNIFICANT CHANGE UP (ref 0–0.9)
MONOCYTES NFR BLD AUTO: 6.5 % — SIGNIFICANT CHANGE UP (ref 2–14)
NEUTROPHILS # BLD AUTO: 5.11 K/UL — SIGNIFICANT CHANGE UP (ref 1.8–7.4)
NEUTROPHILS NFR BLD AUTO: 69.4 % — SIGNIFICANT CHANGE UP (ref 43–77)
NITRITE UR-MCNC: NEGATIVE — SIGNIFICANT CHANGE UP
PH UR: 5 — SIGNIFICANT CHANGE UP (ref 5–8)
PLATELET # BLD AUTO: 304 K/UL — SIGNIFICANT CHANGE UP (ref 150–400)
POTASSIUM SERPL-MCNC: 4.2 MMOL/L — SIGNIFICANT CHANGE UP (ref 3.5–5.3)
POTASSIUM SERPL-SCNC: 4.2 MMOL/L — SIGNIFICANT CHANGE UP (ref 3.5–5.3)
PROT UR-MCNC: 500 MG/DL — SIGNIFICANT CHANGE UP
PROTHROM AB SERPL-ACNC: 12.1 SEC — SIGNIFICANT CHANGE UP (ref 9.5–13)
RBC # BLD: 4.15 M/UL — LOW (ref 4.2–5.8)
RBC # FLD: 12.7 % — SIGNIFICANT CHANGE UP (ref 10.3–14.5)
RBC CASTS # UR COMP ASSIST: ABNORMAL /HPF
SODIUM SERPL-SCNC: 138 MMOL/L — SIGNIFICANT CHANGE UP (ref 135–145)
SP GR SPEC: 1.02 — SIGNIFICANT CHANGE UP (ref 1–1.03)
UROBILINOGEN FLD QL: 0.2 MG/DL — SIGNIFICANT CHANGE UP (ref 0.2–1)
WBC # BLD: 7.37 K/UL — SIGNIFICANT CHANGE UP (ref 3.8–10.5)
WBC # FLD AUTO: 7.37 K/UL — SIGNIFICANT CHANGE UP (ref 3.8–10.5)
WBC UR QL: 3 /HPF — SIGNIFICANT CHANGE UP (ref 0–5)

## 2024-03-08 PROCEDURE — 36415 COLL VENOUS BLD VENIPUNCTURE: CPT

## 2024-03-08 PROCEDURE — 85610 PROTHROMBIN TIME: CPT

## 2024-03-08 PROCEDURE — 85025 COMPLETE CBC W/AUTO DIFF WBC: CPT

## 2024-03-08 PROCEDURE — 86901 BLOOD TYPING SEROLOGIC RH(D): CPT

## 2024-03-08 PROCEDURE — 87086 URINE CULTURE/COLONY COUNT: CPT

## 2024-03-08 PROCEDURE — 99215 OFFICE O/P EST HI 40 MIN: CPT

## 2024-03-08 PROCEDURE — 93000 ELECTROCARDIOGRAM COMPLETE: CPT

## 2024-03-08 PROCEDURE — 99214 OFFICE O/P EST MOD 30 MIN: CPT | Mod: 25

## 2024-03-08 PROCEDURE — 85730 THROMBOPLASTIN TIME PARTIAL: CPT

## 2024-03-08 PROCEDURE — 86920 COMPATIBILITY TEST SPIN: CPT

## 2024-03-08 PROCEDURE — 86850 RBC ANTIBODY SCREEN: CPT

## 2024-03-08 PROCEDURE — 81001 URINALYSIS AUTO W/SCOPE: CPT

## 2024-03-08 PROCEDURE — G2211 COMPLEX E/M VISIT ADD ON: CPT

## 2024-03-08 PROCEDURE — 80048 BASIC METABOLIC PNL TOTAL CA: CPT

## 2024-03-08 PROCEDURE — 83036 HEMOGLOBIN GLYCOSYLATED A1C: CPT

## 2024-03-08 PROCEDURE — 86900 BLOOD TYPING SEROLOGIC ABO: CPT

## 2024-03-08 RX ORDER — DILTIAZEM HYDROCHLORIDE 300 MG/1
CAPSULE, COATED, EXTENDED RELEASE ORAL
Refills: 0 | Status: DISCONTINUED | COMMUNITY
End: 2024-03-08

## 2024-03-08 RX ORDER — ASPIRIN/CALCIUM CARB/MAGNESIUM 324 MG
1 TABLET ORAL
Qty: 0 | Refills: 0 | DISCHARGE

## 2024-03-08 NOTE — ASSESSMENT
[FreeTextEntry1] : A/P:  *preop clearance -may proceed w/ surgery w/ further testint flights w/o problems ECG WNL, remote hx stent -OK to hold eliquis given hematuria resume when hematuria resolved & when safe surgically -Restart ASA if OK from hematuria & surgical standpoint otherwise hold for now.  *Afib-paroxysmal -CHADS-VASc=1-2 (CAD, possible HTN)  -d/c diltiazem -cont. metoprolol. -holding eliquis for now.  *CAD s/p LAD PCI -holding ASA, no statin b/c of myalgias. -will consider PCSK9 after surgeries.  Return in 3 weeks to review epatch results.

## 2024-03-08 NOTE — H&P PST ADULT - RESPIRATORY AND THORAX
Last 5 Patient Entered Readings                                      Current 30 Day Average: 131/63     Recent Readings 9/10/2018 9/5/2018 9/1/2018 8/30/2018 8/27/2018    SBP (mmHg) 116 145 129 133 142    DBP (mmHg) 59 66 64 57 60    Pulse 83 86 83 97 91        Digital Medicine: Health  Follow Up    Lifestyle Modifications:    1.Dietary Modifications (Sodium intake <2,000mg/day, food labels, dining out): Deferred    2.Physical Activity: Deferred    3.Medication Therapy: Patient has been compliant with the medication regimen. Mrs. Nguyen saw Dr. Davis yesterday, and expressed concern about valsartan since the recall in July. Dr. Dvais discontinued valsartan, and started irbesartan 300mg daily. Mrs. Nguyen plans to start this medication over the next few days.     4.Patient has the following medication side effects/concerns: None  (Frequency/Alleviating factors/Precipitating factors, etc.)     Follow up with Mrs. Clemencia Nguyen completed. No further questions or concerns. Will continue follow up to achieve health goals.                 details…

## 2024-03-08 NOTE — HISTORY OF PRESENT ILLNESS
[FreeTextEntry1] : [id number]  BHANU REEDER  Jul 12 1965 [phone]   57 y/o  *CAD s/p PCI LAD-prox '19 *Afib-paroxysmal  here for followup.  Last visit cleared for lung biopsy. Since then had biopsy of lung + for metastatic renal cancer. Since then Kendravikisharon plans March 19th for radical nephrectomy Wedge resectoin date has not been established  had CAD s/p PCI '19, lost to followup reports taking statins but muscle aches note. reports taking plavix for 1 yr, was taking ASA lifelong. but stopped in setting in bleeding intraoop & procedures. Not on eliquis b/c still has hematuria. Unclear about if cleared for ASA for hematuria.  Reviewed history of CAD & eventual PCI in '19 w/ chart & reviewed records. Seen by [facility] Cardio Dr. Hassan. In '19 had pna, during one scan of lungs, CT scan showed pulmonary hypertension & "extensive LAD disease" and "distally dilated aortic arch 4 cm x 4 cm". CT scan showed pulmonary hypertension & "extensive LAD disease" and "distally dilated aortic arch 4 cm x 4 cm". Workup for coronary calcifications included normal perfusion scan. but  abnormal CTA which revealed a calcium score of 1030 and  "at least" moderate narrowing in the LAD and RCA. 'consider further testing' had Cath w/ PCI to prox LAD. per pt discussed w/ cardio who said: "stent was not absolutely necessary" but "stenosis would eventually get worse"  "might want to fix it" Of note cardiologist notes significantly elevated TGs requiring gemfibrozil was on low dose statin at that time.  ECG Mar '24:  NSR no ischemic changes QTc 390.  HOSPITAL ADMIT FEB '24: Presented Feb 4th for R flank pain & blood in urine. Cardio consulted for clearance had h/o PCI but taken to procedure prior to evaluation. saw postop, reviewed cardiac history: Hx. of CAD s/p stent placed on 4/26/2019, readmitted on 4/28/2019 with CP had [facility] stent patent Lost to followup w/ cardio reccomended restablishing care. h/o statin intolerance discussed PCKS9 inhibitor. next day pt went into afib w/ RVR transferred to tele. cardizem started. pt "refused anticoag" per note. spontaneously converted to sinus. instructed to start eliquis when hematuria resolves visually. Renal mass noted on imaging c/w metastatic RCC Biopsy of lesion in lung planned. Discharged Feb 7th.  Echo Feb '24: EF 55-60%, mild LVH, mild LAE, mild-mod AR, mild MR ============================ CARDIAC CATHS:  [facility] Cath Lab Report -- Comprehensive Report  INDICATIONS: Stable angina - CCS2. Abnormal CT angio. HISTORY: There was no prior cardiac history. The patient has hypertension, oral hypoglycemic-treated diabetes, medication-treated dyslipidemia, low HDL, and morbid obesity. PROCEDURE: Pressures:  -- Aortic Pressure (S/D/M): 150/102/123 Pressures:  -- Left Ventricle (s/edp): 136/24/-- Pressures:  -- Aortic Pressure (S/D/M): 149/101/123 --  Left heart catheterization. --  Left coronary angiography. --  Right coronary angiography. --  Intervention on proximal LAD: drug-eluting stent.  "A successful drug-eluting stent was performed on the 80 % lesion in the proximal LAD. Following intervention there was a 1 % residual stenosis."  CORONARY VESSELS: The coronary circulation is right dominant. LM:   --  LM: Angiography showed minor luminal irregularities with no flow limiting lesions. LAD:   --  Proximal LAD: There was a 80 % stenosis. CX:   --  Circumflex: Angiography showed minor luminal irregularities with no flow limiting lesions. RCA:   --  RCA: Angiography showed minor luminal irregularities with no flow limiting lesions. COMPLICATIONS: There were no complications. DIAGNOSTIC RECOMMENDATIONS: ASA and Plavix for 1 year INTERVENTIONAL RECOMMENDATIONS: ASA and Plavix for 1 year. Dagoberto Toney M.D. Signed 04/26/2019 13:18:49  ================= ================= =================  EXAM:  CARDIAC CATHERIZATION   PROCEDURE DATE:  04/29/2019   INTERPRETATION:  Cardiac Catheterization Report  Demographics  Patient Name         LILLI DRUMMOND Height              70 Inches Date of Birth        1965    BMI                 32.42 kg/m S 2  Age                  53 year(s)    Performing      Milton Nieves MD  Procedure Start Time: 04/29/2019 11:15.  Hemodynamics Pressure !AO   !129/83 (98)                                                          ! Angiographic Findings  Cardiac Arteries and Lesion Findings LMCA: Normal. LAD: Diffuse irregularity.Widely patent stent. LCx: Diffuse irregularity. RCA: Diffuse irregularity.  Impression  Diagnostic Conclusions  Non-Obstructive CAD.  Patent stent in LAD  Recommendations  Aggressive medical management of coronary artery disease and its  underlying risk factors.  Manage with medical therapy. MILTON NIEVES M.D., ATTENDING CARDIOLOGIST This document has been electronically signed. May  6 2019  1:05PM

## 2024-03-08 NOTE — H&P PST ADULT - NSICDXPASTMEDICALHX_GEN_ALL_CORE_FT
PAST MEDICAL HISTORY:  Borderline high cholesterol     CAD (coronary artery disease)     Cough     Diabetes     Diverticulitis     Former smoker     H/O pulmonary hypertension     HLD (hyperlipidemia)     HTN (hypertension)     Lung cancer     Lung nodule     Obesity (BMI 30-39.9)     Paroxysmal atrial fibrillation     Pneumonia     Renal calculi     Renal mass     Stented coronary artery     Subclinical hyperthyroidism      PAST MEDICAL HISTORY:  CAD (coronary artery disease)     Cough     Diabetes     Diverticulitis     Former smoker     H/O pulmonary hypertension     HLD (hyperlipidemia)     HTN (hypertension)     Lung nodule     Metastasis to lung     Obesity (BMI 30-39.9)     Paroxysmal atrial fibrillation     Pneumonia     Renal calculi     Renal mass     Stented coronary artery     Subclinical hyperthyroidism

## 2024-03-08 NOTE — H&P PST ADULT - NSICDXPASTSURGICALHX_GEN_ALL_CORE_FT
PAST SURGICAL HISTORY:  History of appendectomy 1994    History of lung biopsy     History of tonsillectomy and adenoidectomy 1968    History of torn meniscus of left knee 1995    S/P cardiac cath     S/P cystoscopy with ureteral stent placement

## 2024-03-08 NOTE — H&P PST ADULT - HISTORY OF PRESENT ILLNESS
58 year old male with right renal mass; Pt was in  2/2024 for right flank pain and hematuria; CT scan showed mass; still having hematuria; he presents to PST for planned robotic right radical nephrectomy and right ureteral stent removal

## 2024-03-08 NOTE — H&P PST ADULT - ASSESSMENT
58 year old male with right renal mass; Pt was in  2024 for right flank pain and hematuria; CT scan showed mass; still having hematuria; he presents to PST for planned robotic right radical nephrectomy and right ureteral stent removal         Plan:  1. PST instructions given ; NPO status/  instructions to be given by ASU   2. Pt instructed to take following meds on day of surgery: metoprolol   3. Pt instructed to take routine evening medications unless indicated   4. Stop NSAIDS ( Aspirin Alev Motrin Mobic Diclofenac), herbal supplements , MVI , Vitamin fish oil 7 days prior to surgery  unless   directed by surgeon or cardiologist;   5. Medical Optimization  with Dr Grimm and cardio Dr Minor   6. EZ wash instructions given   7. Labs EKG CXR as per surgeon request   8. Pt denies covid symptoms shortness of breath fever cough     CAPRINI VTE 2.0 SCORE [CLOT updated 2019]    AGE RELATED RISK FACTORS                                                       MOBILITY RELATED FACTORS  [x ] Age 41-60 years                                            (1 Point)                    [ ] Bed rest                                                        (1 Point)  [ ] Age: 61-74 years                                           (2 Points)                  [ ] Plaster cast                                                   (2 Points)  [ ] Age= 75 years                                              (3 Points)                    [ ] Bed bound for more than 72 hours                 (2 Points)    DISEASE RELATED RISK FACTORS                                               GENDER SPECIFIC FACTORS  [ ] Edema in the lower extremities                       (1 Point)              [ ] Pregnancy                                                     (1 Point)  [ ] Varicose veins                                               (1 Point)                     [ ] Post-partum < 6 weeks                                   (1 Point)             [x ] BMI > 25 Kg/m2                                            (1 Point)                     [ ] Hormonal therapy  or oral contraception          (1 Point)                 [ ] Sepsis (in the previous month)                        (1 Point)               [ ] History of pregnancy complications                 (1 point)  [ ] Pneumonia or serious lung disease                                               [ ] Unexplained or recurrent                     (1 Point)           (in the previous month)                               (1 Point)  [ ] Abnormal pulmonary function test                     (1 Point)                 SURGERY RELATED RISK FACTORS  [ ] Acute myocardial infarction                              (1 Point)               [ ]  Section                                             (1 Point)  [ ] Congestive heart failure (in the previous month)  (1 Point)      [ ] Minor surgery                                                  (1 Point)   [ ] Inflammatory bowel disease                             (1 Point)               [ ] Arthroscopic surgery                                        (2 Points)  [ ] Central venous access                                      (2 Points)                [x ] General surgery lasting more than 45 minutes (2 points)  [x ] Malignancy- Present or previous                   (2 Points)                [ ] Elective arthroplasty                                         (5 points)    [ ] Stroke (in the previous month)                          (5 Points)                                                                                                                                                           HEMATOLOGY RELATED FACTORS                                                 TRAUMA RELATED RISK FACTORS  [ ] Prior episodes of VTE                                     (3 Points)                [ ] Fracture of the hip, pelvis, or leg                       (5 Points)  [ ] Positive family history for VTE                         (3 Points)             [ ] Acute spinal cord injury (in the previous month)  (5 Points)  [ ] Prothrombin 35956 A                                     (3 Points)               [ ] Paralysis  (less than 1 month)                             (5 Points)  [ ] Factor V Leiden                                             (3 Points)                  [ ] Multiple Trauma within 1 month                        (5 Points)  [ ] Lupus anticoagulants                                     (3 Points)                                                           [ ] Anticardiolipin antibodies                               (3 Points)                                                       [ ] High homocysteine in the blood                      (3 Points)                                             [ ] Other congenital or acquired thrombophilia      (3 Points)                                                [ ] Heparin induced thrombocytopenia                  (3 Points)                                     Total Score [     6     ]    The Caprini score indicates that this patient is at high risk for a VTE event (score 6 or greater). Surgical patients in this group will benefit from both pharmacologic prophylaxis and intermittent compression devices.  The surgical team will determine the balance between VTE risk and bleeding risk, and other clinical considerations

## 2024-03-08 NOTE — H&P PST ADULT - NSANTHOSAYNRD_GEN_A_CORE
No. LINA screening performed.  STOP BANG Legend: 0-2 = LOW Risk; 3-4 = INTERMEDIATE Risk; 5-8 = HIGH Risk

## 2024-03-08 NOTE — PHYSICAL EXAM
[Well Nourished] : well nourished [Well Developed] : well developed [No Acute Distress] : no acute distress [Normal Conjunctiva] : normal conjunctiva [Normal Venous Pressure] : normal venous pressure [No Carotid Bruit] : no carotid bruit [Normal S1, S2] : normal S1, S2 [No Murmur] : no murmur [No Rub] : no rub [No Gallop] : no gallop [Clear Lung Fields] : clear lung fields [Good Air Entry] : good air entry [No Respiratory Distress] : no respiratory distress  [Soft] : abdomen soft [Non Tender] : non-tender [No Masses/organomegaly] : no masses/organomegaly [Normal Bowel Sounds] : normal bowel sounds [Normal Gait] : normal gait [No Cyanosis] : no cyanosis [No Edema] : no edema [No Varicosities] : no varicosities [No Clubbing] : no clubbing [No Rash] : no rash [No Skin Lesions] : no skin lesions [Moves all extremities] : moves all extremities [No Focal Deficits] : no focal deficits [Normal Speech] : normal speech [Alert and Oriented] : alert and oriented [Normal memory] : normal memory

## 2024-03-09 DIAGNOSIS — N28.9 DISORDER OF KIDNEY AND URETER, UNSPECIFIED: ICD-10-CM

## 2024-03-09 DIAGNOSIS — Z01.818 ENCOUNTER FOR OTHER PREPROCEDURAL EXAMINATION: ICD-10-CM

## 2024-03-09 LAB
CULTURE RESULTS: NO GROWTH — SIGNIFICANT CHANGE UP
SPECIMEN SOURCE: SIGNIFICANT CHANGE UP

## 2024-03-11 PROBLEM — K57.92 DIVERTICULITIS OF INTESTINE, PART UNSPECIFIED, WITHOUT PERFORATION OR ABSCESS WITHOUT BLEEDING: Chronic | Status: ACTIVE | Noted: 2024-03-08

## 2024-03-11 PROBLEM — C78.00 SECONDARY MALIGNANT NEOPLASM OF UNSPECIFIED LUNG: Chronic | Status: ACTIVE | Noted: 2024-03-08

## 2024-03-11 PROBLEM — N20.0 CALCULUS OF KIDNEY: Chronic | Status: ACTIVE | Noted: 2024-03-08

## 2024-03-11 NOTE — CHART NOTE - NSCHARTNOTEFT_GEN_A_CORE
3/11/2024-  Review PST testing, A1C from 3/8/24. A1C 8.2, spoke with anesthesia C.. Fransico, policy to be followed unless surgery is urgent , in that case needs to be addressed by surgeon. Surgeon's office called, spoke with Kim surgical coordinator for Dr Kaplan. Surgeon will address and speak with anesthesia.

## 2024-03-12 PROBLEM — E05.90 THYROTOXICOSIS, UNSPECIFIED WITHOUT THYROTOXIC CRISIS OR STORM: Chronic | Status: ACTIVE | Noted: 2024-03-08

## 2024-03-12 PROBLEM — I48.0 PAROXYSMAL ATRIAL FIBRILLATION: Chronic | Status: ACTIVE | Noted: 2024-03-08

## 2024-03-13 ENCOUNTER — NON-APPOINTMENT (OUTPATIENT)
Age: 59
End: 2024-03-13

## 2024-03-13 ENCOUNTER — APPOINTMENT (OUTPATIENT)
Dept: THORACIC SURGERY | Facility: CLINIC | Age: 59
End: 2024-03-13
Payer: COMMERCIAL

## 2024-03-13 VITALS
SYSTOLIC BLOOD PRESSURE: 143 MMHG | DIASTOLIC BLOOD PRESSURE: 101 MMHG | OXYGEN SATURATION: 100 % | BODY MASS INDEX: 27.7 KG/M2 | HEART RATE: 84 BPM | WEIGHT: 187 LBS | HEIGHT: 69 IN

## 2024-03-13 PROCEDURE — 99205 OFFICE O/P NEW HI 60 MIN: CPT

## 2024-03-13 RX ORDER — APIXABAN 5 MG/1
5 TABLET, FILM COATED ORAL
Refills: 0 | Status: DISCONTINUED | COMMUNITY
End: 2024-03-13

## 2024-03-13 NOTE — HISTORY OF PRESENT ILLNESS
[FreeTextEntry1] : Mr. BHANU REEDER, 58 year old male, former smoker, w/ hx of large renal mass, who was admitted at  with hematuria (02/04-02/07/24), workup imaging revealed bilateral lung nodules. He is now s/p CT-guided bx of PRISCILLA nodule with Dr. Lei on 02/22/2024. Path revealing clear cell carcinoma, c/w clinically suspected metastatic renal cell carcinoma. Adjacent lung parenchyma with organizing pneumonitis.  Interval medical hx of: AFib (on Eliquis), HTN, CAD (s/p PCI in 2019), HLD, Iron deficiency anemia, pulmonary HTN, DM2, and Retroperitoneal lymphadenopathy  Seen by Dr. Sánchez (heme/onc), plan for systemic therapy post-surgery.  CT Chest on 02/04/2024: - PRISCILLA lobulated pulmonary nodule measuring up to about 1.6 cm new since April 10, 2019. - 2 adjacent pulmonary nodules within the peripheral aspect of the RLL largest measuring about 5 mm. This area was not completely imaged on the prior cardiac CT of April 10, 2019.  CT A/P w/ IV Contrast o 02/04/2024: - 9 mm nodule in the right lower lobe (series 2, image 10).  - Bibasilar subsegmental atelectasis.  - Moderate to large heterogeneous enhancing mass in the right renal midpole concerning for a renal neoplasm possibly renal cell carcinoma. - Mild hydroureteronephrosis to the level of the distal right ureter with moderate right perinephric inflammatory change and a delayed nephrogram. Questionable lesion within the distal right ureter which could represent a ureteral mass. - Portacaval and right retroperitoneal lymphadenopathy.  Of Note: Scheduled for right robotic radical nephrectomy with Dr. Kaplan on 03/19/24.   Patient is here today for CT Sx consultation, referred by Dr. Gomez Kaplan (uro) for potential wedge resection of pulmonary nodules especially the one on the left. Overall, he reports to be feeling well. Denies any chest pain, shortness of breath, cough, or hemoptysis.

## 2024-03-13 NOTE — PHYSICAL EXAM
[General Appearance - Alert] : alert [General Appearance - In No Acute Distress] : in no acute distress [Sclera] : the sclera and conjunctiva were normal [PERRL With Normal Accommodation] : pupils were equal in size, round, and reactive to light [Extraocular Movements] : extraocular movements were intact [Outer Ear] : the ears and nose were normal in appearance [Oropharynx] : the oropharynx was normal [Neck Appearance] : the appearance of the neck was normal [Jugular Venous Distention Increased] : there was no jugular-venous distention [Neck Cervical Mass (___cm)] : no neck mass was observed [Thyroid Diffuse Enlargement] : the thyroid was not enlarged [Thyroid Nodule] : there were no palpable thyroid nodules [Auscultation Breath Sounds / Voice Sounds] : lungs were clear to auscultation bilaterally [Heart Rate And Rhythm] : heart rate was normal and rhythm regular [Heart Sounds] : normal S1 and S2 [Murmurs] : no murmurs [Heart Sounds Gallop] : no gallops [Heart Sounds Pericardial Friction Rub] : no pericardial rub [Examination Of The Chest] : the chest was normal in appearance [Chest Visual Inspection Thoracic Asymmetry] : no chest asymmetry [Diminished Respiratory Excursion] : normal chest expansion [2+] : left 2+ [No Abnormalities] : the abdominal aorta was not enlarged and no bruit was heard [Breast Appearance] : normal in appearance [Bowel Sounds] : normal bowel sounds [Breast Palpation Mass] : no palpable masses [Abdomen Soft] : soft [Abdomen Tenderness] : non-tender [Abdomen Mass (___ Cm)] : no abdominal mass palpated [Cervical Lymph Nodes Enlarged Anterior Bilaterally] : anterior cervical [Cervical Lymph Nodes Enlarged Posterior Bilaterally] : posterior cervical [Axillary Lymph Nodes Enlarged Bilaterally] : axillary [Supraclavicular Lymph Nodes Enlarged Bilaterally] : supraclavicular [Femoral Lymph Nodes Enlarged Bilaterally] : femoral [Inguinal Lymph Nodes Enlarged Bilaterally] : inguinal [No CVA Tenderness] : no ~M costovertebral angle tenderness [No Spinal Tenderness] : no spinal tenderness [Abnormal Walk] : normal gait [Nail Clubbing] : no clubbing  or cyanosis of the fingernails [Musculoskeletal - Swelling] : no joint swelling seen [Motor Tone] : muscle strength and tone were normal [Skin Color & Pigmentation] : normal skin color and pigmentation [Skin Turgor] : normal skin turgor [] : no rash [Deep Tendon Reflexes (DTR)] : deep tendon reflexes were 2+ and symmetric [No Focal Deficits] : no focal deficits [Sensation] : the sensory exam was normal to light touch and pinprick [Oriented To Time, Place, And Person] : oriented to person, place, and time [Impaired Insight] : insight and judgment were intact [Affect] : the affect was normal [Right Carotid Bruit] : no bruit heard over the right carotid [Left Carotid Bruit] : no bruit heard over the left carotid [Left Femoral Bruit] : no bruit heard over the left femoral artery [Right Femoral Bruit] : no bruit heard over the right femoral artery [FreeTextEntry1] : deferred

## 2024-03-13 NOTE — ASSESSMENT
[FreeTextEntry1] : Mr. BHANU REEDER, 58 year old male, former smoker, w/ hx of large renal mass, who was admitted at  with hematuria (02/04-02/07/24), workup imaging revealed bilateral lung nodules. He is now s/p CT-guided bx of PRISCILLA nodule with Dr. Lei on 02/22/2024. Path revealing clear cell carcinoma, c/w clinically suspected metastatic renal cell carcinoma. Adjacent lung parenchyma with organizing pneumonitis.  Seen by Dr. Sánchez (heme/onc), plan for systemic therapy post-surgery.  Patient is here today for CT Sx consultation, referred by Dr. Gomez Kaplan (uro).   I have independently reviewed the medical records and imaging at the time of this office consultation. Imaging and path reviewed with patient. Patient with hx of renal cancer, pending right radical nephrectomy on 03/19/24. I discussed he should proceed with surgery as planned.   Additionally, I discussed there are bilateral lung nodules, PRISCILLA nodule c/w metastatic renal cell carcinoma. Surgical approach reviewed with patient after his renal procedure. Discussed risks in details, patient is agreeable to proceed. Will schedule for Left VATS, possible bilateral VATS, robotic assisted, wedge resection. If B/L VATS not amenable, I would perform surgery on left side first and then one month later schedule for Right VATS, robotic assisted wedge resection. He will need cardiac clearance prior to surgery and hold Eliquis for 72 hours prior to procedure.   I would like him to follow up with me 2 weeks after his renal surgery for clinical follow up via tele visit.   Recommendations reviewed with patient during this office visit, and all questions answered; Patient instructed on the importance of follow up and verbalizes understanding.    I, KALEY Nunez, personally performed the evaluation and management (E/M) services for this new patient.  That E/M includes conducting the initial examination, assessing all conditions, and establishing the plan of care.  Today, my ACP, Gaurang Ferrell NP, was here to observe my evaluation and management services for this patient to be followed going forward.

## 2024-03-13 NOTE — CONSULT LETTER
[FreeTextEntry2] :  Dr. Gomez Kaplan (uro/ref) [FreeTextEntry3] : Say Sutherland MD, FACS , Division of Thoracic Surgery [facility] Thoracic Surgery [facility] Department of Cardiovascular & Thoracic Surgery  [employer] [facility]

## 2024-03-13 NOTE — DATA REVIEWED
[FreeTextEntry1] : I have independently reviewed the following: CT Chest on 02/04/2024 CT A/P w/ IV Contrast o 02/04/2024

## 2024-03-15 ENCOUNTER — APPOINTMENT (OUTPATIENT)
Dept: NUCLEAR MEDICINE | Facility: IMAGING CENTER | Age: 59
End: 2024-03-15

## 2024-03-15 ENCOUNTER — OUTPATIENT (OUTPATIENT)
Dept: OUTPATIENT SERVICES | Facility: HOSPITAL | Age: 59
LOS: 1 days | End: 2024-03-15
Payer: COMMERCIAL

## 2024-03-15 ENCOUNTER — APPOINTMENT (OUTPATIENT)
Dept: NUCLEAR MEDICINE | Facility: IMAGING CENTER | Age: 59
End: 2024-03-15
Payer: COMMERCIAL

## 2024-03-15 DIAGNOSIS — Z98.890 OTHER SPECIFIED POSTPROCEDURAL STATES: Chronic | ICD-10-CM

## 2024-03-15 DIAGNOSIS — Z96.0 PRESENCE OF UROGENITAL IMPLANTS: Chronic | ICD-10-CM

## 2024-03-15 DIAGNOSIS — Z90.49 ACQUIRED ABSENCE OF OTHER SPECIFIED PARTS OF DIGESTIVE TRACT: Chronic | ICD-10-CM

## 2024-03-15 DIAGNOSIS — Z00.8 ENCOUNTER FOR OTHER GENERAL EXAMINATION: ICD-10-CM

## 2024-03-15 DIAGNOSIS — Z87.828 PERSONAL HISTORY OF OTHER (HEALED) PHYSICAL INJURY AND TRAUMA: Chronic | ICD-10-CM

## 2024-03-15 PROBLEM — Z95.5 PRESENCE OF CORONARY ANGIOPLASTY IMPLANT AND GRAFT: Chronic | Status: ACTIVE | Noted: 2024-03-08

## 2024-03-15 PROCEDURE — 78815 PET IMAGE W/CT SKULL-THIGH: CPT | Mod: 26,PI

## 2024-03-15 PROCEDURE — 78815 PET IMAGE W/CT SKULL-THIGH: CPT

## 2024-03-15 PROCEDURE — A9552: CPT

## 2024-03-17 ENCOUNTER — INPATIENT (INPATIENT)
Facility: HOSPITAL | Age: 59
LOS: 4 days | Discharge: HOME | DRG: 657 | End: 2024-03-22
Attending: UROLOGY | Admitting: INTERNAL MEDICINE
Payer: COMMERCIAL

## 2024-03-17 VITALS — WEIGHT: 186.07 LBS | HEIGHT: 68.5 IN

## 2024-03-17 DIAGNOSIS — Z98.890 OTHER SPECIFIED POSTPROCEDURAL STATES: Chronic | ICD-10-CM

## 2024-03-17 DIAGNOSIS — Z87.828 PERSONAL HISTORY OF OTHER (HEALED) PHYSICAL INJURY AND TRAUMA: Chronic | ICD-10-CM

## 2024-03-17 DIAGNOSIS — Z96.0 PRESENCE OF UROGENITAL IMPLANTS: Chronic | ICD-10-CM

## 2024-03-17 DIAGNOSIS — R11.11 VOMITING WITHOUT NAUSEA: ICD-10-CM

## 2024-03-17 DIAGNOSIS — Z90.49 ACQUIRED ABSENCE OF OTHER SPECIFIED PARTS OF DIGESTIVE TRACT: Chronic | ICD-10-CM

## 2024-03-17 LAB
ALBUMIN SERPL ELPH-MCNC: 4.2 G/DL — SIGNIFICANT CHANGE UP (ref 3.3–5)
ALP SERPL-CCNC: 70 U/L — SIGNIFICANT CHANGE UP (ref 40–120)
ALT FLD-CCNC: 10 U/L — LOW (ref 12–78)
ANION GAP SERPL CALC-SCNC: 12 MMOL/L — SIGNIFICANT CHANGE UP (ref 5–17)
APPEARANCE UR: ABNORMAL
APTT BLD: 39.3 SEC — HIGH (ref 24.5–35.6)
AST SERPL-CCNC: 8 U/L — LOW (ref 15–37)
BACTERIA # UR AUTO: NEGATIVE /HPF — SIGNIFICANT CHANGE UP
BASOPHILS # BLD AUTO: 0.03 K/UL — SIGNIFICANT CHANGE UP (ref 0–0.2)
BASOPHILS NFR BLD AUTO: 0.4 % — SIGNIFICANT CHANGE UP (ref 0–2)
BILIRUB SERPL-MCNC: 0.4 MG/DL — SIGNIFICANT CHANGE UP (ref 0.2–1.2)
BILIRUB UR-MCNC: NEGATIVE — SIGNIFICANT CHANGE UP
BUN SERPL-MCNC: 17 MG/DL — SIGNIFICANT CHANGE UP (ref 7–23)
CALCIUM SERPL-MCNC: 11 MG/DL — HIGH (ref 8.5–10.1)
CAST: 1 /LPF — SIGNIFICANT CHANGE UP (ref 0–4)
CHLORIDE SERPL-SCNC: 103 MMOL/L — SIGNIFICANT CHANGE UP (ref 96–108)
CO2 SERPL-SCNC: 24 MMOL/L — SIGNIFICANT CHANGE UP (ref 22–31)
COLOR SPEC: ABNORMAL
CREAT SERPL-MCNC: 1.16 MG/DL — SIGNIFICANT CHANGE UP (ref 0.5–1.3)
DIFF PNL FLD: ABNORMAL
EGFR: 72 ML/MIN/1.73M2 — SIGNIFICANT CHANGE UP
EOSINOPHIL # BLD AUTO: 0.03 K/UL — SIGNIFICANT CHANGE UP (ref 0–0.5)
EOSINOPHIL NFR BLD AUTO: 0.4 % — SIGNIFICANT CHANGE UP (ref 0–6)
FLUAV AG NPH QL: SIGNIFICANT CHANGE UP
FLUBV AG NPH QL: SIGNIFICANT CHANGE UP
GLUCOSE BLDC GLUCOMTR-MCNC: 145 MG/DL — HIGH (ref 70–99)
GLUCOSE SERPL-MCNC: 137 MG/DL — HIGH (ref 70–99)
GLUCOSE UR QL: NEGATIVE MG/DL — SIGNIFICANT CHANGE UP
HCT VFR BLD CALC: 33.5 % — LOW (ref 39–50)
HGB BLD-MCNC: 11.5 G/DL — LOW (ref 13–17)
IMM GRANULOCYTES NFR BLD AUTO: 0.4 % — SIGNIFICANT CHANGE UP (ref 0–0.9)
INR BLD: 1.05 RATIO — SIGNIFICANT CHANGE UP (ref 0.85–1.18)
KETONES UR-MCNC: ABNORMAL MG/DL
LACTATE SERPL-SCNC: 1.7 MMOL/L — SIGNIFICANT CHANGE UP (ref 0.7–2)
LACTATE SERPL-SCNC: 3.3 MMOL/L — HIGH (ref 0.7–2)
LEUKOCYTE ESTERASE UR-ACNC: ABNORMAL
LYMPHOCYTES # BLD AUTO: 1.37 K/UL — SIGNIFICANT CHANGE UP (ref 1–3.3)
LYMPHOCYTES # BLD AUTO: 16.9 % — SIGNIFICANT CHANGE UP (ref 13–44)
MCHC RBC-ENTMCNC: 28.8 PG — SIGNIFICANT CHANGE UP (ref 27–34)
MCHC RBC-ENTMCNC: 34.3 GM/DL — SIGNIFICANT CHANGE UP (ref 32–36)
MCV RBC AUTO: 83.8 FL — SIGNIFICANT CHANGE UP (ref 80–100)
MONOCYTES # BLD AUTO: 0.51 K/UL — SIGNIFICANT CHANGE UP (ref 0–0.9)
MONOCYTES NFR BLD AUTO: 6.3 % — SIGNIFICANT CHANGE UP (ref 2–14)
NEUTROPHILS # BLD AUTO: 6.16 K/UL — SIGNIFICANT CHANGE UP (ref 1.8–7.4)
NEUTROPHILS NFR BLD AUTO: 75.6 % — SIGNIFICANT CHANGE UP (ref 43–77)
NITRITE UR-MCNC: NEGATIVE — SIGNIFICANT CHANGE UP
PH UR: 5.5 — SIGNIFICANT CHANGE UP (ref 5–8)
PLATELET # BLD AUTO: 277 K/UL — SIGNIFICANT CHANGE UP (ref 150–400)
POTASSIUM SERPL-MCNC: 3.6 MMOL/L — SIGNIFICANT CHANGE UP (ref 3.5–5.3)
POTASSIUM SERPL-SCNC: 3.6 MMOL/L — SIGNIFICANT CHANGE UP (ref 3.5–5.3)
PROT SERPL-MCNC: 8.3 GM/DL — SIGNIFICANT CHANGE UP (ref 6–8.3)
PROT UR-MCNC: 100 MG/DL
PROTHROM AB SERPL-ACNC: 11.8 SEC — SIGNIFICANT CHANGE UP (ref 9.5–13)
RBC # BLD: 4 M/UL — LOW (ref 4.2–5.8)
RBC # FLD: 12.5 % — SIGNIFICANT CHANGE UP (ref 10.3–14.5)
RBC CASTS # UR COMP ASSIST: >1900 /HPF — HIGH (ref 0–4)
RSV RNA NPH QL NAA+NON-PROBE: SIGNIFICANT CHANGE UP
SARS-COV-2 RNA SPEC QL NAA+PROBE: SIGNIFICANT CHANGE UP
SODIUM SERPL-SCNC: 139 MMOL/L — SIGNIFICANT CHANGE UP (ref 135–145)
SP GR SPEC: >1.03 — HIGH (ref 1–1.03)
SQUAMOUS # UR AUTO: 1 /HPF — SIGNIFICANT CHANGE UP (ref 0–5)
TROPONIN I, HIGH SENSITIVITY RESULT: 6.36 NG/L — SIGNIFICANT CHANGE UP
UROBILINOGEN FLD QL: 0.2 MG/DL — SIGNIFICANT CHANGE UP (ref 0.2–1)
WBC # BLD: 8.13 K/UL — SIGNIFICANT CHANGE UP (ref 3.8–10.5)
WBC # FLD AUTO: 8.13 K/UL — SIGNIFICANT CHANGE UP (ref 3.8–10.5)
WBC UR QL: 14 /HPF — HIGH (ref 0–5)

## 2024-03-17 PROCEDURE — C9399: CPT

## 2024-03-17 PROCEDURE — 99222 1ST HOSP IP/OBS MODERATE 55: CPT

## 2024-03-17 PROCEDURE — 71045 X-RAY EXAM CHEST 1 VIEW: CPT | Mod: 26

## 2024-03-17 PROCEDURE — 84100 ASSAY OF PHOSPHORUS: CPT

## 2024-03-17 PROCEDURE — 80053 COMPREHEN METABOLIC PANEL: CPT

## 2024-03-17 PROCEDURE — 85025 COMPLETE CBC W/AUTO DIFF WBC: CPT

## 2024-03-17 PROCEDURE — 93010 ELECTROCARDIOGRAM REPORT: CPT

## 2024-03-17 PROCEDURE — 80048 BASIC METABOLIC PNL TOTAL CA: CPT

## 2024-03-17 PROCEDURE — S2900: CPT

## 2024-03-17 PROCEDURE — 80076 HEPATIC FUNCTION PANEL: CPT

## 2024-03-17 PROCEDURE — 83735 ASSAY OF MAGNESIUM: CPT

## 2024-03-17 PROCEDURE — 74177 CT ABD & PELVIS W/CONTRAST: CPT | Mod: 26,MC

## 2024-03-17 PROCEDURE — 88307 TISSUE EXAM BY PATHOLOGIST: CPT

## 2024-03-17 PROCEDURE — 36415 COLL VENOUS BLD VENIPUNCTURE: CPT

## 2024-03-17 PROCEDURE — 82962 GLUCOSE BLOOD TEST: CPT

## 2024-03-17 PROCEDURE — 99285 EMERGENCY DEPT VISIT HI MDM: CPT

## 2024-03-17 PROCEDURE — 85027 COMPLETE CBC AUTOMATED: CPT

## 2024-03-17 PROCEDURE — C1889: CPT

## 2024-03-17 RX ORDER — INSULIN LISPRO 100/ML
VIAL (ML) SUBCUTANEOUS AT BEDTIME
Refills: 0 | Status: DISCONTINUED | OUTPATIENT
Start: 2024-03-18 | End: 2024-03-18

## 2024-03-17 RX ORDER — DEXTROSE 50 % IN WATER 50 %
15 SYRINGE (ML) INTRAVENOUS ONCE
Refills: 0 | Status: DISCONTINUED | OUTPATIENT
Start: 2024-03-17 | End: 2024-03-22

## 2024-03-17 RX ORDER — DEXTROSE 50 % IN WATER 50 %
25 SYRINGE (ML) INTRAVENOUS ONCE
Refills: 0 | Status: DISCONTINUED | OUTPATIENT
Start: 2024-03-17 | End: 2024-03-22

## 2024-03-17 RX ORDER — NALOXONE HYDROCHLORIDE 4 MG/.1ML
0.4 SPRAY NASAL ONCE
Refills: 0 | Status: DISCONTINUED | OUTPATIENT
Start: 2024-03-17 | End: 2024-03-22

## 2024-03-17 RX ORDER — INSULIN GLARGINE 100 [IU]/ML
10 INJECTION, SOLUTION SUBCUTANEOUS EVERY MORNING
Refills: 0 | Status: DISCONTINUED | OUTPATIENT
Start: 2024-03-18 | End: 2024-03-18

## 2024-03-17 RX ORDER — POLYETHYLENE GLYCOL 3350 17 G/17G
17 POWDER, FOR SOLUTION ORAL DAILY
Refills: 0 | Status: DISCONTINUED | OUTPATIENT
Start: 2024-03-17 | End: 2024-03-22

## 2024-03-17 RX ORDER — SODIUM CHLORIDE 9 MG/ML
1000 INJECTION, SOLUTION INTRAVENOUS
Refills: 0 | Status: DISCONTINUED | OUTPATIENT
Start: 2024-03-17 | End: 2024-03-22

## 2024-03-17 RX ORDER — OXYCODONE HYDROCHLORIDE 5 MG/1
2.5 TABLET ORAL EVERY 4 HOURS
Refills: 0 | Status: DISCONTINUED | OUTPATIENT
Start: 2024-03-17 | End: 2024-03-18

## 2024-03-17 RX ORDER — SENNA PLUS 8.6 MG/1
2 TABLET ORAL AT BEDTIME
Refills: 0 | Status: DISCONTINUED | OUTPATIENT
Start: 2024-03-17 | End: 2024-03-22

## 2024-03-17 RX ORDER — ONDANSETRON 8 MG/1
4 TABLET, FILM COATED ORAL ONCE
Refills: 0 | Status: DISCONTINUED | OUTPATIENT
Start: 2024-03-17 | End: 2024-03-19

## 2024-03-17 RX ORDER — ONDANSETRON 8 MG/1
4 TABLET, FILM COATED ORAL EVERY 8 HOURS
Refills: 0 | Status: DISCONTINUED | OUTPATIENT
Start: 2024-03-17 | End: 2024-03-19

## 2024-03-17 RX ORDER — SODIUM CHLORIDE 9 MG/ML
2000 INJECTION INTRAMUSCULAR; INTRAVENOUS; SUBCUTANEOUS ONCE
Refills: 0 | Status: COMPLETED | OUTPATIENT
Start: 2024-03-17 | End: 2024-03-17

## 2024-03-17 RX ORDER — MORPHINE SULFATE 50 MG/1
4 CAPSULE, EXTENDED RELEASE ORAL ONCE
Refills: 0 | Status: DISCONTINUED | OUTPATIENT
Start: 2024-03-17 | End: 2024-03-17

## 2024-03-17 RX ORDER — GLUCAGON INJECTION, SOLUTION 0.5 MG/.1ML
1 INJECTION, SOLUTION SUBCUTANEOUS ONCE
Refills: 0 | Status: DISCONTINUED | OUTPATIENT
Start: 2024-03-17 | End: 2024-03-22

## 2024-03-17 RX ORDER — INSULIN LISPRO 100/ML
3 VIAL (ML) SUBCUTANEOUS
Refills: 0 | Status: DISCONTINUED | OUTPATIENT
Start: 2024-03-17 | End: 2024-03-22

## 2024-03-17 RX ORDER — OXYCODONE HYDROCHLORIDE 5 MG/1
5 TABLET ORAL EVERY 4 HOURS
Refills: 0 | Status: DISCONTINUED | OUTPATIENT
Start: 2024-03-17 | End: 2024-03-18

## 2024-03-17 RX ORDER — CEFTRIAXONE 500 MG/1
1000 INJECTION, POWDER, FOR SOLUTION INTRAMUSCULAR; INTRAVENOUS ONCE
Refills: 0 | Status: COMPLETED | OUTPATIENT
Start: 2024-03-17 | End: 2024-03-17

## 2024-03-17 RX ORDER — METOPROLOL TARTRATE 50 MG
25 TABLET ORAL
Refills: 0 | Status: DISCONTINUED | OUTPATIENT
Start: 2024-03-17 | End: 2024-03-22

## 2024-03-17 RX ORDER — ONDANSETRON 8 MG/1
4 TABLET, FILM COATED ORAL ONCE
Refills: 0 | Status: COMPLETED | OUTPATIENT
Start: 2024-03-17 | End: 2024-03-17

## 2024-03-17 RX ORDER — LANOLIN ALCOHOL/MO/W.PET/CERES
3 CREAM (GRAM) TOPICAL AT BEDTIME
Refills: 0 | Status: DISCONTINUED | OUTPATIENT
Start: 2024-03-17 | End: 2024-03-22

## 2024-03-17 RX ORDER — DEXTROSE 50 % IN WATER 50 %
12.5 SYRINGE (ML) INTRAVENOUS ONCE
Refills: 0 | Status: DISCONTINUED | OUTPATIENT
Start: 2024-03-17 | End: 2024-03-22

## 2024-03-17 RX ORDER — ACETAMINOPHEN 500 MG
650 TABLET ORAL EVERY 6 HOURS
Refills: 0 | Status: DISCONTINUED | OUTPATIENT
Start: 2024-03-17 | End: 2024-03-19

## 2024-03-17 RX ADMIN — SODIUM CHLORIDE 1000 MILLILITER(S): 9 INJECTION INTRAMUSCULAR; INTRAVENOUS; SUBCUTANEOUS at 17:59

## 2024-03-17 RX ADMIN — Medication 25 MILLIGRAM(S): at 22:54

## 2024-03-17 RX ADMIN — MORPHINE SULFATE 4 MILLIGRAM(S): 50 CAPSULE, EXTENDED RELEASE ORAL at 16:56

## 2024-03-17 RX ADMIN — CEFTRIAXONE 1000 MILLIGRAM(S): 500 INJECTION, POWDER, FOR SOLUTION INTRAMUSCULAR; INTRAVENOUS at 19:25

## 2024-03-17 RX ADMIN — MORPHINE SULFATE 4 MILLIGRAM(S): 50 CAPSULE, EXTENDED RELEASE ORAL at 20:55

## 2024-03-17 RX ADMIN — ONDANSETRON 4 MILLIGRAM(S): 8 TABLET, FILM COATED ORAL at 19:25

## 2024-03-17 RX ADMIN — ONDANSETRON 4 MILLIGRAM(S): 8 TABLET, FILM COATED ORAL at 16:56

## 2024-03-17 NOTE — ED PROVIDER NOTE - ENMT, MLM
Airway patent, Nasal mucosa clear. Mouth with normal mucosa. Throat has no vesicles, no oropharyngeal exudates and uvula is midline. Airway patent, Nasal mucosa clear. Mouth with mildly dry mucosa. Throat has no vesicles, no oropharyngeal exudates and uvula is midline.

## 2024-03-17 NOTE — ED ADULT TRIAGE NOTE - CHIEF COMPLAINT QUOTE
Pt ambulatory to the ED with c/o abdominal pain, blood in the urine, vomiting, back pain and fevers. Pt has Kidney cancer and to have kidney removed at  on Tuesday. Pt see's MD Ovalle but was unable to get in touch with him today. Pt appears tachypneic. Pt has no CP.

## 2024-03-17 NOTE — ED PROVIDER NOTE - MUSCULOSKELETAL, MLM
Spine appears normal, range of motion is not limited, no muscle or joint tenderness Spine appears normal, range of motion is not limited, no muscle or joint tenderness.  NEFF x 4, no focal extremity swelling nor tenderness.

## 2024-03-17 NOTE — H&P ADULT - MLM HIDDEN
Juan Jose - I have ordered an Ultrasound of the testicles and scrotum with duplex to investigate possible left testicular mass.    You can phone the clinic to get in touch with me. Our clinic # is . The nurses can get in touch with me through there and I will discuss the results with you when available.     I have included information on the \"[facility]\" clinics and \"helping hands\" programs for out of pocket healthcare concerns, which may be helpful.        yes

## 2024-03-17 NOTE — H&P ADULT - HISTORY OF PRESENT ILLNESS
59 yo M with CAD s/p stent 4/26/2019, kidney stone 10 years ago, HTN, HLD, new diagnosis of diabetes A1c 8.2 3/2024. Admitted in Feb 2024 for right flank pain and hematuria on CT abdomen found to have R renal mass, hydroureteronephrosis, Portacaval and right retroperitoneal lymphadenopathy and RLL 9mm pulmonary nodule. s/p cystoscopy right retrograde pyelogram and right ureteral stent placement by Dr. Ovalle on Feb 4, 2024 and planned for Robot-assisted right radical nephrectomy and right ureteral stent removal 3/19 now presents for hematuria and intractable right flank pain. He denies fevers. +chills, abdominal pain and vomiting

## 2024-03-17 NOTE — ED PROVIDER NOTE - PHYSICAL EXAMINATION
PA NOTE: GEN: AOX3, NAD. HEENT: Throat clear. Airway is patent. EYES: PERRLA. EOMI. Head: NC/AT. NECK: Supple, No JVD. FROM. C-spine non-tender. CV:S1S2, RRR, LUNGS: Non-labored breathing, no tachypnea. O2sat 100% RA. CTA b/l. No w/r/r. CHEST: Equal chest expansion and rise. No deformity. ABD: Soft, +Mild tenderness right flank area. No no rebound, no guarding. No CVAT. EXT: No e/c/c. 2+ distal pulses. SKIN: No rashes. NEURO: No focal deficits. CN II-XII intact. FROM. 5/5 motor and sensory. ~Power Felder PA-C

## 2024-03-17 NOTE — ED ADULT NURSE REASSESSMENT NOTE - NS ED NURSE REASSESS COMMENT FT1
Received patient from previous RN Mello. Patient A&Ox4, VSS. Patient noted to be vomiting upon assessment, medicated as ordered. All questions answered. Plan of care explained. Safety measures in place.

## 2024-03-17 NOTE — H&P ADULT - ASSESSMENT
57 yo M with CAD s/p stent 4/26/2019, kidney stone 10 years ago, HTN, HLD, new diagnosis of diabetes and recent findings of R renal mass, hydroureteronephrosis, Portacaval and right retroperitoneal lymphadenopathy and RLL 9mm pulmonary nodule. s/p cystoscopy right retrograde pyelogram and right ureteral stent placement by Dr. Ovalle on Feb 4, 2024 and planned for Robot-assisted right radical nephrectomy and right ureteral stent removal 3/19 now presents for hematuria and intractable right flank pain    Renal mass, gross hematuria   CT abdomen: no evidence of pyelo; Stable large right renal malignancy, involves renal hilum, Gerota's fascia and contacts liver.  UA negative for bacteria  monitor urine output  monitor cbc  Morphine for pain  Zofran for nausea  consult Dr. Kaplan                    Diabetes   A1c 8.2 3/2024   insulin coverage  monitor FS  hypoglycemic bundle                       CAD s/p stent   new A. fib  patient currently wearing heart monitor to be mailed back this week  not on any medications                       HTN continue metoprolol     HLD not on meds      DVT ppx SCDs hold off AC in setting of gross hematuria 57 yo M with CAD s/p stent 4/26/2019, kidney stone 10 years ago, HTN, HLD, new diagnosis of diabetes and recent findings of R renal mass, hydroureteronephrosis, Portacaval and right retroperitoneal lymphadenopathy and RLL 9mm pulmonary nodule. s/p cystoscopy right retrograde pyelogram and right ureteral stent placement by Dr. Ovalle on Feb 4, 2024 and planned for Robot-assisted right radical nephrectomy and right ureteral stent removal 3/19 now presents for hematuria and intractable right flank pain    Renal mass, gross hematuria   CT abdomen: no evidence of pyelo; Stable large right renal malignancy, involves renal hilum, Gerota's fascia and contacts liver.  UA negative for bacteria  monitor urine output  monitor cbc  Morphine for pain  Zofran for nausea  consult Dr. Kaplan                    Diabetes  A1c 8.2 3/2024   insulin coverage  monitor FS  hypoglycemic bundle                         CAD s/p stent   on Feb 2024 admission diagnosed with new A. fib  patient currently wearing heart monitor to be mailed back this week  EKG NSR  continue metoprolol 25mg  BID    HLD not on meds      DVT ppx SCDs hold off AC in setting of gross hematuria

## 2024-03-17 NOTE — H&P ADULT - NSHPLABSRESULTS_GEN_ALL_CORE
11.5   8.13  )-----------( 277      ( 17 Mar 2024 16:39 )             33.5     03-17    139  |  103  |  17  ----------------------------<  137<H>  3.6   |  24  |  1.16    Ca    11.0<H>      17 Mar 2024 16:39    TPro  8.3  /  Alb  4.2  /  TBili  0.4  /  DBili  x   /  AST  8<L>  /  ALT  10<L>  /  AlkPhos  70  03-17    CAPILLARY BLOOD GLUCOSE      POCT Blood Glucose.: 145 mg/dL (17 Mar 2024 23:00)    PT/INR - ( 17 Mar 2024 16:39 )   PT: 11.8 sec;   INR: 1.05 ratio         PTT - ( 17 Mar 2024 16:39 )  PTT:39.3 sec  Urinalysis Basic - ( 17 Mar 2024 18:54 )    Color: Red / Appearance: Cloudy / SG: >1.030 / pH: x  Gluc: x / Ketone: Trace mg/dL  / Bili: Negative / Urobili: 0.2 mg/dL   Blood: x / Protein: 100 mg/dL / Nitrite: Negative   Leuk Esterase: Small / RBC: >1900 /HPF / WBC 14 /HPF   Sq Epi: x / Non Sq Epi: 1 /HPF / Bacteria: Negative /HPF    CT abdomen  Right lower pole mild pelvocaliectasis distended with blood products.   Double-J right nephroureteral stent proximal pigtail in upper pole calyx,   distal pigtail at level of ureterovesical junction. No evidence of   pyelonephritis.  Stable large right renal malignancy, involves renal hilum, Gerota's   fascia and contacts liver.  Increased retroperitoneal malignant adenopathy.  Stable bilateral pulmonary metastases.  Stable couple small anterior peritoneal nodular densities in midline   upper abdomen, indeterminate, may represent small nodes or implants.

## 2024-03-17 NOTE — ED PROVIDER NOTE - CLINICAL SUMMARY MEDICAL DECISION MAKING FREE TEXT BOX
57 yo WM, PMH incl. HTN, HLD, CAD s/p stent '19, kidney stone, recent Dx of DMt2, new Dx of R renal mass by CT 2/2024 (suspected kidney CA) with + retroperitoneal LAD & R hydroureteronephrosis tx'ed by Dr. Ovalle with R ureteral stent placement, + scheduled in 2 dd. for R nephrectomy & removal of R ureteral stent; BIB pvt car via wife c/o'ing significant pain R flank & gross hematuria.     Plan:  Labs incl. pan-culture, lactate, caogs, IVF, IV pain meds & Zofran for N/V, CXR, EKG, CT A/P; monitor, observe, reassess.  Expect admit. 59 yo WM, PMH incl. HTN, HLD, CAD s/p stent '19, kidney stone, recent Dx of DMt2, new Dx of R renal mass by CT 2/2024 (suspected kidney CA) with + retroperitoneal LAD & R hydroureteronephrosis tx'ed by Dr. Ovalle with R ureteral stent placement, + scheduled in 2 dd. for R nephrectomy & removal of R ureteral stent; BIB pvt car via wife c/o'ing significant pain R flank & gross hematuria.   Pt does NOT meet sepsis criteria but warrants Abd pain/infection w/u.    Plan:  Labs incl. pan-culture, lactate, caogs, IVF, IV pain meds & Zofran for N/V, CXR, EKG, CT A/P; monitor, observe, reassess.  Expect admit.    DANNIELLE Larry MD:  Labs notable for normal CMP & WBC; elev. lactate w/ repeat s/p 2 liters IVF normal; U/A + UTI.  CT A/P report reviewed.  IV Ceftriaxone administered for UTI.  ED PA discussed case with admit hospitalist Dr. NICOLE Diaz & Med admit accepted.

## 2024-03-17 NOTE — ED PROVIDER NOTE - PROGRESS NOTE DETAILS
PA: Patient is a 59 yo male with PMH incl. HTN, HLD, CAD s/p stent '19, kidney stone, recent Dx of DMt2, new Dx of R renal mass by CT 2/2024 (suspected kidney CA) with + retroperitoneal LAD & R hydroureteronephrosis tx'ed by Dr. Ovalle with R ureteral stent placement, + scheduled 3/19/2024 for R nephrectomy & removal of R ureteral stent; BIB pvt car via wife c/o'ing significant pain R flank & gross hematuria.  Pain radiates into R lower back/CVA area.  Associated chills, N/V; no Fever, cp, SOB. ~Power Felder PA-C spoke with urology, wants medical admission. will admit to hospitalist

## 2024-03-17 NOTE — ED PROVIDER NOTE - CARE PLAN
Principal Discharge DX:	Intractable vomiting  Secondary Diagnosis:	Abdominal pain  Secondary Diagnosis:	Renal mass   1

## 2024-03-17 NOTE — ED PROVIDER NOTE - INPATIENT RECORD SUMMARY
new Dx of R renal mass by CT 2/2024 (suspected kidney CA) with + retroperitoneal LAD & R hydroureteronephrosis tx'ed by Dr. Ovalle with R ureteral stent placement

## 2024-03-17 NOTE — ED PROVIDER NOTE - ATTENDING APP SHARED VISIT CONTRIBUTION OF CARE
OMAR Larry MD, ED Attending physician:  This was a shared visit with SHIRA.  I reviewed and verified the documentation and independently performed the documented history/exam/mdm.

## 2024-03-17 NOTE — ED ADULT NURSE NOTE - OBJECTIVE STATEMENT
pt in ed c/o of abd pain/nausea and vomiting. pt is scheduled to have his R kidney removed 2/2 kidney CA, mets to the lungs. pt in [facility]. pt reports hematuria. piv inserted. blood work sent. ekg completed. placed on cardiac monitor.

## 2024-03-17 NOTE — H&P ADULT - NSVTERISKREFERASSESS_GEN_ALL_CORE
Immediate Brief Procedure Note    Patient: Bryn Mcclendon    Pre-op Diagnosis: No primary diagnosis found.    Post-op Diagnosis: Same    Procedure: right sided US guided paracentesis    Proceduralist: Hammad Rose MD    Assistants: None    Anesthesia Staff: [unfilled]    Anesthesia Type: Local    Findings: Successful retrieval of ascitic fluid    Estimated Blood Loss: trace    Complications: none    Specimens Removed: yes   Refer to the Assessment tab to view/cancel completed assessment.

## 2024-03-17 NOTE — ED PROVIDER NOTE - CONSTITUTIONAL, MLM
normal... Well appearing, awake, alert, oriented to person, place, time/situation and in no apparent distress. Older WM, awake, alert, oriented to person, place, time/situation, no respiratory discomfort, + moderate discomfort d/t pain, though non-toxic.

## 2024-03-17 NOTE — H&P ADULT - NSHPPHYSICALEXAM_GEN_ALL_CORE
T(C): 36.9 (03-17-24 @ 19:34), Max: 36.9 (03-17-24 @ 19:34)  HR: 68 (03-17-24 @ 19:34) (68 - 82)  BP: 150/90 (03-17-24 @ 20:14) (144/88 - 164/100)  RR: 17 (03-17-24 @ 19:34) (17 - 18)  SpO2: 99% (03-17-24 @ 19:34) (99% - 100%)    CONSTITUTIONAL: Well groomed, no apparent distress  EYES: PERRLA and symmetric, EOMI, No conjunctival or scleral injection, non-icteric  ENMT: Oral mucosa with moist membranes.    NECK: Supple, symmetric and without tracheal deviation   RESP: No respiratory distress, no use of accessory muscles; CTA b/l, no WRR  CV: RRR, +S1S2  GI: Soft, tender to palpation   MSK:  Normal ROM without pain, no spinal tenderness, normal muscle strength/tone  SKIN: No rashes or ulcers noted   Right CVA tenderness  NEURO: CN II-XII intact; normal reflexes in upper and lower extremities, sensation intact in upper and lower extremities b/l to light touch   PSYCH: Appropriate insight/judgment; A+O x 3, mood and affect appropriate, recent/remote memory intact

## 2024-03-17 NOTE — ED PROVIDER NOTE - OBJECTIVE STATEMENT
57 yo WM, PMH incl. HTN, HLD, CAD s/p stent '19, kidney stone, recent Dx of DMt2, new Dx of R renal mass by CT 2/2024 (suspected kidney CA) with + retroperitoneal LAD & R hydroureteronephrosis tx'ed by Dr. Ovalle with R ureteral stent placement, + scheduled 3/19/2024 for R nephrectomy & removal of R ureteral stent; BIB pvt car via wife c/o'ing significant pain R flank & gross hematuria.  Pain radiates into R lower back/CVA area.  Associated chills, N/V; no F, cp, SOB.

## 2024-03-17 NOTE — ED ADULT NURSE NOTE - NSFALLUNIVINTERV_ED_ALL_ED
Bed/Stretcher in lowest position, wheels locked, appropriate side rails in place/Call bell, personal items and telephone in reach/Instruct patient to call for assistance before getting out of bed/chair/stretcher/Non-slip footwear applied when patient is off stretcher/[place] to call system/Physically safe environment - no spills, clutter or unnecessary equipment/Purposeful proactive rounding/Room/bathroom lighting operational, light cord in reach

## 2024-03-18 LAB
ALBUMIN SERPL ELPH-MCNC: 3.5 G/DL — SIGNIFICANT CHANGE UP (ref 3.3–5)
ALP SERPL-CCNC: 65 U/L — SIGNIFICANT CHANGE UP (ref 40–120)
ALT FLD-CCNC: 18 U/L — SIGNIFICANT CHANGE UP (ref 12–78)
ANION GAP SERPL CALC-SCNC: 7 MMOL/L — SIGNIFICANT CHANGE UP (ref 5–17)
AST SERPL-CCNC: 18 U/L — SIGNIFICANT CHANGE UP (ref 15–37)
BILIRUB SERPL-MCNC: 0.6 MG/DL — SIGNIFICANT CHANGE UP (ref 0.2–1.2)
BUN SERPL-MCNC: 12 MG/DL — SIGNIFICANT CHANGE UP (ref 7–23)
CALCIUM SERPL-MCNC: 9.6 MG/DL — SIGNIFICANT CHANGE UP (ref 8.5–10.1)
CHLORIDE SERPL-SCNC: 105 MMOL/L — SIGNIFICANT CHANGE UP (ref 96–108)
CO2 SERPL-SCNC: 25 MMOL/L — SIGNIFICANT CHANGE UP (ref 22–31)
CREAT SERPL-MCNC: 1.07 MG/DL — SIGNIFICANT CHANGE UP (ref 0.5–1.3)
EGFR: 79 ML/MIN/1.73M2 — SIGNIFICANT CHANGE UP
GLUCOSE BLDC GLUCOMTR-MCNC: 144 MG/DL — HIGH (ref 70–99)
GLUCOSE BLDC GLUCOMTR-MCNC: 152 MG/DL — HIGH (ref 70–99)
GLUCOSE BLDC GLUCOMTR-MCNC: 158 MG/DL — HIGH (ref 70–99)
GLUCOSE BLDC GLUCOMTR-MCNC: 188 MG/DL — HIGH (ref 70–99)
GLUCOSE BLDC GLUCOMTR-MCNC: 193 MG/DL — HIGH (ref 70–99)
GLUCOSE SERPL-MCNC: 180 MG/DL — HIGH (ref 70–99)
HCT VFR BLD CALC: 31.6 % — LOW (ref 39–50)
HGB BLD-MCNC: 10.7 G/DL — LOW (ref 13–17)
MCHC RBC-ENTMCNC: 28.8 PG — SIGNIFICANT CHANGE UP (ref 27–34)
MCHC RBC-ENTMCNC: 33.9 GM/DL — SIGNIFICANT CHANGE UP (ref 32–36)
MCV RBC AUTO: 84.9 FL — SIGNIFICANT CHANGE UP (ref 80–100)
PLATELET # BLD AUTO: 222 K/UL — SIGNIFICANT CHANGE UP (ref 150–400)
POTASSIUM SERPL-MCNC: 3.6 MMOL/L — SIGNIFICANT CHANGE UP (ref 3.5–5.3)
POTASSIUM SERPL-SCNC: 3.6 MMOL/L — SIGNIFICANT CHANGE UP (ref 3.5–5.3)
PROT SERPL-MCNC: 7.2 GM/DL — SIGNIFICANT CHANGE UP (ref 6–8.3)
RBC # BLD: 3.72 M/UL — LOW (ref 4.2–5.8)
RBC # FLD: 12.6 % — SIGNIFICANT CHANGE UP (ref 10.3–14.5)
SODIUM SERPL-SCNC: 137 MMOL/L — SIGNIFICANT CHANGE UP (ref 135–145)
WBC # BLD: 7.19 K/UL — SIGNIFICANT CHANGE UP (ref 3.8–10.5)
WBC # FLD AUTO: 7.19 K/UL — SIGNIFICANT CHANGE UP (ref 3.8–10.5)

## 2024-03-18 PROCEDURE — 99222 1ST HOSP IP/OBS MODERATE 55: CPT

## 2024-03-18 PROCEDURE — 99232 SBSQ HOSP IP/OBS MODERATE 35: CPT

## 2024-03-18 RX ORDER — MORPHINE SULFATE 50 MG/1
4 CAPSULE, EXTENDED RELEASE ORAL EVERY 4 HOURS
Refills: 0 | Status: DISCONTINUED | OUTPATIENT
Start: 2024-03-18 | End: 2024-03-21

## 2024-03-18 RX ORDER — SODIUM CHLORIDE 9 MG/ML
1000 INJECTION INTRAMUSCULAR; INTRAVENOUS; SUBCUTANEOUS
Refills: 0 | Status: DISCONTINUED | OUTPATIENT
Start: 2024-03-18 | End: 2024-03-19

## 2024-03-18 RX ORDER — PROCHLORPERAZINE MALEATE 5 MG
10 TABLET ORAL EVERY 8 HOURS
Refills: 0 | Status: DISCONTINUED | OUTPATIENT
Start: 2024-03-18 | End: 2024-03-22

## 2024-03-18 RX ORDER — INSULIN GLARGINE 100 [IU]/ML
5 INJECTION, SOLUTION SUBCUTANEOUS AT BEDTIME
Refills: 0 | Status: DISCONTINUED | OUTPATIENT
Start: 2024-03-18 | End: 2024-03-22

## 2024-03-18 RX ORDER — CHLORHEXIDINE GLUCONATE 213 G/1000ML
1 SOLUTION TOPICAL ONCE
Refills: 0 | Status: COMPLETED | OUTPATIENT
Start: 2024-03-18 | End: 2024-03-19

## 2024-03-18 RX ORDER — INSULIN LISPRO 100/ML
VIAL (ML) SUBCUTANEOUS
Refills: 0 | Status: DISCONTINUED | OUTPATIENT
Start: 2024-03-18 | End: 2024-03-22

## 2024-03-18 RX ORDER — MORPHINE SULFATE 50 MG/1
2 CAPSULE, EXTENDED RELEASE ORAL EVERY 4 HOURS
Refills: 0 | Status: DISCONTINUED | OUTPATIENT
Start: 2024-03-18 | End: 2024-03-21

## 2024-03-18 RX ADMIN — MORPHINE SULFATE 4 MILLIGRAM(S): 50 CAPSULE, EXTENDED RELEASE ORAL at 08:46

## 2024-03-18 RX ADMIN — Medication 10 MILLIGRAM(S): at 14:29

## 2024-03-18 RX ADMIN — INSULIN GLARGINE 5 UNIT(S): 100 INJECTION, SOLUTION SUBCUTANEOUS at 22:27

## 2024-03-18 RX ADMIN — SODIUM CHLORIDE 75 MILLILITER(S): 9 INJECTION INTRAMUSCULAR; INTRAVENOUS; SUBCUTANEOUS at 14:29

## 2024-03-18 RX ADMIN — MORPHINE SULFATE 4 MILLIGRAM(S): 50 CAPSULE, EXTENDED RELEASE ORAL at 01:01

## 2024-03-18 RX ADMIN — Medication 3 UNIT(S): at 09:28

## 2024-03-18 RX ADMIN — Medication 25 MILLIGRAM(S): at 22:19

## 2024-03-18 RX ADMIN — Medication 25 MILLIGRAM(S): at 09:17

## 2024-03-18 RX ADMIN — Medication 3 UNIT(S): at 12:33

## 2024-03-18 RX ADMIN — POLYETHYLENE GLYCOL 3350 17 GRAM(S): 17 POWDER, FOR SOLUTION ORAL at 09:18

## 2024-03-18 RX ADMIN — ONDANSETRON 4 MILLIGRAM(S): 8 TABLET, FILM COATED ORAL at 12:36

## 2024-03-18 RX ADMIN — ONDANSETRON 4 MILLIGRAM(S): 8 TABLET, FILM COATED ORAL at 03:56

## 2024-03-18 RX ADMIN — MORPHINE SULFATE 4 MILLIGRAM(S): 50 CAPSULE, EXTENDED RELEASE ORAL at 18:15

## 2024-03-18 RX ADMIN — Medication 30 MILLILITER(S): at 14:36

## 2024-03-18 RX ADMIN — Medication 1: at 12:34

## 2024-03-18 RX ADMIN — MORPHINE SULFATE 4 MILLIGRAM(S): 50 CAPSULE, EXTENDED RELEASE ORAL at 13:59

## 2024-03-18 RX ADMIN — MORPHINE SULFATE 4 MILLIGRAM(S): 50 CAPSULE, EXTENDED RELEASE ORAL at 04:02

## 2024-03-18 RX ADMIN — MORPHINE SULFATE 4 MILLIGRAM(S): 50 CAPSULE, EXTENDED RELEASE ORAL at 22:19

## 2024-03-18 NOTE — CONSULT NOTE ADULT - ASSESSMENT
59 yo male with PMH as above including Right renal mass, hydroureteronephrosis, Portacaval and right retroperitoneal lymphadenopathy and RLL 9mm pulmonary nodule s/p cystoscopy right retrograde pyelogram and right ureteral stent placement by Dr. Ovalle 2/4/24 and scheduled for Right robotic radical nephrectomy and right ureteral stent removal 3/19/24 with Dr. Kaplan admitted for right flank pain and hematuria.   WBC, H/H and Creat WNL. Labs WNL. Pt's vitals are stable.  Recommend  - Pain control PRN  - F/U urine and urine c/s and treat accordingly  - NPO MN for Right robotic radical nephrectomy and right ureteral stent removal 3/19/24   - Trend cbc and BMP    Case discussed with Dr. Kaplan

## 2024-03-18 NOTE — DIETITIAN NUTRITION RISK NOTIFICATION - ADDITIONAL COMMENTS/DIETITIAN RECOMMENDATIONS
1. Recommend to liberalize diet to regular to maximize caloric and protein intake   2. Encourage protein-rich foods, maximize food preferences   3. Ensure Max BID to optimize nutritional needs (provides 150 kcal, 30 g protein/ shake)   4. Monitor and optimize BG levels between 140-180 mg/dL by medical management   5. Continue to monitor hydration status and lytes 2/2 persistent vomiting, replete lytes prn  6. Consider anti-emetics prn to promote increased PO intake   7. Monitor bowel movements, if no BM for >3 days, consider implementing bowel regimen.   8. Consider obtaining vitamin D 25OH level to assess nutriture   9. Consider adding thiamine 100 mg daily 2/2 poor PO intake/ malnutrition and MVI w/ minerals daily to ensure 100% RDA met   10. Confirm goals of care regarding nutrition support   RD will continue to monitor PO intake, labs, hydration, and wt prn.

## 2024-03-18 NOTE — DIETITIAN INITIAL EVALUATION ADULT - OTHER INFO
59 yo M with CAD s/p stent 4/26/2019, kidney stone 10 years ago, HTN, HLD, new diagnosis of diabetes A1c 8.2 3/2024. Admitted in Feb 2024 for right flank pain and hematuria on CT abdomen found to have R renal mass, hydroureteronephrosis, Portacaval and right retroperitoneal lymphadenopathy and RLL 9mm pulmonary nodule. S/p cystoscopy right retrograde pyelogram and right ureteral stent placement by Dr. Ovalle on Feb 4, 2024 and planned for Robot-assisted right radical nephrectomy and right ureteral stent removal 3/19 now presents for hematuria and intractable right flank pain. Admit for abdominal pain w/ associated vomiting and renal mass.     Upon RD visit this morning, reports poor appetite 2/2 persistent vomiting r/t abdominal pain. Currently on DASH diet, RD ordered breakfast of scrambled eggs, toast and tea for breakfast, pt was agreeable to try to eat if tolerated, still likely consuming <50% ENN while in HH. Plan to be NPO after midnight 2/2 robot-assisted R radical nephrectomy and R ureteral stent removal scheduled for tomorrow. Reports UBW ~240# x1 yr ago w/ intentional wt loss originally which then turned unintentional as wt continued to decrease, states current wt now 187# as of yesterday. Wt hx as per previous RD note: 195# on 2/15/24. RD unable to obtain bed scale wt 2/2 pt sitting in chair upon visit this morning. EMR wt doc'd 186# on 3/17, appears accurate. Unintentional wt loss 9# / 4.6% x1 mo - not clin sig. NFPE reveals varied muscle/fat wasting. Labs reviewed: POCTs variable x24 hrs (158, 193, 188, 145) and HgA1c 8.2% on 3/8 (improved since A1c 10.4% on 2/5/24). Received 7U Admelog x24 hrs - continue to monitor and optimize BG levels between 140-180 mg/dL by medical management. Recommend to liberalize diet to regular to maximize caloric and protein intake. Will add Ensure Max BID to optimize nutritional needs (provides 150 kcal, 30 g protein/ shake), pt was receptive. C/o persistent vomiting while in HH, last episode of emesis this AM, consider anti-emetics prn. DNR/DNI status noted - recommend to confirm goals of care regarding nutrition support. See below for other recs.

## 2024-03-18 NOTE — DIETITIAN INITIAL EVALUATION ADULT - ORAL INTAKE PTA/DIET HISTORY
Reports appetite is "not bad," typically consumes ~3 meals/day plus snacks, likely meeting >/= 75% ENN. Endorses following a low-carbohydrate, low-sugar diabetic diet and states that he has previous knowledge of diabetic diets 2/2 hx of being a . Has CGM, reports constantly checking BG throughout the day and states he knows which foods will likely cause a spike in BG.  C/o abdominal pain w/ associated vomiting, last ate around noon yesterday. States he sometimes has constipation 2/2 meds but is not persistent.

## 2024-03-18 NOTE — DIETITIAN INITIAL EVALUATION ADULT - PERTINENT LABORATORY DATA
03-18    137  |  105  |  12  ----------------------------<  180<H>  3.6   |  25  |  1.07    Ca    9.6      18 Mar 2024 06:51    TPro  7.2  /  Alb  3.5  /  TBili  0.6  /  DBili  x   /  AST  18  /  ALT  18  /  AlkPhos  65  03-18  POCT Blood Glucose.: 158 mg/dL (03-18-24 @ 11:42)  A1C with Estimated Average Glucose Result: 8.2 % (03-08-24 @ 14:39)  A1C with Estimated Average Glucose Result: 10.4 % (02-05-24 @ 06:37)

## 2024-03-18 NOTE — PROGRESS NOTE ADULT - SUBJECTIVE AND OBJECTIVE BOX
HOSPITALIST ATTENDING PROGRESS NOTE    Chart and meds reviewed.  Patient seen and examined.    CC: blood in urine      Subjective:  Pt is a 58 year old male with past medical history of HTN, CAD s/p stent in 2019, newly diagnosed T2DM A1C 8.3, and recent admission in feb 2024 s/p diagnosis of right-renal mass with pulmonary metastases. Patient had cytoscopy, with right retrograde pyelogram and right ureteral stent placement on 2/4 with Dr. Ovalle. Scheduled for robot-assisted right radical nephrectomy and stent removal 3/19 but presented to hospital with increasing R-flank pain associated with nausea, vomiting, and bloody urine. Patient denies chest pain, shortness of breath or other acute distress at this time.   All other systems reviewed and found to be negative with the exception of what has been described above.    MEDICATIONS  (STANDING):  dextrose 5%. 1000 milliLiter(s) (50 mL/Hr) IV Continuous <Continuous>  dextrose 5%. 1000 milliLiter(s) (100 mL/Hr) IV Continuous <Continuous>  dextrose 50% Injectable 25 Gram(s) IV Push once  dextrose 50% Injectable 12.5 Gram(s) IV Push once  dextrose 50% Injectable 25 Gram(s) IV Push once  glucagon  Injectable 1 milliGRAM(s) IntraMuscular once  insulin glargine Injectable (LANTUS) 5 Unit(s) SubCutaneous at bedtime  insulin lispro (ADMELOG) corrective regimen sliding scale   SubCutaneous three times a day before meals  insulin lispro Injectable (ADMELOG) 3 Unit(s) SubCutaneous three times a day before meals  metoprolol tartrate 25 milliGRAM(s) Oral two times a day  naloxone Injectable 0.4 milliGRAM(s) IV Push once  ondansetron Injectable 4 milliGRAM(s) IV Push once  polyethylene glycol 3350 17 Gram(s) Oral daily  senna 2 Tablet(s) Oral at bedtime    MEDICATIONS  (PRN):  acetaminophen     Tablet .. 650 milliGRAM(s) Oral every 6 hours PRN Temp greater or equal to 38C (100.4F), Mild Pain (1 - 3)  aluminum hydroxide/magnesium hydroxide/simethicone Suspension 30 milliLiter(s) Oral every 4 hours PRN Dyspepsia  bisacodyl 5 milliGRAM(s) Oral daily PRN Constipation  dextrose Oral Gel 15 Gram(s) Oral once PRN Blood Glucose LESS THAN 70 milliGRAM(s)/deciliter  melatonin 3 milliGRAM(s) Oral at bedtime PRN Insomnia  morphine  - Injectable 4 milliGRAM(s) IV Push every 4 hours PRN Severe Pain (7 - 10)  morphine  - Injectable 2 milliGRAM(s) IV Push every 4 hours PRN Moderate Pain (4 - 6)  ondansetron Injectable 4 milliGRAM(s) IV Push every 8 hours PRN Nausea and/or Vomiting      HEENT:  pupils equal and reactive, EOMI, no oropharyngeal lesions, erythema, exudates, oral thrush  NECK:   supple, no carotid bruits, no palpable lymph nodes, no thyromegaly  CV:  +S1, +S2, regular, no murmurs or rubs  RESP:   lungs clear to auscultation bilaterally, no wheezing, rales, rhonchi, good air entry bilaterally  GI:  abdomen soft, non-tender, non-distended, normal BS, no bruits, no abdominal masses, no palpable masses  EXT:  no clubbing, no cyanosis, no edema, no calf pain, swelling or erythema  NEURO:  AAOX3, no focal neurological deficits, follows all commands, able to move extremities spontaneously  SKIN:  no ulcers, lesions or rashes    LABS:                          10.7   7.19  )-----------( 222      ( 18 Mar 2024 06:51 )             31.6     03-18    137  |  105  |  12  ----------------------------<  180<H>  3.6   |  25  |  1.07    Ca    9.6      18 Mar 2024 06:51    TPro  7.2  /  Alb  3.5  /  TBili  0.6  /  DBili  x   /  AST  18  /  ALT  18  /  AlkPhos  65  03-18        LIVER FUNCTIONS - ( 18 Mar 2024 06:51 )  Alb: 3.5 g/dL / Pro: 7.2 gm/dL / ALK PHOS: 65 U/L / ALT: 18 U/L / AST: 18 U/L / GGT: x           PT/INR - ( 17 Mar 2024 16:39 )   PT: 11.8 sec;   INR: 1.05 ratio         PTT - ( 17 Mar 2024 16:39 )  PTT:39.3 sec  Urinalysis Basic - ( 18 Mar 2024 06:51 )    Color: x / Appearance: x / SG: x / pH: x  Gluc: 180 mg/dL / Ketone: x  / Bili: x / Urobili: x   Blood: x / Protein: x / Nitrite: x   Leuk Esterase: x / RBC: x / WBC x   Sq Epi: x / Non Sq Epi: x / Bacteria: x        Lactate, Blood: 1.7 mmol/L (03-17 @ 18:49)  Lactate, Blood: 3.3 mmol/L (03-17 @ 16:39)    Blood, Urine: Large (03-17 @ 18:54)    Troponin Trend: Lactate, Blood: 1.7 mmol/L (03-17 @ 18:49)  Lactate, Blood: 3.3 mmol/L (03-17 @ 16:39)    Lactate, Blood: 1.7 mmol/L (03-17 @ 18:49)  Lactate, Blood: 3.3 mmol/L (03-17 @ 16:39)      CULTURES:  Blood, Urine: Large (03-17 @ 18:54)      Additional results/Imaging, I have personally reviewed:  < from: CT Abdomen and Pelvis w/ IV Cont (03.17.24 @ 17:32) >  IMPRESSION:  Right lower pole mild pelvocaliectasis distended with blood products.   Double-J right nephroureteral stent proximal pigtail in upper pole calyx,   distal pigtail at level of ureterovesical junction. No evidence of   pyelonephritis.  Stable large right renal malignancy, involves renal hilum, Gerota's   fascia and contacts liver.  Increased retroperitoneal malignant adenopathy.  Stable bilateral pulmonary metastases.  Stable couple small anterior peritoneal nodular densities in midline   upper abdomen, indeterminate, may represent small nodes or implants.    < end of copied text >      Telemetry, personally reviewed:  < from: 12 Lead ECG (03.17.24 @ 16:30) >  Ventricular Rate 90 BPM    Atrial Rate 90 BPM    P-R Interval 124 ms    QRS Duration 72 ms    Q-T Interval 350 ms    QTC Calculation(Bazett) 428 ms    P Axis 21 degrees    R Axis 40 degrees    T Axis 76 degrees    Diagnosis Line Normal sinus rhythm  Normal ECG    < end of copied text >   HOSPITALIST ATTENDING PROGRESS NOTE    Chart and meds reviewed.  Patient seen and examined.    CC: blood in urine      Subjective:  Pt is a 58 year old male with past medical history of HTN, CAD s/p stent in 2019, newly diagnosed T2DM A1C 8.3, and recent admission in feb 2024 s/p diagnosis of right-renal mass with pulmonary metastases. Patient had cytoscopy, with right retrograde pyelogram and right ureteral stent placement on 2/4 with Dr. Ovalle. Scheduled for robot-assisted right radical nephrectomy and stent removal 3/19 but presented to hospital with increasing R-flank pain associated with nausea, vomiting, and bloody urine. Patient denies chest pain, shortness of breath or other acute distress at this time.   All other systems reviewed and found to be negative with the exception of what has been described above.    MEDICATIONS  (STANDING):  dextrose 5%. 1000 milliLiter(s) (50 mL/Hr) IV Continuous <Continuous>  dextrose 5%. 1000 milliLiter(s) (100 mL/Hr) IV Continuous <Continuous>  dextrose 50% Injectable 25 Gram(s) IV Push once  dextrose 50% Injectable 12.5 Gram(s) IV Push once  dextrose 50% Injectable 25 Gram(s) IV Push once  glucagon  Injectable 1 milliGRAM(s) IntraMuscular once  insulin glargine Injectable (LANTUS) 5 Unit(s) SubCutaneous at bedtime  insulin lispro (ADMELOG) corrective regimen sliding scale   SubCutaneous three times a day before meals  insulin lispro Injectable (ADMELOG) 3 Unit(s) SubCutaneous three times a day before meals  metoprolol tartrate 25 milliGRAM(s) Oral two times a day  naloxone Injectable 0.4 milliGRAM(s) IV Push once  ondansetron Injectable 4 milliGRAM(s) IV Push once  polyethylene glycol 3350 17 Gram(s) Oral daily  senna 2 Tablet(s) Oral at bedtime    MEDICATIONS  (PRN):  acetaminophen     Tablet .. 650 milliGRAM(s) Oral every 6 hours PRN Temp greater or equal to 38C (100.4F), Mild Pain (1 - 3)  aluminum hydroxide/magnesium hydroxide/simethicone Suspension 30 milliLiter(s) Oral every 4 hours PRN Dyspepsia  bisacodyl 5 milliGRAM(s) Oral daily PRN Constipation  dextrose Oral Gel 15 Gram(s) Oral once PRN Blood Glucose LESS THAN 70 milliGRAM(s)/deciliter  melatonin 3 milliGRAM(s) Oral at bedtime PRN Insomnia  morphine  - Injectable 4 milliGRAM(s) IV Push every 4 hours PRN Severe Pain (7 - 10)  morphine  - Injectable 2 milliGRAM(s) IV Push every 4 hours PRN Moderate Pain (4 - 6)  ondansetron Injectable 4 milliGRAM(s) IV Push every 8 hours PRN Nausea and/or Vomiting    Vital Signs Last 24 Hrs  T(C): 36.6 (18 Mar 2024 09:11), Max: 37.2 (18 Mar 2024 06:41)  T(F): 97.9 (18 Mar 2024 09:11), Max: 98.9 (18 Mar 2024 06:41)  HR: 72 (18 Mar 2024 09:11) (68 - 82)  BP: 132/75 (18 Mar 2024 09:11) (132/75 - 164/100)  BP(mean): 104 (17 Mar 2024 16:00) (104 - 104)  RR: 18 (18 Mar 2024 09:11) (17 - 18)  SpO2: 97% (18 Mar 2024 09:11) (95% - 100%)    GEN: NAD  HEENT:  pupils equal and reactive, EOMI, no oropharyngeal lesions, erythema, exudates, oral thrush  NECK:   supple, no carotid bruits, no palpable lymph nodes, no thyromegaly  CV:  +S1, +S2, regular, no murmurs or rubs  RESP:   lungs clear to auscultation bilaterally, no wheezing, rales, rhonchi, good air entry bilaterally  GI:  abdomen soft, non-tender, non-distended, normal BS, no bruits, no abdominal masses, no palpable masses  EXT:  no clubbing, no cyanosis, no edema, no calf pain, swelling or erythema  NEURO:  AAOX3, no focal neurological deficits, follows all commands, able to move extremities spontaneously  SKIN:  no ulcers, lesions or rashes    LABS:                          10.7   7.19  )-----------( 222      ( 18 Mar 2024 06:51 )             31.6     03-18    137  |  105  |  12  ----------------------------<  180<H>  3.6   |  25  |  1.07    Ca    9.6      18 Mar 2024 06:51    TPro  7.2  /  Alb  3.5  /  TBili  0.6  /  DBili  x   /  AST  18  /  ALT  18  /  AlkPhos  65  03-18        LIVER FUNCTIONS - ( 18 Mar 2024 06:51 )  Alb: 3.5 g/dL / Pro: 7.2 gm/dL / ALK PHOS: 65 U/L / ALT: 18 U/L / AST: 18 U/L / GGT: x           PT/INR - ( 17 Mar 2024 16:39 )   PT: 11.8 sec;   INR: 1.05 ratio    PTT - ( 17 Mar 2024 16:39 )  PTT:39.3 sec    Urinalysis Basic - ( 18 Mar 2024 06:51 )  Color: x / Appearance: x / SG: x / pH: x  Gluc: 180 mg/dL / Ketone: x  / Bili: x / Urobili: x   Blood: x / Protein: x / Nitrite: x   Leuk Esterase: x / RBC: x / WBC x   Sq Epi: x / Non Sq Epi: x / Bacteria: x    CT Abdomen and Pelvis w/ IV Cont (03.17.24 @ 17:32) >  IMPRESSION:  Right lower pole mild pelvocaliectasis distended with blood products.   Double-J right nephroureteral stent proximal pigtail in upper pole calyx,   distal pigtail at level of ureterovesical junction. No evidence of   pyelonephritis.  Stable large right renal malignancy, involves renal hilum, Gerota's   fascia and contacts liver.  Increased retroperitoneal malignant adenopathy.  Stable bilateral pulmonary metastases.  Stable couple small anterior peritoneal nodular densities in midline   upper abdomen, indeterminate, may represent small nodes or implants.    < end of copied text >      Telemetry, personally reviewed:  < from: 12 Lead ECG (03.17.24 @ 16:30) >  Ventricular Rate 90 BPM    Atrial Rate 90 BPM    P-R Interval 124 ms    QRS Duration 72 ms    Q-T Interval 350 ms    QTC Calculation(Bazett) 428 ms    P Axis 21 degrees    R Axis 40 degrees    T Axis 76 degrees    Diagnosis Line Normal sinus rhythm  Normal ECG    < end of copied text >   HOSPITALIST ATTENDING PROGRESS NOTE    Chart and meds reviewed.  Patient seen and examined.    CC: blood in urine      Subjective:  Pt is a 58 year old male with past medical history of HTN, CAD s/p stent in 2019, newly diagnosed T2DM A1C 8.3, and recent admission in feb 2024 s/p diagnosis of right-renal mass with pulmonary metastases. Patient had cytoscopy, with right retrograde pyelogram and right ureteral stent placement on 2/4 with Dr. Ovalle. Scheduled for robot-assisted right radical nephrectomy and stent removal 3/19 but presented to hospital with increasing R-flank pain associated with nausea, vomiting, and bloody urine. Patient denies chest pain, shortness of breath or other acute distress at this time.   All other systems reviewed and found to be negative with the exception of what has been described above.    MEDICATIONS  (STANDING):  dextrose 5%. 1000 milliLiter(s) (50 mL/Hr) IV Continuous <Continuous>  dextrose 5%. 1000 milliLiter(s) (100 mL/Hr) IV Continuous <Continuous>  dextrose 50% Injectable 25 Gram(s) IV Push once  dextrose 50% Injectable 12.5 Gram(s) IV Push once  dextrose 50% Injectable 25 Gram(s) IV Push once  glucagon  Injectable 1 milliGRAM(s) IntraMuscular once  insulin glargine Injectable (LANTUS) 5 Unit(s) SubCutaneous at bedtime  insulin lispro (ADMELOG) corrective regimen sliding scale   SubCutaneous three times a day before meals  insulin lispro Injectable (ADMELOG) 3 Unit(s) SubCutaneous three times a day before meals  metoprolol tartrate 25 milliGRAM(s) Oral two times a day  naloxone Injectable 0.4 milliGRAM(s) IV Push once  ondansetron Injectable 4 milliGRAM(s) IV Push once  polyethylene glycol 3350 17 Gram(s) Oral daily  senna 2 Tablet(s) Oral at bedtime    MEDICATIONS  (PRN):  acetaminophen     Tablet .. 650 milliGRAM(s) Oral every 6 hours PRN Temp greater or equal to 38C (100.4F), Mild Pain (1 - 3)  aluminum hydroxide/magnesium hydroxide/simethicone Suspension 30 milliLiter(s) Oral every 4 hours PRN Dyspepsia  bisacodyl 5 milliGRAM(s) Oral daily PRN Constipation  dextrose Oral Gel 15 Gram(s) Oral once PRN Blood Glucose LESS THAN 70 milliGRAM(s)/deciliter  melatonin 3 milliGRAM(s) Oral at bedtime PRN Insomnia  morphine  - Injectable 4 milliGRAM(s) IV Push every 4 hours PRN Severe Pain (7 - 10)  morphine  - Injectable 2 milliGRAM(s) IV Push every 4 hours PRN Moderate Pain (4 - 6)  ondansetron Injectable 4 milliGRAM(s) IV Push every 8 hours PRN Nausea and/or Vomiting    Vital Signs Last 24 Hrs  T(C): 36.6 (18 Mar 2024 09:11), Max: 37.2 (18 Mar 2024 06:41)  T(F): 97.9 (18 Mar 2024 09:11), Max: 98.9 (18 Mar 2024 06:41)  HR: 72 (18 Mar 2024 09:11) (68 - 82)  BP: 132/75 (18 Mar 2024 09:11) (132/75 - 164/100)  BP(mean): 104 (17 Mar 2024 16:00) (104 - 104)  RR: 18 (18 Mar 2024 09:11) (17 - 18)  SpO2: 97% (18 Mar 2024 09:11) (95% - 100%)    GEN: NAD  HEENT:  pupils equal and reactive, EOMI, no oropharyngeal lesions, erythema, exudates, oral thrush  NECK:   supple, no carotid bruits, no palpable lymph nodes, no thyromegaly  CV:  +S1, +S2, regular, no murmurs or rubs  RESP:   lungs clear to auscultation bilaterally, no wheezing, rales, rhonchi, good air entry bilaterally  GI:  abdomen soft, non-tender, non-distended, normal BS, no bruits, no abdominal masses, no palpable masses  EXT:  no clubbing, no cyanosis, no edema, no calf pain, swelling or erythema  NEURO:  AAOX3, no focal neurological deficits, follows all commands, able to move extremities spontaneously  SKIN:  no ulcers, lesions or rashes    LABS:                          10.7   7.19  )-----------( 222      ( 18 Mar 2024 06:51 )             31.6     03-18    137  |  105  |  12  ----------------------------<  180<H>  3.6   |  25  |  1.07    Ca    9.6      18 Mar 2024 06:51    TPro  7.2  /  Alb  3.5  /  TBili  0.6  /  DBili  x   /  AST  18  /  ALT  18  /  AlkPhos  65  03-18        LIVER FUNCTIONS - ( 18 Mar 2024 06:51 )  Alb: 3.5 g/dL / Pro: 7.2 gm/dL / ALK PHOS: 65 U/L / ALT: 18 U/L / AST: 18 U/L / GGT: x           PT/INR - ( 17 Mar 2024 16:39 )   PT: 11.8 sec;   INR: 1.05 ratio    PTT - ( 17 Mar 2024 16:39 )  PTT:39.3 sec    Urinalysis Basic - ( 18 Mar 2024 06:51 )  Color: x / Appearance: x / SG: x / pH: x  Gluc: 180 mg/dL / Ketone: x  / Bili: x / Urobili: x   Blood: x / Protein: x / Nitrite: x   Leuk Esterase: x / RBC: x / WBC x   Sq Epi: x / Non Sq Epi: x / Bacteria: x    CT Abdomen and Pelvis w/ IV Cont (03.17.24 @ 17:32) >  IMPRESSION:  Right lower pole mild pelvocaliectasis distended with blood products.   Double-J right nephroureteral stent proximal pigtail in upper pole calyx,   distal pigtail at level of ureterovesical junction. No evidence of   pyelonephritis.  Stable large right renal malignancy, involves renal hilum, Gerota's   fascia and contacts liver.  Increased retroperitoneal malignant adenopathy.  Stable bilateral pulmonary metastases.  Stable couple small anterior peritoneal nodular densities in midline   upper abdomen, indeterminate, may represent small nodes or implants.

## 2024-03-18 NOTE — DIETITIAN INITIAL EVALUATION ADULT - PERTINENT MEDS FT
MEDICATIONS  (STANDING):  chlorhexidine 4% Liquid 1 Application(s) Topical once  dextrose 5%. 1000 milliLiter(s) (50 mL/Hr) IV Continuous <Continuous>  dextrose 5%. 1000 milliLiter(s) (100 mL/Hr) IV Continuous <Continuous>  dextrose 50% Injectable 25 Gram(s) IV Push once  dextrose 50% Injectable 12.5 Gram(s) IV Push once  dextrose 50% Injectable 25 Gram(s) IV Push once  glucagon  Injectable 1 milliGRAM(s) IntraMuscular once  insulin glargine Injectable (LANTUS) 5 Unit(s) SubCutaneous at bedtime  insulin lispro (ADMELOG) corrective regimen sliding scale   SubCutaneous three times a day before meals  insulin lispro Injectable (ADMELOG) 3 Unit(s) SubCutaneous three times a day before meals  metoprolol tartrate 25 milliGRAM(s) Oral two times a day  naloxone Injectable 0.4 milliGRAM(s) IV Push once  ondansetron Injectable 4 milliGRAM(s) IV Push once  polyethylene glycol 3350 17 Gram(s) Oral daily  senna 2 Tablet(s) Oral at bedtime    MEDICATIONS  (PRN):  acetaminophen     Tablet .. 650 milliGRAM(s) Oral every 6 hours PRN Temp greater or equal to 38C (100.4F), Mild Pain (1 - 3)  aluminum hydroxide/magnesium hydroxide/simethicone Suspension 30 milliLiter(s) Oral every 4 hours PRN Dyspepsia  bisacodyl 5 milliGRAM(s) Oral daily PRN Constipation  dextrose Oral Gel 15 Gram(s) Oral once PRN Blood Glucose LESS THAN 70 milliGRAM(s)/deciliter  melatonin 3 milliGRAM(s) Oral at bedtime PRN Insomnia  morphine  - Injectable 4 milliGRAM(s) IV Push every 4 hours PRN Severe Pain (7 - 10)  morphine  - Injectable 2 milliGRAM(s) IV Push every 4 hours PRN Moderate Pain (4 - 6)  ondansetron Injectable 4 milliGRAM(s) IV Push every 8 hours PRN Nausea and/or Vomiting

## 2024-03-18 NOTE — DIETITIAN INITIAL EVALUATION ADULT - NS FNS DIET ORDER
Diet, NPO after Midnight:      NPO Start Date: 18-Mar-2024,   NPO Start Time: 23:59 (03-18-24 @ 10:43)  Diet, DASH/TLC:   Sodium & Cholesterol Restricted (03-17-24 @ 19:47)

## 2024-03-18 NOTE — DIETITIAN NUTRITION RISK NOTIFICATION - TREATMENT: THE FOLLOWING DIET HAS BEEN RECOMMENDED
Diet, NPO after Midnight:      NPO Start Date: 18-Mar-2024,   NPO Start Time: 23:59 (03-18-24 @ 10:43) [Active]  Diet, DASH/TLC:   Sodium & Cholesterol Restricted (03-17-24 @ 19:47) [Active]

## 2024-03-18 NOTE — DIETITIAN INITIAL EVALUATION ADULT - ENTER FROM (CAL/KG)
Per pharmacy, insurance will cover Metformin  mg, two tablets daily.  
Pharmacy is calling regarding Medication not covered by insurance for the metformin. Writer advised caller of a callback from the nurse within 24-72 hours.     Patient Name: Rao Hauserraven  Caller Name: Stalin  Name of Facility: [facility]  Callback Number:[phone]  Fax Number: [phone]  Additional Info: Pharmacy would like to know if the metformin can be changed to a generic brand. Medication is not covered by insurance  Did you confirm the message with the caller?: yes    Thank you,  Fanta Navarro      [id number] [address] AT [place] [address] [place] [address]  Phone: [phone] Fax: [phone]    
25

## 2024-03-18 NOTE — CONSULT NOTE ADULT - SUBJECTIVE AND OBJECTIVE BOX
HPI:  59 yo M with CAD s/p stent 4/26/2019, kidney stone 10 years ago, HTN, HLD, new diagnosis of diabetes A1c 8.2 3/2024. Admitted in Feb 2024 for right flank pain and hematuria on CT abdomen found to have R renal mass, hydroureteronephrosis, Portacaval and right retroperitoneal lymphadenopathy and RLL 9mm pulmonary nodule. s/p cystoscopy right retrograde pyelogram and right ureteral stent placement by Dr. Ovalle on Feb 4, 2024 and planned for Robot-assisted right radical nephrectomy and right ureteral stent removal 3/19 now presents for hematuria and intractable right flank pain. He denies fevers. +chills, abdominal pain and vomiting (17 Mar 2024 20:47)      59 yo male with PMH as above including Right renal mass, hydroureteronephrosis, Portacaval and right retroperitoneal lymphadenopathy and RLL 9mm pulmonary nodule s/p cystoscopy right retrograde pyelogram and right ureteral stent placement by Dr. Ovalle 2/4/24 and scheduled for Right robotic radical nephrectomy and right ureteral stent removal 3/19/24 with Dr. Kalpan admitted for right flank pain and hematuria. Patient seen at bedside reports he has intermitted Right flank pain worse when he is passing a clot and notes hematuria with clots but denies clot retention, reports he has been voiding freely. He denies any fevers, but notes when he has severe right flank pain he has nausea and chills from the intractable pain. He denies dysuria, frequency or urgency.     PAST MEDICAL & SURGICAL HISTORY:  Pneumonia      Cough      Former smoker      Obesity (BMI 30-39.9)      HTN (hypertension)      HLD (hyperlipidemia)      CAD (coronary artery disease)      Diabetes      H/O pulmonary hypertension      Renal mass      Lung nodule      Stented coronary artery      Paroxysmal atrial fibrillation      Diverticulitis      Renal calculi      Subclinical hyperthyroidism      Metastasis to lung      History of appendectomy  1994      History of torn meniscus of left knee  1995      History of tonsillectomy and adenoidectomy  1968      S/P cystoscopy with ureteral stent placement      History of lung biopsy      S/P cardiac cath          REVIEW OF SYSTEMS   All other review of systems neg, except as noted in HPI    MEDICATIONS  (STANDING):  chlorhexidine 4% Liquid 1 Application(s) Topical once  dextrose 5%. 1000 milliLiter(s) (50 mL/Hr) IV Continuous <Continuous>  dextrose 5%. 1000 milliLiter(s) (100 mL/Hr) IV Continuous <Continuous>  dextrose 50% Injectable 25 Gram(s) IV Push once  dextrose 50% Injectable 12.5 Gram(s) IV Push once  dextrose 50% Injectable 25 Gram(s) IV Push once  glucagon  Injectable 1 milliGRAM(s) IntraMuscular once  insulin glargine Injectable (LANTUS) 5 Unit(s) SubCutaneous at bedtime  insulin lispro (ADMELOG) corrective regimen sliding scale   SubCutaneous three times a day before meals  insulin lispro Injectable (ADMELOG) 3 Unit(s) SubCutaneous three times a day before meals  metoprolol tartrate 25 milliGRAM(s) Oral two times a day  naloxone Injectable 0.4 milliGRAM(s) IV Push once  ondansetron Injectable 4 milliGRAM(s) IV Push once  polyethylene glycol 3350 17 Gram(s) Oral daily  senna 2 Tablet(s) Oral at bedtime    MEDICATIONS  (PRN):  acetaminophen     Tablet .. 650 milliGRAM(s) Oral every 6 hours PRN Temp greater or equal to 38C (100.4F), Mild Pain (1 - 3)  aluminum hydroxide/magnesium hydroxide/simethicone Suspension 30 milliLiter(s) Oral every 4 hours PRN Dyspepsia  bisacodyl 5 milliGRAM(s) Oral daily PRN Constipation  dextrose Oral Gel 15 Gram(s) Oral once PRN Blood Glucose LESS THAN 70 milliGRAM(s)/deciliter  melatonin 3 milliGRAM(s) Oral at bedtime PRN Insomnia  morphine  - Injectable 4 milliGRAM(s) IV Push every 4 hours PRN Severe Pain (7 - 10)  morphine  - Injectable 2 milliGRAM(s) IV Push every 4 hours PRN Moderate Pain (4 - 6)  ondansetron Injectable 4 milliGRAM(s) IV Push every 8 hours PRN Nausea and/or Vomiting      Allergies    sulfa drugs (Unknown)    Intolerances        SOCIAL HISTORY:    FAMILY HISTORY:  FH: breast cancer (Mother)        Vital Signs Last 24 Hrs  T(C): 36.6 (18 Mar 2024 09:11), Max: 37.2 (18 Mar 2024 06:41)  T(F): 97.9 (18 Mar 2024 09:11), Max: 98.9 (18 Mar 2024 06:41)  HR: 72 (18 Mar 2024 09:11) (68 - 82)  BP: 132/75 (18 Mar 2024 09:11) (132/75 - 164/100)  BP(mean): 104 (17 Mar 2024 16:00) (104 - 104)  RR: 18 (18 Mar 2024 09:11) (17 - 18)  SpO2: 97% (18 Mar 2024 09:11) (95% - 100%)    Parameters below as of 18 Mar 2024 09:11  Patient On (Oxygen Delivery Method): room air        PHYSICAL EXAM:    VITALS  T(C): 36.6 (03-18-24 @ 09:11), Max: 37.2 (03-18-24 @ 06:41)  HR: 72 (03-18-24 @ 09:11) (68 - 82)  BP: 132/75 (03-18-24 @ 09:11) (132/75 - 164/100)  RR: 18 (03-18-24 @ 09:11) (17 - 18)  SpO2: 97% (03-18-24 @ 09:11) (95% - 100%)            Skin     : No jaundice   HEENT: Normocephalic , No epistaxis  Lung    : No resp distress  Abdo:   : Soft, Non tender, No guarding, No distension   Back    : Mild right sided flank tenderness to palpation  Extremity: No calf tenderness   Genitalia Male: No Grace  Neuro   : A&Ox3      LABS:                        10.7   7.19  )-----------( 222      ( 18 Mar 2024 06:51 )             31.6     03-18    137  |  105  |  12  ----------------------------<  180<H>  3.6   |  25  |  1.07    Ca    9.6      18 Mar 2024 06:51    TPro  7.2  /  Alb  3.5  /  TBili  0.6  /  DBili  x   /  AST  18  /  ALT  18  /  AlkPhos  65  03-18    PT/INR - ( 17 Mar 2024 16:39 )   PT: 11.8 sec;   INR: 1.05 ratio         PTT - ( 17 Mar 2024 16:39 )  PTT:39.3 sec  Urinalysis Basic - ( 18 Mar 2024 06:51 )    Color: x / Appearance: x / SG: x / pH: x  Gluc: 180 mg/dL / Ketone: x  / Bili: x / Urobili: x   Blood: x / Protein: x / Nitrite: x   Leuk Esterase: x / RBC: x / WBC x   Sq Epi: x / Non Sq Epi: x / Bacteria: x        RADIOLOGY & ADDITIONAL STUDIES:< from: CT Abdomen and Pelvis w/ IV Cont (03.17.24 @ 17:32) >  ACC: [id number] EXAM:  CT ABDOMEN AND PELVIS IC   ORDERED BY: JEF PELLETIER     PROCEDURE DATE:  03/17/2024          INTERPRETATION:  CLINICAL INFORMATION: Fever and abdominal pain. Blood in   urine. Back pain.    COMPARISON: FDG PET/CT March 15, 2024, MRI abdomen February 5, 2024, CT   chest abdomen pelvis February 4, 2024.    CONTRAST/COMPLICATIONS:  IV Contrast: Omnipaque 350  90 cc administered   0 cc discarded  Oral Contrast: NONE  Complications: None reported at time of study completion    PROCEDURE:  CT of the Abdomen and Pelvis was performed.  Sagittal and coronal reformats were performed.    FINDINGS:  LOWER CHEST: Coronary artery calcifications. Stable peripheral pulmonary   nodules, inferior lingula 16 mm, lateral basilar right lower lobe 6 mm,   not present in 2019, radiotracer uptake on PET/CT. Bilateral gynecomastia    LIVER: Within normal limits.  BILE DUCTS: Normal caliber.  GALLBLADDER: Cholelithiasis.  SPLEEN: Within normal limits.  PANCREAS: Within normal limits.  ADRENALS:Within normal limits.  KIDNEYS/URETERS: No significant change solid heterogenous enhancing   partially necrotic right renal mass 9.5 cm, contacting throughout   associated with, involving renal hilum. Double-J right nephroureteral   stent in place, proximal pigtail in upper pole calyx, distal pigtail at   level of ureterovesical junction.Right lower pole pelvocaliectasis. No   renal or perirenal abscess. Hyperdense material in right lower pole   calyces could represent blood clot. Bilateral perinephric stranding,   right greater than left.    BLADDER: Trabeculated wall.  REPRODUCTIVE ORGANS: Prostate is enlarged.    BOWEL: No bowel obstruction. Colonic diverticulosis. Appendix is not   visualized. No evidence of inflammation in the pericecal region.  PERITONEUM: No ascites. Couple small anterior peritoneal nodular   densities in midline upper abdomen measuring up to 5 mm (2:41), no   detectable radiotracer activity on PET/CT, could be due to small size.  VESSELS: Atherosclerotic changes. Patent bilateral renal arteries and   veins.  RETROPERITONEUM/LYMPH NODES: Multiple enlarged retroperitoneal nodes, for   example posterior to right main renal artery 2.4 x 1.5 cm, aortocaval   below level of renal arteries 2.3 x 2.1 cm. No pelvic lymphadenopathy..  ABDOMINAL WALL: Small fat-containing umbilical and right inguinal hernias  BONES: No suspicious lesion. Degenerative changes.    IMPRESSION:  Right lower pole mild pelvocaliectasis distended with blood products.   Double-J right nephroureteral stent proximal pigtail in upper pole calyx,   distal pigtail at level of ureterovesical junction. No evidence of   pyelonephritis.  Stable large right renal malignancy, involves renal hilum, Gerota's   fascia and contacts liver.  Increased retroperitoneal malignant adenopathy.  Stable bilateral pulmonary metastases.  Stable couple small anterior peritoneal nodular densities in midline   upper abdomen, indeterminate, may represent small nodes or implants.    --- End of Report ---            SHIVA LOPEZ MD; Attending Radiologist  This document has been electronically signed. Mar 17 2024  6:33PM    < end of copied text >

## 2024-03-18 NOTE — DIETITIAN INITIAL EVALUATION ADULT - ADD RECOMMEND
No
1. Recommend to liberalize diet to regular to maximize caloric and protein intake   2. Encourage protein-rich foods, maximize food preferences   3. Ensure Max BID to optimize nutritional needs (provides 150 kcal, 30 g protein/ shake)   4. Monitor and optimize BG levels between 140-180 mg/dL by medical management   5. Continue to monitor hydration status and lytes 2/2 persistent vomiting, replete lytes prn  6. Consider anti-emetics prn to promote increased PO intake   7. Monitor bowel movements, if no BM for >3 days, consider implementing bowel regimen.   8. Consider obtaining vitamin D 25OH level to assess nutriture   9. Consider adding thiamine 100 mg daily 2/2 poor PO intake/ malnutrition and MVI w/ minerals daily to ensure 100% RDA met   10. Confirm goals of care regarding nutrition support   RD will continue to monitor PO intake, labs, hydration, and wt prn.

## 2024-03-18 NOTE — DIETITIAN INITIAL EVALUATION ADULT - NSICDXPASTMEDICALHX_GEN_ALL_CORE_FT
PAST MEDICAL HISTORY:  CAD (coronary artery disease)     Cough     Diabetes     Diverticulitis     Former smoker     H/O pulmonary hypertension     HLD (hyperlipidemia)     HTN (hypertension)     Lung nodule     Metastasis to lung     Obesity (BMI 30-39.9)     Paroxysmal atrial fibrillation     Pneumonia     Renal calculi     Renal mass     Stented coronary artery     Subclinical hyperthyroidism

## 2024-03-18 NOTE — PROGRESS NOTE ADULT - ASSESSMENT
Pt is a 58 year old male with past medical history of HTN, CAD s/p stent in 2019, newly diagnosed T2DM A1C 8.3, and recent admission in feb 2024 s/p diagnosis of right-renal mass with pulmonary metastases. Patient had cytoscopy, with right retrograde pyelogram and right ureteral stent placement on 2/4 with Dr. Ovalle. Scheduled for robot-assisted right radical nephrectomy and stent removal 3/19 but presented to hospital with increasing R-flank pain associated with nausea, vomiting, chills and bloody urine. In ER found to have lactate of 3.3, decreased to 1.7 s/p 2L bolus, no leukocytosis noted, CT neg for pyelo. Admitted for medical management prior to surgery 3/19.     # Hematuria secondary to renal mass   - no evidence of pyelonephritis on CT, no leukocytosis, UA negative   - scheduled for robot-assisted right radical nephrectomy 3/19  - c/w PRN morphine for pain control   - c/w PRN zofran for nausea/vomiting   - monitor urine output  - H/H stable but downtrending, continue to trend   - awaiting nephrology c/s    # CAD, A.fib   - s/p stent 2019  - patient found to have new onset a.fib in february, ekg this admission NSR  - wearing 24hr ambulatory holter monitor to be mailed back by wife today   - c/w home dose metoprolol   - hold off AC in setting of hematuria     # HTN   - c/w home dose metoprolol    # DVT ppx   - hold off AC in setting of hematuria   - SCDs, encourage ambulation     # GOC   - patient confirmed DNR/DNI, agreeable to noninvasive airway      Pt is a 58 year old male with past medical history of HTN, CAD s/p stent in 2019, newly diagnosed T2DM A1C 8.3, and recent admission in feb 2024 s/p diagnosis of right-renal mass with pulmonary metastases. Patient had cytoscopy, with right retrograde pyelogram and right ureteral stent placement on 2/4 with Dr. Ovalle. Scheduled for robot-assisted right radical nephrectomy and stent removal 3/19 but presented to hospital with increasing R-flank pain associated with nausea, vomiting, chills and bloody urine. In ER found to have lactate of 3.3, decreased to 1.7 s/p 2L bolus, no leukocytosis noted, CT neg for pyelo. Admitted for medical management prior to surgery 3/19.     # Hematuria secondary to renal mass   - no evidence of pyelonephritis on CT, no leukocytosis, UA negative   - scheduled for robot-assisted right radical nephrectomy 3/19  - c/w PRN morphine for pain control   - c/w PRN zofran for nausea/vomiting   - monitor urine output  - H/H stable but downtrending, continue to trend   - awaiting nephrology c/s    # CAD, A.fib   - s/p stent 2019  - patient found to have new onset a.fib in february  - ekg this admission NSR  - wearing 24hr ambulatory holter monitor to be mailed back by wife today   - c/w  metoprolol   - hold off AC in setting of hematuria  - Optimized for OR as per cardio     # HTN   - c/w metoprolol    # DVT ppx   - hold off AC in setting of hematuria   - SCDs, encourage ambulation     # GOC   - patient confirmed DNR/DNI  - agreeable to noninvasive airway      Pt is a 58 year old male with past medical history of HTN, CAD s/p stent in 2019, newly diagnosed T2DM A1C 8.3, and recent admission in feb 2024 s/p diagnosis of right-renal mass with pulmonary metastases. Patient had cytoscopy, with right retrograde pyelogram and right ureteral stent placement on 2/4 with Dr. Ovalle. Scheduled for robot-assisted right radical nephrectomy and stent removal 3/19 but presented to hospital with increasing R-flank pain associated with nausea, vomiting, chills and bloody urine. In ER found to have lactate of 3.3, decreased to 1.7 s/p 2L bolus, no leukocytosis noted, CT neg for pyelo. Admitted for medical management prior to surgery 3/19.     # Hematuria secondary to renal mass   - no evidence of pyelonephritis on CT, no leukocytosis, UA negative   - scheduled for robot-assisted right radical nephrectomy 3/19  - c/w PRN morphine for pain control   - c/w PRN zofran for nausea/vomiting   - monitor urine output  - H/H stable but downtrending, continue to trend   - awaiting nephrology c/s    # CAD, A.fib   - s/p stent 2019  - patient found to have new onset a.fib in february  - ekg this admission NSR  - wearing 24hr ambulatory holter monitor to be mailed back by wife today   - c/w  metoprolol   - hold off AC in setting of hematuria  - Optimized for OR as per cardio    # DM  - Lantus 5 tonight ( OR in am )  - Normally lantus 10 with ISS  - NPO after midnight    # HTN   - c/w metoprolol    # DVT ppx   - hold off AC in setting of hematuria   - SCDs, encourage ambulation     # GOC   - patient confirmed DNR/DNI  - agreeable to noninvasive airway     # Dispo: OR in am, NPO midnight

## 2024-03-19 ENCOUNTER — APPOINTMENT (OUTPATIENT)
Dept: CARDIOLOGY | Facility: CLINIC | Age: 59
End: 2024-03-19

## 2024-03-19 ENCOUNTER — APPOINTMENT (OUTPATIENT)
Dept: UROLOGY | Facility: HOSPITAL | Age: 59
End: 2024-03-19

## 2024-03-19 ENCOUNTER — RESULT REVIEW (OUTPATIENT)
Age: 59
End: 2024-03-19

## 2024-03-19 LAB
ANION GAP SERPL CALC-SCNC: 6 MMOL/L — SIGNIFICANT CHANGE UP (ref 5–17)
ANION GAP SERPL CALC-SCNC: 6 MMOL/L — SIGNIFICANT CHANGE UP (ref 5–17)
BASOPHILS # BLD AUTO: 0.01 K/UL — SIGNIFICANT CHANGE UP (ref 0–0.2)
BASOPHILS NFR BLD AUTO: 0.2 % — SIGNIFICANT CHANGE UP (ref 0–2)
BUN SERPL-MCNC: 12 MG/DL — SIGNIFICANT CHANGE UP (ref 7–23)
BUN SERPL-MCNC: 15 MG/DL — SIGNIFICANT CHANGE UP (ref 7–23)
CALCIUM SERPL-MCNC: 9.1 MG/DL — SIGNIFICANT CHANGE UP (ref 8.5–10.1)
CALCIUM SERPL-MCNC: 9.2 MG/DL — SIGNIFICANT CHANGE UP (ref 8.5–10.1)
CHLORIDE SERPL-SCNC: 107 MMOL/L — SIGNIFICANT CHANGE UP (ref 96–108)
CHLORIDE SERPL-SCNC: 108 MMOL/L — SIGNIFICANT CHANGE UP (ref 96–108)
CO2 SERPL-SCNC: 27 MMOL/L — SIGNIFICANT CHANGE UP (ref 22–31)
CO2 SERPL-SCNC: 27 MMOL/L — SIGNIFICANT CHANGE UP (ref 22–31)
CREAT SERPL-MCNC: 1.08 MG/DL — SIGNIFICANT CHANGE UP (ref 0.5–1.3)
CREAT SERPL-MCNC: 1.24 MG/DL — SIGNIFICANT CHANGE UP (ref 0.5–1.3)
CULTURE RESULTS: SIGNIFICANT CHANGE UP
EGFR: 67 ML/MIN/1.73M2 — SIGNIFICANT CHANGE UP
EGFR: 79 ML/MIN/1.73M2 — SIGNIFICANT CHANGE UP
EOSINOPHIL # BLD AUTO: 0.01 K/UL — SIGNIFICANT CHANGE UP (ref 0–0.5)
EOSINOPHIL NFR BLD AUTO: 0.2 % — SIGNIFICANT CHANGE UP (ref 0–6)
GLUCOSE BLDC GLUCOMTR-MCNC: 125 MG/DL — HIGH (ref 70–99)
GLUCOSE BLDC GLUCOMTR-MCNC: 127 MG/DL — HIGH (ref 70–99)
GLUCOSE BLDC GLUCOMTR-MCNC: 147 MG/DL — HIGH (ref 70–99)
GLUCOSE BLDC GLUCOMTR-MCNC: 148 MG/DL — HIGH (ref 70–99)
GLUCOSE BLDC GLUCOMTR-MCNC: 151 MG/DL — HIGH (ref 70–99)
GLUCOSE BLDC GLUCOMTR-MCNC: 195 MG/DL — HIGH (ref 70–99)
GLUCOSE SERPL-MCNC: 135 MG/DL — HIGH (ref 70–99)
GLUCOSE SERPL-MCNC: 192 MG/DL — HIGH (ref 70–99)
HCT VFR BLD CALC: 28.7 % — LOW (ref 39–50)
HCT VFR BLD CALC: 29.1 % — LOW (ref 39–50)
HGB BLD-MCNC: 9.5 G/DL — LOW (ref 13–17)
HGB BLD-MCNC: 9.5 G/DL — LOW (ref 13–17)
IMM GRANULOCYTES NFR BLD AUTO: 0.4 % — SIGNIFICANT CHANGE UP (ref 0–0.9)
LYMPHOCYTES # BLD AUTO: 0.32 K/UL — LOW (ref 1–3.3)
LYMPHOCYTES # BLD AUTO: 5.6 % — LOW (ref 13–44)
MANUAL SMEAR VERIFICATION: SIGNIFICANT CHANGE UP
MCHC RBC-ENTMCNC: 28.4 PG — SIGNIFICANT CHANGE UP (ref 27–34)
MCHC RBC-ENTMCNC: 29 PG — SIGNIFICANT CHANGE UP (ref 27–34)
MCHC RBC-ENTMCNC: 32.6 GM/DL — SIGNIFICANT CHANGE UP (ref 32–36)
MCHC RBC-ENTMCNC: 33.1 GM/DL — SIGNIFICANT CHANGE UP (ref 32–36)
MCV RBC AUTO: 87.1 FL — SIGNIFICANT CHANGE UP (ref 80–100)
MCV RBC AUTO: 87.5 FL — SIGNIFICANT CHANGE UP (ref 80–100)
MONOCYTES # BLD AUTO: 0.14 K/UL — SIGNIFICANT CHANGE UP (ref 0–0.9)
MONOCYTES NFR BLD AUTO: 2.5 % — SIGNIFICANT CHANGE UP (ref 2–14)
NEUTROPHILS # BLD AUTO: 5.17 K/UL — SIGNIFICANT CHANGE UP (ref 1.8–7.4)
NEUTROPHILS NFR BLD AUTO: 91.1 % — HIGH (ref 43–77)
PLAT MORPH BLD: NORMAL — SIGNIFICANT CHANGE UP
PLATELET # BLD AUTO: 216 K/UL — SIGNIFICANT CHANGE UP (ref 150–400)
PLATELET # BLD AUTO: 224 K/UL — SIGNIFICANT CHANGE UP (ref 150–400)
POTASSIUM SERPL-MCNC: 3.6 MMOL/L — SIGNIFICANT CHANGE UP (ref 3.5–5.3)
POTASSIUM SERPL-MCNC: 4.3 MMOL/L — SIGNIFICANT CHANGE UP (ref 3.5–5.3)
POTASSIUM SERPL-SCNC: 3.6 MMOL/L — SIGNIFICANT CHANGE UP (ref 3.5–5.3)
POTASSIUM SERPL-SCNC: 4.3 MMOL/L — SIGNIFICANT CHANGE UP (ref 3.5–5.3)
RBC # BLD: 3.28 M/UL — LOW (ref 4.2–5.8)
RBC # BLD: 3.34 M/UL — LOW (ref 4.2–5.8)
RBC # FLD: 12.9 % — SIGNIFICANT CHANGE UP (ref 10.3–14.5)
RBC # FLD: 12.9 % — SIGNIFICANT CHANGE UP (ref 10.3–14.5)
RBC BLD AUTO: NORMAL — SIGNIFICANT CHANGE UP
SODIUM SERPL-SCNC: 140 MMOL/L — SIGNIFICANT CHANGE UP (ref 135–145)
SODIUM SERPL-SCNC: 141 MMOL/L — SIGNIFICANT CHANGE UP (ref 135–145)
SPECIMEN SOURCE: SIGNIFICANT CHANGE UP
WBC # BLD: 5.33 K/UL — SIGNIFICANT CHANGE UP (ref 3.8–10.5)
WBC # BLD: 5.67 K/UL — SIGNIFICANT CHANGE UP (ref 3.8–10.5)
WBC # FLD AUTO: 5.33 K/UL — SIGNIFICANT CHANGE UP (ref 3.8–10.5)
WBC # FLD AUTO: 5.67 K/UL — SIGNIFICANT CHANGE UP (ref 3.8–10.5)

## 2024-03-19 PROCEDURE — 52310 CYSTOSCOPY AND TREATMENT: CPT | Mod: RT

## 2024-03-19 PROCEDURE — 99232 SBSQ HOSP IP/OBS MODERATE 35: CPT

## 2024-03-19 PROCEDURE — 88307 TISSUE EXAM BY PATHOLOGIST: CPT | Mod: 26

## 2024-03-19 PROCEDURE — 50230 REMOVAL KIDNEY OPEN RADICAL: CPT | Mod: RT,22

## 2024-03-19 PROCEDURE — 50230 REMOVAL KIDNEY OPEN RADICAL: CPT | Mod: AS,22,RT

## 2024-03-19 RX ORDER — SODIUM CHLORIDE 9 MG/ML
500 INJECTION, SOLUTION INTRAVENOUS ONCE
Refills: 0 | Status: DISCONTINUED | OUTPATIENT
Start: 2024-03-19 | End: 2024-03-21

## 2024-03-19 RX ORDER — ONDANSETRON 8 MG/1
4 TABLET, FILM COATED ORAL EVERY 6 HOURS
Refills: 0 | Status: DISCONTINUED | OUTPATIENT
Start: 2024-03-19 | End: 2024-03-22

## 2024-03-19 RX ORDER — CEFAZOLIN SODIUM 1 G
2000 VIAL (EA) INJECTION EVERY 8 HOURS
Refills: 0 | Status: DISCONTINUED | OUTPATIENT
Start: 2024-03-19 | End: 2024-03-19

## 2024-03-19 RX ORDER — SODIUM CHLORIDE 9 MG/ML
1000 INJECTION, SOLUTION INTRAVENOUS
Refills: 0 | Status: COMPLETED | OUTPATIENT
Start: 2024-03-19 | End: 2024-03-20

## 2024-03-19 RX ORDER — HYDROMORPHONE HYDROCHLORIDE 2 MG/ML
30 INJECTION INTRAMUSCULAR; INTRAVENOUS; SUBCUTANEOUS
Refills: 0 | Status: DISCONTINUED | OUTPATIENT
Start: 2024-03-19 | End: 2024-03-22

## 2024-03-19 RX ORDER — FENTANYL CITRATE 50 UG/ML
25 INJECTION INTRAVENOUS
Refills: 0 | Status: DISCONTINUED | OUTPATIENT
Start: 2024-03-19 | End: 2024-03-19

## 2024-03-19 RX ORDER — ACETAMINOPHEN 500 MG
1000 TABLET ORAL ONCE
Refills: 0 | Status: COMPLETED | OUTPATIENT
Start: 2024-03-19 | End: 2024-03-19

## 2024-03-19 RX ORDER — HEPARIN SODIUM 5000 [USP'U]/ML
5000 INJECTION INTRAVENOUS; SUBCUTANEOUS THREE TIMES A DAY
Refills: 0 | Status: DISCONTINUED | OUTPATIENT
Start: 2024-03-19 | End: 2024-03-22

## 2024-03-19 RX ORDER — HYDROMORPHONE HYDROCHLORIDE 2 MG/ML
0.5 INJECTION INTRAMUSCULAR; INTRAVENOUS; SUBCUTANEOUS
Refills: 0 | Status: DISCONTINUED | OUTPATIENT
Start: 2024-03-19 | End: 2024-03-19

## 2024-03-19 RX ORDER — ACETAMINOPHEN 500 MG
1000 TABLET ORAL ONCE
Refills: 0 | Status: COMPLETED | OUTPATIENT
Start: 2024-03-20 | End: 2024-03-20

## 2024-03-19 RX ORDER — ONDANSETRON 8 MG/1
4 TABLET, FILM COATED ORAL ONCE
Refills: 0 | Status: DISCONTINUED | OUTPATIENT
Start: 2024-03-19 | End: 2024-03-19

## 2024-03-19 RX ORDER — SODIUM CHLORIDE 9 MG/ML
1000 INJECTION INTRAMUSCULAR; INTRAVENOUS; SUBCUTANEOUS
Refills: 0 | Status: DISCONTINUED | OUTPATIENT
Start: 2024-03-19 | End: 2024-03-19

## 2024-03-19 RX ORDER — NALOXONE HYDROCHLORIDE 4 MG/.1ML
0.1 SPRAY NASAL
Refills: 0 | Status: DISCONTINUED | OUTPATIENT
Start: 2024-03-19 | End: 2024-03-22

## 2024-03-19 RX ORDER — SODIUM CHLORIDE 9 MG/ML
500 INJECTION INTRAMUSCULAR; INTRAVENOUS; SUBCUTANEOUS ONCE
Refills: 0 | Status: COMPLETED | OUTPATIENT
Start: 2024-03-19 | End: 2024-03-19

## 2024-03-19 RX ORDER — CEFAZOLIN SODIUM 1 G
2000 VIAL (EA) INJECTION EVERY 8 HOURS
Refills: 0 | Status: COMPLETED | OUTPATIENT
Start: 2024-03-19 | End: 2024-03-20

## 2024-03-19 RX ORDER — OXYCODONE HYDROCHLORIDE 5 MG/1
5 TABLET ORAL ONCE
Refills: 0 | Status: DISCONTINUED | OUTPATIENT
Start: 2024-03-19 | End: 2024-03-19

## 2024-03-19 RX ADMIN — HEPARIN SODIUM 5000 UNIT(S): 5000 INJECTION INTRAVENOUS; SUBCUTANEOUS at 22:42

## 2024-03-19 RX ADMIN — Medication 400 MILLIGRAM(S): at 22:39

## 2024-03-19 RX ADMIN — Medication 25 MILLIGRAM(S): at 10:24

## 2024-03-19 RX ADMIN — Medication 2000 MILLIGRAM(S): at 22:41

## 2024-03-19 RX ADMIN — SODIUM CHLORIDE 500 MILLILITER(S): 9 INJECTION INTRAMUSCULAR; INTRAVENOUS; SUBCUTANEOUS at 23:20

## 2024-03-19 RX ADMIN — Medication 1000 MILLIGRAM(S): at 23:09

## 2024-03-19 RX ADMIN — SODIUM CHLORIDE 75 MILLILITER(S): 9 INJECTION INTRAMUSCULAR; INTRAVENOUS; SUBCUTANEOUS at 06:08

## 2024-03-19 RX ADMIN — CHLORHEXIDINE GLUCONATE 1 APPLICATION(S): 213 SOLUTION TOPICAL at 10:21

## 2024-03-19 RX ADMIN — INSULIN GLARGINE 5 UNIT(S): 100 INJECTION, SOLUTION SUBCUTANEOUS at 22:41

## 2024-03-19 RX ADMIN — SENNA PLUS 2 TABLET(S): 8.6 TABLET ORAL at 22:42

## 2024-03-19 RX ADMIN — Medication 25 MILLIGRAM(S): at 22:41

## 2024-03-19 RX ADMIN — HYDROMORPHONE HYDROCHLORIDE 30 MILLILITER(S): 2 INJECTION INTRAMUSCULAR; INTRAVENOUS; SUBCUTANEOUS at 17:22

## 2024-03-19 NOTE — PROGRESS NOTE ADULT - SUBJECTIVE AND OBJECTIVE BOX
POC    58y Male with right renal mass POD 0 s/p robotic right nephrectomy, converted to open, with periaortic lymph node dissection, cystoscopy with ureteral stent removal presents for POC.  Pt seen and examined. Complains of incisional pain, otherwise no complaints.  Denies SOB/CP/N/V.     acetaminophen   IVPB .. 1000 milliGRAM(s) IV Intermittent once  aluminum hydroxide/magnesium hydroxide/simethicone Suspension 30 milliLiter(s) Oral every 4 hours PRN  bisacodyl 5 milliGRAM(s) Oral daily PRN  ceFAZolin  Injectable. 2000 milliGRAM(s) IV Push every 8 hours  dextrose 5%. 1000 milliLiter(s) IV Continuous <Continuous>  dextrose 5%. 1000 milliLiter(s) IV Continuous <Continuous>  dextrose 50% Injectable 25 Gram(s) IV Push once  dextrose 50% Injectable 12.5 Gram(s) IV Push once  dextrose 50% Injectable 25 Gram(s) IV Push once  dextrose Oral Gel 15 Gram(s) Oral once PRN  glucagon  Injectable 1 milliGRAM(s) IntraMuscular once  heparin   Injectable 5000 Unit(s) SubCutaneous three times a day  HYDROmorphone PCA (1 mG/mL) 30 milliLiter(s) PCA Continuous PCA Continuous  insulin glargine Injectable (LANTUS) 5 Unit(s) SubCutaneous at bedtime  insulin lispro (ADMELOG) corrective regimen sliding scale   SubCutaneous three times a day before meals  insulin lispro Injectable (ADMELOG) 3 Unit(s) SubCutaneous three times a day before meals  lactated ringers Bolus 500 milliLiter(s) IV Bolus once  lactated ringers. 1000 milliLiter(s) IV Continuous <Continuous>  melatonin 3 milliGRAM(s) Oral at bedtime PRN  metoprolol tartrate 25 milliGRAM(s) Oral two times a day  morphine  - Injectable 2 milliGRAM(s) IV Push every 4 hours PRN  morphine  - Injectable 4 milliGRAM(s) IV Push every 4 hours PRN  naloxone Injectable 0.4 milliGRAM(s) IV Push once  naloxone Injectable 0.1 milliGRAM(s) IV Push every 3 minutes PRN  ondansetron Injectable 4 milliGRAM(s) IV Push every 6 hours PRN  ondansetron Injectable 4 milliGRAM(s) IV Push every 6 hours PRN  polyethylene glycol 3350 17 Gram(s) Oral daily  prochlorperazine   Injectable 10 milliGRAM(s) IntraMuscular every 8 hours PRN  senna 2 Tablet(s) Oral at bedtime      Vital Signs Last 24 Hrs  T(C): 36.4 (19 Mar 2024 20:30), Max: 37.3 (19 Mar 2024 08:29)  T(F): 97.6 (19 Mar 2024 20:30), Max: 99.2 (19 Mar 2024 08:29)  HR: 58 (19 Mar 2024 20:30) (58 - 82)  BP: 110/67 (19 Mar 2024 21:32) (96/68 - 125/77)  BP(mean): 80 (19 Mar 2024 21:32) (80 - 80)  RR: 19 (19 Mar 2024 20:30) (11 - 20)  SpO2: 99% (19 Mar 2024 21:32) (95% - 100%)    Parameters below as of 19 Mar 2024 21:32  Patient On (Oxygen Delivery Method): nasal cannula  O2 Flow (L/min): 2    UOP: 325 cc       Physical Exam  Gen: NAD, comfortable in bed  Lungs: CTAB  Heart: S1/S2, RRR  Abd: Soft, ND, + incisional tenderness, no rebound no guarding. +BS. incisions c/d/i  : chapman in place with clear urine  Neuro: A&Ox3  Extremities: venodynes in place. no edema                          9.5    5.67  )-----------( 216      ( 19 Mar 2024 18:04 )             28.7       03-19    141  |  108  |  15  ----------------------------<  192<H>  4.3   |  27  |  1.24    Ca    9.1      19 Mar 2024 18:04    TPro  7.2  /  Alb  3.5  /  TBili  0.6  /  DBili  x   /  AST  18  /  ALT  18  /  AlkPhos  65  03-18      A/P: 58y Male POD 0 s/p robotic right nephrectomy, converted to open, with periaortic lymph node dissection, cystoscopy with ureteral stent removal, currently stable  continue PCA, IV tylenol  normal saline bolus  DVT prophylaxis/OOB  Encourage Incentive spirometry  Strict I&O's  ADAT  AM labs

## 2024-03-19 NOTE — PROGRESS NOTE ADULT - SUBJECTIVE AND OBJECTIVE BOX
CC: Vomiting without nausea    HPI: 59 yo M with CAD s/p stent 4/26/2019, kidney stone 10 years ago, HTN, HLD, new diagnosis of diabetes A1c 8.2 3/2024. Admitted in Feb 2024 for right flank pain and hematuria on CT abdomen found to have R renal mass, hydroureteronephrosis, Portacaval and right retroperitoneal lymphadenopathy and RLL 9mm pulmonary nodule. s/p cystoscopy right retrograde pyelogram and right ureteral stent placement by Dr. Ovalle on Feb 4, 2024 and planned for Robot-assisted right radical nephrectomy and right ureteral stent removal 3/19 now presents for hematuria and intractable right flank pain. He denies fevers. +chills, abdominal pain and vomiting     INTERVAL HPI/OVERNIGHT EVENTS: chart reviewed, Pt was seen and examined,  OOB to chair, reports feeling fine,  still with hematuria but able to urinate. Denies CP or SOB, no pain. Awaiting for Procedure today     Vital Signs Last 24 Hrs  T(C): 37.3 (19 Mar 2024 08:29), Max: 37.3 (19 Mar 2024 08:29)  T(F): 99.2 (19 Mar 2024 08:29), Max: 99.2 (19 Mar 2024 08:29)  HR: 82 (19 Mar 2024 10:27) (69 - 82)  BP: 107/70 (19 Mar 2024 10:27) (107/70 - 144/82)  RR: 20 (19 Mar 2024 08:29) (18 - 20)  SpO2: 97% (19 Mar 2024 08:29) (95% - 97%)    Parameters below as of 19 Mar 2024 08:29  Patient On (Oxygen Delivery Method): room air        REVIEW OF SYSTEMS:  All other review of systems is negative unless indicated above.      PHYSICAL EXAM:  General: in no acute distress  Eyes:  EOMI; conjunctiva and sclera clear  Head: Normocephalic; atraumatic  ENMT: No nasal discharge; airway clear  Respiratory: No wheezes, rales or rhonchi  Cardiovascular: Regular rate and rhythm. S1 and S2 Normal;   Gastrointestinal: Soft non-tender non-distended; Normal bowel sounds  Genitourinary: No  suprapubic  tenderness  Extremities: No edema  Vascular: Peripheral pulses palpable 2+ bilaterally  Neurological: Alert and oriented x3, non focal   Musculoskeletal: Normal muscle tone, without deformities  Psychiatric: Cooperative and appropriate      LABS:                        9.5    5.33  )-----------( 224      ( 19 Mar 2024 06:45 )             29.1     19 Mar 2024 06:45    140    |  107    |  12     ----------------------------<  135    3.6     |  27     |  1.08     Ca    9.2        19 Mar 2024 06:45    TPro  7.2    /  Alb  3.5    /  TBili  0.6    /  DBili  x      /  AST  18     /  ALT  18     /  AlkPhos  65     18 Mar 2024 06:51  PT/INR - ( 17 Mar 2024 16:39 )   PT: 11.8 sec;   INR: 1.05 ratio      PTT - ( 17 Mar 2024 16:39 )  PTT:39.3 sec      CAPILLARY BLOOD GLUCOSE  POCT Blood Glucose.: 125 mg/dL (19 Mar 2024 06:05)  POCT Blood Glucose.: 152 mg/dL (18 Mar 2024 22:26)  POCT Blood Glucose.: 144 mg/dL (18 Mar 2024 18:03)  POCT Blood Glucose.: 158 mg/dL (18 Mar 2024 11:42)    LIVER FUNCTIONS - ( 18 Mar 2024 06:51 )  Alb: 3.5 g/dL / Pro: 7.2 gm/dL / ALK PHOS: 65 U/L / ALT: 18 U/L / AST: 18 U/L / GGT: x           Urinalysis Basic - ( 19 Mar 2024 06:45 )    Color: x / Appearance: x / SG: x / pH: x  Gluc: 135 mg/dL / Ketone: x  / Bili: x / Urobili: x   Blood: x / Protein: x / Nitrite: x   Leuk Esterase: x / RBC: x / WBC x   Sq Epi: x / Non Sq Epi: x / Bacteria: x        MEDICATIONS  (STANDING):  dextrose 5%. 1000 milliLiter(s) (100 mL/Hr) IV Continuous <Continuous>  dextrose 5%. 1000 milliLiter(s) (50 mL/Hr) IV Continuous <Continuous>  dextrose 50% Injectable 25 Gram(s) IV Push once  dextrose 50% Injectable 12.5 Gram(s) IV Push once  dextrose 50% Injectable 25 Gram(s) IV Push once  glucagon  Injectable 1 milliGRAM(s) IntraMuscular once  insulin glargine Injectable (LANTUS) 5 Unit(s) SubCutaneous at bedtime  insulin lispro (ADMELOG) corrective regimen sliding scale   SubCutaneous three times a day before meals  insulin lispro Injectable (ADMELOG) 3 Unit(s) SubCutaneous three times a day before meals  metoprolol tartrate 25 milliGRAM(s) Oral two times a day  naloxone Injectable 0.4 milliGRAM(s) IV Push once  ondansetron Injectable 4 milliGRAM(s) IV Push once  polyethylene glycol 3350 17 Gram(s) Oral daily  senna 2 Tablet(s) Oral at bedtime  sodium chloride 0.9%. 1000 milliLiter(s) (75 mL/Hr) IV Continuous <Continuous>    MEDICATIONS  (PRN):  acetaminophen     Tablet .. 650 milliGRAM(s) Oral every 6 hours PRN Temp greater or equal to 38C (100.4F), Mild Pain (1 - 3)  aluminum hydroxide/magnesium hydroxide/simethicone Suspension 30 milliLiter(s) Oral every 4 hours PRN Dyspepsia  bisacodyl 5 milliGRAM(s) Oral daily PRN Constipation  dextrose Oral Gel 15 Gram(s) Oral once PRN Blood Glucose LESS THAN 70 milliGRAM(s)/deciliter  melatonin 3 milliGRAM(s) Oral at bedtime PRN Insomnia  morphine  - Injectable 2 milliGRAM(s) IV Push every 4 hours PRN Moderate Pain (4 - 6)  morphine  - Injectable 4 milliGRAM(s) IV Push every 4 hours PRN Severe Pain (7 - 10)  ondansetron Injectable 4 milliGRAM(s) IV Push every 8 hours PRN Nausea and/or Vomiting  prochlorperazine   Injectable 10 milliGRAM(s) IntraMuscular every 8 hours PRN nausea      RADIOLOGY & ADDITIONAL TESTS:  ACC: [id number] EXAM:  XR CHEST PORTABLE URGENT 1V   ORDERED BY: JEF PELLETIER     PROCEDURE DATE:  03/17/2024          INTERPRETATION:  TIME OF EXAM: March 17, 2024 at 5:31 PM.    CLINICAL INFORMATION: Sepsis.    COMPARISON:  PET/CT scan from March 15, 2024.    TECHNIQUE:   AP Portable chest x-ray. A generator obscures part of the   left chest. Right costophrenic angle excluded from image.    INTERPRETATION:    Heart size and the mediastinum cannot be accurately evaluated on this   projection.  The right lung is clear.  A faint 2 cm peripheral left midlung nodular opacity corresponds to a   finding on the PET/CT scan. Additional subcentimeter nodules seen on that   study are not able to be seen.  No pleural effusion or pneumothorax is seen.  There is osteoarthritic degenerative change of the spine.    IMPRESSION:  Faint 2 cm peripheral left midlung nodular opacity   corresponding to a finding on the PET/CT scan. Additional subcentimeter   nodule seen on that study are not able to be seen.  The remainder of the lungs are clear.

## 2024-03-19 NOTE — PROGRESS NOTE ADULT - ASSESSMENT
58 y.o male with PMH CAD s/p stent in 2019, newly diagnosed T2DM A1C 8.3, and recent admission in feb 2024 s/p diagnosis of right-renal mass with pulmonary metastases admitted for:       Patient had cytoscopy, with right retrograde pyelogram and right ureteral stent placement on 2/4 with Dr. Ovalel. Scheduled for robot-assisted right radical nephrectomy and stent removal 3/19 but presented to hospital with increasing R-flank pain associated with nausea, vomiting, chills and bloody urine. In ER found to have lactate of 3.3, decreased to 1.7 s/p 2L bolus, no leukocytosis noted, CT neg for pyelo. Admitted for medical management prior to surgery 3/19.     # Acute Blood loss Anemia du eto Hematuria secondary to renal mass   - no evidence of pyelonephritis on CT, no leukocytosis, UA negative   - scheduled for robot-assisted right radical nephrectomy 3/19, today   - c/w PRN morphine for pain control   - c/w PRN zofran for nausea/vomiting   - monitor urine output  - H/H stable but downtrending, continue to trend, transfuse PRN       # CAD, A.fib   - s/p stent 2019  - ekg this admission NSR  - wearing 24hr ambulatory holter monitor e mailed back by wife  - c/w  metoprolol   - hold off AC in setting of hematuria  - Optimized for OR as per cardio    # DM  - Lantus 5 tonight ( OR in am )  - Normally lantus 10 with ISS  - NPO after midnight    # HTN   - c/w metoprolol    # DVT ppx   - hold off AC in setting of hematuria   - SCDs, encourage ambulation     # GOC   - patient confirmed DNR/DNI  - agreeable to noninvasive airway     # Dispo: OR in am, NPO midnight    58 y.o male with PMH CAD s/p stent in 2019, newly diagnosed T2DM A1C 8.3, and recent admission in feb 2024 s/p diagnosis of right-renal mass with pulmonary metastases admitted for:       Patient had cytoscopy, with right retrograde pyelogram and right ureteral stent placement on 2/4 with Dr. Ovalle. Scheduled for robot-assisted right radical nephrectomy and stent removal 3/19 but presented to hospital with increasing R-flank pain associated with nausea, vomiting, chills and bloody urine. In ER found to have lactate of 3.3, decreased to 1.7 s/p 2L bolus, no leukocytosis noted, CT neg for pyelo. Admitted for medical management prior to surgery 3/19.     # Acute Blood loss Anemia du eto Hematuria secondary to renal mass   - no evidence of pyelonephritis on CT, no leukocytosis, UA negative   - scheduled for robot-assisted right radical nephrectomy 3/19, today   - c/w PRN morphine for pain control   - c/w PRN zofran for nausea/vomiting   - monitor urine output  - H/H stable but downtrending, continue to trend, transfuse PRN       # CAD, A.fib   - s/p stent 2019  - ekg this admission NSR  - had  24hr ambulatory holter monitor e mailed back by wife  - c/w  metoprolol   - hold off AC in setting of hematuria  -Has cardiac clearance in the chart from outPt     # DM  - s/p Lantus 5U last night   - Normally lantus 10 with ISS  -Monitor BS cover with ISS       # HTN   - c/w metoprolol    # DVT ppx   - hold off AC in setting of hematuria   - SCDs, encourage ambulation     # GOC   - patient confirmed DNR/DNI  - agreeable to noninvasive airway     # Dispo: NPO  for OR. Pt is medically optimized

## 2024-03-19 NOTE — BRIEF OPERATIVE NOTE - NSICDXBRIEFPROCEDURE_GEN_ALL_CORE_FT
PROCEDURES:  Nephrectomy 19-Mar-2024 18:02:35 right robotic nephrectomy converted to open, paraortic lymph node dissection, cystoscopy with removal of uretertal stent Dora Richards

## 2024-03-19 NOTE — CONSULT NOTE ADULT - SUBJECTIVE AND OBJECTIVE BOX
Pt is a 58 year old male with past medical history of HTN, CAD s/p stent in 2019, newly diagnosed T2DM A1C 8.3, and recent admission in feb 2024 s/p diagnosis of right-renal mass with pulmonary metastases. Patient had cytoscopy with right retrograde pyelogram and right ureteral stent placement on 2/4 with Dr. Ovalle. Scheduled for robot-assisted right radical nephrectomy and stent removal 3/19 originally. Presented to the ED      Patient is a 58y old  Male who presents with a chief complaint of hematuria (19 Mar 2024 20:39)      BRIEF HOSPITAL COURSE:   Pt is a 58 year old male with past medical history of HTN, CAD s/p stent in 2019, newly diagnosed T2DM A1C 8.3, and recent admission in feb 2024 s/p diagnosis of right-renal mass with pulmonary metastases. Patient had cytoscopy with right retrograde pyelogram and right ureteral stent placement on 2/4 with Dr. Ovalle. Scheduled for robot-assisted right radical nephrectomy and stent removal 3/19 originally. Presented to the ED with hematuria and intractable r flank pain. Was admitted to the floor for medical management prior to operation. Today, patient got r nephrectomy originally robotic converted to open, paraaortic lymph node dissection, cystoscopy w/ removal of ureteral stent. Patient is currently s/p operation and moved to the SICU for medical management.    PAST MEDICAL & SURGICAL HISTORY:  Pneumonia      Cough      Former smoker      Obesity (BMI 30-39.9)      HTN (hypertension)      HLD (hyperlipidemia)      CAD (coronary artery disease)      Diabetes      H/O pulmonary hypertension      Renal mass      Lung nodule      Stented coronary artery      Paroxysmal atrial fibrillation      Diverticulitis      Renal calculi      Subclinical hyperthyroidism      Metastasis to lung      History of appendectomy  1994      History of torn meniscus of left knee  1995      History of tonsillectomy and adenoidectomy  1968      S/P cystoscopy with ureteral stent placement      History of lung biopsy      S/P cardiac cath        Allergies    sulfa drugs (Unknown)    Intolerances      FAMILY HISTORY:  FH: breast cancer (Mother)        Review of Systems:  CONSTITUTIONAL: No fever, chills, or fatigue  EYES: No eye pain, visual disturbances, or discharge  ENMT:  No difficulty hearing, tinnitus, vertigo; No sinus or throat pain  NECK: No pain or stiffness  RESPIRATORY: No cough, wheezing, chills or hemoptysis; No shortness of breath  CARDIOVASCULAR: No chest pain, palpitations, dizziness, or leg swelling  GASTROINTESTINAL: + abdominal pain from surgical site. Patient has a PCA. No nausea, vomiting, or hematemesis; No diarrhea or constipation. No melena or hematochezia.  GENITOURINARY: No dysuria, frequency, hematuria, or incontinence  NEUROLOGICAL: No headaches, memory loss, loss of strength, numbness, or tremors  SKIN: No itching, burning, rashes, or lesions   MUSCULOSKELETAL: No joint pain or swelling; No muscle, back, or extremity pain  PSYCHIATRIC: No depression, anxiety, mood swings, or difficulty sleeping      Medications:  ceFAZolin  Injectable. 2000 milliGRAM(s) IV Push every 8 hours    metoprolol tartrate 25 milliGRAM(s) Oral two times a day      acetaminophen   IVPB .. 1000 milliGRAM(s) IV Intermittent once  HYDROmorphone PCA (1 mG/mL) 30 milliLiter(s) PCA Continuous PCA Continuous  melatonin 3 milliGRAM(s) Oral at bedtime PRN  morphine  - Injectable 2 milliGRAM(s) IV Push every 4 hours PRN  morphine  - Injectable 4 milliGRAM(s) IV Push every 4 hours PRN  ondansetron Injectable 4 milliGRAM(s) IV Push every 6 hours PRN  ondansetron Injectable 4 milliGRAM(s) IV Push every 6 hours PRN  prochlorperazine   Injectable 10 milliGRAM(s) IntraMuscular every 8 hours PRN      heparin   Injectable 5000 Unit(s) SubCutaneous three times a day    aluminum hydroxide/magnesium hydroxide/simethicone Suspension 30 milliLiter(s) Oral every 4 hours PRN  bisacodyl 5 milliGRAM(s) Oral daily PRN  polyethylene glycol 3350 17 Gram(s) Oral daily  senna 2 Tablet(s) Oral at bedtime      dextrose 50% Injectable 25 Gram(s) IV Push once  dextrose 50% Injectable 12.5 Gram(s) IV Push once  dextrose 50% Injectable 25 Gram(s) IV Push once  dextrose Oral Gel 15 Gram(s) Oral once PRN  glucagon  Injectable 1 milliGRAM(s) IntraMuscular once  insulin glargine Injectable (LANTUS) 5 Unit(s) SubCutaneous at bedtime  insulin lispro (ADMELOG) corrective regimen sliding scale   SubCutaneous three times a day before meals  insulin lispro Injectable (ADMELOG) 3 Unit(s) SubCutaneous three times a day before meals    dextrose 5%. 1000 milliLiter(s) IV Continuous <Continuous>  dextrose 5%. 1000 milliLiter(s) IV Continuous <Continuous>  sodium chloride 0.9%. 1000 milliLiter(s) IV Continuous <Continuous>  sodium chloride 0.9%. 1000 milliLiter(s) IV Continuous <Continuous>        naloxone Injectable 0.4 milliGRAM(s) IV Push once  naloxone Injectable 0.1 milliGRAM(s) IV Push every 3 minutes PRN          ICU Vital Signs Last 24 Hrs  T(C): 36.4 (19 Mar 2024 20:30), Max: 37.3 (19 Mar 2024 08:29)  T(F): 97.6 (19 Mar 2024 20:30), Max: 99.2 (19 Mar 2024 08:29)  HR: 58 (19 Mar 2024 20:30) (58 - 82)  BP: 104/72 (19 Mar 2024 20:30) (96/68 - 125/77)  BP(mean): --  ABP: --  ABP(mean): --  RR: 19 (19 Mar 2024 20:30) (11 - 20)  SpO2: 100% (19 Mar 2024 20:30) (95% - 100%)    O2 Parameters below as of 19 Mar 2024 20:30  Patient On (Oxygen Delivery Method): nasal cannula  O2 Flow (L/min): 2        Vital Signs Last 24 Hrs  T(C): 36.4 (19 Mar 2024 20:30), Max: 37.3 (19 Mar 2024 08:29)  T(F): 97.6 (19 Mar 2024 20:30), Max: 99.2 (19 Mar 2024 08:29)  HR: 58 (19 Mar 2024 20:30) (58 - 82)  BP: 104/72 (19 Mar 2024 20:30) (96/68 - 125/77)  BP(mean): --  RR: 19 (19 Mar 2024 20:30) (11 - 20)  SpO2: 100% (19 Mar 2024 20:30) (95% - 100%)    Parameters below as of 19 Mar 2024 20:30  Patient On (Oxygen Delivery Method): nasal cannula  O2 Flow (L/min): 2          I&O's Detail    19 Mar 2024 07:01  -  19 Mar 2024 21:25  --------------------------------------------------------  IN:    Continuous Bladder Irrigation (mL): 200 mL    Other (mL): 2400 mL  Total IN: 2600 mL    OUT:    Continuous Bladder Irrigation (mL): 200 mL    Other (mL): 425 mL  Total OUT: 625 mL    Total NET: 1975 mL            LABS:                        9.5    5.67  )-----------( 216      ( 19 Mar 2024 18:04 )             28.7     03-19    141  |  108  |  15  ----------------------------<  192<H>  4.3   |  27  |  1.24    Ca    9.1      19 Mar 2024 18:04    TPro  7.2  /  Alb  3.5  /  TBili  0.6  /  DBili  x   /  AST  18  /  ALT  18  /  AlkPhos  65  03-18          CAPILLARY BLOOD GLUCOSE      POCT Blood Glucose.: 147 mg/dL (19 Mar 2024 21:10)      Urinalysis Basic - ( 19 Mar 2024 18:04 )    Color: x / Appearance: x / SG: x / pH: x  Gluc: 192 mg/dL / Ketone: x  / Bili: x / Urobili: x   Blood: x / Protein: x / Nitrite: x   Leuk Esterase: x / RBC: x / WBC x   Sq Epi: x / Non Sq Epi: x / Bacteria: x      CULTURES:  Culture Results:   <10,000 CFU/mL Normal Urogenital Carito (03-17 @ 18:54)  Culture Results:   No growth at 24 hours (03-17 @ 16:39)  Culture Results:   No growth at 24 hours (03-17 @ 16:39)      Physical Examination:    General: No acute distress.  Alert, oriented, interactive, nonfocal    HEENT: Pupils equal, reactive to light.  Symmetric.    PULM: Clear to auscultation bilaterally, no significant sputum production    CVS: Regular rate and rhythm, no murmurs, rubs, or gallops    ABD: Soft, nondistended, nontender, normoactive bowel sounds, no masses    EXT: No edema, nontender    SKIN: Warm and well perfused, no rashes noted.    RADIOLOGY:   < from: Xray Chest 1 View- PORTABLE-Urgent (03.17.24 @ 17:36) >  ACC: [id number] EXAM:  XR CHEST PORTABLE URGENT 1V   ORDERED BY: JEF PELLETIER     PROCEDURE DATE:  03/17/2024          INTERPRETATION:  TIME OF EXAM: March 17, 2024 at 5:31 PM.    CLINICAL INFORMATION: Sepsis.    COMPARISON:  PET/CT scan from March 15, 2024.    TECHNIQUE:   AP Portable chest x-ray. A generator obscures part of the   left chest. Right costophrenic angle excluded from image.    INTERPRETATION:    Heart size and the mediastinum cannot be accurately evaluated on this   projection.  The right lung is clear.  A faint 2 cm peripheral left midlung nodular opacity corresponds to a   finding on the PET/CT scan. Additional subcentimeter nodules seen on that   study are not able to be seen.  No pleural effusion or pneumothorax is seen.  There is osteoarthritic degenerative change of the spine.    IMPRESSION:  Faint 2 cm peripheral left midlung nodular opacity   corresponding to a finding on the PET/CT scan. Additional subcentimeter   nodule seen on that study are not able to be seen.  The remainder of the lungs are clear.    --- End of Report ---            ELKE ABDI MD; Attending Radiologist  This document has been electronically signed. Mar 18 2024 11:38AM    < end of copied text >  < from: CT Abdomen and Pelvis w/ IV Cont (03.17.24 @ 17:32) >    ACC: [id number] EXAM:  CT ABDOMEN AND PELVIS IC   ORDERED BY: JEF PELLETIER     PROCEDURE DATE:  03/17/2024          INTERPRETATION:  CLINICAL INFORMATION: Fever and abdominal pain. Blood in   urine. Back pain.    COMPARISON: FDG PET/CT March 15, 2024, MRI abdomen February 5, 2024, CT   chest abdomen pelvis February 4, 2024.    CONTRAST/COMPLICATIONS:  IV Contrast: Omnipaque 350  90 cc administered   0 cc discarded  Oral Contrast: NONE  Complications: None reported at time of study completion    PROCEDURE:  CT of the Abdomen and Pelvis was performed.  Sagittal and coronal reformats were performed.    FINDINGS:  LOWER CHEST: Coronary artery calcifications. Stable peripheral pulmonary   nodules, inferior lingula 16 mm, lateral basilar right lower lobe 6 mm,   not present in 2019, radiotracer uptake on PET/CT. Bilateral gynecomastia    LIVER: Within normal limits.  BILE DUCTS: Normal caliber.  GALLBLADDER: Cholelithiasis.  SPLEEN: Within normal limits.  PANCREAS: Within normal limits.  ADRENALS:Within normal limits.  KIDNEYS/URETERS: No significant change solid heterogenous enhancing   partially necrotic right renal mass 9.5 cm, contacting throughout   associated with, involving renal hilum. Double-J right nephroureteral   stent in place, proximal pigtail in upper pole calyx, distal pigtail at   level of ureterovesical junction.Right lower pole pelvocaliectasis. No   renal or perirenal abscess. Hyperdense material in right lower pole   calyces could represent blood clot. Bilateral perinephric stranding,   right greater than left.    BLADDER: Trabeculated wall.  REPRODUCTIVE ORGANS: Prostate is enlarged.    BOWEL: No bowel obstruction. Colonic diverticulosis. Appendix is not   visualized. No evidence of inflammation in the pericecal region.  PERITONEUM: No ascites. Couple small anterior peritoneal nodular   densities in midline upper abdomen measuring up to 5 mm (2:41), no   detectable radiotracer activity on PET/CT, could be due to small size.  VESSELS: Atherosclerotic changes. Patent bilateral renal arteries and   veins.  RETROPERITONEUM/LYMPH NODES: Multiple enlarged retroperitoneal nodes, for   example posterior to right main renal artery 2.4 x 1.5 cm, aortocaval   below level of renal arteries 2.3 x 2.1 cm. No pelvic lymphadenopathy..  ABDOMINAL WALL: Small fat-containing umbilical and right inguinal hernias  BONES: No suspicious lesion. Degenerative changes.    IMPRESSION:  Right lower pole mild pelvocaliectasis distended with blood products.   Double-J right nephroureteral stent proximal pigtail in upper pole calyx,   distal pigtail at level of ureterovesical junction. No evidence of   pyelonephritis.  Stable large right renal malignancy, involves renal hilum, Gerota's   fascia and contacts liver.  Increased retroperitoneal malignant adenopathy.  Stable bilateral pulmonary metastases.  Stable couple small anterior peritoneal nodular densities in midline   upper abdomen, indeterminate, may represent small nodes or implants.    --- End of Report ---            SHIVA LOPEZ MD; Attending Radiologist  This document has been electronically signed. Mar 17 2024  6:33PM    < end of copied text >      TIME SPENT:   45 minutes spent evaluating/treating patient with medical issues. reviewing data/labs/imaging, discussing case with multidisciplinary team, discussing plan/goals of care with patient/family. Non-inclusive of procedure time. Date of entry of this note is equal to the date of services rendered.     Case Discussed with ICU Attending

## 2024-03-19 NOTE — CONSULT NOTE ADULT - ASSESSMENT
Pt is a 58 year old male with past medical history of HTN, CAD s/p stent in 2019, newly diagnosed T2DM A1C 8.3, and recent admission in feb 2024 s/p diagnosis of right-renal mass with pulmonary metastases    Here with:    #hematuria  #renal cell carcinoma w/ mets  #R nephrectomy  #CAD  #afib  #t2DM  #htn    -patient is s/p r nephrectomy. Surgical site looks clean no bleeding noted seeping through dressing. Area is painful to palpation. Patient has no other complaints at this time. Admitted to SICU for medical management.   -continue w/ home meds  -monitor h/h  -Patient has PCA for pain control. Monitor pain status throughout evening. Titrate to effect  -monitor uop  -patient receiving subq heparin for DVT ppx. continue scd's Pt is a 58 year old male with past medical history of HTN, CAD s/p stent in 2019, newly diagnosed T2DM A1C 8.3, and recent admission in feb 2024 s/p diagnosis of right-renal mass with pulmonary metastases    Here with:    #hematuria  #renal cell carcinoma w/ mets  #R nephrectomy  #CAD  #afib  #t2DM  #htn    -patient is s/p r nephrectomy. Surgical site looks clean no bleeding noted seeping through dressing. Area is painful to palpation. Patient has no other complaints at this time. Admitted to SICU for medical management.   -500mL LR bolus and LR @ 75/hr one time dose for maintenance  -continue w/ home meds  -monitor h/h  -Patient has PCA for pain control. Monitor pain status throughout evening. Titrate to effect  -monitor uop  -patient receiving subq heparin for DVT ppx. continue scd's

## 2024-03-20 ENCOUNTER — TRANSCRIPTION ENCOUNTER (OUTPATIENT)
Age: 59
End: 2024-03-20

## 2024-03-20 LAB
ALBUMIN SERPL ELPH-MCNC: 3 G/DL — LOW (ref 3.3–5)
ALP SERPL-CCNC: 55 U/L — SIGNIFICANT CHANGE UP (ref 40–120)
ALT FLD-CCNC: 98 U/L — HIGH (ref 12–78)
ANION GAP SERPL CALC-SCNC: 6 MMOL/L — SIGNIFICANT CHANGE UP (ref 5–17)
AST SERPL-CCNC: 91 U/L — HIGH (ref 15–37)
BASOPHILS # BLD AUTO: 0.01 K/UL — SIGNIFICANT CHANGE UP (ref 0–0.2)
BASOPHILS NFR BLD AUTO: 0.2 % — SIGNIFICANT CHANGE UP (ref 0–2)
BILIRUB SERPL-MCNC: 0.3 MG/DL — SIGNIFICANT CHANGE UP (ref 0.2–1.2)
BUN SERPL-MCNC: 18 MG/DL — SIGNIFICANT CHANGE UP (ref 7–23)
CALCIUM SERPL-MCNC: 8.6 MG/DL — SIGNIFICANT CHANGE UP (ref 8.5–10.1)
CHLORIDE SERPL-SCNC: 106 MMOL/L — SIGNIFICANT CHANGE UP (ref 96–108)
CO2 SERPL-SCNC: 26 MMOL/L — SIGNIFICANT CHANGE UP (ref 22–31)
CREAT SERPL-MCNC: 1.28 MG/DL — SIGNIFICANT CHANGE UP (ref 0.5–1.3)
EGFR: 64 ML/MIN/1.73M2 — SIGNIFICANT CHANGE UP
EOSINOPHIL # BLD AUTO: 0 K/UL — SIGNIFICANT CHANGE UP (ref 0–0.5)
EOSINOPHIL NFR BLD AUTO: 0 % — SIGNIFICANT CHANGE UP (ref 0–6)
GLUCOSE BLDC GLUCOMTR-MCNC: 111 MG/DL — HIGH (ref 70–99)
GLUCOSE BLDC GLUCOMTR-MCNC: 121 MG/DL — HIGH (ref 70–99)
GLUCOSE BLDC GLUCOMTR-MCNC: 142 MG/DL — HIGH (ref 70–99)
GLUCOSE SERPL-MCNC: 114 MG/DL — HIGH (ref 70–99)
HCT VFR BLD CALC: 29.1 % — LOW (ref 39–50)
HGB BLD-MCNC: 9.4 G/DL — LOW (ref 13–17)
IMM GRANULOCYTES NFR BLD AUTO: 0.5 % — SIGNIFICANT CHANGE UP (ref 0–0.9)
LYMPHOCYTES # BLD AUTO: 1.07 K/UL — SIGNIFICANT CHANGE UP (ref 1–3.3)
LYMPHOCYTES # BLD AUTO: 16.5 % — SIGNIFICANT CHANGE UP (ref 13–44)
MCHC RBC-ENTMCNC: 28.7 PG — SIGNIFICANT CHANGE UP (ref 27–34)
MCHC RBC-ENTMCNC: 32.3 GM/DL — SIGNIFICANT CHANGE UP (ref 32–36)
MCV RBC AUTO: 88.7 FL — SIGNIFICANT CHANGE UP (ref 80–100)
MONOCYTES # BLD AUTO: 0.5 K/UL — SIGNIFICANT CHANGE UP (ref 0–0.9)
MONOCYTES NFR BLD AUTO: 7.7 % — SIGNIFICANT CHANGE UP (ref 2–14)
NEUTROPHILS # BLD AUTO: 4.88 K/UL — SIGNIFICANT CHANGE UP (ref 1.8–7.4)
NEUTROPHILS NFR BLD AUTO: 75.1 % — SIGNIFICANT CHANGE UP (ref 43–77)
PLATELET # BLD AUTO: 212 K/UL — SIGNIFICANT CHANGE UP (ref 150–400)
POTASSIUM SERPL-MCNC: 3.9 MMOL/L — SIGNIFICANT CHANGE UP (ref 3.5–5.3)
POTASSIUM SERPL-SCNC: 3.9 MMOL/L — SIGNIFICANT CHANGE UP (ref 3.5–5.3)
PROT SERPL-MCNC: 6.2 GM/DL — SIGNIFICANT CHANGE UP (ref 6–8.3)
RBC # BLD: 3.28 M/UL — LOW (ref 4.2–5.8)
RBC # FLD: 13 % — SIGNIFICANT CHANGE UP (ref 10.3–14.5)
SODIUM SERPL-SCNC: 138 MMOL/L — SIGNIFICANT CHANGE UP (ref 135–145)
WBC # BLD: 6.49 K/UL — SIGNIFICANT CHANGE UP (ref 3.8–10.5)
WBC # FLD AUTO: 6.49 K/UL — SIGNIFICANT CHANGE UP (ref 3.8–10.5)

## 2024-03-20 PROCEDURE — 99233 SBSQ HOSP IP/OBS HIGH 50: CPT

## 2024-03-20 PROCEDURE — 99232 SBSQ HOSP IP/OBS MODERATE 35: CPT

## 2024-03-20 RX ORDER — SODIUM CHLORIDE 9 MG/ML
1000 INJECTION, SOLUTION INTRAVENOUS
Refills: 0 | Status: DISCONTINUED | OUTPATIENT
Start: 2024-03-20 | End: 2024-03-20

## 2024-03-20 RX ORDER — SODIUM CHLORIDE 9 MG/ML
500 INJECTION, SOLUTION INTRAVENOUS ONCE
Refills: 0 | Status: COMPLETED | OUTPATIENT
Start: 2024-03-20 | End: 2024-03-20

## 2024-03-20 RX ORDER — SIMETHICONE 80 MG/1
80 TABLET, CHEWABLE ORAL THREE TIMES A DAY
Refills: 0 | Status: DISCONTINUED | OUTPATIENT
Start: 2024-03-20 | End: 2024-03-22

## 2024-03-20 RX ADMIN — Medication 30 MILLILITER(S): at 15:55

## 2024-03-20 RX ADMIN — Medication 400 MILLIGRAM(S): at 06:09

## 2024-03-20 RX ADMIN — Medication 30 MILLILITER(S): at 07:58

## 2024-03-20 RX ADMIN — HEPARIN SODIUM 5000 UNIT(S): 5000 INJECTION INTRAVENOUS; SUBCUTANEOUS at 15:55

## 2024-03-20 RX ADMIN — POLYETHYLENE GLYCOL 3350 17 GRAM(S): 17 POWDER, FOR SOLUTION ORAL at 10:38

## 2024-03-20 RX ADMIN — SIMETHICONE 80 MILLIGRAM(S): 80 TABLET, CHEWABLE ORAL at 22:03

## 2024-03-20 RX ADMIN — HYDROMORPHONE HYDROCHLORIDE 30 MILLILITER(S): 2 INJECTION INTRAMUSCULAR; INTRAVENOUS; SUBCUTANEOUS at 20:18

## 2024-03-20 RX ADMIN — HEPARIN SODIUM 5000 UNIT(S): 5000 INJECTION INTRAVENOUS; SUBCUTANEOUS at 22:02

## 2024-03-20 RX ADMIN — Medication 3 UNIT(S): at 12:38

## 2024-03-20 RX ADMIN — SODIUM CHLORIDE 75 MILLILITER(S): 9 INJECTION, SOLUTION INTRAVENOUS at 00:27

## 2024-03-20 RX ADMIN — INSULIN GLARGINE 5 UNIT(S): 100 INJECTION, SOLUTION SUBCUTANEOUS at 22:03

## 2024-03-20 RX ADMIN — Medication 25 MILLIGRAM(S): at 10:38

## 2024-03-20 RX ADMIN — SODIUM CHLORIDE 500 MILLILITER(S): 9 INJECTION, SOLUTION INTRAVENOUS at 02:15

## 2024-03-20 RX ADMIN — Medication 3 UNIT(S): at 17:29

## 2024-03-20 RX ADMIN — HEPARIN SODIUM 5000 UNIT(S): 5000 INJECTION INTRAVENOUS; SUBCUTANEOUS at 06:14

## 2024-03-20 RX ADMIN — Medication 25 MILLIGRAM(S): at 22:03

## 2024-03-20 NOTE — DISCHARGE NOTE NURSING/CASE MANAGEMENT/SOCIAL WORK - PATIENT PORTAL LINK FT
You can access the FollowMyHealth Patient Portal offered by [facility] by registering at the following website: [email or web address]. By joining [employer]’s FollowMyHealth portal, you will also be able to view your health information using other applications (apps) compatible with our system.

## 2024-03-20 NOTE — PROGRESS NOTE ADULT - ASSESSMENT
58 y.o male with PMH CAD s/p stent in 2019, newly diagnosed T2DM A1C 8.3, and recent admission in feb 2024 s/p diagnosis of right-renal mass with pulmonary metastases admitted for:       Patient had cytoscopy, with right retrograde pyelogram and right ureteral stent placement on 2/4 with Dr. Ovalle. Scheduled for robot-assisted right radical nephrectomy and stent removal 3/19 but presented to hospital with increasing R-flank pain associated with nausea, vomiting, chills and bloody urine. In ER found to have lactate of 3.3, decreased to 1.7 s/p 2L bolus, no leukocytosis noted, CT neg for pyelo. Admitted for medical management prior to surgery 3/19.     # Acute Blood loss Anemia du eto Hematuria secondary to renal mass   POD#1 from r nephrectomy, stent removal paraoritc lymphnode dissection  -Pain control  -IS  -Encourage ambulation  TOV as per Uro  - c/w PRN morphine for pain control   - c/w PRN zofran for nausea/vomiting   - monitor urine output  -trend H&H      # CAD, A.fib   - s/p stent 2019  - ekg this admission NSR  - had  24hr ambulatory holter monitor e mailed back by wife  - c/w  metoprolol   -hematuria resolved restart AC when okay w Urology    # DM  -  lantus 5 with ISS  -Monitor BS cover with ISS       # HTN   - c/w metoprolol    # DVT ppx   - hold off AC in setting of hematuria - restart when okay w Uro  - SCDs, encourage ambulation     # GOC   - patient confirmed DNR/DNI  - agreeable to noninvasive airway

## 2024-03-20 NOTE — PROGRESS NOTE ADULT - ASSESSMENT
A/P: 58y Male POD 1 s/p robotic right nephrectomy, converted to open, with periaortic lymph node dissection, cystoscopy with ureteral stent removal, currently stable  continue PCA, IV tylenol  DVT prophylaxis/OOB  Ambulate as nickie  Encourage Incentive spirometry  Strict I&O's  AM labs  OK to downgrade to 1N  Above plan d/w Dr. Kaplan

## 2024-03-20 NOTE — PROGRESS NOTE ADULT - SUBJECTIVE AND OBJECTIVE BOX
Patient is a 58y old  Male who presents with a chief complaint of hematuria (20 Mar 2024 14:05)      BRIEF HOSPITAL COURSE: 59 yo male with PMHx CAD s/p stent 4/26/2019, kidney stone 10 years ago, HTN, HLD, new diagnosis of diabetes A1c 8.2 3/2024, Afib (not on AC d/t hematuria) Admitted in Feb 2024 for right flank pain and hematuria on CT abdomen found to have R renal mass, hydroureteronephrosis, Portacaval and right retroperitoneal lymphadenopathy and RLL 9mm pulmonary nodule. s/p cystoscopy right retrograde pyelogram and right ureteral stent placement by Dr. Ovalle on Feb 4, 2024 and planned for Robot-assisted right radical nephrectomy and right ureteral stent removal 3/19 now presents for hematuria and intractable right flank pain.    s/p robot assisted converted to open radical R nephrectomy, paraaortic LN dissection,  removal ureteral stent 3/19  EBL 150cc      3/20 pt seen this am, doing well. pain is present but controlled with dilaudid PCA. passing gas from below but c/o feeling bloated. denies nausea chest pain, sob.     PAST MEDICAL & SURGICAL HISTORY:  Pneumonia  Cough  Former smoker  Obesity (BMI 30-39.9)  HTN (hypertension)  HLD (hyperlipidemia)  CAD (coronary artery disease)  Diabetes  H/O pulmonary hypertension  Renal mass  Lung nodule  Stented coronary artery  Paroxysmal atrial fibrillation  Diverticulitis  Renal calculi  Subclinical hyperthyroidism  Metastasis to lung  History of appendectomy  1994  History of torn meniscus of left knee  1995  History of tonsillectomy and adenoidectomy  1968  S/P cystoscopy with ureteral stent placement  History of lung biopsy  S/P cardiac cath      Medications:    metoprolol tartrate 25 milliGRAM(s) Oral two times a day  HYDROmorphone PCA (1 mG/mL) 30 milliLiter(s) PCA Continuous PCA Continuous  melatonin 3 milliGRAM(s) Oral at bedtime PRN  morphine  - Injectable 2 milliGRAM(s) IV Push every 4 hours PRN  morphine  - Injectable 4 milliGRAM(s) IV Push every 4 hours PRN  ondansetron Injectable 4 milliGRAM(s) IV Push every 6 hours PRN  ondansetron Injectable 4 milliGRAM(s) IV Push every 6 hours PRN  prochlorperazine   Injectable 10 milliGRAM(s) IntraMuscular every 8 hours PRN  heparin   Injectable 5000 Unit(s) SubCutaneous three times a day  aluminum hydroxide/magnesium hydroxide/simethicone Suspension 30 milliLiter(s) Oral every 4 hours PRN  bisacodyl 5 milliGRAM(s) Oral daily PRN  polyethylene glycol 3350 17 Gram(s) Oral daily  senna 2 Tablet(s) Oral at bedtime  simethicone 80 milliGRAM(s) Chew three times a day  dextrose 50% Injectable 25 Gram(s) IV Push once  dextrose 50% Injectable 12.5 Gram(s) IV Push once  dextrose 50% Injectable 25 Gram(s) IV Push once  dextrose Oral Gel 15 Gram(s) Oral once PRN  glucagon  Injectable 1 milliGRAM(s) IntraMuscular once  insulin glargine Injectable (LANTUS) 5 Unit(s) SubCutaneous at bedtime  insulin lispro (ADMELOG) corrective regimen sliding scale   SubCutaneous three times a day before meals  insulin lispro Injectable (ADMELOG) 3 Unit(s) SubCutaneous three times a day before meal  dextrose 5%. 1000 milliLiter(s) IV Continuous <Continuous>  dextrose 5%. 1000 milliLiter(s) IV Continuous <Continuous>  lactated ringers Bolus 500 milliLiter(s) IV Bolus once  lactated ringers. 1000 milliLiter(s) IV Continuous <Continuous>  naloxone Injectable 0.4 milliGRAM(s) IV Push once  naloxone Injectable 0.1 milliGRAM(s) IV Push every 3 minutes PRN    ICU Vital Signs Last 24 Hrs  T(C): 36.8 (20 Mar 2024 13:30), Max: 36.8 (20 Mar 2024 08:49)  T(F): 98.2 (20 Mar 2024 13:30), Max: 98.2 (20 Mar 2024 08:49)  HR: 54 (20 Mar 2024 14:00) (54 - 72)  BP: 102/69 (20 Mar 2024 14:00) (96/68 - 125/80)  BP(mean): 80 (20 Mar 2024 14:00) (80 - 93)  ABP: --  ABP(mean): --  RR: 12 (20 Mar 2024 14:00) (8 - 24)  SpO2: 95% (20 Mar 2024 14:00) (95% - 100%)    O2 Parameters below as of 20 Mar 2024 04:04  Patient On (Oxygen Delivery Method): nasal cannula  O2 Flow (L/min): 2      I&O's Detail    19 Mar 2024 07:01  -  20 Mar 2024 07:00  --------------------------------------------------------  IN:    Continuous Bladder Irrigation (mL): 200 mL    IV PiggyBack: 100 mL    Lactated Ringers: 660 mL    Other (mL): 2400 mL    Sodium Chloride 0.9% Bolus: 500 mL  Total IN: 3860 mL    OUT:    Continuous Bladder Irrigation (mL): 200 mL    Indwelling Catheter - Urethral (mL): 500 mL    Other (mL): 425 mL  Total OUT: 1125 mL    Total NET: 2735 mL      LABS:                        9.4    6.49  )-----------( 212      ( 20 Mar 2024 04:57 )             29.1     03-20    138  |  106  |  18  ----------------------------<  114<H>  3.9   |  26  |  1.28    Ca    8.6      20 Mar 2024 05:03    TPro  6.2  /  Alb  3.0<L>  /  TBili  0.3  /  DBili  x   /  AST  91<H>  /  ALT  98<H>  /  AlkPhos  55  03-20    CAPILLARY BLOOD GLUCOSE  POCT Blood Glucose.: 142 mg/dL (20 Mar 2024 12:34)  Urinalysis Basic - ( 20 Mar 2024 05:03 )    Color: x / Appearance: x / SG: x / pH: x  Gluc: 114 mg/dL / Ketone: x  / Bili: x / Urobili: x   Blood: x / Protein: x / Nitrite: x   Leuk Esterase: x / RBC: x / WBC x   Sq Epi: x / Non Sq Epi: x / Bacteria: x      CULTURES:  Culture Results:   <10,000 CFU/mL Normal Urogenital Carito (03-17 @ 18:54)  Culture Results:   No growth at 48 Hours (03-17 @ 16:39)  Culture Results:   No growth at 48 Hours (03-17 @ 16:39)      Physical Examination:    General: No acute distress.    HEENT: Pupils equal, reactive to light.  Symmetric.  PULM: Clear to auscultation bilaterall  CVS: Regular rate and rhythm, no murmurs  ABD: Soft, tender by incision, normoactive BS. large R sided flank dressing,   EXT: No LE edema, nontender  SKIN: Warm and well perfused  NEURO: Alert, oriented, interactive,    DEVICES:   chapman    RADIOLOGY:

## 2024-03-20 NOTE — PROGRESS NOTE ADULT - SUBJECTIVE AND OBJECTIVE BOX
Status Post:  RA right radical nephrectomy converted to open    Post Operative Day #: 1    SUBJECTIVE:    Flatus: Y             Bowel Movement: N  Pain (0-10): 7           Pain Control Adequate: Y with PCA pump  Nausea: N          Vomiting: N  Diarrhea/Constipation?  N  Chest Pain: N   SOB:  N    MEDICATIONS  (STANDING):  dextrose 5%. 1000 milliLiter(s) (100 mL/Hr) IV Continuous <Continuous>  dextrose 5%. 1000 milliLiter(s) (50 mL/Hr) IV Continuous <Continuous>  dextrose 50% Injectable 25 Gram(s) IV Push once  dextrose 50% Injectable 12.5 Gram(s) IV Push once  dextrose 50% Injectable 25 Gram(s) IV Push once  glucagon  Injectable 1 milliGRAM(s) IntraMuscular once  heparin   Injectable 5000 Unit(s) SubCutaneous three times a day  HYDROmorphone PCA (1 mG/mL) 30 milliLiter(s) PCA Continuous PCA Continuous  insulin glargine Injectable (LANTUS) 5 Unit(s) SubCutaneous at bedtime  insulin lispro (ADMELOG) corrective regimen sliding scale   SubCutaneous three times a day before meals  insulin lispro Injectable (ADMELOG) 3 Unit(s) SubCutaneous three times a day before meals  lactated ringers Bolus 500 milliLiter(s) IV Bolus once  lactated ringers. 1000 milliLiter(s) (125 mL/Hr) IV Continuous <Continuous>  metoprolol tartrate 25 milliGRAM(s) Oral two times a day  naloxone Injectable 0.4 milliGRAM(s) IV Push once  polyethylene glycol 3350 17 Gram(s) Oral daily  senna 2 Tablet(s) Oral at bedtime  simethicone 80 milliGRAM(s) Chew three times a day    MEDICATIONS  (PRN):  aluminum hydroxide/magnesium hydroxide/simethicone Suspension 30 milliLiter(s) Oral every 4 hours PRN Dyspepsia  bisacodyl 5 milliGRAM(s) Oral daily PRN Constipation  dextrose Oral Gel 15 Gram(s) Oral once PRN Blood Glucose LESS THAN 70 milliGRAM(s)/deciliter  melatonin 3 milliGRAM(s) Oral at bedtime PRN Insomnia  morphine  - Injectable 2 milliGRAM(s) IV Push every 4 hours PRN Moderate Pain (4 - 6)  morphine  - Injectable 4 milliGRAM(s) IV Push every 4 hours PRN Severe Pain (7 - 10)  naloxone Injectable 0.1 milliGRAM(s) IV Push every 3 minutes PRN For ANY of the following changes in patient status:  A. RR LESS THAN 10 breaths per minute, B. Oxygen saturation LESS THAN 90%, C. Sedation score of 6  ondansetron Injectable 4 milliGRAM(s) IV Push every 6 hours PRN Nausea  ondansetron Injectable 4 milliGRAM(s) IV Push every 6 hours PRN Nausea and/or Vomiting  prochlorperazine   Injectable 10 milliGRAM(s) IntraMuscular every 8 hours PRN nausea      OBJECTIVE:  Vital Signs Last 24 Hrs  T(C): 36.8 (20 Mar 2024 13:30), Max: 36.8 (20 Mar 2024 08:49)  T(F): 98.2 (20 Mar 2024 13:30), Max: 98.2 (20 Mar 2024 08:49)  HR: 54 (20 Mar 2024 14:00) (54 - 72)  BP: 102/69 (20 Mar 2024 14:00) (96/68 - 125/80)  BP(mean): 80 (20 Mar 2024 14:00) (80 - 93)  RR: 12 (20 Mar 2024 14:00) (8 - 24)  SpO2: 95% (20 Mar 2024 14:00) (95% - 100%)    Parameters below as of 20 Mar 2024 04:04  Patient On (Oxygen Delivery Method): nasal cannula  O2 Flow (L/min): 2    I&O's Detail    19 Mar 2024 07:01  -  20 Mar 2024 07:00  --------------------------------------------------------  IN:    Continuous Bladder Irrigation (mL): 200 mL    IV PiggyBack: 100 mL    Lactated Ringers: 660 mL    Other (mL): 2400 mL    Sodium Chloride 0.9% Bolus: 500 mL  Total IN: 3860 mL    OUT:    Continuous Bladder Irrigation (mL): 200 mL    Indwelling Catheter - Urethral (mL): 500 mL    Other (mL): 425 mL  Total OUT: 1125 mL    Total NET: 2735 mL      20 Mar 2024 07:01  -  20 Mar 2024 16:37  --------------------------------------------------------  IN:  Total IN: 0 mL    OUT:    Indwelling Catheter - Urethral (mL): 500 mL  Total OUT: 500 mL    Total NET: -500 mL          PHYSICAL EXAM:  Gen: NAD, comfortable in bed  Lungs: CTAB  Heart: S1/S2, RRR  Abd: Soft, ND, + incisional tenderness, no rebound no guarding. +BS. incisions c/d/i  : chapman in place with clear urine  Neuro: A&Ox3  Extremities: venodynes in place. no edema

## 2024-03-20 NOTE — PROGRESS NOTE ADULT - SUBJECTIVE AND OBJECTIVE BOX
WHITE, BHANU  58y  Male      Patient is a 58y old  Male who presents with a chief complaint of hematuria (19 Mar 2024 21:54)      INTERVAL HPI/OVERNIGHT EVENTS:  #POD 1 r nephrectomy, stent removal  Seen and examine sistting in chair comfortably. Wife and his  are sitting bedside  Complains of some abd pain, passing gas but no BM  Chapman draining clear yellow urine      REVIEW OF SYSTEMS:  12 systems reviewed negative except above    T(C): 36.8 (03-20-24 @ 13:30), Max: 36.8 (03-20-24 @ 08:49)  HR: 59 (03-20-24 @ 12:00) (56 - 69)  BP: 100/70 (03-20-24 @ 10:00) (96/68 - 125/80)  RR: 13 (03-20-24 @ 12:00) (8 - 24)  SpO2: 100% (03-20-24 @ 12:00) (96% - 100%)  Wt(kg): --Vital Signs Last 24 Hrs  T(C): 36.8 (20 Mar 2024 13:30), Max: 36.8 (20 Mar 2024 08:49)  T(F): 98.2 (20 Mar 2024 13:30), Max: 98.2 (20 Mar 2024 08:49)  HR: 59 (20 Mar 2024 12:00) (56 - 69)  BP: 100/70 (20 Mar 2024 10:00) (96/68 - 125/80)  BP(mean): 81 (20 Mar 2024 10:00) (80 - 93)  RR: 13 (20 Mar 2024 12:00) (8 - 24)  SpO2: 100% (20 Mar 2024 12:00) (96% - 100%)    Parameters below as of 20 Mar 2024 04:04  Patient On (Oxygen Delivery Method): nasal cannula  O2 Flow (L/min): 2      PHYSICAL EXAM:  GENERAL: NAD, well-groomed, well-developed  HEAD:  Atraumatic, Normocephalic  EYES: EOMI, PERRLA, conjunctiva and sclera clear  ENMT: No tonsillar erythema, exudates, or enlargement; Moist mucous membranes, Good dentition, No lesions  NECK: Supple, No JVD, Normal thyroid  NERVOUS SYSTEM:  Alert & Oriented X3, Good concentration; Motor Strength 5/5 B/L upper and lower extremities; DTRs 2+ intact and symmetric  CHEST/LUNG: Clear to percussion bilaterally; No rales, rhonchi, wheezing, or rubs  HEART: Regular rate and rhythm; No murmurs, rubs, or gallops  ABDOMEN:R flank dressing c/d/i, chapman in place draining clear yellow urine  EXTREMITIES:  2+ Peripheral Pulses, No clubbing, cyanosis, or edema  LYMPH: No lymphadenopathy noted  SKIN: No rashes or lesions    Consultant(s) Notes Reviewed:  [x ] YES  [ ] NO  Care Discussed with Consultants/Other Providers [ x] YES  [ ] NO    LABS:                        9.4    6.49  )-----------( 212      ( 20 Mar 2024 04:57 )             29.1     03-20    138  |  106  |  18  ----------------------------<  114<H>  3.9   |  26  |  1.28    Ca    8.6      20 Mar 2024 05:03    TPro  6.2  /  Alb  3.0<L>  /  TBili  0.3  /  DBili  x   /  AST  91<H>  /  ALT  98<H>  /  AlkPhos  55  03-20      Urinalysis Basic - ( 20 Mar 2024 05:03 )    Color: x / Appearance: x / SG: x / pH: x  Gluc: 114 mg/dL / Ketone: x  / Bili: x / Urobili: x   Blood: x / Protein: x / Nitrite: x   Leuk Esterase: x / RBC: x / WBC x   Sq Epi: x / Non Sq Epi: x / Bacteria: x      CAPILLARY BLOOD GLUCOSE      POCT Blood Glucose.: 142 mg/dL (20 Mar 2024 12:34)  POCT Blood Glucose.: 151 mg/dL (19 Mar 2024 22:31)  POCT Blood Glucose.: 147 mg/dL (19 Mar 2024 21:10)  POCT Blood Glucose.: 195 mg/dL (19 Mar 2024 17:14)        Urinalysis Basic - ( 20 Mar 2024 05:03 )    Color: x / Appearance: x / SG: x / pH: x  Gluc: 114 mg/dL / Ketone: x  / Bili: x / Urobili: x   Blood: x / Protein: x / Nitrite: x   Leuk Esterase: x / RBC: x / WBC x   Sq Epi: x / Non Sq Epi: x / Bacteria: x        RADIOLOGY & ADDITIONAL TESTS:    Imaging Personally Reviewed:  [ ] YES  [ ] NO    HEALTH ISSUES - PROBLEM Dx:

## 2024-03-20 NOTE — PROGRESS NOTE ADULT - ASSESSMENT
ASSESSMENT  57 yo male with PMHx CAD s/p stent 4/26/2019, kidney stone 10 years ago, HTN, HLD, new diagnosis of diabetes A1c 8.2 3/2024, Afib (not on AC d/t hematuria)   Recent admission in Feb 2024 for right flank pain and hematuria on CT abdomen found to have R renal mass, hydroureteronephrosis, Portacaval and right RP lymphadenopathy and RLL 9mm pulmonary nodule. s/p cystoscopy right retrograde pyelogram and right ureteral stent placement by Dr. Ovalle on Feb 4, 2024  CT chest revealinglobulated PRISCILLA nodule. Pt underwent lung biopsy with IR 2/22 pathology revealing clear cell carcinoma consistent with metastatic renal cell carcinoma.       s/p robot assisted converted to open radical R nephrectomy, paraaortic LN dissection,  removal ureteral stent 3/19  EBL 150cc    PLAN    Neuro: AAOx 3. pain control prn dilaudid PCA. tylenol  CV: Normotensive. sinus rhythm on monitor. hx of Afib on lopressor bid for rate control. pt states he was never started on eliquis d/t hematuria. cardiologist: Dr. Minor.   Pulm: on 2L sating 95-97%. titrate to maintain O2 sat > 94%.  GI: PO diet. PPI. bowel regimen prn add simethicone  Nephro: UOP low overnight, cont LR @125 for now. monitor I & Os. Trend renal fxn. chapman as per surgery.  Endo: BGMs ac hs. ISS.   ID: s/p post op cefazolin. trend WBC. monitor temps.    Heme: DVT ppx - hep sq. SCDs.   PT eval    Dispo: SICU. pain control. IV fluids. PT    Will discuss with Dr. Peters

## 2024-03-21 LAB
ANION GAP SERPL CALC-SCNC: 8 MMOL/L — SIGNIFICANT CHANGE UP (ref 5–17)
BASOPHILS # BLD AUTO: 0.02 K/UL — SIGNIFICANT CHANGE UP (ref 0–0.2)
BASOPHILS NFR BLD AUTO: 0.4 % — SIGNIFICANT CHANGE UP (ref 0–2)
BUN SERPL-MCNC: 13 MG/DL — SIGNIFICANT CHANGE UP (ref 7–23)
CALCIUM SERPL-MCNC: 8.8 MG/DL — SIGNIFICANT CHANGE UP (ref 8.5–10.1)
CHLORIDE SERPL-SCNC: 102 MMOL/L — SIGNIFICANT CHANGE UP (ref 96–108)
CO2 SERPL-SCNC: 25 MMOL/L — SIGNIFICANT CHANGE UP (ref 22–31)
CREAT SERPL-MCNC: 1.38 MG/DL — HIGH (ref 0.5–1.3)
EGFR: 59 ML/MIN/1.73M2 — LOW
EOSINOPHIL # BLD AUTO: 0.03 K/UL — SIGNIFICANT CHANGE UP (ref 0–0.5)
EOSINOPHIL NFR BLD AUTO: 0.5 % — SIGNIFICANT CHANGE UP (ref 0–6)
GLUCOSE BLDC GLUCOMTR-MCNC: 119 MG/DL — HIGH (ref 70–99)
GLUCOSE BLDC GLUCOMTR-MCNC: 125 MG/DL — HIGH (ref 70–99)
GLUCOSE BLDC GLUCOMTR-MCNC: 133 MG/DL — HIGH (ref 70–99)
GLUCOSE BLDC GLUCOMTR-MCNC: 153 MG/DL — HIGH (ref 70–99)
GLUCOSE SERPL-MCNC: 134 MG/DL — HIGH (ref 70–99)
HCT VFR BLD CALC: 30.2 % — LOW (ref 39–50)
HGB BLD-MCNC: 9.8 G/DL — LOW (ref 13–17)
IMM GRANULOCYTES NFR BLD AUTO: 0.5 % — SIGNIFICANT CHANGE UP (ref 0–0.9)
LYMPHOCYTES # BLD AUTO: 0.94 K/UL — LOW (ref 1–3.3)
LYMPHOCYTES # BLD AUTO: 16.5 % — SIGNIFICANT CHANGE UP (ref 13–44)
MAGNESIUM SERPL-MCNC: 2 MG/DL — SIGNIFICANT CHANGE UP (ref 1.6–2.6)
MCHC RBC-ENTMCNC: 28.7 PG — SIGNIFICANT CHANGE UP (ref 27–34)
MCHC RBC-ENTMCNC: 32.5 GM/DL — SIGNIFICANT CHANGE UP (ref 32–36)
MCV RBC AUTO: 88.3 FL — SIGNIFICANT CHANGE UP (ref 80–100)
MONOCYTES # BLD AUTO: 0.33 K/UL — SIGNIFICANT CHANGE UP (ref 0–0.9)
MONOCYTES NFR BLD AUTO: 5.8 % — SIGNIFICANT CHANGE UP (ref 2–14)
NEUTROPHILS # BLD AUTO: 4.33 K/UL — SIGNIFICANT CHANGE UP (ref 1.8–7.4)
NEUTROPHILS NFR BLD AUTO: 76.3 % — SIGNIFICANT CHANGE UP (ref 43–77)
PHOSPHATE SERPL-MCNC: 2.6 MG/DL — SIGNIFICANT CHANGE UP (ref 2.5–4.5)
PLATELET # BLD AUTO: 239 K/UL — SIGNIFICANT CHANGE UP (ref 150–400)
POTASSIUM SERPL-MCNC: 3.6 MMOL/L — SIGNIFICANT CHANGE UP (ref 3.5–5.3)
POTASSIUM SERPL-SCNC: 3.6 MMOL/L — SIGNIFICANT CHANGE UP (ref 3.5–5.3)
RBC # BLD: 3.42 M/UL — LOW (ref 4.2–5.8)
RBC # FLD: 12.9 % — SIGNIFICANT CHANGE UP (ref 10.3–14.5)
SODIUM SERPL-SCNC: 135 MMOL/L — SIGNIFICANT CHANGE UP (ref 135–145)
WBC # BLD: 5.68 K/UL — SIGNIFICANT CHANGE UP (ref 3.8–10.5)
WBC # FLD AUTO: 5.68 K/UL — SIGNIFICANT CHANGE UP (ref 3.8–10.5)

## 2024-03-21 PROCEDURE — 99233 SBSQ HOSP IP/OBS HIGH 50: CPT

## 2024-03-21 RX ORDER — ACETAMINOPHEN 500 MG
650 TABLET ORAL EVERY 6 HOURS
Refills: 0 | Status: DISCONTINUED | OUTPATIENT
Start: 2024-03-21 | End: 2024-03-22

## 2024-03-21 RX ORDER — SODIUM CHLORIDE 9 MG/ML
1000 INJECTION, SOLUTION INTRAVENOUS
Refills: 0 | Status: DISCONTINUED | OUTPATIENT
Start: 2024-03-21 | End: 2024-03-22

## 2024-03-21 RX ORDER — ACETAMINOPHEN 500 MG
1000 TABLET ORAL ONCE
Refills: 0 | Status: DISCONTINUED | OUTPATIENT
Start: 2024-03-21 | End: 2024-03-22

## 2024-03-21 RX ADMIN — SIMETHICONE 80 MILLIGRAM(S): 80 TABLET, CHEWABLE ORAL at 13:03

## 2024-03-21 RX ADMIN — Medication 3 UNIT(S): at 12:07

## 2024-03-21 RX ADMIN — Medication 25 MILLIGRAM(S): at 21:58

## 2024-03-21 RX ADMIN — POLYETHYLENE GLYCOL 3350 17 GRAM(S): 17 POWDER, FOR SOLUTION ORAL at 13:03

## 2024-03-21 RX ADMIN — Medication 25 MILLIGRAM(S): at 07:58

## 2024-03-21 RX ADMIN — SENNA PLUS 2 TABLET(S): 8.6 TABLET ORAL at 21:58

## 2024-03-21 RX ADMIN — HYDROMORPHONE HYDROCHLORIDE 30 MILLILITER(S): 2 INJECTION INTRAMUSCULAR; INTRAVENOUS; SUBCUTANEOUS at 14:54

## 2024-03-21 RX ADMIN — HEPARIN SODIUM 5000 UNIT(S): 5000 INJECTION INTRAVENOUS; SUBCUTANEOUS at 05:31

## 2024-03-21 RX ADMIN — INSULIN GLARGINE 5 UNIT(S): 100 INJECTION, SOLUTION SUBCUTANEOUS at 22:00

## 2024-03-21 RX ADMIN — Medication 3 UNIT(S): at 07:59

## 2024-03-21 RX ADMIN — HEPARIN SODIUM 5000 UNIT(S): 5000 INJECTION INTRAVENOUS; SUBCUTANEOUS at 21:59

## 2024-03-21 RX ADMIN — SIMETHICONE 80 MILLIGRAM(S): 80 TABLET, CHEWABLE ORAL at 05:31

## 2024-03-21 RX ADMIN — SIMETHICONE 80 MILLIGRAM(S): 80 TABLET, CHEWABLE ORAL at 21:59

## 2024-03-21 NOTE — PROGRESS NOTE ADULT - SUBJECTIVE AND OBJECTIVE BOX
INTERVAL HPI/OVERNIGHT EVENTS:  Pt  seen and examined in SICU. States chapman catheter is uncomfortable   Ambulating. Tolerating regular diet +flatus/BM. Denied nausea vomiting  Pain improving however reports still using PCA as needed.    MEDICATIONS  (STANDING):  acetaminophen   IVPB .. 1000 milliGRAM(s) IV Intermittent once  dextrose 5%. 1000 milliLiter(s) (100 mL/Hr) IV Continuous <Continuous>  dextrose 5%. 1000 milliLiter(s) (50 mL/Hr) IV Continuous <Continuous>  dextrose 50% Injectable 25 Gram(s) IV Push once  dextrose 50% Injectable 12.5 Gram(s) IV Push once  dextrose 50% Injectable 25 Gram(s) IV Push once  glucagon  Injectable 1 milliGRAM(s) IntraMuscular once  heparin   Injectable 5000 Unit(s) SubCutaneous three times a day  HYDROmorphone PCA (1 mG/mL) 30 milliLiter(s) PCA Continuous PCA Continuous  insulin glargine Injectable (LANTUS) 5 Unit(s) SubCutaneous at bedtime  insulin lispro (ADMELOG) corrective regimen sliding scale   SubCutaneous three times a day before meals  insulin lispro Injectable (ADMELOG) 3 Unit(s) SubCutaneous three times a day before meals  lactated ringers. 1000 milliLiter(s) (75 mL/Hr) IV Continuous <Continuous>  metoprolol tartrate 25 milliGRAM(s) Oral two times a day  naloxone Injectable 0.4 milliGRAM(s) IV Push once  polyethylene glycol 3350 17 Gram(s) Oral daily  senna 2 Tablet(s) Oral at bedtime  simethicone 80 milliGRAM(s) Chew three times a day    MEDICATIONS  (PRN):  acetaminophen     Tablet .. 650 milliGRAM(s) Oral every 6 hours PRN Temp greater or equal to 38C (100.4F), Mild Pain (1 - 3)  aluminum hydroxide/magnesium hydroxide/simethicone Suspension 30 milliLiter(s) Oral every 4 hours PRN Dyspepsia  bisacodyl 5 milliGRAM(s) Oral daily PRN Constipation  dextrose Oral Gel 15 Gram(s) Oral once PRN Blood Glucose LESS THAN 70 milliGRAM(s)/deciliter  melatonin 3 milliGRAM(s) Oral at bedtime PRN Insomnia  morphine  - Injectable 2 milliGRAM(s) IV Push every 4 hours PRN Moderate Pain (4 - 6)  morphine  - Injectable 4 milliGRAM(s) IV Push every 4 hours PRN Severe Pain (7 - 10)  naloxone Injectable 0.1 milliGRAM(s) IV Push every 3 minutes PRN For ANY of the following changes in patient status:  A. RR LESS THAN 10 breaths per minute, B. Oxygen saturation LESS THAN 90%, C. Sedation score of 6  ondansetron Injectable 4 milliGRAM(s) IV Push every 6 hours PRN Nausea  ondansetron Injectable 4 milliGRAM(s) IV Push every 6 hours PRN Nausea and/or Vomiting  prochlorperazine   Injectable 10 milliGRAM(s) IntraMuscular every 8 hours PRN nausea    Vital Signs Last 24 Hrs  T(C): 37.6 (21 Mar 2024 05:39), Max: 37.6 (21 Mar 2024 05:39)  T(F): 99.6 (21 Mar 2024 05:39), Max: 99.6 (21 Mar 2024 05:39)  HR: 73 (21 Mar 2024 06:00) (54 - 89)  BP: 109/73 (21 Mar 2024 06:00) (100/70 - 132/75)  BP(mean): 85 (21 Mar 2024 06:00) (77 - 94)  RR: 16 (21 Mar 2024 06:00) (12 - 23)  SpO2: 97% (21 Mar 2024 06:00) (92% - 100%)    Parameters below as of 21 Mar 2024 04:00  Patient On (Oxygen Delivery Method): room air    Physical:  General: A&Ox3. NAD.   Chest: respiration unlabored  Abdomen: Soft nondistended, appropriate incisional tenderness. Incisions with staples in place. No active drainage or bleeding. No surrounding erythema. No guarding.  Ext: no calf tenderness, no edema  : chapman in place with clear urine    I&O's Detail  20 Mar 2024 07:01  -  21 Mar 2024 07:00  --------------------------------------------------------  IN:    Lactated Ringers: 1499 mL    Lactated Ringers: 310 mL  Total IN: 1809 mL    OUT:    Indwelling Catheter - Urethral (mL): 3890 mL  Total OUT: 3890 mL    Total NET: -2081 mL    LABS:                        9.8    5.68  )-----------( 239      ( 21 Mar 2024 05:40 )             30.2             03-21    135  |  102  |  13  ----------------------------<  134<H>  3.6   |  25  |  1.38<H>    Ca    8.8      21 Mar 2024 05:40  Phos  2.6     03-21  Mg     2.0     03-21    TPro  6.2  /  Alb  3.0<L>  /  TBili  0.3  /  DBili  x   /  AST  91<H>  /  ALT  98<H>  /  AlkPhos  55  03-20

## 2024-03-21 NOTE — PROGRESS NOTE ADULT - ASSESSMENT
58M POD#2 s/p robotic right nephrectomy, converted to open, with periaortic lymph node dissection, cystoscopy with ureteral stent removal.  currently stable  Cr 1.38    Diet as tolerated  Pain control PRN, wean off PCA  Grace catheter  Daily labs  Ok to downgrade to 1N  DVT prophylaxis/OOB  Encourage Incentive spirometry

## 2024-03-21 NOTE — PROGRESS NOTE ADULT - ASSESSMENT
58 y.o male with PMH CAD s/p stent in 2019, newly diagnosed T2DM A1C 8.3, and recent admission in feb 2024 s/p diagnosis of right-renal mass with pulmonary metastases admitted for:       Patient had cytoscopy, with right retrograde pyelogram and right ureteral stent placement on 2/4 with Dr. Ovalle. Scheduled for robot-assisted right radical nephrectomy and stent removal 3/19 but presented to hospital with increasing R-flank pain associated with nausea, vomiting, chills and bloody urine. In ER found to have lactate of 3.3, decreased to 1.7 s/p 2L bolus, no leukocytosis noted, CT neg for pyelo. Admitted for medical management prior to surgery 3/19.     # Acute Blood loss Anemia du eto Hematuria secondary to renal mass   POD#2 from r nephrectomy, stent removal paraoritc lymphnode dissection  -Pain control  -IS  -Encourage ambulation  TOV as per Uro  - c/w PRN morphine for pain control   - c/w PRN zofran for nausea/vomiting   - monitor urine output  -trend H&H      # CAD, A.fib   - s/p stent 2019  - ekg this admission NSR  - had  24hr ambulatory holter monitor e mailed back by wife  - c/w  metoprolol   -hematuria resolved restart AC when okay w Urology    # DM  -  lantus 5 with ISS  -Monitor BS cover with ISS       # HTN   - c/w metoprolol    # DVT ppx   - hold off AC in setting of hematuria - restart when okay w Uro  - SCDs, encourage ambulation     # GOC   - patient confirmed DNR/DNI  - agreeable to noninvasive airway

## 2024-03-21 NOTE — PROGRESS NOTE ADULT - SUBJECTIVE AND OBJECTIVE BOX
WHITE, BHANU  58y  Male      Patient is a 58y old  Male who presents with a chief complaint of hematuria (21 Mar 2024 11:40)      INTERVAL HPI/OVERNIGHT EVENTS:  Seen and examined, sitting in chair comfortably  Chapman in place, draining clear yellow urine      REVIEW OF SYSTEMS:  CONSTITUTIONAL: No fever, weight loss, or fatigue  EYES: No eye pain, visual disturbances, or discharge  ENMT:  No difficulty hearing, tinnitus, vertigo; No sinus or throat pain  NECK: No pain or stiffness  BREASTS: No pain, masses, or nipple discharge  RESPIRATORY: No cough, wheezing, chills or hemoptysis; No shortness of breath  CARDIOVASCULAR: No chest pain, palpitations, dizziness, or leg swelling  GASTROINTESTINAL: No abdominal or epigastric pain. No nausea, vomiting, or hematemesis; No diarrhea or constipation. No melena or hematochezia.  GENITOURINARY: No dysuria, frequency, hematuria, or incontinence  NEUROLOGICAL: No headaches, memory loss, loss of strength, numbness, or tremors  SKIN: No itching, burning, rashes, or lesions   LYMPH NODES: No enlarged glands  ENDOCRINE: No heat or cold intolerance; No hair loss  MUSCULOSKELETAL: No joint pain or swelling; No muscle, back, or extremity pain  PSYCHIATRIC: No depression, anxiety, mood swings, or difficulty sleeping  HEME/LYMPH: No easy bruising, or bleeding gums  ALLERY AND IMMUNOLOGIC: No hives or eczema    T(C): 36.4 (03-21-24 @ 13:40), Max: 37.6 (03-21-24 @ 05:39)  HR: 72 (03-21-24 @ 12:00) (54 - 89)  BP: 124/81 (03-21-24 @ 12:00) (102/69 - 132/75)  RR: 18 (03-21-24 @ 12:00) (12 - 22)  SpO2: 97% (03-21-24 @ 12:00) (92% - 98%)  Wt(kg): --Vital Signs Last 24 Hrs  T(C): 36.4 (21 Mar 2024 13:40), Max: 37.6 (21 Mar 2024 05:39)  T(F): 97.6 (21 Mar 2024 13:40), Max: 99.6 (21 Mar 2024 05:39)  HR: 72 (21 Mar 2024 12:00) (54 - 89)  BP: 124/81 (21 Mar 2024 12:00) (102/69 - 132/75)  BP(mean): 96 (21 Mar 2024 12:00) (77 - 96)  RR: 18 (21 Mar 2024 12:00) (12 - 22)  SpO2: 97% (21 Mar 2024 12:00) (92% - 98%)    Parameters below as of 21 Mar 2024 12:00  Patient On (Oxygen Delivery Method): room air        PHYSICAL EXAM:  GENERAL: NAD, well-groomed, well-developed  HEAD:  Atraumatic, Normocephalic  EYES: EOMI, PERRLA, conjunctiva and sclera clear  ENMT: No tonsillar erythema, exudates, or enlargement; Moist mucous membranes, Good dentition, No lesions  NECK: Supple, No JVD, Normal thyroid  NERVOUS SYSTEM:  Alert & Oriented X3, Good concentration; Motor Strength 5/5 B/L upper and lower extremities; DTRs 2+ intact and symmetric  CHEST/LUNG: Clear to percussion bilaterally; No rales, rhonchi, wheezing, or rubs  HEART: Regular rate and rhythm; No murmurs, rubs, or gallops  ABDOMEN:R flank dressing c/d/i, chapman in place draining clear yellow urine  EXTREMITIES:  2+ Peripheral Pulses, No clubbing, cyanosis, or edema  LYMPH: No lymphadenopathy noted  SKIN: No rashes or lesions    Consultant(s) Notes Reviewed:  [x ] YES  [ ] NO  Care Discussed with Consultants/Other Providers [ x] YES  [ ] NO    LABS:                        9.8    5.68  )-----------( 239      ( 21 Mar 2024 05:40 )             30.2     03-21    135  |  102  |  13  ----------------------------<  134<H>  3.6   |  25  |  1.38<H>    Ca    8.8      21 Mar 2024 05:40  Phos  2.6     03-21  Mg     2.0     03-21    TPro  6.2  /  Alb  3.0<L>  /  TBili  0.3  /  DBili  x   /  AST  91<H>  /  ALT  98<H>  /  AlkPhos  55  03-20      Urinalysis Basic - ( 21 Mar 2024 05:40 )    Color: x / Appearance: x / SG: x / pH: x  Gluc: 134 mg/dL / Ketone: x  / Bili: x / Urobili: x   Blood: x / Protein: x / Nitrite: x   Leuk Esterase: x / RBC: x / WBC x   Sq Epi: x / Non Sq Epi: x / Bacteria: x      CAPILLARY BLOOD GLUCOSE      POCT Blood Glucose.: 119 mg/dL (21 Mar 2024 11:42)  POCT Blood Glucose.: 133 mg/dL (21 Mar 2024 07:50)  POCT Blood Glucose.: 111 mg/dL (20 Mar 2024 21:57)  POCT Blood Glucose.: 121 mg/dL (20 Mar 2024 17:28)        Urinalysis Basic - ( 21 Mar 2024 05:40 )    Color: x / Appearance: x / SG: x / pH: x  Gluc: 134 mg/dL / Ketone: x  / Bili: x / Urobili: x   Blood: x / Protein: x / Nitrite: x   Leuk Esterase: x / RBC: x / WBC x   Sq Epi: x / Non Sq Epi: x / Bacteria: x        RADIOLOGY & ADDITIONAL TESTS:    Imaging Personally Reviewed:  [ ] YES  [ ] NO    HEALTH ISSUES - PROBLEM Dx:

## 2024-03-21 NOTE — PROGRESS NOTE ADULT - ASSESSMENT
ASSESSMENT  59 yo male with PMHx CAD s/p stent 4/26/2019, kidney stone 10 years ago, HTN, HLD, new diagnosis of diabetes A1c 8.2 3/2024, Afib (not on AC d/t hematuria)   Recent admission in Feb 2024 for right flank pain and hematuria on CT abdomen found to have R renal mass, hydroureteronephrosis, Portacaval and right RP lymphadenopathy and RLL 9mm pulmonary nodule. s/p cystoscopy right retrograde pyelogram and right ureteral stent placement by Dr. Ovalle on Feb 4, 2024  CT chest revealing lobulated PRISCILLA nodule. Pt underwent lung biopsy with IR 2/22 pathology revealing clear cell carcinoma consistent with metastatic renal cell carcinoma.       s/p robot assisted converted to open radical R nephrectomy, paraaortic LN dissection,  removal ureteral stent 3/19  EBL 150cc    PLAN    Neuro: AAOx 3. pain control with dilaudid PCA. change to prns. tylenol  CV: Normotensive. sinus rhythm on monitor. hx of Afib on lopressor bid for rate control. pt states he was never started on eliquis d/t hematuria and that he has an appt with Dr. Mai next week to discuss starting AC.,. cardiologist: Dr. Minor.   Pulm: on room air. encourage incentive spirometer  GI: PO diet. PPI. bowel regimen prn add simethicone  Nephro: UOP improving. Cr trending up slightly 1.2 - 1.3.  cont LR @75 for now. monitor I & Os. Trend renal fxn. chapman as per surgery.  Endo: BGMs ac hs. ISS.   ID: s/p post op cefazolin. trend WBC. monitor temps.    Heme: Hgb stable. DVT ppx - hep sq. SCDs.   PT eval    Dispo: Stable for 1N. pain control. IV fluids. trend Cr.     Will discuss with Dr. Peters    signed out to WILL Jenkins  ASSESSMENT  59 yo male with PMHx CAD s/p stent 4/26/2019, kidney stone 10 years ago, HTN, HLD, new diagnosis of diabetes A1c 8.2 3/2024, Afib (not on AC d/t hematuria)   Recent admission in Feb 2024 for right flank pain and hematuria on CT abdomen found to have R renal mass, hydroureteronephrosis, Portacaval and right RP lymphadenopathy and RLL 9mm pulmonary nodule. s/p cystoscopy right retrograde pyelogram and right ureteral stent placement by Dr. Ovalle on Feb 4, 2024  CT chest revealing lobulated PRISCILLA nodule. Pt underwent lung biopsy with IR 2/22 pathology revealing clear cell carcinoma consistent with metastatic renal cell carcinoma.       s/p robot assisted converted to open radical R nephrectomy, paraaortic LN dissection,  removal ureteral stent 3/19  EBL 150cc    PLAN    Neuro: AAOx 3. pain control with dilaudid PCA. change to prns. tylenol  CV: Normotensive. sinus rhythm on monitor. hx of Afib on lopressor bid for rate control. pt states he was never started on eliquis d/t hematuria and that he has an appt with Dr. Mai next week to discuss starting AC. spoke with Dr. Mai - recommended started eliquis when cleared by /surgery. however if risk of bleeding post op is high then ok to wait 1-2 days more.   Pulm: on room air. encourage incentive spirometer  GI: PO diet. PPI. bowel regimen prn add simethicone  Nephro: UOP improving. Cr trending up slightly 1.2 - 1.3.  cont LR @75 for now. monitor I & Os. Trend renal fxn. chapman dcd as per surgery.  Endo: BGMs ac hs. ISS.   ID: s/p post op cefazolin. trend WBC. monitor temps.    Heme: Hgb stable. DVT ppx - hep sq. SCDs.   PT eval    Dispo: Stable for 1N. pain control. IV fluids. trend Cr.     Will discuss with Dr. Peters    signed out to WILL Jenkins for hospitalist consult

## 2024-03-21 NOTE — PROGRESS NOTE ADULT - SUBJECTIVE AND OBJECTIVE BOX
Patient is a 58y old  Male who presents with a chief complaint of hematuria (20 Mar 2024 14:05)      BRIEF HOSPITAL COURSE: 57 yo male with PMHx CAD s/p stent 4/26/2019, kidney stone 10 years ago, HTN, HLD, new diagnosis of diabetes A1c 8.2 3/2024, Afib (not on AC d/t hematuria) Admitted in Feb 2024 for right flank pain and hematuria on CT abdomen found to have R renal mass, hydroureteronephrosis, Portacaval and right retroperitoneal lymphadenopathy and RLL 9mm pulmonary nodule. s/p cystoscopy right retrograde pyelogram and right ureteral stent placement by Dr. Ovalle on Feb 4, 2024 and planned for Robot-assisted right radical nephrectomy and right ureteral stent removal 3/19 now presents for hematuria and intractable right flank pain.    s/p robot assisted converted to open radical R nephrectomy, paraaortic LN dissection,  removal ureteral stent 3/19  EBL 150cc    3/20 pt seen this am, doing well. pain is present but controlled with dilaudid PCA. passing gas from below but c/o feeling bloated. denies nausea chest pain, sob.   3/21 pt feeling much better. gas pain resolved. still with incisional pain. asking about chapman removal    PAST MEDICAL & SURGICAL HISTORY:  Pneumonia  Cough  Former smoker  Obesity (BMI 30-39.9)  HTN (hypertension)  HLD (hyperlipidemia)  CAD (coronary artery disease)  Diabetes  H/O pulmonary hypertension  Renal mass  Lung nodule  Stented coronary artery  Paroxysmal atrial fibrillation  Diverticulitis  Renal calculi  Subclinical hyperthyroidism  Metastasis to lung  History of appendectomy  1994  History of torn meniscus of left knee  1995  History of tonsillectomy and adenoidectomy  1968  S/P cystoscopy with ureteral stent placement  History of lung biopsy  S/P cardiac cath      MEDICATIONS  (STANDING):  acetaminophen   IVPB .. 1000 milliGRAM(s) IV Intermittent once  dextrose 5%. 1000 milliLiter(s) (100 mL/Hr) IV Continuous <Continuous>  dextrose 5%. 1000 milliLiter(s) (50 mL/Hr) IV Continuous <Continuous>  dextrose 50% Injectable 25 Gram(s) IV Push once  dextrose 50% Injectable 12.5 Gram(s) IV Push once  dextrose 50% Injectable 25 Gram(s) IV Push once  glucagon  Injectable 1 milliGRAM(s) IntraMuscular once  heparin   Injectable 5000 Unit(s) SubCutaneous three times a day  HYDROmorphone PCA (1 mG/mL) 30 milliLiter(s) PCA Continuous PCA Continuous  insulin glargine Injectable (LANTUS) 5 Unit(s) SubCutaneous at bedtime  insulin lispro (ADMELOG) corrective regimen sliding scale   SubCutaneous three times a day before meals  insulin lispro Injectable (ADMELOG) 3 Unit(s) SubCutaneous three times a day before meals  lactated ringers. 1000 milliLiter(s) (75 mL/Hr) IV Continuous <Continuous>  metoprolol tartrate 25 milliGRAM(s) Oral two times a day  naloxone Injectable 0.4 milliGRAM(s) IV Push once  polyethylene glycol 3350 17 Gram(s) Oral daily  senna 2 Tablet(s) Oral at bedtime  simethicone 80 milliGRAM(s) Chew three times a day      Vital Signs Last 24 Hrs  T(C): 36.7 (21 Mar 2024 09:09), Max: 37.6 (21 Mar 2024 05:39)  T(F): 98 (21 Mar 2024 09:09), Max: 99.6 (21 Mar 2024 05:39)  HR: 63 (21 Mar 2024 10:00) (54 - 89)  BP: 111/76 (21 Mar 2024 10:00) (102/69 - 132/75)  BP(mean): 88 (21 Mar 2024 10:00) (77 - 94)  RR: 17 (21 Mar 2024 10:00) (12 - 23)  SpO2: 97% (21 Mar 2024 10:00) (92% - 100%)    Parameters below as of 21 Mar 2024 10:00  Patient On (Oxygen Delivery Method): room air    I&O's Detail    20 Mar 2024 07:01  -  21 Mar 2024 07:00  --------------------------------------------------------  IN:    Lactated Ringers: 1499 mL    Lactated Ringers: 310 mL  Total IN: 1809 mL    OUT:    Indwelling Catheter - Urethral (mL): 3890 mL  Total OUT: 3890 mL    Total NET: -2081 mL      LABS:                   9.8    5.68  )-----------( 239      ( 21 Mar 2024 05:40 )             30.2     03-21    135  |  102  |  13  ----------------------------<  134<H>  3.6   |  25  |  1.38<H>    Ca    8.8      21 Mar 2024 05:40  Phos  2.6     03-21  Mg     2.0     03-21    TPro  6.2  /  Alb  3.0<L>  /  TBili  0.3  /  DBili  x   /  AST  91<H>  /  ALT  98<H>  /  AlkPhos  55  03-20    LIVER FUNCTIONS - ( 20 Mar 2024 05:03 )  Alb: 3.0 g/dL / Pro: 6.2 gm/dL / ALK PHOS: 55 U/L / ALT: 98 U/L / AST: 91 U/L / GGT: x           Urinalysis Basic - ( 21 Mar 2024 05:40 )    Color: x / Appearance: x / SG: x / pH: x  Gluc: 134 mg/dL / Ketone: x  / Bili: x / Urobili: x   Blood: x / Protein: x / Nitrite: x   Leuk Esterase: x / RBC: x / WBC x   Sq Epi: x / Non Sq Epi: x / Bacteria: x    CULTURES:  Culture Results:   <10,000 CFU/mL Normal Urogenital Carito (03-17 @ 18:54)  Culture Results:   No growth at 48 Hours (03-17 @ 16:39)  Culture Results:   No growth at 48 Hours (03-17 @ 16:39)      Physical Examination:    General: No acute distress.    HEENT: Pupils equal, reactive to light.  Symmetric.  PULM: Clear to auscultation bilaterall  CVS: Regular rate and rhythm, no murmurs  ABD: Soft, tender by incision, normoactive BS. large R sided flank incision with stable. clean no discharge seen  EXT: No LE edema, nontender  SKIN: Warm and well perfused  NEURO: Alert, oriented, interactive,    DEVICES:   chapman - clear urine    RADIOLOGY:

## 2024-03-22 ENCOUNTER — TRANSCRIPTION ENCOUNTER (OUTPATIENT)
Age: 59
End: 2024-03-22

## 2024-03-22 VITALS
SYSTOLIC BLOOD PRESSURE: 128 MMHG | DIASTOLIC BLOOD PRESSURE: 83 MMHG | HEART RATE: 96 BPM | OXYGEN SATURATION: 97 % | TEMPERATURE: 98 F | RESPIRATION RATE: 17 BRPM

## 2024-03-22 LAB
ADD ON TEST-SPECIMEN IN LAB: SIGNIFICANT CHANGE UP
ALBUMIN SERPL ELPH-MCNC: 3.1 G/DL — LOW (ref 3.3–5)
ALP SERPL-CCNC: 94 U/L — SIGNIFICANT CHANGE UP (ref 40–120)
ALT FLD-CCNC: 111 U/L — HIGH (ref 12–78)
ANION GAP SERPL CALC-SCNC: 7 MMOL/L — SIGNIFICANT CHANGE UP (ref 5–17)
AST SERPL-CCNC: 53 U/L — HIGH (ref 15–37)
BASOPHILS # BLD AUTO: 0.02 K/UL — SIGNIFICANT CHANGE UP (ref 0–0.2)
BASOPHILS NFR BLD AUTO: 0.4 % — SIGNIFICANT CHANGE UP (ref 0–2)
BILIRUB DIRECT SERPL-MCNC: 0.2 MG/DL — SIGNIFICANT CHANGE UP (ref 0–0.3)
BILIRUB INDIRECT FLD-MCNC: 0.3 MG/DL — SIGNIFICANT CHANGE UP (ref 0.2–1)
BILIRUB SERPL-MCNC: 0.5 MG/DL — SIGNIFICANT CHANGE UP (ref 0.2–1.2)
BUN SERPL-MCNC: 10 MG/DL — SIGNIFICANT CHANGE UP (ref 7–23)
CALCIUM SERPL-MCNC: 9 MG/DL — SIGNIFICANT CHANGE UP (ref 8.5–10.1)
CHLORIDE SERPL-SCNC: 102 MMOL/L — SIGNIFICANT CHANGE UP (ref 96–108)
CO2 SERPL-SCNC: 25 MMOL/L — SIGNIFICANT CHANGE UP (ref 22–31)
CREAT SERPL-MCNC: 1.26 MG/DL — SIGNIFICANT CHANGE UP (ref 0.5–1.3)
CULTURE RESULTS: SIGNIFICANT CHANGE UP
CULTURE RESULTS: SIGNIFICANT CHANGE UP
EGFR: 65 ML/MIN/1.73M2 — SIGNIFICANT CHANGE UP
EOSINOPHIL # BLD AUTO: 0.06 K/UL — SIGNIFICANT CHANGE UP (ref 0–0.5)
EOSINOPHIL NFR BLD AUTO: 1.1 % — SIGNIFICANT CHANGE UP (ref 0–6)
GLUCOSE BLDC GLUCOMTR-MCNC: 130 MG/DL — HIGH (ref 70–99)
GLUCOSE BLDC GLUCOMTR-MCNC: 131 MG/DL — HIGH (ref 70–99)
GLUCOSE SERPL-MCNC: 123 MG/DL — HIGH (ref 70–99)
HCT VFR BLD CALC: 28 % — LOW (ref 39–50)
HGB BLD-MCNC: 9.1 G/DL — LOW (ref 13–17)
IMM GRANULOCYTES NFR BLD AUTO: 0.6 % — SIGNIFICANT CHANGE UP (ref 0–0.9)
LYMPHOCYTES # BLD AUTO: 1.09 K/UL — SIGNIFICANT CHANGE UP (ref 1–3.3)
LYMPHOCYTES # BLD AUTO: 20.1 % — SIGNIFICANT CHANGE UP (ref 13–44)
MCHC RBC-ENTMCNC: 28.4 PG — SIGNIFICANT CHANGE UP (ref 27–34)
MCHC RBC-ENTMCNC: 32.5 GM/DL — SIGNIFICANT CHANGE UP (ref 32–36)
MCV RBC AUTO: 87.5 FL — SIGNIFICANT CHANGE UP (ref 80–100)
MONOCYTES # BLD AUTO: 0.48 K/UL — SIGNIFICANT CHANGE UP (ref 0–0.9)
MONOCYTES NFR BLD AUTO: 8.8 % — SIGNIFICANT CHANGE UP (ref 2–14)
NEUTROPHILS # BLD AUTO: 3.75 K/UL — SIGNIFICANT CHANGE UP (ref 1.8–7.4)
NEUTROPHILS NFR BLD AUTO: 69 % — SIGNIFICANT CHANGE UP (ref 43–77)
PLATELET # BLD AUTO: 233 K/UL — SIGNIFICANT CHANGE UP (ref 150–400)
POTASSIUM SERPL-MCNC: 3.5 MMOL/L — SIGNIFICANT CHANGE UP (ref 3.5–5.3)
POTASSIUM SERPL-SCNC: 3.5 MMOL/L — SIGNIFICANT CHANGE UP (ref 3.5–5.3)
PROT SERPL-MCNC: 6.8 GM/DL — SIGNIFICANT CHANGE UP (ref 6–8.3)
RBC # BLD: 3.2 M/UL — LOW (ref 4.2–5.8)
RBC # FLD: 12.9 % — SIGNIFICANT CHANGE UP (ref 10.3–14.5)
SODIUM SERPL-SCNC: 134 MMOL/L — LOW (ref 135–145)
SPECIMEN SOURCE: SIGNIFICANT CHANGE UP
SPECIMEN SOURCE: SIGNIFICANT CHANGE UP
WBC # BLD: 5.43 K/UL — SIGNIFICANT CHANGE UP (ref 3.8–10.5)
WBC # FLD AUTO: 5.43 K/UL — SIGNIFICANT CHANGE UP (ref 3.8–10.5)

## 2024-03-22 PROCEDURE — 99232 SBSQ HOSP IP/OBS MODERATE 35: CPT

## 2024-03-22 RX ORDER — OXYCODONE HYDROCHLORIDE 5 MG/1
1 TABLET ORAL
Qty: 20 | Refills: 0
Start: 2024-03-22 | End: 2024-03-26

## 2024-03-22 RX ORDER — NALOXONE HYDROCHLORIDE 4 MG/.1ML
4 SPRAY NASAL
Qty: 1 | Refills: 0
Start: 2024-03-22 | End: 2024-03-22

## 2024-03-22 RX ORDER — POLYETHYLENE GLYCOL 3350 17 G/17G
17 POWDER, FOR SOLUTION ORAL
Qty: 0 | Refills: 0 | DISCHARGE
Start: 2024-03-22

## 2024-03-22 RX ORDER — MAGNESIUM HYDROXIDE 400 MG/1
30 TABLET, CHEWABLE ORAL DAILY
Refills: 0 | Status: DISCONTINUED | OUTPATIENT
Start: 2024-03-22 | End: 2024-03-22

## 2024-03-22 RX ORDER — OXYCODONE HYDROCHLORIDE 5 MG/1
5 TABLET ORAL EVERY 4 HOURS
Refills: 0 | Status: DISCONTINUED | OUTPATIENT
Start: 2024-03-22 | End: 2024-03-22

## 2024-03-22 RX ADMIN — HEPARIN SODIUM 5000 UNIT(S): 5000 INJECTION INTRAVENOUS; SUBCUTANEOUS at 06:07

## 2024-03-22 RX ADMIN — OXYCODONE HYDROCHLORIDE 5 MILLIGRAM(S): 5 TABLET ORAL at 11:45

## 2024-03-22 RX ADMIN — SODIUM CHLORIDE 75 MILLILITER(S): 9 INJECTION, SOLUTION INTRAVENOUS at 00:50

## 2024-03-22 RX ADMIN — OXYCODONE HYDROCHLORIDE 5 MILLIGRAM(S): 5 TABLET ORAL at 10:49

## 2024-03-22 RX ADMIN — Medication 25 MILLIGRAM(S): at 10:47

## 2024-03-22 RX ADMIN — MAGNESIUM HYDROXIDE 30 MILLILITER(S): 400 TABLET, CHEWABLE ORAL at 13:07

## 2024-03-22 RX ADMIN — Medication 3 UNIT(S): at 13:08

## 2024-03-22 RX ADMIN — POLYETHYLENE GLYCOL 3350 17 GRAM(S): 17 POWDER, FOR SOLUTION ORAL at 10:48

## 2024-03-22 RX ADMIN — Medication 3 UNIT(S): at 09:30

## 2024-03-22 RX ADMIN — SIMETHICONE 80 MILLIGRAM(S): 80 TABLET, CHEWABLE ORAL at 06:06

## 2024-03-22 NOTE — PROGRESS NOTE ADULT - ASSESSMENT
58 y.o male with PMH CAD s/p stent in 2019, newly diagnosed T2DM A1C 8.3, and recent admission in feb 2024 s/p diagnosis of right-renal mass with pulmonary metastases admitted for:   Patient had cytoscopy, with right retrograde pyelogram and right ureteral stent placement on 2/4 with Dr. Ovalle. Scheduled for robot-assisted right radical nephrectomy and stent removal 3/19 but presented to hospital with increasing R-flank pain associated with nausea, vomiting, chills and bloody urine. In ER found to have lactate of 3.3, decreased to 1.7 s/p 2L bolus, no leukocytosis noted, CT neg for pyelo. Admitted for medical management prior to surgery 3/19.     # Acute Blood loss Anemia due to Hematuria secondary to renal mass  s/p Right nephrectomy, stent removal paraoritc lymphnode dissection  -Pain control  - oxycodone as per urology   -IS, Encourage ambulation  - Grace removed, voiding   - c/w PRN zofran for nausea/vomiting   - monitor urine output  - as per urology    # CAD s/p 1 stent  , Paroxysmal  A.fib   - s/p stent 2019  - ekg this admission NSR  - had  24hr ambulatory holter monitor e mailed back by wife  - c/w  metoprolol   -hematuria resolved restart AC when okay w Urology  - would recommend restarting ASA 81 today, follow up with Dr. Mai on Wednesday for further AC initiation    # Elevated LFTs   - outpatient follow up with PCP and oncologist    # Constipation  - bowel regiment while on PO pain meds    # DM2  -  lantus 5 with ISS  -Monitor BS cover with ISS   - restart home medications    # HTN   - c/w metoprolol    # DVT ppx   - hold off AC in setting of hematuria - restart when okay w Uro  - SCDs, encourage ambulation     # GOC   - patient confirmed DNR/DNI  - agreeable to noninvasive airway     Thank you for consult, will follow with you, stable for discharge

## 2024-03-22 NOTE — DISCHARGE NOTE PROVIDER - CARE PROVIDERS DIRECT ADDRESSES
,[email or web address][facility] [email or web address]

## 2024-03-22 NOTE — DISCHARGE NOTE PROVIDER - NSDCFUSCHEDAPPT_GEN_ALL_CORE_FT
Say Sutherland  [facility]  THORSURG [address]  Scheduled Appointment: 04/03/2024    Lucas Mai  [facility]  CARDIOLOGY [address]  Scheduled Appointment: 04/05/2024    Twila Cosme  [facility]  ENDOCRIN [address] [facility]  Scheduled Appointment: 04/11/2024    Chrissy Coffey  [facility]  [place] [facility]  Scheduled Appointment: 04/16/2024    Domingo Blakely  [facility]  ENDOCRIN [address]  Scheduled Appointment: 05/06/2024

## 2024-03-22 NOTE — DISCHARGE NOTE PROVIDER - NSDCMRMEDTOKEN_GEN_ALL_CORE_FT
Lantus 100 units/mL subcutaneous solution: 20 unit(s) subcutaneous once a day (in the morning)  metFORMIN 500 mg oral tablet: 1 tab(s) orally 2 times a day (with meals)  metoprolol tartrate 25 mg oral tablet: 1 tab(s) orally 2 times a day   aspirin 81 mg oral delayed release tablet: 1 tab(s) orally once a day OTC  Lantus 100 units/mL subcutaneous solution: 20 unit(s) subcutaneous once a day (in the morning)  metFORMIN 500 mg oral tablet: 1 tab(s) orally 2 times a day (with meals)  metoprolol tartrate 25 mg oral tablet: 1 tab(s) orally 2 times a day  naloxone 4 mg/0.1 mL nasal spray: 4 milligram(s) intranasally once a day  oxyCODONE 5 mg oral tablet: 1 tab(s) orally every 6 hours as needed for Moderate Pain (4 - 6) MDD: 4  polyethylene glycol 3350 oral powder for reconstitution: 17 gram(s) orally once a day

## 2024-03-22 NOTE — DISCHARGE NOTE PROVIDER - DETAILS OF MALNUTRITION DIAGNOSIS/DIAGNOSES
This patient has been assessed with a concern for Malnutrition and was treated during this hospitalization for the following Nutrition diagnosis/diagnoses:     -  03/18/2024: Moderate protein-calorie malnutrition

## 2024-03-22 NOTE — DISCHARGE NOTE PROVIDER - PROVIDER TOKENS
PROVIDER:[TOKEN:[[id number]],ESTABLISHEDPATIENT:[T]] PROVIDER:[TOKEN:[[id number]],ESTABLISHEDPATIENT:[T]],PROVIDER:[TOKEN:[[id number]],FOLLOWUP:[1 week]],PROVIDER:[TOKEN:[[id number]],FOLLOWUP:[1 week]],PROVIDER:[TOKEN:[[id number]],FOLLOWUP:[1 week]]

## 2024-03-22 NOTE — DISCHARGE NOTE PROVIDER - NSDCCPTREATMENT_GEN_ALL_CORE_FT
PRINCIPAL PROCEDURE  Procedure: Nephrectomy  Findings and Treatment: right robotic nephrectomy converted to open, paraortic lymph node dissection, cystoscopy with removal of uretertal stent

## 2024-03-22 NOTE — DISCHARGE NOTE PROVIDER - NSDCFUADDINST_GEN_ALL_CORE_FT
Call MD/return to ED for Bleeding/discharge from incisions, increased Abdominal pain, nausea and vomiting, fever or chills  Call Dr Kaplan's office today for a F/U appointment  Follow up with Primary Care MD and Oncology for Elevated Liver enzymes

## 2024-03-22 NOTE — PROGRESS NOTE ADULT - NUTRITIONAL ASSESSMENT
This patient has been assessed with a concern for Malnutrition and has been determined to have a diagnosis/diagnoses of Moderate protein-calorie malnutrition.    This patient is being managed with:   Diet Regular-  Consistent Carbohydrate {Evening Snack} (CSTCHOSN)  Low Sodium  Entered: Mar 20 2024  7:06AM  
This patient has been assessed with a concern for Malnutrition and has been determined to have a diagnosis/diagnoses of Moderate protein-calorie malnutrition.    This patient is being managed with:   Diet Regular-  Consistent Carbohydrate {Evening Snack} (CSTCHOSN)  Low Sodium  Entered: Mar 20 2024  7:06AM    This patient has been assessed with a concern for Malnutrition and has been determined to have a diagnosis/diagnoses of Moderate protein-calorie malnutrition.    This patient is being managed with:   Diet Regular-  Consistent Carbohydrate {Evening Snack} (CSTCHOSN)  Low Sodium  Entered: Mar 20 2024  7:06AM  
This patient has been assessed with a concern for Malnutrition and has been determined to have a diagnosis/diagnoses of Moderate protein-calorie malnutrition.    This patient is being managed with:   Diet Regular-  Consistent Carbohydrate {Evening Snack} (CSTCHOSN)  Low Sodium  Entered: Mar 20 2024  7:06AM  
This patient has been assessed with a concern for Malnutrition and has been determined to have a diagnosis/diagnoses of Moderate protein-calorie malnutrition.    This patient is being managed with:   Diet Regular-  Consistent Carbohydrate {Evening Snack} (CSTCHOSN)  Low Sodium  Entered: Mar 20 2024  7:06AM  
This patient has been assessed with a concern for Malnutrition and has been determined to have a diagnosis/diagnoses of Moderate protein-calorie malnutrition.    This patient is being managed with:   Diet NPO after Midnight-     NPO Start Date: 18-Mar-2024   NPO Start Time: 23:59  Entered: Mar 18 2024  5:23PM    Diet NPO after Midnight-     NPO Start Date: 18-Mar-2024   NPO Start Time: 23:59  Entered: Mar 18 2024 10:43AM    Diet DASH/TLC-  Sodium & Cholesterol Restricted  Entered: Mar 17 2024  7:47PM    The following pending diet order is being considered for treatment of Moderate protein-calorie malnutrition:  Diet Regular-  Supplement Feeding Modality:  Oral  Ensure Max Cans or Servings Per Day:  1       Frequency:  Two Times a day  Entered: Mar 18 2024  1:46PM

## 2024-03-22 NOTE — PROGRESS NOTE ADULT - REASON FOR ADMISSION
hematuria

## 2024-03-22 NOTE — PROGRESS NOTE ADULT - SUBJECTIVE AND OBJECTIVE BOX
Patient is a 58y old  Male who presents with a chief complaint of hematuria     3/22 -pt Seen and examined,  + abdominal bloating, + flatus, + BM 2 days ago, tolerating diet, pain controlled without PCA , afebrile    Review of system- Rest of the review of system are negative except mentioned in HPI    Vital sings reviewed for last 24 h  T(C): 36.4 (24 @ 09:20), Max: 37.4 (24 @ 00:46)  T(F): 97.5 (24 @ 09:20), Max: 99.4 (24 @ 00:46)  HR: 96 (24 @ 09:20) (72 - 96)  BP: 128/83 (24 @ 09:20) (118/74 - 154/78)  RR: 17 (24 @ 09:20) (17 - 18)  SpO2: 97% (24 @ 09:20) (95% - 97%)  Wt(kg): --  Daily     Daily Weight in k.8 (22 Mar 2024 05:22)  CAPILLARY BLOOD GLUCOSE      POCT Blood Glucose.: 131 mg/dL (22 Mar 2024 08:49)  POCT Blood Glucose.: 153 mg/dL (21 Mar 2024 21:57)  POCT Blood Glucose.: 125 mg/dL (21 Mar 2024 17:41)      PHYSICAL EXAM:  GENERAL: NAD, well-groomed, well-developed  HEAD:  Atraumatic, Normocephalic  EYES: EOMI, PERRLA, conjunctiva and sclera clear  ENMT: No tonsillar erythema, exudates, or enlargement; Moist mucous membranes, Good dentition, No lesions  NECK: Supple, No JVD, Normal thyroid  NERVOUS SYSTEM:  Alert & Oriented X3, Good concentration; Motor Strength 5/5 B/L upper and lower extremities; DTRs 2+ intact and symmetric  CHEST/LUNG: Clear to percussion bilaterally; No rales, rhonchi, wheezing, or rubs  HEART: Regular rate and rhythm; No murmurs, rubs, or gallops  ABDOMEN:R flank dressing c/d/i, chapman in place draining clear yellow urine  EXTREMITIES:  2+ Peripheral Pulses, No clubbing, cyanosis, or edema  LYMPH: No lymphadenopathy noted  SKIN: No rashes or lesions      All labs radiology and other studies reviewed and interpreted :                           9.1    5.43  )-----------( 233      ( 22 Mar 2024 06:35 )             28.0         134<L>  |  102  |  10  ----------------------------<  123<H>  3.5   |  25  |  1.26    Ca    9.0      22 Mar 2024 06:35  Phos  2.6       Mg     2.0         TPro  6.8  /  Alb  3.1<L>  /  TBili  0.5  /  DBili  0.2  /  AST  53<H>  /  ALT  111<H>  /  AlkPhos  94          LIVER FUNCTIONS - ( 22 Mar 2024 06:35 )  Alb: 3.1 g/dL / Pro: 6.8 gm/dL / ALK PHOS: 94 U/L / ALT: 111 U/L / AST: 53 U/L / GGT: x           RADIOLOGY & ADDITIONAL TESTS:    < from: Xray Chest 1 View- PORTABLE-Urgent (24 @ 17:36) >  IMPRESSION:  Faint 2 cm peripheral left midlung nodular opacity   corresponding to a finding on the PET/CT scan. Additional subcentimeter   nodule seen on that study are not able to be seen.  The remainder of the lungs are clear.    < end of copied text >    < from: CT Abdomen and Pelvis w/ IV Cont (24 @ 17:32) >  IMPRESSION:  Right lower pole mild pelvocaliectasis distended with blood products.   Double-J right nephroureteral stent proximal pigtail in upper pole calyx,   distal pigtail at level of ureterovesical junction. No evidence of   pyelonephritis.  Stable large right renal malignancy, involves renal hilum, Gerota's   fascia and contacts liver.  Increased retroperitoneal malignant adenopathy.  Stable bilateral pulmonary metastases.  Stable couple small anterior peritoneal nodular densities in midline   upper abdomen, indeterminate, may represent small nodes or implants.    < end of copied text >    < from: NM PET/CT Onc FDG Skull to Thigh, Inital (03.15.24 @ 18:18) >  IMPRESSION: Abnormal skull-to-thigh FDG-PET/CT scan. Findings are   collectively consistent with metastatic disease.  1.  FDG avid right renal mass.  2.  Enlarged, FDG avid metastatic retroperitoneal lymph nodes.  3.  FDG avid biopsy-proven metastatic LEFT pulmonary nodule.      < end of copied text >  Imaging Personally Reviewed:  [ ] YES  [ ] NO    HEALTH ISSUES - PROBLEM Dx:    MEDICATIONS  (STANDING):  acetaminophen   IVPB .. 1000 milliGRAM(s) IV Intermittent once  dextrose 5%. 1000 milliLiter(s) (100 mL/Hr) IV Continuous <Continuous>  dextrose 5%. 1000 milliLiter(s) (50 mL/Hr) IV Continuous <Continuous>  dextrose 50% Injectable 25 Gram(s) IV Push once  dextrose 50% Injectable 12.5 Gram(s) IV Push once  dextrose 50% Injectable 25 Gram(s) IV Push once  glucagon  Injectable 1 milliGRAM(s) IntraMuscular once  heparin   Injectable 5000 Unit(s) SubCutaneous three times a day  insulin glargine Injectable (LANTUS) 5 Unit(s) SubCutaneous at bedtime  insulin lispro (ADMELOG) corrective regimen sliding scale   SubCutaneous three times a day before meals  insulin lispro Injectable (ADMELOG) 3 Unit(s) SubCutaneous three times a day before meals  lactated ringers. 1000 milliLiter(s) (75 mL/Hr) IV Continuous <Continuous>  magnesium hydroxide Suspension 30 milliLiter(s) Oral daily  metoprolol tartrate 25 milliGRAM(s) Oral two times a day  naloxone Injectable 0.4 milliGRAM(s) IV Push once  polyethylene glycol 3350 17 Gram(s) Oral daily  senna 2 Tablet(s) Oral at bedtime  simethicone 80 milliGRAM(s) Chew three times a day    MEDICATIONS  (PRN):  acetaminophen     Tablet .. 650 milliGRAM(s) Oral every 6 hours PRN Temp greater or equal to 38C (100.4F), Mild Pain (1 - 3)  aluminum hydroxide/magnesium hydroxide/simethicone Suspension 30 milliLiter(s) Oral every 4 hours PRN Dyspepsia  bisacodyl 5 milliGRAM(s) Oral daily PRN Constipation  dextrose Oral Gel 15 Gram(s) Oral once PRN Blood Glucose LESS THAN 70 milliGRAM(s)/deciliter  melatonin 3 milliGRAM(s) Oral at bedtime PRN Insomnia  naloxone Injectable 0.1 milliGRAM(s) IV Push every 3 minutes PRN For ANY of the following changes in patient status:  A. RR LESS THAN 10 breaths per minute, B. Oxygen saturation LESS THAN 90%, C. Sedation score of 6  ondansetron Injectable 4 milliGRAM(s) IV Push every 6 hours PRN Nausea  ondansetron Injectable 4 milliGRAM(s) IV Push every 6 hours PRN Nausea and/or Vomiting  oxyCODONE    IR 5 milliGRAM(s) Oral every 4 hours PRN Moderate Pain (4 - 6)  prochlorperazine   Injectable 10 milliGRAM(s) IntraMuscular every 8 hours PRN nausea

## 2024-03-22 NOTE — DISCHARGE NOTE PROVIDER - HOSPITAL COURSE
59 yo M admitted to Medicine 3/17 with intractable right flank flank pain and gross hematuria. CAD s/p stent 4/26/2019, kidney stone 10 years ago, HTN, HLD, new diagnosis of diabetes A1c 8.2 3/2024. Admitted in Feb 2024 for right flank pain and hematuria . CT abdomen found to have R renal mass, hydroureteronephrosis, Portacaval and right retroperitoneal lymphadenopathy and RLL 9mm pulmonary nodule. s/p cystoscopy right retrograde pyelogram and right ureteral stent placement by Dr. Ovalle on Feb 4, 2024 . On 3/19 Pt underwent a  Robot-assisted converted to Open R radical nephrectomy and right ureteral stent removal .Pt is tolerating diet, ambulating, + flatus and BM

## 2024-03-22 NOTE — PROGRESS NOTE ADULT - SUBJECTIVE AND OBJECTIVE BOX
Pt is a 58M POD #3  s/p robotic right nephrectomy, converted to open, with periaortic lymph node dissection, cystoscopy with ureteral stent removal  Resting in chair,+ mild abdominal discomfort. Tolerating regular diet, ambulating. + Flatus and BM. No CP, dyspnea, N/V    I&O's Detail    21 Mar 2024 07:01  -  22 Mar 2024 07:00  --------------------------------------------------------  IN:  Total IN: 0 mL    OUT:    Indwelling Catheter - Urethral (mL): 1350 mL    Voided (mL): 675 mL  Total OUT: 2025 mL    Total NET: -2025 mL      22 Mar 2024 07:01  -  22 Mar 2024 11:50  --------------------------------------------------------  IN:    Oral Fluid: 240 mL  Total IN: 240 mL    OUT:  Total OUT: 0 mL    Total NET: 240 mL        Vital Signs Last 24 Hrs  T(C): 36.4 (22 Mar 2024 09:20), Max: 37.4 (22 Mar 2024 00:46)  T(F): 97.5 (22 Mar 2024 09:20), Max: 99.4 (22 Mar 2024 00:46)  HR: 96 (22 Mar 2024 09:20) (72 - 96)  BP: 128/83 (22 Mar 2024 09:20) (118/74 - 154/78)  BP(mean): 96 (21 Mar 2024 12:00) (96 - 96)  RR: 17 (22 Mar 2024 09:20) (17 - 18)  SpO2: 97% (22 Mar 2024 09:20) (95% - 97%)    Parameters below as of 22 Mar 2024 09:20  Patient On (Oxygen Delivery Method): room air      Physical:    General: A&Ox3. NAD    Lungs: CTA B/L    Heart: S1S2 RRR    Abdomen: + BS, nondistended,  Incisions with staples in place. No bleeding, erythema or exudates.  + incisional tenderness, soft,  No guardingrebound  .  Extrem:: no calf tenderness.  no edema. venodynes on    : chapman in place with clear urine                          9.1    5.43  )-----------( 233      ( 22 Mar 2024 06:35 )             28.0     03-22    134<L>  |  102  |  10  ----------------------------<  123<H>  3.5   |  25  |  1.26    Ca    9.0      22 Mar 2024 06:35  Phos  2.6     03-21  Mg     2.0     03-21              Urinalysis Basic - ( 22 Mar 2024 06:35 )    Color: x / Appearance: x / SG: x / pH: x  Gluc: 123 mg/dL / Ketone: x  / Bili: x / Urobili: x   Blood: x / Protein: x / Nitrite: x   Leuk Esterase: x / RBC: x / WBC x   Sq Epi: x / Non Sq Epi: x / Bacteria: x          Assessment and Plan:   · Assessment	  Pt is a 58M POD #3  s/p robotic right nephrectomy, converted to open, with periaortic lymph node dissection, cystoscopy with ureteral stent removal  VSS/Afeb  Tolerating regular diet  PCA off, PO pain meds started  Voiding clear yellow urine  Encourage Incentive spirometry  Pt may be discharged home and OK to start ASA today and Eliquis tomorrow  Above discussed with Dr Kaplan and Hospitalist Sree     Pt is a 58M POD #3  s/p robotic right nephrectomy, converted to open, with periaortic lymph node dissection, cystoscopy with ureteral stent removal  Resting in chair,+ mild abdominal discomfort. Tolerating regular diet, ambulating. + Flatus and BM. No CP, dyspnea, N/V    I&O's Detail    21 Mar 2024 07:01  -  22 Mar 2024 07:00  --------------------------------------------------------  IN:  Total IN: 0 mL    OUT:    Indwelling Catheter - Urethral (mL): 1350 mL    Voided (mL): 675 mL  Total OUT: 2025 mL    Total NET: -2025 mL      22 Mar 2024 07:01  -  22 Mar 2024 11:50  --------------------------------------------------------  IN:    Oral Fluid: 240 mL  Total IN: 240 mL    OUT:  Total OUT: 0 mL    Total NET: 240 mL        Vital Signs Last 24 Hrs  T(C): 36.4 (22 Mar 2024 09:20), Max: 37.4 (22 Mar 2024 00:46)  T(F): 97.5 (22 Mar 2024 09:20), Max: 99.4 (22 Mar 2024 00:46)  HR: 96 (22 Mar 2024 09:20) (72 - 96)  BP: 128/83 (22 Mar 2024 09:20) (118/74 - 154/78)  BP(mean): 96 (21 Mar 2024 12:00) (96 - 96)  RR: 17 (22 Mar 2024 09:20) (17 - 18)  SpO2: 97% (22 Mar 2024 09:20) (95% - 97%)    Parameters below as of 22 Mar 2024 09:20  Patient On (Oxygen Delivery Method): room air      Physical:    General: A&Ox3. NAD    Lungs: CTA B/L    Heart: S1S2 RRR    Abdomen: + BS, nondistended,  Incisions with staples in place. No bleeding, erythema or exudates.  + incisional tenderness, soft,  No guarding/rebound  .  Extrem:: no calf tenderness.  no edema. venodynes on    : chapman in place with clear urine                          9.1    5.43  )-----------( 233      ( 22 Mar 2024 06:35 )             28.0     03-22    134<L>  |  102  |  10  ----------------------------<  123<H>  3.5   |  25  |  1.26    Ca    9.0      22 Mar 2024 06:35  Phos  2.6     03-21  Mg     2.0     03-21              Urinalysis Basic - ( 22 Mar 2024 06:35 )    Color: x / Appearance: x / SG: x / pH: x  Gluc: 123 mg/dL / Ketone: x  / Bili: x / Urobili: x   Blood: x / Protein: x / Nitrite: x   Leuk Esterase: x / RBC: x / WBC x   Sq Epi: x / Non Sq Epi: x / Bacteria: x          Assessment and Plan:   · Assessment	  Pt is a 58M POD #3  s/p robotic right nephrectomy, converted to open, with periaortic lymph node dissection, cystoscopy with ureteral stent removal  VSS/Afeb  Tolerating regular diet  PCA off, PO pain meds started  Voiding clear yellow urine  Encourage Incentive spirometry  Pt may be discharged home and OK to start ASA today and Eliquis tomorrow  Above discussed with Dr Kaplan and Hospitalist Sree     Pt is a 58M POD #3  s/p robotic right nephrectomy, converted to open, with periaortic lymph node dissection, cystoscopy with ureteral stent removal  Resting in chair,+ mild abdominal discomfort. Tolerating regular diet, ambulating. + Flatus and BM. No CP, dyspnea, N/V    I&O's Detail    21 Mar 2024 07:01  -  22 Mar 2024 07:00  --------------------------------------------------------  IN:  Total IN: 0 mL    OUT:    Indwelling Catheter - Urethral (mL): 1350 mL    Voided (mL): 675 mL  Total OUT: 2025 mL    Total NET: -2025 mL      22 Mar 2024 07:01  -  22 Mar 2024 11:50  --------------------------------------------------------  IN:    Oral Fluid: 240 mL  Total IN: 240 mL    OUT:  Total OUT: 0 mL    Total NET: 240 mL        Vital Signs Last 24 Hrs  T(C): 36.4 (22 Mar 2024 09:20), Max: 37.4 (22 Mar 2024 00:46)  T(F): 97.5 (22 Mar 2024 09:20), Max: 99.4 (22 Mar 2024 00:46)  HR: 96 (22 Mar 2024 09:20) (72 - 96)  BP: 128/83 (22 Mar 2024 09:20) (118/74 - 154/78)  BP(mean): 96 (21 Mar 2024 12:00) (96 - 96)  RR: 17 (22 Mar 2024 09:20) (17 - 18)  SpO2: 97% (22 Mar 2024 09:20) (95% - 97%)    Parameters below as of 22 Mar 2024 09:20  Patient On (Oxygen Delivery Method): room air      Physical:    General: A&Ox3. NAD    Lungs: CTA B/L    Heart: S1S2 RRR    Abdomen: + BS, nondistended,  Incisions with staples in place. No bleeding, erythema or exudates.  + incisional tenderness, soft,  No guarding/rebound  .  Extrem:: no calf tenderness.  no edema. venodynes on    : chapman in place with clear urine                          9.1    5.43  )-----------( 233      ( 22 Mar 2024 06:35 )             28.0     03-22    134<L>  |  102  |  10  ----------------------------<  123<H>  3.5   |  25  |  1.26    Ca    9.0      22 Mar 2024 06:35  Phos  2.6     03-21  Mg     2.0     03-21              Urinalysis Basic - ( 22 Mar 2024 06:35 )    Color: x / Appearance: x / SG: x / pH: x  Gluc: 123 mg/dL / Ketone: x  / Bili: x / Urobili: x   Blood: x / Protein: x / Nitrite: x   Leuk Esterase: x / RBC: x / WBC x   Sq Epi: x / Non Sq Epi: x / Bacteria: x          Assessment and Plan:   · Assessment	  Pt is a 58M POD #3  s/p robotic right nephrectomy, converted to open, with periaortic lymph node dissection, cystoscopy with ureteral stent removal  VSS/Afeb  Tolerating regular diet  PCA off, PO pain meds started  Voiding clear yellow urine  Encourage Incentive spirometry  Pt may be discharged home and OK to start ASA today and Eliquis tomorrow  Above discussed with Dr Kaplan   Pt to restart ASA 81 MG today and discuss starting Eliquis with his Cardiologist this Wed and follow up with PMD and Oncologist for elevated liver enzymes as per Hospitalist Dr. Balbuena

## 2024-03-22 NOTE — DISCHARGE NOTE PROVIDER - CARE PROVIDER_API CALL
Gomez Kaplan  Urology  [address] [place] [address]  Phone: [phone]  Fax: [phone]  Established Patient  Follow Up Time:    Gomez Kaplan  Urology  [address] [place] [address]  Phone: [phone]  Fax: [phone]  Established Patient  Follow Up Time:     Lucas Mai)  Cardiology  [address] [facility], [address]  [place] [address]  Phone: [phone]  Fax: [phone]  Follow Up Time: 1 week    Chrissy Coffey  Hematology  [address] [facility] [address], [place] [address]  Phone: [phone]  Fax: [phone]  Follow Up Time: 1 week    John Grimm  Internal Medicine  [address] [place] [address]  Phone: [phone]  Fax: [phone]  Follow Up Time: 1 week

## 2024-03-22 NOTE — DISCHARGE NOTE PROVIDER - NSDCCPCAREPLAN_GEN_ALL_CORE_FT
PRINCIPAL DISCHARGE DIAGNOSIS  Diagnosis: Renal mass  Assessment and Plan of Treatment:       SECONDARY DISCHARGE DIAGNOSES  Diagnosis: Abdominal pain  Assessment and Plan of Treatment:     Diagnosis: Renal mass  Assessment and Plan of Treatment:     Diagnosis: Hematuria  Assessment and Plan of Treatment:      PRINCIPAL DISCHARGE DIAGNOSIS  Diagnosis: Renal mass  Assessment and Plan of Treatment: follow up with Dr. Kaplan within 1 week for further monitoring      SECONDARY DISCHARGE DIAGNOSES  Diagnosis: Abdominal pain  Assessment and Plan of Treatment: take oxycodone as prescribed    Diagnosis: Renal mass  Assessment and Plan of Treatment: follow up with Dr. Sánchez within 1 week for further management    Diagnosis: Hematuria  Assessment and Plan of Treatment: follow up with Dr. Kaplan within 1 week for further monitoring    Diagnosis: Paroxysmal atrial fibrillation  Assessment and Plan of Treatment: restart home dose of ASA 81 mg , follow up with Dr. Mai within 1 week for further management possible starting blood thinner ( Eliuqis)    Diagnosis: Constipation  Assessment and Plan of Treatment: take laxatives, monitor bowel movements    Diagnosis: Elevated LFTs  Assessment and Plan of Treatment: repeat liver function test in 1 week follow up with PCP within 1 week

## 2024-03-22 NOTE — PROGRESS NOTE ADULT - PROVIDER SPECIALTY LIST ADULT
Hospitalist
Urology
Hospitalist
Urology
Hospitalist
SICU
Hospitalist
Hospitalist
SICU

## 2024-03-26 ENCOUNTER — APPOINTMENT (OUTPATIENT)
Dept: UROLOGY | Facility: CLINIC | Age: 59
End: 2024-03-26
Payer: COMMERCIAL

## 2024-03-26 PROCEDURE — 99024 POSTOP FOLLOW-UP VISIT: CPT

## 2024-03-26 NOTE — HISTORY OF PRESENT ILLNESS
[FreeTextEntry1] : 58 yom s/p laparoscopic converted to open right radical nephrectomy- 1 week ago pt now presenting with leakage from abdominal 2cm incision site on midline lower abdomen. all Incision sites are clean and intact drainage noted, nonpurulent drainage that is redtinged and otherwise clear. no foul odor from wound  Pt has laceration underneath left lower eye, states it is from falling after taking oxycodone.

## 2024-03-26 NOTE — ASSESSMENT
[FreeTextEntry1] : 58 yom s/p laparoscopic converted to open right radical nephrectomy- 1 week ago -Wound culture swab obtained.  -pt to cover wound with sterile dressing. -pt to notify if fever/chillls, redness, purulent drainage, or foul smelling odor. -pt to followup with Dr. Denis

## 2024-03-26 NOTE — PHYSICAL EXAM
[General Appearance - Well Nourished] : well nourished [General Appearance - Well Developed] : well developed [Normal Appearance] : normal appearance [] : no respiratory distress [Normal Station and Gait] : the gait and station were normal for the patient's age [Skin Color & Pigmentation] : normal skin color and pigmentation [No Focal Deficits] : no focal deficits [Oriented To Time, Place, And Person] : oriented to person, place, and time

## 2024-03-28 LAB — SURGICAL PATHOLOGY STUDY: SIGNIFICANT CHANGE UP

## 2024-03-29 DIAGNOSIS — Z87.891 PERSONAL HISTORY OF NICOTINE DEPENDENCE: ICD-10-CM

## 2024-03-29 DIAGNOSIS — Z88.2 ALLERGY STATUS TO SULFONAMIDES: ICD-10-CM

## 2024-03-29 DIAGNOSIS — Z87.442 PERSONAL HISTORY OF URINARY CALCULI: ICD-10-CM

## 2024-03-29 DIAGNOSIS — E78.5 HYPERLIPIDEMIA, UNSPECIFIED: ICD-10-CM

## 2024-03-29 DIAGNOSIS — C64.1 MALIGNANT NEOPLASM OF RIGHT KIDNEY, EXCEPT RENAL PELVIS: ICD-10-CM

## 2024-03-29 DIAGNOSIS — N28.89 OTHER SPECIFIED DISORDERS OF KIDNEY AND URETER: ICD-10-CM

## 2024-03-29 DIAGNOSIS — I10 ESSENTIAL (PRIMARY) HYPERTENSION: ICD-10-CM

## 2024-03-29 DIAGNOSIS — E44.0 MODERATE PROTEIN-CALORIE MALNUTRITION: ICD-10-CM

## 2024-03-29 DIAGNOSIS — I27.20 PULMONARY HYPERTENSION, UNSPECIFIED: ICD-10-CM

## 2024-03-29 DIAGNOSIS — I48.0 PAROXYSMAL ATRIAL FIBRILLATION: ICD-10-CM

## 2024-03-29 DIAGNOSIS — D62 ACUTE POSTHEMORRHAGIC ANEMIA: ICD-10-CM

## 2024-03-29 DIAGNOSIS — Z66 DO NOT RESUSCITATE: ICD-10-CM

## 2024-03-29 DIAGNOSIS — K59.00 CONSTIPATION, UNSPECIFIED: ICD-10-CM

## 2024-03-29 DIAGNOSIS — I25.10 ATHEROSCLEROTIC HEART DISEASE OF NATIVE CORONARY ARTERY WITHOUT ANGINA PECTORIS: ICD-10-CM

## 2024-03-29 DIAGNOSIS — Z95.5 PRESENCE OF CORONARY ANGIOPLASTY IMPLANT AND GRAFT: ICD-10-CM

## 2024-03-29 DIAGNOSIS — E11.9 TYPE 2 DIABETES MELLITUS WITHOUT COMPLICATIONS: ICD-10-CM

## 2024-03-29 DIAGNOSIS — Z79.4 LONG TERM (CURRENT) USE OF INSULIN: ICD-10-CM

## 2024-03-29 DIAGNOSIS — Z53.31 LAPAROSCOPIC SURGICAL PROCEDURE CONVERTED TO OPEN PROCEDURE: ICD-10-CM

## 2024-03-29 DIAGNOSIS — C78.00 SECONDARY MALIGNANT NEOPLASM OF UNSPECIFIED LUNG: ICD-10-CM

## 2024-03-29 DIAGNOSIS — E05.80 OTHER THYROTOXICOSIS WITHOUT THYROTOXIC CRISIS OR STORM: ICD-10-CM

## 2024-04-03 ENCOUNTER — NON-APPOINTMENT (OUTPATIENT)
Age: 59
End: 2024-04-03

## 2024-04-03 ENCOUNTER — APPOINTMENT (OUTPATIENT)
Dept: UROLOGY | Facility: CLINIC | Age: 59
End: 2024-04-03
Payer: COMMERCIAL

## 2024-04-03 PROCEDURE — 99024 POSTOP FOLLOW-UP VISIT: CPT

## 2024-04-03 NOTE — HISTORY OF PRESENT ILLNESS
[FreeTextEntry1] : 57yo M s/p right radical nephrectomy robotic converted to open, retroperitoneal lymph node dissection on 3/19/24.   Patient reports he is feeling well. Some hypersensitivity at the midline but otherwise pain is controlled. Normal appetite and bowel movements.   Staples are still in place. Had some drainage from one of the wounds last week but has completely resolved.   Pathology with adverse features, positive margins, and positive lymph nodes.

## 2024-04-03 NOTE — ASSESSMENT
[FreeTextEntry1] : metastatic RCC. recovering well staples removed  Discussed followup with Dr Sutherland and Dr. Sánchez we will discuss in tumor board.

## 2024-04-03 NOTE — PHYSICAL EXAM
[Normal Appearance] : normal appearance [Well Groomed] : well groomed [General Appearance - In No Acute Distress] : no acute distress [Edema] : no peripheral edema [Respiration, Rhythm And Depth] : normal respiratory rhythm and effort [Abdomen Soft] : soft [Abdomen Tenderness] : non-tender [Exaggerated Use Of Accessory Muscles For Inspiration] : no accessory muscle use [Costovertebral Angle Tenderness] : no ~M costovertebral angle tenderness [Urinary Bladder Findings] : the bladder was normal on palpation [Normal Station and Gait] : the gait and station were normal for the patient's age [] : no rash [No Focal Deficits] : no focal deficits [Oriented To Time, Place, And Person] : oriented to person, place, and time [Affect] : the affect was normal [Mood] : the mood was normal [No Palpable Adenopathy] : no palpable adenopathy [de-identified] : wounds clean, staples intact

## 2024-04-04 NOTE — REASON FOR VISIT
[Home] : at home, [unfilled] , at the time of the visit. [Medical Office: ([facility])___] : at the medical office located in  [Verbal consent obtained from patient] : the patient, [unfilled]

## 2024-04-05 ENCOUNTER — APPOINTMENT (OUTPATIENT)
Dept: CARDIOLOGY | Facility: CLINIC | Age: 59
End: 2024-04-05
Payer: COMMERCIAL

## 2024-04-05 VITALS — OXYGEN SATURATION: 97 % | SYSTOLIC BLOOD PRESSURE: 130 MMHG | HEART RATE: 67 BPM | DIASTOLIC BLOOD PRESSURE: 80 MMHG

## 2024-04-05 PROCEDURE — 93000 ELECTROCARDIOGRAM COMPLETE: CPT

## 2024-04-05 PROCEDURE — G2211 COMPLEX E/M VISIT ADD ON: CPT

## 2024-04-05 PROCEDURE — 99214 OFFICE O/P EST MOD 30 MIN: CPT

## 2024-04-10 ENCOUNTER — APPOINTMENT (OUTPATIENT)
Dept: THORACIC SURGERY | Facility: CLINIC | Age: 59
End: 2024-04-10
Payer: COMMERCIAL

## 2024-04-10 PROCEDURE — 99443: CPT

## 2024-04-11 ENCOUNTER — APPOINTMENT (OUTPATIENT)
Dept: ENDOCRINOLOGY | Facility: CLINIC | Age: 59
End: 2024-04-11
Payer: COMMERCIAL

## 2024-04-11 ENCOUNTER — NON-APPOINTMENT (OUTPATIENT)
Age: 59
End: 2024-04-11

## 2024-04-11 VITALS
HEART RATE: 82 BPM | TEMPERATURE: 98.3 F | OXYGEN SATURATION: 97 % | DIASTOLIC BLOOD PRESSURE: 92 MMHG | SYSTOLIC BLOOD PRESSURE: 136 MMHG

## 2024-04-11 PROCEDURE — 95251 CONT GLUC MNTR ANALYSIS I&R: CPT

## 2024-04-11 PROCEDURE — 99205 OFFICE O/P NEW HI 60 MIN: CPT

## 2024-04-11 RX ORDER — PEN NEEDLE, DIABETIC 29 G X1/2"
32G X 4 MM NEEDLE, DISPOSABLE MISCELLANEOUS
Qty: 1 | Refills: 0 | Status: ACTIVE | COMMUNITY
Start: 2024-04-11 | End: 1900-01-01

## 2024-04-11 RX ORDER — BLOOD-GLUCOSE METER
W/DEVICE EACH MISCELLANEOUS
Qty: 1 | Refills: 0 | Status: ACTIVE | COMMUNITY
Start: 2024-04-11 | End: 1900-01-01

## 2024-04-11 RX ORDER — BLOOD SUGAR DIAGNOSTIC
STRIP MISCELLANEOUS DAILY
Qty: 1 | Refills: 3 | Status: ACTIVE | COMMUNITY
Start: 2024-04-11 | End: 1900-01-01

## 2024-04-11 RX ORDER — LANCETS 33 GAUGE
EACH MISCELLANEOUS
Qty: 100 | Refills: 3 | Status: ACTIVE | COMMUNITY
Start: 2024-04-11 | End: 1900-01-01

## 2024-04-11 NOTE — ASSESSMENT
[FreeTextEntry1] : Mr. BHANU REEDER, 58 year old male, former smoker, w/ hx of large renal mass, who was admitted at  with hematuria (02/04-02/07/24), workup imaging revealed bilateral lung nodules. He is now s/p CT-guided bx of PRISCILLA nodule with Dr. Lei on 02/22/2024. Path revealing clear cell carcinoma, c/w clinically suspected metastatic renal cell carcinoma. Adjacent lung parenchyma with organizing pneumonitis. Referred by Dr. Gomez Kaplan (uro).  Seen by Dr. Sánchez (heme/onc), plan for systemic therapy post-surgery.  Now s/p right radical nephrectomy on 03/19/24.   Patient is here today for follow up via tele visit.   I have independently reviewed the medical records and imaging at the time of this office consultation. Further discussed surgical resection with patient; given his + nodes and large tumor with perinephric fat infiltration I will likely delay surgery.  We again discussed risks/benefits reviewed. Patient is agreeable to proceed if oncology feels surgery is appropriate at this juncture. Procedure is scheduled for 4/18/24, However, will reach out to Dr. Sánchez regarding the timeline of surgical resection and systemic treatment for RCC.   Recommendations reviewed with patient during this office visit, and all questions answered; Patient instructed on the importance of follow up and verbalizes understanding.   I, KALEY Nunez, personally performed the evaluation and management (E/M) services for this established patient. That E/M includes assessing all new/exacerbated conditions, and establishing a new plan of care. Today, My ACP, Lenore Reyes, was here to observe my evaluation and management services for this patient to be followed going forward.

## 2024-04-11 NOTE — ASSESSMENT
[FreeTextEntry1] : This is a 59 yo M /w new history of diabetes and metastatic clear cell renal cell carcinoma who presents for initial evaluation of new onset type 2 diabetes. Patient reports oncology is planning immunotherapy but does not know about the simultaneous use of glucocorticoids. He will be seeing oncology next week and touch base with the office regarding the steroid plan, if any will be given.  #Type 2 diabetes  -Markie 2 data suggests excellent glycemic control currently -c/w lantus 10 units daily -will hold metformin given report of epistaxis -given recent weight loss patient is not a good candidate for a GLP1 receptor agonist at this time but may benefit in the future -if steroids are given with the immunotherapy then would anticipate increase lantus on the day of the infusion and 2 days afterwords to around 14 units daily. -continue with CGM monitoring -routine follow up with ophthalmology - was already seen and reports no retinopathy -monofilament test at next visit -UACR today -nutrition counseling provided in office, and patient is already doing an excellent job  #immunotherapy -discussed the various endocrinopathies associated with immunotherapy including the possibility for hypothyroidism, adrenal insufficiency and autoimmune diabetes, as well as some of the symptoms associated with these disorders -Check baseline TFT   Case discussed with Dr. Ba Lopez MD Endocrinology Fellow

## 2024-04-11 NOTE — REVIEW OF SYSTEMS
[All other systems negative] : All other systems negative [FreeTextEntry1] : Constitutional: Denies any fatigue, recent weight change, change in appetite Eyes: Denies any blurry vision, visual field defect, eye pain.  ENT: Denies any dysphagia, neck pain, hearing loss, nasal congestion  Resp: Denies any SOB, cough, wheezing  CV: Denies any chest pain, palpitations, edema  GI: Denies any nausea, vomiting, diarrhea or constipation Endo: Denies any polyuria, polydipsia, heat or cold intolerance MSK: Denies any joint pain, muscle weakness or joint stiffness Neuro: Denies any headaches, dizziness, tremors, pain/numbness Psych: Denies any anxiety, insomnia, depression, suicidal ideation

## 2024-04-11 NOTE — HISTORY OF PRESENT ILLNESS
[FreeTextEntry1] : This is a 57 yo M /w new history of diabetes and metastatic clear cell renal cell carcinoma who presents for initial evaluation of  new onset type 2 diabetes.  #T2DM, newly diagnosed  Patient had prediabetes in 2019 with A1c 6.2% Patient was diagnosed with DM this past February when he presented with glucose in 300s at [facility]. He was found ot have kidney mass which was resected in March 2024 and resulted as grade 4 clear cell carcinoma A1c 10.4% at that time. A1c one month later in March down to 8.2% (hb 12.3) He was initially prescribed lantus 20 units daily and metformin 500mg BID. After kidney was removed he stopped taking metformin because of nose bleeds. Nose bleeds stopped after taking  CURRENT REGIMEN (NOT ON STEROIDS) He is now taking 10 units of lantus morning. Stopped metformin due to nose bleeds  Currently using the Markie 2 Data over last 7 days time in range 97% High 3% Sugars range 110-140s throughout the day. No hypoglycemia  Reports good appetite He lost 55 pounds in the last year. Reports he is gaining weight now, of about 15 pounds  24hr diet recall breakfast: 2 eggs and a keto roll w/ a table spoon of butter and an apple Lunch: tuna salad sandwich on keto bread and a bowl of mixed berries. Rubin slaw dinner: roasted chicken thighs and veggies Snacks: atkins peanut butter cup Drinks: He does not drink soda or juice. Drinks water or watered down  gatorade light. Drinks hot tea (peppermint and green tea  Denies any tingling/numbness in fingers or toes denies polyuria, polydipsia, or blurry vision  Reports recent ophthalmology visit after gashing his cheek on a fall. He had dilated eye exam at that time which showed some macular edema. No history of retinopathy  He had lipid checked by outside PCP in March. He thinks his LDL is 100. Reports previously on statin therapy but had muscle pain Following with his cardiologist  Patient is planned to start immunotherapy. He has metastasis to the lung.

## 2024-04-11 NOTE — END OF VISIT
[] : Fellow [FreeTextEntry3] : This is a 59 yo M /w new history of diabetes and metastatic clear cell renal cell carcinoma who presents for initial evaluation of new onset type 2 diabetes. BGs currently at goal on 10 units of lantus. Patient reports oncology is planning immunotherapy but does not know about the simultaneous use of glucocorticoids. He will be seeing oncology next week and touch base with the office regarding the steroid plan, if any will be given. If pretreatment with steroids, will likely need lantus 14 units daily on days 1-3 of chemo and to continue lantus 10 units on non chemo days. Will check baseline TFT given commencing immunotherapy. Also check urinary ACR today [Time Spent: ___ minutes] : I have spent [unfilled] minutes of time on the encounter.

## 2024-04-11 NOTE — HISTORY OF PRESENT ILLNESS
[FreeTextEntry1] : Mr. BHANU REEDER, 58 year old male, former smoker, w/ hx of large renal mass, who was admitted at  with hematuria (02/04-02/07/24), workup imaging revealed bilateral lung nodules. He is now s/p CT-guided bx of PRISCILLA nodule with Dr. Lei on 02/22/2024. Path revealing clear cell carcinoma, c/w clinically suspected metastatic renal cell carcinoma. Adjacent lung parenchyma with organizing pneumonitis. Referred by Dr. Gomez Kaplan (uro).  Interval medical hx of: AFib (on Eliquis), HTN, CAD (s/p PCI in 2019), HLD, Iron deficiency anemia, pulmonary HTN, DM2, and Retroperitoneal lymphadenopathy  Seen by Dr. Sánchez (heme/onc), plan for systemic therapy post-surgery.  CT Chest on 02/04/2024: - PRISCILLA lobulated pulmonary nodule measuring up to about 1.6 cm new since April 10, 2019. - 2 adjacent pulmonary nodules within the peripheral aspect of the RLL largest measuring about 5 mm. This area was not completely imaged on the prior cardiac CT of April 10, 2019.  CT A/P w/ IV Contrast o 02/04/2024: - 9 mm nodule in the right lower lobe (series 2, image 10).  - Bibasilar subsegmental atelectasis.  - Moderate to large heterogeneous enhancing mass in the right renal midpole concerning for a renal neoplasm possibly renal cell carcinoma. - Mild hydroureteronephrosis to the level of the distal right ureter with moderate right perinephric inflammatory change and a delayed nephrogram. Questionable lesion within the distal right ureter which could represent a ureteral mass. - Portacaval and right retroperitoneal lymphadenopathy.  03/13/2024: Initially seen, I discussed there are bilateral lung nodules, PRISCILLA nodule c/w metastatic renal cell carcinoma. Surgical approach reviewed with patient after his renal procedure. Discussed risks in details, patient is agreeable to proceed. Will schedule for Left VATS, possible bilateral VATS, robotic assisted, wedge resection. If B/L VATS not amenable, I would perform surgery on left side first and then one month later schedule for Right VATS, robotic assisted wedge resection. He will need cardiac clearance prior to surgery and hold Eliquis for 72 hours prior to procedure. I would like him to follow up with me 2 weeks after his renal surgery for clinical follow up via tele visit.  ****He is scheduled for surgery on 04/18/24.   03/19/2024: S/p right radical nephrectomy robotic converted to open, retroperitoneal lymph node dissection with Dr. Kaplan. Path reveals Clear-cell renal cell carcinoma with positive margins and retroperitoneal LNs. - Pending follow up with heme/onc on 4/16/24.  Patient is here today for follow up via tele visit. Overall, recovering well from nephrectomy. Today, patient denies worsening SOB, chest pain, cough, hemoptysis, fever, chills, night sweats, lightheadedness or dizziness.

## 2024-04-11 NOTE — PHYSICAL EXAM
[Alert] : alert [Well Nourished] : well nourished [Healthy Appearance] : healthy appearance [No Acute Distress] : no acute distress [Normal Voice/Communication] : normal voice communication [Normal Sclera/Conjunctiva] : normal sclera/conjunctiva [Normal Outer Ear/Nose] : the ears and nose were normal in appearance [No Neck Mass] : no neck mass was observed [No LAD] : no lymphadenopathy [Thyroid Not Enlarged] : the thyroid was not enlarged [No Thyroid Nodules] : no palpable thyroid nodules [No Respiratory Distress] : no respiratory distress [No Accessory Muscle Use] : no accessory muscle use [Normal Rate and Effort] : normal respiratory rate and effort [Normal Rate] : heart rate was normal [Regular Rhythm] : with a regular rhythm [No Edema] : no peripheral edema [Normal Gait] : normal gait [No Involuntary Movements] : no involuntary movements were seen [Normal] : normal [2+] : 2+ in the dorsalis pedis [Acanthosis Nigricans] : no acanthosis nigricans [de-identified] : General: Patient appears well nourished without any distress  HEENT: Thyroid is supple, no nodules palpated, no LAD  Cardio: RRR, no murmurs appreciated  Pulm: CTA b/l, no wheezes, no edema  Skin: No significant rashes or bruises noted Neuro: No focal deficits noted. No tremors GI: No pain with palpation

## 2024-04-11 NOTE — CONSULT LETTER
[FreeTextEntry2] :  Dr. Gomez Kaplan (uro/ref) [FreeTextEntry3] : Say Sutherland MD, FACS , Division of Thoracic Surgery [facility] Thoracic Surgery [facility] Department of Cardiovascular & Thoracic Surgery  [employer] [facility]

## 2024-04-15 ENCOUNTER — NON-APPOINTMENT (OUTPATIENT)
Age: 59
End: 2024-04-15

## 2024-04-15 LAB
CREAT SPEC-SCNC: 113 MG/DL
MICROALBUMIN 24H UR DL<=1MG/L-MCNC: 4.4 MG/DL
MICROALBUMIN/CREAT 24H UR-RTO: 39 MG/G
T4 FREE SERPL-MCNC: 1 NG/DL
TSH SERPL-ACNC: 7.09 UIU/ML

## 2024-04-15 RX ORDER — BLOOD-GLUCOSE SENSOR
EACH MISCELLANEOUS
Qty: 2 | Refills: 4 | Status: ACTIVE | COMMUNITY
Start: 2024-04-15 | End: 1900-01-01

## 2024-04-16 ENCOUNTER — OUTPATIENT (OUTPATIENT)
Dept: OUTPATIENT SERVICES | Facility: HOSPITAL | Age: 59
LOS: 1 days | Discharge: HOME | End: 2024-04-16

## 2024-04-16 ENCOUNTER — APPOINTMENT (OUTPATIENT)
Dept: HEMATOLOGY ONCOLOGY | Facility: CLINIC | Age: 59
End: 2024-04-16
Payer: COMMERCIAL

## 2024-04-16 VITALS
BODY MASS INDEX: 28.44 KG/M2 | HEIGHT: 69 IN | WEIGHT: 192 LBS | DIASTOLIC BLOOD PRESSURE: 88 MMHG | TEMPERATURE: 98.3 F | RESPIRATION RATE: 16 BRPM | SYSTOLIC BLOOD PRESSURE: 143 MMHG | OXYGEN SATURATION: 98 % | HEART RATE: 79 BPM

## 2024-04-16 DIAGNOSIS — Z85.46 PERSONAL HISTORY OF MALIGNANT NEOPLASM OF PROSTATE: ICD-10-CM

## 2024-04-16 DIAGNOSIS — N28.89 OTHER SPECIFIED DISORDERS OF KIDNEY AND URETER: ICD-10-CM

## 2024-04-16 DIAGNOSIS — Z96.0 PRESENCE OF UROGENITAL IMPLANTS: Chronic | ICD-10-CM

## 2024-04-16 DIAGNOSIS — Z87.828 PERSONAL HISTORY OF OTHER (HEALED) PHYSICAL INJURY AND TRAUMA: Chronic | ICD-10-CM

## 2024-04-16 DIAGNOSIS — Z98.890 OTHER SPECIFIED POSTPROCEDURAL STATES: Chronic | ICD-10-CM

## 2024-04-16 DIAGNOSIS — Z87.898 PERSONAL HISTORY OF OTHER SPECIFIED CONDITIONS: ICD-10-CM

## 2024-04-16 DIAGNOSIS — N28.9 DISORDER OF KIDNEY AND URETER, UNSPECIFIED: ICD-10-CM

## 2024-04-16 DIAGNOSIS — Z90.49 ACQUIRED ABSENCE OF OTHER SPECIFIED PARTS OF DIGESTIVE TRACT: Chronic | ICD-10-CM

## 2024-04-16 PROCEDURE — 99215 OFFICE O/P EST HI 40 MIN: CPT

## 2024-04-16 NOTE — HISTORY OF PRESENT ILLNESS
[FreeTextEntry1] : [id number] BHANU REEDER Jul 12 1965 [phone]  59 y/o  *CAD s/p PCI LAD-prox '19 *Seml-txymhxtmsn-ZEOTN1-VASc=2 (CAD, DM)   here for followup.  entire kidney removed.  open procedure restarted ASA 7 day after procedure has been on asa  for past 5 days. notes HRs are 60s, blood sugars are better controlled & insulin dose was reduced.  Also follows w/ endo. reports nosebleeds shortly after starting metformin.  PET scan showed L lung is cancerous  R lung is not. Plan to remove L lung nodule only. Plan for procedure April 18th.  No chest discomfort, dyspnea, palpitations.  Reviewed epatch results March Mar '24: no afib events. sinus rhythm ave rate 83, SVE 1.78% PVCs 0.09% longested SV arrhythmias 12 beats, 13 beats idioventricular rhythm 80-90s 3/17/'24 5pm noted.   ======================= ECG Mar '24: NSR no ischemic changes QTc 390. ======================= CARDIAC HISTORY: Seen by [facility] Cardio Dr. Hassan. In '19 had pna, during one scan of lungs, CT scan showed pulmonary hypertension & "extensive LAD disease" and "distally dilated aortic arch 4 cm x 4 cm". CT scan showed pulmonary hypertension & "extensive LAD disease" and "distally dilated aortic arch 4 cm x 4 cm". Workup for coronary calcifications included normal perfusion scan. but abnormal CTA which revealed a calcium score of 1030 and "at least" moderate narrowing in the LAD and RCA. 'consider further testing' had Cath w/ PCI to prox LAD. per pt discussed w/ cardio who said: "stent was not absolutely necessary" but "stenosis would eventually get worse" "might want to fix it" Of note cardiologist notes significantly elevated TGs requiring gemfibrozil was on low dose statin at that time. ======================= HOSPITAL ADMIT FEB '24: Presented Feb 4th for R flank pain & blood in urine. Cardio consulted for clearance had h/o PCI but taken to procedure prior to evaluation. saw postop, reviewed cardiac history: Hx. of CAD s/p stent placed on 4/26/2019, readmitted on 4/28/2019 with CP had C stent patent Lost to followup w/ cardio reccomended restablishing care. h/o statin intolerance discussed PCKS9 inhibitor. next day pt went into afib w/ RVR transferred to tele. cardizem started. pt "refused anticoag" per note. spontaneously converted to sinus. instructed to start eliquis when hematuria resolves visually. Renal mass noted on imaging c/w metastatic RCC Biopsy of lesion in lung planned. Discharged Feb 7th. ======================= Echo Feb '24: EF 55-60%, mild LVH, mild LAE, mild-mod AR, mild MR ============================ CARDIAC CATHS:  [facility] Cath Lab Report -- Comprehensive Report INDICATIONS: Stable angina - CCS2. Abnormal CT angio. HISTORY: There was no prior cardiac history. The patient has hypertension, oral hypoglycemic-treated diabetes, medication-treated dyslipidemia, low HDL, and morbid obesity. PROCEDURE: Pressures: -- Aortic Pressure (S/D/M): 150/102/123 Pressures: -- Left Ventricle (s/edp): 136/24/-- Pressures: -- Aortic Pressure (S/D/M): 149/101/123 -- Left heart catheterization. -- Left coronary angiography. -- Right coronary angiography. -- Intervention on proximal LAD: drug-eluting stent. "A successful drug-eluting stent was performed on the 80 % lesion in the proximal LAD. Following intervention there was a 1 % residual stenosis."  CORONARY VESSELS: The coronary circulation is right dominant. LM: -- LM: Angiography showed minor luminal irregularities with no flow limiting lesions. LAD: -- Proximal LAD: There was a 80 % stenosis. CX: -- Circumflex: Angiography showed minor luminal irregularities with no flow limiting lesions. RCA: -- RCA: Angiography showed minor luminal irregularities with no flow limiting lesions. COMPLICATIONS: There were no complications. DIAGNOSTIC RECOMMENDATIONS: ASA and Plavix for 1 year INTERVENTIONAL RECOMMENDATIONS: ASA and Plavix for 1 year. Dagoberto Toney M.D. Signed 04/26/2019 13:18:49 ================= ================= ================= EXAM: CARDIAC CATHERIZATION PROCEDURE DATE: 04/29/2019 INTERPRETATION: Cardiac Catheterization Report Demographics Patient Name  LILLI DRUMMOND Height  70 Inches Date of Birth 1965 BMI   32.42 kg/m S 2 Age   53 year(s) Performing Milton Nieves MD Procedure Start Time: 04/29/2019 11:15. Hemodynamics Pressure !AO !129/83 (98)       ! Angiographic Findings Cardiac Arteries and Lesion Findings LMCA: Normal. LAD: Diffuse irregularity.Widely patent stent. LCx: Diffuse irregularity. RCA: Diffuse irregularity. Impression Diagnostic Conclusions Non-Obstructive CAD. Patent stent in LAD Recommendations Aggressive medical management of coronary artery disease and its underlying risk factors. Manage with medical therapy. MILTON NIEVES M.D., ATTENDING CARDIOLOGIST This document has been electronically signed. May 6 2019 1:05PM ================= ================= =================

## 2024-04-16 NOTE — ASSESSMENT
[FreeTextEntry1] : A/P:  *idioventricular rhythm on epatch 15 beats -MPI lexiscan - cannot exercise b/c pt is postop  *afib -CHADS2-VASc=2 (CAD, DM) -discussed anticoag.I reccomend anticoag.  Return as telehealth in 2 weeks.

## 2024-04-17 PROBLEM — N28.9 RENAL LESION: Status: RESOLVED | Noted: 2024-02-15 | Resolved: 2024-04-17

## 2024-04-17 PROBLEM — Z87.898 HISTORY OF MULTIPLE PULMONARY NODULES: Status: RESOLVED | Noted: 2024-02-14 | Resolved: 2024-04-17

## 2024-04-17 PROBLEM — Z85.46 HISTORY OF MALIGNANT NEOPLASM OF PROSTATE: Status: RESOLVED | Noted: 2024-03-26 | Resolved: 2024-04-17

## 2024-04-17 PROBLEM — N28.89 RENAL MASS: Status: RESOLVED | Noted: 2024-02-07 | Resolved: 2024-04-17

## 2024-04-17 NOTE — PHYSICAL EXAM
[Restricted in physically strenuous activity but ambulatory and able to carry out work of a light or sedentary nature] : Status 1- Restricted in physically strenuous activity but ambulatory and able to carry out work of a light or sedentary nature, e.g., light house work, office work [Normal] : affect appropriate [de-identified] : looks well  [de-identified] : healing surgical incision in right  mid abdomen.

## 2024-04-17 NOTE — HISTORY OF PRESENT ILLNESS
[Disease: _____________________] : Disease: [unfilled] [M: ___] : M[unfilled] [T: ___] : T[unfilled] [N: ___] : N[unfilled] [de-identified] : BHANU REEDER is a 58 y.o. M with a PMH significant for HTN, HLD, CAD -> stent 4/2019, diabetes, who we saw in  for a renal mass alg lung nodule concerning for metastatic RCC.   2/4/24 - 2/7/24 - Admitted to  with hematuria.  CT C/A/P - Dominant PRISCILLA lobulated 1.6 cm lung nodule new since 4/10/19. 2 adjacent pulmonary nodules within the peripheral aspect of the RLL, largest measuring about 5 mm, indeterminate. Heterogeneous enhancing mass measuring 7.3 x 5.8 x 7.2 cm (AP x TRV x CC) in the right renal midpole concerning for a renal neoplasm. Mild hydroureteronephrosis to the level of the distal right ureter with moderate right perinephric inflammatory change and a delayed nephrogram. Questionable 4 mm lesion within the distal right ureter. No left hydronephrosis. Mild nonspecific left perinephric fat stranding.  Enlarged portacaval LN measuring 2.2 x 1.0 cm. Enlarged right retroperitoneal LN measuring 1.5 x 1.2 cm.  2/424 - Cystoscopy with retrograde pyelogram and ureteral stent insertion. PATH - ureter wash negative for high grade UCC.   2/5/24 - MRI Abd - 9.6 cm solid partially necrotic right renal mass compatible with renal cell carcinoma. Several retroperitoneal lymph nodes suspect for metastatic disease.  During hospitalization  he was also found to have intermittent Afib. Started on Diltiazem and metoprolol.  Eliquis recommedned but not started yet due to persistent hematuria. Due to high blood sugar levels- started on insulin and oral hypoglycemics.  IR biopsy of PRISCILLA pulmonary nodule  2/22/24 : clear cell carcinoma c/w renal primary  3/19/24 : right radical nephrectomy ( Dr Kaplan )  Path :  clear cell renal cell carcinoma  11.0 cm WHO grade 4 with extensive necrosis, extending to perinephric fat and invading renal vein and renal sinus . negative margins. 2 of 2 lymph nodes positive for metastatic cancer  pT3a pN1 [de-identified] : IMDC intermediate- poor risk disease ( one risk factor)  [de-identified] : clear cell carcinoma  [de-identified] : Recovering from surgery. Incision healed. No hematuria.

## 2024-04-17 NOTE — REVIEW OF SYSTEMS
[Patient Intake Form Reviewed] : Patient intake form was reviewed [Fatigue] : fatigue [Diarrhea: Grade 0] : Diarrhea: Grade 0 [Negative] : Allergic/Immunologic [FreeTextEntry7] : slight bloating, occ constipation

## 2024-04-17 NOTE — ASSESSMENT
[FreeTextEntry1] : 59 y/o male presented with hematuria and found to have large partially necrotic right renal mass concerning for RCC. Also has slightly enlarged retroperitoneal LNs and peripheral PRISCILLA 1.6 cm pulmonary nodule. CT guided biopsy of PRISCILLA nodule by IR 2/22/24 : clear cell carcinoma c/w renal primary  Because of oligometastatic disease and relatively large primary tumor- he will beneft from resection of primary- right nephrectomy. s/p  radical right nephrectomy by Dr Kaplan on 3/19/24.  Surgical pathology showed large  clear cell renal cancer with necrosis , vascular invasion and metastatic to 2/2 regional lymph nodes pT3 pN1  Recommend systemic therapy for minimal volume residual metastatic disease. If solitary nodule remains the only area of active disease after few months of systemic therapy- would recommend wedge resection of lung nodule as it would appear to be a solitary met.  Discussed systemic therapy options. Patient has excellent performance status and he is interested in the most active systemic therapy.  Plan :  dual immunotherapy with nivolumab and ipilimumab. Discussed benefits , schedules and potential side effects. discussed management of immunotx related side effects.  Nivolumab 3 mg/kg iv q 3 weeks plus ipilimumab 1 mg/kg iv q 3 weeks x 4 cycles followed by nivolumab maintenance 3 mg/kg iv q 2 weeks.  Plan to start next week.   D/w patient and his wife.   CC: Dr RANDALL Grimm    f remains only area of active disease after several months of systemic therapy. I disussed that with thoracic surgery  Dr Sutherland.  Plan :   Long d/w patient and his wife. Return visit ~ 3 weeks after surgery.  CC: Dr RANDALL Grimm.

## 2024-04-18 ENCOUNTER — NON-APPOINTMENT (OUTPATIENT)
Age: 59
End: 2024-04-18

## 2024-04-18 ENCOUNTER — APPOINTMENT (OUTPATIENT)
Dept: THORACIC SURGERY | Facility: HOSPITAL | Age: 59
End: 2024-04-18

## 2024-04-18 DIAGNOSIS — C78.00 SECONDARY MALIGNANT NEOPLASM OF UNSPECIFIED LUNG: ICD-10-CM

## 2024-04-18 DIAGNOSIS — C64.1 MALIGNANT NEOPLASM OF RIGHT KIDNEY, EXCEPT RENAL PELVIS: ICD-10-CM

## 2024-04-22 ENCOUNTER — RESULT REVIEW (OUTPATIENT)
Age: 59
End: 2024-04-22

## 2024-04-22 ENCOUNTER — NON-APPOINTMENT (OUTPATIENT)
Age: 59
End: 2024-04-22

## 2024-04-22 ENCOUNTER — APPOINTMENT (OUTPATIENT)
Dept: INFUSION THERAPY | Facility: CLINIC | Age: 59
End: 2024-04-22

## 2024-04-22 LAB
BASOPHILS # BLD AUTO: 0.03 K/UL — SIGNIFICANT CHANGE UP (ref 0–0.2)
BASOPHILS NFR BLD AUTO: 0.5 % — SIGNIFICANT CHANGE UP (ref 0–2)
EOSINOPHIL # BLD AUTO: 0.14 K/UL — SIGNIFICANT CHANGE UP (ref 0–0.5)
EOSINOPHIL NFR BLD AUTO: 2.2 % — SIGNIFICANT CHANGE UP (ref 0–6)
HCT VFR BLD CALC: 34 % — LOW (ref 39–50)
HGB BLD-MCNC: 11.3 G/DL — LOW (ref 13–17)
IMM GRANULOCYTES NFR BLD AUTO: 0.5 % — SIGNIFICANT CHANGE UP (ref 0–0.9)
LYMPHOCYTES # BLD AUTO: 1.37 K/UL — SIGNIFICANT CHANGE UP (ref 1–3.3)
LYMPHOCYTES # BLD AUTO: 21.8 % — SIGNIFICANT CHANGE UP (ref 13–44)
MCHC RBC-ENTMCNC: 28.3 PG — SIGNIFICANT CHANGE UP (ref 27–34)
MCHC RBC-ENTMCNC: 33.2 GM/DL — SIGNIFICANT CHANGE UP (ref 32–36)
MCV RBC AUTO: 85 FL — SIGNIFICANT CHANGE UP (ref 80–100)
MONOCYTES # BLD AUTO: 0.39 K/UL — SIGNIFICANT CHANGE UP (ref 0–0.9)
MONOCYTES NFR BLD AUTO: 6.2 % — SIGNIFICANT CHANGE UP (ref 2–14)
NEUTROPHILS # BLD AUTO: 4.33 K/UL — SIGNIFICANT CHANGE UP (ref 1.8–7.4)
NEUTROPHILS NFR BLD AUTO: 68.8 % — SIGNIFICANT CHANGE UP (ref 43–77)
NRBC # BLD: 0 /100 WBCS — SIGNIFICANT CHANGE UP (ref 0–0)
PLATELET # BLD AUTO: 268 K/UL — SIGNIFICANT CHANGE UP (ref 150–400)
RBC # BLD: 4 M/UL — LOW (ref 4.2–5.8)
RBC # FLD: 14 % — SIGNIFICANT CHANGE UP (ref 10.3–14.5)
WBC # BLD: 6.29 K/UL — SIGNIFICANT CHANGE UP (ref 3.8–10.5)
WBC # FLD AUTO: 6.29 K/UL — SIGNIFICANT CHANGE UP (ref 3.8–10.5)

## 2024-04-23 ENCOUNTER — APPOINTMENT (OUTPATIENT)
Dept: CARDIOLOGY | Facility: CLINIC | Age: 59
End: 2024-04-23
Payer: COMMERCIAL

## 2024-04-23 LAB
ALBUMIN SERPL ELPH-MCNC: 5 G/DL — SIGNIFICANT CHANGE UP (ref 3.3–5)
ALP SERPL-CCNC: 61 U/L — SIGNIFICANT CHANGE UP (ref 40–120)
ALT FLD-CCNC: 12 U/L — SIGNIFICANT CHANGE UP (ref 10–45)
ANION GAP SERPL CALC-SCNC: 17 MMOL/L — SIGNIFICANT CHANGE UP (ref 5–17)
AST SERPL-CCNC: 13 U/L — SIGNIFICANT CHANGE UP (ref 10–40)
BILIRUB SERPL-MCNC: 0.2 MG/DL — SIGNIFICANT CHANGE UP (ref 0.2–1.2)
BUN SERPL-MCNC: 34 MG/DL — HIGH (ref 7–23)
CALCIUM SERPL-MCNC: 10.2 MG/DL — SIGNIFICANT CHANGE UP (ref 8.4–10.5)
CHLORIDE SERPL-SCNC: 103 MMOL/L — SIGNIFICANT CHANGE UP (ref 96–108)
CO2 SERPL-SCNC: 17 MMOL/L — LOW (ref 22–31)
CREAT SERPL-MCNC: 1.27 MG/DL — SIGNIFICANT CHANGE UP (ref 0.5–1.3)
EGFR: 65 ML/MIN/1.73M2 — SIGNIFICANT CHANGE UP
ERYTHROCYTE [SEDIMENTATION RATE] IN BLOOD: 77 MM/HR — HIGH (ref 0–20)
FERRITIN SERPL-MCNC: 380 NG/ML — SIGNIFICANT CHANGE UP (ref 30–400)
GLUCOSE SERPL-MCNC: 113 MG/DL — HIGH (ref 70–99)
HBV CORE AB SER-ACNC: SIGNIFICANT CHANGE UP
HBV SURFACE AB SER-ACNC: SIGNIFICANT CHANGE UP
HBV SURFACE AG SER-ACNC: SIGNIFICANT CHANGE UP
IRON SATN MFR SERPL: 24 % — SIGNIFICANT CHANGE UP (ref 16–55)
IRON SATN MFR SERPL: 71 UG/DL — SIGNIFICANT CHANGE UP (ref 45–165)
POTASSIUM SERPL-MCNC: 5.4 MMOL/L — HIGH (ref 3.5–5.3)
POTASSIUM SERPL-SCNC: 5.4 MMOL/L — HIGH (ref 3.5–5.3)
PROT SERPL-MCNC: 7.6 G/DL — SIGNIFICANT CHANGE UP (ref 6–8.3)
SODIUM SERPL-SCNC: 137 MMOL/L — SIGNIFICANT CHANGE UP (ref 135–145)
TIBC SERPL-MCNC: 296 UG/DL — SIGNIFICANT CHANGE UP (ref 220–430)
UIBC SERPL-MCNC: 225 UG/DL — SIGNIFICANT CHANGE UP (ref 110–370)

## 2024-04-23 PROCEDURE — 99443: CPT

## 2024-04-23 PROCEDURE — G2211 COMPLEX E/M VISIT ADD ON: CPT | Mod: NC,1L

## 2024-04-24 DIAGNOSIS — Z51.11 ENCOUNTER FOR ANTINEOPLASTIC CHEMOTHERAPY: ICD-10-CM

## 2024-04-26 NOTE — HISTORY OF PRESENT ILLNESS
[FreeTextEntry1] : [id number] BHANU REEDER Jul 12 1965 [phone]  59 y/o  *CAD s/p PCI LAD-prox '19 *Geqf-zsmuollomr-WSKSX0-VASc=2 (CAD, DM)  here for followup.   Last visit lung surgery was being planned in next 1-2 weeks. Since then change in plan: plan for immunotherapy for 2-3 months prior to  consider lung surgery.  pt wasn't contacted re: scheduling stress test by our office. Walking w/o limitation.   on metoprolol & asa 81 mg daily  discussed again anticoag given afib diagnosis. Pt is a  & states anticoag would significant disrupt his everyday work. He is interested in a watchman device to eliminate the need for anticoag.  Assessment/Plan: -nuclear scan to r/o CAD given newly diagnosed afib & NSVT on monitoring. -watchman referral -return in 3 weeks to review results of nuclear scan.   Time spent reviewing pt's clinical data, assessment and plan: in office/telehealth level 4: established 25 min [id number]

## 2024-04-30 ENCOUNTER — RESULT REVIEW (OUTPATIENT)
Age: 59
End: 2024-04-30

## 2024-04-30 ENCOUNTER — APPOINTMENT (OUTPATIENT)
Dept: HEMATOLOGY ONCOLOGY | Facility: CLINIC | Age: 59
End: 2024-04-30

## 2024-04-30 LAB
ANION GAP SERPL CALC-SCNC: 10 MMOL/L — SIGNIFICANT CHANGE UP (ref 5–17)
BASOPHILS # BLD AUTO: 0.04 K/UL — SIGNIFICANT CHANGE UP (ref 0–0.2)
BASOPHILS NFR BLD AUTO: 0.5 % — SIGNIFICANT CHANGE UP (ref 0–2)
BUN SERPL-MCNC: 25 MG/DL — HIGH (ref 7–23)
CALCIUM SERPL-MCNC: 10 MG/DL — SIGNIFICANT CHANGE UP (ref 8.4–10.5)
CHLORIDE SERPL-SCNC: 102 MMOL/L — SIGNIFICANT CHANGE UP (ref 96–108)
CO2 SERPL-SCNC: 22 MMOL/L — SIGNIFICANT CHANGE UP (ref 22–31)
CREAT SERPL-MCNC: 1.17 MG/DL — SIGNIFICANT CHANGE UP (ref 0.5–1.3)
EGFR: 71 ML/MIN/1.73M2 — SIGNIFICANT CHANGE UP
EOSINOPHIL # BLD AUTO: 0.12 K/UL — SIGNIFICANT CHANGE UP (ref 0–0.5)
EOSINOPHIL NFR BLD AUTO: 1.6 % — SIGNIFICANT CHANGE UP (ref 0–6)
GLUCOSE SERPL-MCNC: 123 MG/DL — HIGH (ref 70–99)
HCT VFR BLD CALC: 35.2 % — LOW (ref 39–50)
HGB BLD-MCNC: 11.6 G/DL — LOW (ref 13–17)
IMM GRANULOCYTES NFR BLD AUTO: 0.7 % — SIGNIFICANT CHANGE UP (ref 0–0.9)
LYMPHOCYTES # BLD AUTO: 1.12 K/UL — SIGNIFICANT CHANGE UP (ref 1–3.3)
LYMPHOCYTES # BLD AUTO: 15 % — SIGNIFICANT CHANGE UP (ref 13–44)
MCHC RBC-ENTMCNC: 28.3 PG — SIGNIFICANT CHANGE UP (ref 27–34)
MCHC RBC-ENTMCNC: 33 GM/DL — SIGNIFICANT CHANGE UP (ref 32–36)
MCV RBC AUTO: 85.9 FL — SIGNIFICANT CHANGE UP (ref 80–100)
MONOCYTES # BLD AUTO: 0.47 K/UL — SIGNIFICANT CHANGE UP (ref 0–0.9)
MONOCYTES NFR BLD AUTO: 6.3 % — SIGNIFICANT CHANGE UP (ref 2–14)
NEUTROPHILS # BLD AUTO: 5.68 K/UL — SIGNIFICANT CHANGE UP (ref 1.8–7.4)
NEUTROPHILS NFR BLD AUTO: 75.9 % — SIGNIFICANT CHANGE UP (ref 43–77)
NRBC # BLD: 0 /100 WBCS — SIGNIFICANT CHANGE UP (ref 0–0)
PLATELET # BLD AUTO: 277 K/UL — SIGNIFICANT CHANGE UP (ref 150–400)
POTASSIUM SERPL-MCNC: 4.5 MMOL/L — SIGNIFICANT CHANGE UP (ref 3.5–5.3)
POTASSIUM SERPL-SCNC: 4.5 MMOL/L — SIGNIFICANT CHANGE UP (ref 3.5–5.3)
RBC # BLD: 4.1 M/UL — LOW (ref 4.2–5.8)
RBC # FLD: 14.1 % — SIGNIFICANT CHANGE UP (ref 10.3–14.5)
SODIUM SERPL-SCNC: 134 MMOL/L — LOW (ref 135–145)
WBC # BLD: 7.48 K/UL — SIGNIFICANT CHANGE UP (ref 3.8–10.5)
WBC # FLD AUTO: 7.48 K/UL — SIGNIFICANT CHANGE UP (ref 3.8–10.5)

## 2024-05-04 PROBLEM — Z86.2 HISTORY OF IRON DEFICIENCY ANEMIA: Status: RESOLVED | Noted: 2024-02-12 | Resolved: 2024-05-04

## 2024-05-04 PROBLEM — Z87.448 HISTORY OF HEMATURIA: Status: RESOLVED | Noted: 2024-02-12 | Resolved: 2024-05-04

## 2024-05-04 RX ORDER — ASPIRIN 81 MG
81 TABLET, DELAYED RELEASE (ENTERIC COATED) ORAL DAILY
Refills: 0 | Status: ACTIVE | COMMUNITY

## 2024-05-06 ENCOUNTER — APPOINTMENT (OUTPATIENT)
Dept: ENDOCRINOLOGY | Facility: CLINIC | Age: 59
End: 2024-05-06

## 2024-05-07 ENCOUNTER — APPOINTMENT (OUTPATIENT)
Dept: HEMATOLOGY ONCOLOGY | Facility: CLINIC | Age: 59
End: 2024-05-07
Payer: COMMERCIAL

## 2024-05-07 ENCOUNTER — APPOINTMENT (OUTPATIENT)
Dept: HEMATOLOGY ONCOLOGY | Facility: CLINIC | Age: 59
End: 2024-05-07

## 2024-05-07 DIAGNOSIS — Z86.2 PERSONAL HISTORY OF DISEASES OF THE BLOOD AND BLOOD-FORMING ORGANS AND CERTAIN DISORDERS INVOLVING THE IMMUNE MECHANISM: ICD-10-CM

## 2024-05-07 DIAGNOSIS — Z87.448 PERSONAL HISTORY OF OTHER DISEASES OF URINARY SYSTEM: ICD-10-CM

## 2024-05-09 ENCOUNTER — APPOINTMENT (OUTPATIENT)
Dept: CARDIOLOGY | Facility: CLINIC | Age: 59
End: 2024-05-09
Payer: COMMERCIAL

## 2024-05-09 DIAGNOSIS — I25.10 ATHEROSCLEROTIC HEART DISEASE OF NATIVE CORONARY ARTERY W/OUT ANGINA PECTORIS: ICD-10-CM

## 2024-05-09 PROCEDURE — A9500: CPT

## 2024-05-09 PROCEDURE — 78452 HT MUSCLE IMAGE SPECT MULT: CPT

## 2024-05-09 PROCEDURE — 93015 CV STRESS TEST SUPVJ I&R: CPT

## 2024-05-10 ENCOUNTER — APPOINTMENT (OUTPATIENT)
Dept: HEMATOLOGY ONCOLOGY | Facility: CLINIC | Age: 59
End: 2024-05-10

## 2024-05-10 ENCOUNTER — RESULT REVIEW (OUTPATIENT)
Age: 59
End: 2024-05-10

## 2024-05-10 LAB
ALBUMIN SERPL ELPH-MCNC: 5 G/DL — SIGNIFICANT CHANGE UP (ref 3.3–5)
ALP SERPL-CCNC: 52 U/L — SIGNIFICANT CHANGE UP (ref 40–120)
ALT FLD-CCNC: 11 U/L — SIGNIFICANT CHANGE UP (ref 10–45)
ANION GAP SERPL CALC-SCNC: 12 MMOL/L — SIGNIFICANT CHANGE UP (ref 5–17)
AST SERPL-CCNC: 14 U/L — SIGNIFICANT CHANGE UP (ref 10–40)
BASOPHILS # BLD AUTO: 0.05 K/UL — SIGNIFICANT CHANGE UP (ref 0–0.2)
BASOPHILS NFR BLD AUTO: 1 % — SIGNIFICANT CHANGE UP (ref 0–2)
BILIRUB SERPL-MCNC: 0.3 MG/DL — SIGNIFICANT CHANGE UP (ref 0.2–1.2)
BUN SERPL-MCNC: 26 MG/DL — HIGH (ref 7–23)
CALCIUM SERPL-MCNC: 10.2 MG/DL — SIGNIFICANT CHANGE UP (ref 8.4–10.5)
CHLORIDE SERPL-SCNC: 103 MMOL/L — SIGNIFICANT CHANGE UP (ref 96–108)
CO2 SERPL-SCNC: 23 MMOL/L — SIGNIFICANT CHANGE UP (ref 22–31)
CREAT SERPL-MCNC: 1.41 MG/DL — HIGH (ref 0.5–1.3)
EGFR: 57 ML/MIN/1.73M2 — LOW
EOSINOPHIL # BLD AUTO: 0.1 K/UL — SIGNIFICANT CHANGE UP (ref 0–0.5)
EOSINOPHIL NFR BLD AUTO: 2 % — SIGNIFICANT CHANGE UP (ref 0–6)
FOLATE SERPL-MCNC: 7.7 NG/ML — SIGNIFICANT CHANGE UP
GLUCOSE SERPL-MCNC: 148 MG/DL — HIGH (ref 70–99)
HCT VFR BLD CALC: 36.4 % — LOW (ref 39–50)
HGB BLD-MCNC: 12.1 G/DL — LOW (ref 13–17)
IMM GRANULOCYTES NFR BLD AUTO: 0.6 % — SIGNIFICANT CHANGE UP (ref 0–0.9)
LYMPHOCYTES # BLD AUTO: 1.13 K/UL — SIGNIFICANT CHANGE UP (ref 1–3.3)
LYMPHOCYTES # BLD AUTO: 22.2 % — SIGNIFICANT CHANGE UP (ref 13–44)
MCHC RBC-ENTMCNC: 28.3 PG — SIGNIFICANT CHANGE UP (ref 27–34)
MCHC RBC-ENTMCNC: 33.2 GM/DL — SIGNIFICANT CHANGE UP (ref 32–36)
MCV RBC AUTO: 85 FL — SIGNIFICANT CHANGE UP (ref 80–100)
MONOCYTES # BLD AUTO: 0.37 K/UL — SIGNIFICANT CHANGE UP (ref 0–0.9)
MONOCYTES NFR BLD AUTO: 7.3 % — SIGNIFICANT CHANGE UP (ref 2–14)
NEUTROPHILS # BLD AUTO: 3.41 K/UL — SIGNIFICANT CHANGE UP (ref 1.8–7.4)
NEUTROPHILS NFR BLD AUTO: 66.9 % — SIGNIFICANT CHANGE UP (ref 43–77)
NRBC # BLD: 0 /100 WBCS — SIGNIFICANT CHANGE UP (ref 0–0)
PLATELET # BLD AUTO: 257 K/UL — SIGNIFICANT CHANGE UP (ref 150–400)
POTASSIUM SERPL-MCNC: 5.2 MMOL/L — SIGNIFICANT CHANGE UP (ref 3.5–5.3)
POTASSIUM SERPL-SCNC: 5.2 MMOL/L — SIGNIFICANT CHANGE UP (ref 3.5–5.3)
PROT SERPL-MCNC: 7.6 G/DL — SIGNIFICANT CHANGE UP (ref 6–8.3)
RBC # BLD: 4.28 M/UL — SIGNIFICANT CHANGE UP (ref 4.2–5.8)
RBC # FLD: 13.8 % — SIGNIFICANT CHANGE UP (ref 10.3–14.5)
SODIUM SERPL-SCNC: 139 MMOL/L — SIGNIFICANT CHANGE UP (ref 135–145)
T4 FREE SERPL-MCNC: 1.2 NG/DL — SIGNIFICANT CHANGE UP (ref 0.9–1.8)
T4 FREE+ TSH PNL SERPL: 4.63 UIU/ML — HIGH (ref 0.27–4.2)
VIT B12 SERPL-MCNC: 476 PG/ML — SIGNIFICANT CHANGE UP (ref 232–1245)
WBC # BLD: 5.09 K/UL — SIGNIFICANT CHANGE UP (ref 3.8–10.5)
WBC # FLD AUTO: 5.09 K/UL — SIGNIFICANT CHANGE UP (ref 3.8–10.5)

## 2024-05-13 ENCOUNTER — RESULT REVIEW (OUTPATIENT)
Age: 59
End: 2024-05-13

## 2024-05-13 ENCOUNTER — APPOINTMENT (OUTPATIENT)
Dept: INFUSION THERAPY | Facility: CLINIC | Age: 59
End: 2024-05-13
Payer: COMMERCIAL

## 2024-05-13 ENCOUNTER — APPOINTMENT (OUTPATIENT)
Dept: HEMATOLOGY ONCOLOGY | Facility: CLINIC | Age: 59
End: 2024-05-13
Payer: COMMERCIAL

## 2024-05-13 VITALS
OXYGEN SATURATION: 98 % | DIASTOLIC BLOOD PRESSURE: 85 MMHG | SYSTOLIC BLOOD PRESSURE: 137 MMHG | HEART RATE: 70 BPM | RESPIRATION RATE: 16 BRPM | WEIGHT: 196.44 LBS | TEMPERATURE: 98.6 F | BODY MASS INDEX: 29.09 KG/M2 | HEIGHT: 69 IN

## 2024-05-13 DIAGNOSIS — Z85.528 PERSONAL HISTORY OF OTHER MALIGNANT NEOPLASM OF KIDNEY: ICD-10-CM

## 2024-05-13 PROCEDURE — G2211 COMPLEX E/M VISIT ADD ON: CPT

## 2024-05-13 PROCEDURE — 99213 OFFICE O/P EST LOW 20 MIN: CPT

## 2024-05-13 RX ORDER — ASPIRIN/CALCIUM CARB/MAGNESIUM 324 MG
1 TABLET ORAL
Qty: 0 | Refills: 0 | DISCHARGE

## 2024-05-13 RX ORDER — INSULIN GLARGINE 100 [IU]/ML
20 INJECTION, SOLUTION SUBCUTANEOUS
Refills: 0 | DISCHARGE

## 2024-05-13 NOTE — HISTORY OF PRESENT ILLNESS
[de-identified] : BHANU REEDER is a 58 y.o. M with a PMH significant for HTN, HLD, CAD -> stent 4/2019, and DM2, who we are following for metastatic RCC (lug mets).   2/4/24 - 2/7/24 - Admitted to  with hematuria.  CT C/A/P - Dominant PRISCILLA lobulated 1.6 cm lung nodule new since 4/10/19. 2 adjacent pulmonary nodules within the peripheral aspect of the RLL, largest measuring about 5 mm, indeterminate. Heterogeneous enhancing mass measuring 7.3 x 5.8 x 7.2 cm (AP x TRV x CC) in the right renal midpole concerning for a renal neoplasm. Mild hydroureteronephrosis to the level of the distal right ureter with moderate right perinephric inflammatory change and a delayed nephrogram. Questionable 4 mm lesion within the distal right ureter. No left hydronephrosis. Mild nonspecific left perinephric fat stranding.  Enlarged portacaval LN measuring 2.2 x 1.0 cm. Enlarged right retroperitoneal LN measuring 1.5 x 1.2 cm.  2/424 - Cystoscopy with retrograde pyelogram and ureteral stent insertion. PATH - ureter wash negative for high grade UCC.   2/5/24 - MRI Abd - 9.6 cm solid partially necrotic right renal mass compatible with renal cell carcinoma. Several retroperitoneal lymph nodes suspect for metastatic disease.  During hospitalization he was also found to have intermittent Afib. Started on Diltiazem and metoprolol.  Eliquis recommedned but not started yet due to persistent hematuria. Due to high blood sugar levels - started on insulin and oral hypoglycemics.  2/22/24 - IR biopsy of PRISCILLA pulmonary nodule - clear cell carcinoma c/w renal primary.  3/15/24 - PETCT - 8.7 x 6.8cm right renal mass with necrotic appearing center, SUV 9.9. Upper abdominal LN's up to 2.4cm, SUV up to 11.3cm.  2cm PRISCILLA nodule with SUV 6.6.  3/19/24 - Right radical nephrectomy (Dr. Kaplan). Path: clear cell renal cell carcinoma, 11.0 cm, WHO grade 4, with extensive necrosis, extending to perinephric fat and invading renal vein and renal sinus.  Negative margins. 2/2 LN's positive for metastatic cancer. pT3a pN1.  4/22/24 - Started on dual immunotherapy with Ipi + Nivo. [de-identified] : clear cell carcinoma  [de-identified] : IMDC intermediate - poor risk disease (one risk factor)  [de-identified] : D1 C2 Ipi + Nivo. Patient reports major complaint is that he has been very fatigued.  He has noted a mild increase in his baseline arthralgias but reports he has not been moving much since his surgery.  Has some baseline constipation and has noted some mild nausea.  Also has had some mild headaches.  No diarrhea, SOB, itching, or rashes.  Denies any changes in his family, medical, or social history since his last visit of 4/16/24.

## 2024-05-13 NOTE — ASSESSMENT
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  S/p radical right nephrectomy (Dr. Kaplan) 3/19/24. pT3 pN1.  Recommend systemic therapy for minimal volume residual metastatic disease. If solitary nodule remains the only area of active disease after few months of systemic therapy would recommend wedge resection of lung nodule as it would appear to be a solitary met.  4/22/24 - Started on dual immunotherapy with nivolumab (3mg/kg) and ipilimumab (1mg/kg) Q21 x 4 -> Nivo maintenance.  Patient now D1 C2 Ipi + Nivo.  Tolerating as expected hitherto.   Plan: Continue on current regime.  Patient with increased fatigue - not clear if regular post-operative fatigue or is hypophysis, especially in setting of mild headaches.  Will send off cortisol level as baseline.  Labs and PE 5/28/24.   Dr John Grimm PCP) Dr. Gomez Kaplan (Surgery) Dr. Twila Cosme (Endo) Dr. Lucas Mai (Cardio) Dr. Say Sutherland (CTSX) Dr. Paramjit Ovalle (Urology)

## 2024-05-13 NOTE — REVIEW OF SYSTEMS
[Joint Pain] : joint pain [Joint Stiffness] : joint stiffness [FreeTextEntry7] : occ constipation, occ nausea [de-identified] : mild headaches

## 2024-05-13 NOTE — PHYSICAL EXAM
[de-identified] : looks well  [de-identified] : healing surgical incision in right  mid abdomen.  [de-identified] : anicteric [de-identified] : no c/c/e [de-identified] : no rashes

## 2024-05-14 ENCOUNTER — APPOINTMENT (OUTPATIENT)
Dept: CARDIOLOGY | Facility: CLINIC | Age: 59
End: 2024-05-14
Payer: COMMERCIAL

## 2024-05-14 VITALS
HEIGHT: 69 IN | BODY MASS INDEX: 28.73 KG/M2 | HEART RATE: 73 BPM | OXYGEN SATURATION: 98 % | SYSTOLIC BLOOD PRESSURE: 126 MMHG | WEIGHT: 194 LBS | DIASTOLIC BLOOD PRESSURE: 76 MMHG

## 2024-05-14 DIAGNOSIS — R79.89 OTHER SPECIFIED ABNORMAL FINDINGS OF BLOOD CHEMISTRY: ICD-10-CM

## 2024-05-14 DIAGNOSIS — R59.0 LOCALIZED ENLARGED LYMPH NODES: ICD-10-CM

## 2024-05-14 DIAGNOSIS — Z85.528 PERSONAL HISTORY OF OTHER MALIGNANT NEOPLASM OF KIDNEY: ICD-10-CM

## 2024-05-14 DIAGNOSIS — D64.9 ANEMIA, UNSPECIFIED: ICD-10-CM

## 2024-05-14 DIAGNOSIS — R53.83 OTHER FATIGUE: ICD-10-CM

## 2024-05-14 DIAGNOSIS — Z51.12 ENCOUNTER FOR ANTINEOPLASTIC IMMUNOTHERAPY: ICD-10-CM

## 2024-05-14 LAB — CORTIS F PM SERPL-MCNC: 6.1 UG/DL — SIGNIFICANT CHANGE UP (ref 2.7–10.5)

## 2024-05-14 PROCEDURE — 93000 ELECTROCARDIOGRAM COMPLETE: CPT

## 2024-05-14 PROCEDURE — 99214 OFFICE O/P EST MOD 30 MIN: CPT

## 2024-05-14 RX ORDER — INSULIN GLARGINE 100 [IU]/ML
100 INJECTION, SOLUTION SUBCUTANEOUS
Qty: 3 | Refills: 3 | Status: DISCONTINUED | COMMUNITY
End: 2024-05-14

## 2024-05-14 RX ORDER — METFORMIN HYDROCHLORIDE 500 MG/1
500 TABLET, COATED ORAL TWICE DAILY
Refills: 0 | Status: DISCONTINUED | COMMUNITY
End: 2024-05-14

## 2024-05-19 NOTE — ASSESSMENT
[FreeTextEntry1] : A/P: =================== *CAD s/p PCI LAD-prox '19 -Cont. ASA -myalgias on multiple different statins. cannot take these. no FLP off statins available for review -FLP off statins from PCP requested. cmp ordered -telehealth after FLP ion 1 week  =================== *Kzjy-ejvkiulifb-FMGBT4-VASc=2 (CAD, DM) -DM is borderline - currently not requiring insulin or oral meds this was present when he was in hospital w/ renal mass but has since resolved.  -cont. metoprolol -will start NOAC as not working as a  pt plans to stop when working -Long discussion re: anticoag for CVA prophylaxis. 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  if on anticoag. this will significantly limit his ability to do his work. He is nearing nursing home (about 5 yrs) but seeking early nursing home will be difficult financially. -I discussed w/ him two options: 1.Watchman device to eliminate need for anticoag. OR 2.ILR implant to  screen for long episodes of afib. if long episodes of afib he would consider initiating anticoag & making changes in his work  (early nursing home v. changing job responsibilities). =================== Followup in 1 week to review FLP as noted above.

## 2024-05-19 NOTE — HISTORY OF PRESENT ILLNESS
[FreeTextEntry1] : BHANU REEDER Jul 12 1965 [phone]  59 y/o  *CAD s/p PCI LAD-prox '19 *[id number]-VASc=2 (CAD, DM)  here for followup. Reviewed results of nuclear - "probably normal"  "small-sized, mild defect(s) in the apical wall  that is partially reversible ( likely artifactual)  Normal stress prone images ,  totally normalizes with prone imaging  suggestive of diaphragmatic attenuation artifact.." Reviewed above probably normal results no need for further evaluation of obstructive CAD.  no new cardiac symptoms.  has apt w/ EP for watchman device also discussed ILR if pt does not want watchman  pt is near intermediate as a .  ======================= ECG Mar '24: NSR no ischemic changes QTc 390. ======================= CARDIAC HISTORY: *Seen by [facility] Cardio Dr. Hassan. In '19 had pna, during one scan of lungs, CT scan showed pulmonary hypertension & "extensive LAD disease" and "distally dilated aortic arch 4 cm x 4 cm". CT scan showed pulmonary hypertension & "extensive LAD disease" and "distally dilated aortic arch 4 cm x 4 cm". Workup for coronary calcifications included normal perfusion scan. but abnormal CTA which revealed a calcium score of 1030 and "at least" moderate narrowing in the LAD and RCA. 'consider further testing' had Cath w/ PCI to prox LAD. per pt discussed w/ cardio who said: "stent was not absolutely necessary" but "stenosis would eventually get worse" "might want to fix it" Of note cardiologist notes significantly elevated TGs requiring gemfibrozil was on low dose statin at that time. ---------- HOSPITAL ADMIT FEB '24: *Presented Feb 4th for R flank pain & blood in urine. Cardio consulted for clearance had h/o PCI but taken to procedure prior to evaluation. saw postop, reviewed cardiac history: Hx. of CAD s/p stent placed on 4/26/2019, readmitted on 4/28/2019 with CP had [facility] stent patent *Lost to followup w/ cardio reccomended restablishing care. h/o statin intolerance discussed PCKS9 inhibitor. *next day pt went into afib w/ RVR transferred to tele. cardizem started. pt "refused anticoag" per note. spontaneously converted to sinus. instructed to start eliquis when hematuria resolves visually. *Renal mass noted on imaging c/w metastatic RCC Biopsy of lesion in lung planned. Discharged Feb 7th. ---------- CLINIC *Mar '24: cleared for renal mass resection. anticoag when safe postop. *Apr '24: epatch for afib burden showed 15 beats WCT idioventricular rhythm suspected.  Lexiscan ordered.  Anticoag advised when cleared surgically *Apr '24: stress not scheduled office to schedule pt. ======================= Echo Feb '24: EF 55-60%, mild LVH, mild LAE, mild-mod AR, mild MR ============================ CARDIAC CATHS:  [facility] Cath Lab Report -- Comprehensive Report INDICATIONS: Stable angina - CCS2. Abnormal CT angio. HISTORY: There was no prior cardiac history. The patient has hypertension, oral hypoglycemic-treated diabetes, medication-treated dyslipidemia, low HDL, and morbid obesity. PROCEDURE: Pressures: -- Aortic Pressure (S/D/M): 150/102/123 Pressures: -- Left Ventricle (s/edp): 136/24/-- Pressures: -- Aortic Pressure (S/D/M): 149/101/123 -- Left heart catheterization. -- Left coronary angiography. -- Right coronary angiography. -- Intervention on proximal LAD: drug-eluting stent. "A successful drug-eluting stent was performed on the 80 % lesion in the proximal LAD. Following intervention there was a 1 % residual stenosis."  CORONARY VESSELS: The coronary circulation is right dominant. LM: -- LM: Angiography showed minor luminal irregularities with no flow limiting lesions. LAD: -- Proximal LAD: There was a 80 % stenosis. CX: -- Circumflex: Angiography showed minor luminal irregularities with no flow limiting lesions. RCA: -- RCA: Angiography showed minor luminal irregularities with no flow limiting lesions. COMPLICATIONS: There were no complications. DIAGNOSTIC RECOMMENDATIONS: ASA and Plavix for 1 year INTERVENTIONAL RECOMMENDATIONS: ASA and Plavix for 1 year. Dagoberto Toney M.D. Signed 04/26/2019 13:18:49 ================= ================= ================= EXAM: CARDIAC CATHERIZATION PROCEDURE DATE: 04/29/2019 INTERPRETATION: Cardiac Catheterization Report Demographics Patient Name LILLI DRUMMOND Height 70 Inches Date of Birth 1965 BMI 32.42 kg/m S 2 Age 53 year(s) Performing Milton Nieves MD Procedure Start Time: 04/29/2019 11:15. Hemodynamics Pressure !AO !129/83 (98) ! Angiographic Findings Cardiac Arteries and Lesion Findings LMCA: Normal. LAD: Diffuse irregularity.Widely patent stent. LCx: Diffuse irregularity. RCA: Diffuse irregularity. Impression Diagnostic Conclusions Non-Obstructive CAD. Patent stent in LAD Recommendations Aggressive medical management of coronary artery disease and its underlying risk factors. Manage with medical therapy. MILTON NIEVES M.D., ATTENDING CARDIOLOGIST This document has been electronically signed. May 6 2019 1:05PM ================= ================= =================

## 2024-05-21 ENCOUNTER — RESULT REVIEW (OUTPATIENT)
Age: 59
End: 2024-05-21

## 2024-05-21 ENCOUNTER — APPOINTMENT (OUTPATIENT)
Dept: HEMATOLOGY ONCOLOGY | Facility: CLINIC | Age: 59
End: 2024-05-21

## 2024-05-21 LAB
ALBUMIN SERPL ELPH-MCNC: 4.8 G/DL — SIGNIFICANT CHANGE UP (ref 3.3–5)
ALP SERPL-CCNC: 57 U/L — SIGNIFICANT CHANGE UP (ref 40–120)
ALT FLD-CCNC: 9 U/L — LOW (ref 10–45)
ANION GAP SERPL CALC-SCNC: 15 MMOL/L — SIGNIFICANT CHANGE UP (ref 5–17)
AST SERPL-CCNC: 11 U/L — SIGNIFICANT CHANGE UP (ref 10–40)
BASOPHILS # BLD AUTO: 0.02 K/UL — SIGNIFICANT CHANGE UP (ref 0–0.2)
BASOPHILS NFR BLD AUTO: 0.4 % — SIGNIFICANT CHANGE UP (ref 0–2)
BILIRUB SERPL-MCNC: 0.4 MG/DL — SIGNIFICANT CHANGE UP (ref 0.2–1.2)
BUN SERPL-MCNC: 27 MG/DL — HIGH (ref 7–23)
CALCIUM SERPL-MCNC: 10 MG/DL — SIGNIFICANT CHANGE UP (ref 8.4–10.5)
CHLORIDE SERPL-SCNC: 104 MMOL/L — SIGNIFICANT CHANGE UP (ref 96–108)
CHOLEST SERPL-MCNC: 188 MG/DL
CO2 SERPL-SCNC: 18 MMOL/L — LOW (ref 22–31)
CREAT SERPL-MCNC: 1.27 MG/DL — SIGNIFICANT CHANGE UP (ref 0.5–1.3)
EGFR: 65 ML/MIN/1.73M2 — SIGNIFICANT CHANGE UP
EOSINOPHIL # BLD AUTO: 0.12 K/UL — SIGNIFICANT CHANGE UP (ref 0–0.5)
EOSINOPHIL NFR BLD AUTO: 2.4 % — SIGNIFICANT CHANGE UP (ref 0–6)
GLUCOSE SERPL-MCNC: 146 MG/DL — HIGH (ref 70–99)
HCT VFR BLD CALC: 36.3 % — LOW (ref 39–50)
HDLC SERPL-MCNC: 30 MG/DL
HGB BLD-MCNC: 12.2 G/DL — LOW (ref 13–17)
IMM GRANULOCYTES NFR BLD AUTO: 0.4 % — SIGNIFICANT CHANGE UP (ref 0–0.9)
LDLC SERPL CALC-MCNC: 122 MG/DL
LDLC SERPL DIRECT ASSAY-MCNC: 121 MG/DL
LYMPHOCYTES # BLD AUTO: 1.1 K/UL — SIGNIFICANT CHANGE UP (ref 1–3.3)
LYMPHOCYTES # BLD AUTO: 21.7 % — SIGNIFICANT CHANGE UP (ref 13–44)
MCHC RBC-ENTMCNC: 28 PG — SIGNIFICANT CHANGE UP (ref 27–34)
MCHC RBC-ENTMCNC: 33.6 GM/DL — SIGNIFICANT CHANGE UP (ref 32–36)
MCV RBC AUTO: 83.4 FL — SIGNIFICANT CHANGE UP (ref 80–100)
MONOCYTES # BLD AUTO: 0.33 K/UL — SIGNIFICANT CHANGE UP (ref 0–0.9)
MONOCYTES NFR BLD AUTO: 6.5 % — SIGNIFICANT CHANGE UP (ref 2–14)
NEUTROPHILS # BLD AUTO: 3.48 K/UL — SIGNIFICANT CHANGE UP (ref 1.8–7.4)
NEUTROPHILS NFR BLD AUTO: 68.6 % — SIGNIFICANT CHANGE UP (ref 43–77)
NONHDLC SERPL-MCNC: 158 MG/DL
NRBC # BLD: 0 /100 WBCS — SIGNIFICANT CHANGE UP (ref 0–0)
PLATELET # BLD AUTO: 249 K/UL — SIGNIFICANT CHANGE UP (ref 150–400)
POTASSIUM SERPL-MCNC: 4.4 MMOL/L — SIGNIFICANT CHANGE UP (ref 3.5–5.3)
POTASSIUM SERPL-SCNC: 4.4 MMOL/L — SIGNIFICANT CHANGE UP (ref 3.5–5.3)
PROT SERPL-MCNC: 7.6 G/DL — SIGNIFICANT CHANGE UP (ref 6–8.3)
RBC # BLD: 4.35 M/UL — SIGNIFICANT CHANGE UP (ref 4.2–5.8)
RBC # FLD: 13.5 % — SIGNIFICANT CHANGE UP (ref 10.3–14.5)
SODIUM SERPL-SCNC: 137 MMOL/L — SIGNIFICANT CHANGE UP (ref 135–145)
T4 FREE+ TSH PNL SERPL: 5.82 UIU/ML — HIGH (ref 0.27–4.2)
TRIGL SERPL-MCNC: 198 MG/DL
WBC # BLD: 5.07 K/UL — SIGNIFICANT CHANGE UP (ref 3.8–10.5)
WBC # FLD AUTO: 5.07 K/UL — SIGNIFICANT CHANGE UP (ref 3.8–10.5)

## 2024-05-22 LAB — T4 FREE SERPL-MCNC: 1.3 NG/DL — SIGNIFICANT CHANGE UP (ref 0.9–1.8)

## 2024-05-23 ENCOUNTER — APPOINTMENT (OUTPATIENT)
Dept: CARDIOLOGY | Facility: CLINIC | Age: 59
End: 2024-05-23

## 2024-05-23 NOTE — HISTORY OF PRESENT ILLNESS
[Home] : at home, [unfilled] , at the time of the visit. [Medical Office: ([facility])___] : at the medical office located in  [Verbal consent obtained from patient] : the patient, [unfilled] [FreeTextEntry1] : [id number]  BHANU REEDER  Jul 12 1965 [phone]   Telehealth visit scheduled to ..... Reviewed .....       Assessment/Plan:       Next visit scheduled....       Time spent reviewing pt's clinical data, assessment and plan: in office/telehealth level 1: new 10 min 83444, estalbished 5 min 95136 level 2: new 20 min 97028, estalibhsed 10 min 93706 level 3: new 30 min 80651, established 15 min 46367 level 4: new 45 min 16932, established 25 min 62988 level 5: new 60 min 87341, established  40 min 88876   telephonic services new/established. level 1: 5-10 min 42933 level 2: 11-20 min 11288, level 3:  >21 min 50062

## 2024-05-24 LAB — ADRENAL CORTEX AB SER-ACNC: NEGATIVE — SIGNIFICANT CHANGE UP

## 2024-05-28 ENCOUNTER — RESULT REVIEW (OUTPATIENT)
Age: 59
End: 2024-05-28

## 2024-05-28 ENCOUNTER — APPOINTMENT (OUTPATIENT)
Dept: HEMATOLOGY ONCOLOGY | Facility: CLINIC | Age: 59
End: 2024-05-28
Payer: COMMERCIAL

## 2024-05-28 DIAGNOSIS — D64.9 ANEMIA, UNSPECIFIED: ICD-10-CM

## 2024-05-28 DIAGNOSIS — R59.0 LOCALIZED ENLARGED LYMPH NODES: ICD-10-CM

## 2024-05-28 DIAGNOSIS — R53.83 OTHER FATIGUE: ICD-10-CM

## 2024-05-28 LAB
ALBUMIN SERPL ELPH-MCNC: 4.8 G/DL — SIGNIFICANT CHANGE UP (ref 3.3–5)
ALP SERPL-CCNC: 55 U/L — SIGNIFICANT CHANGE UP (ref 40–120)
ALT FLD-CCNC: 10 U/L — SIGNIFICANT CHANGE UP (ref 10–45)
ANION GAP SERPL CALC-SCNC: 13 MMOL/L — SIGNIFICANT CHANGE UP (ref 5–17)
AST SERPL-CCNC: 13 U/L — SIGNIFICANT CHANGE UP (ref 10–40)
BASOPHILS # BLD AUTO: 0.01 K/UL — SIGNIFICANT CHANGE UP (ref 0–0.2)
BASOPHILS NFR BLD AUTO: 0.2 % — SIGNIFICANT CHANGE UP (ref 0–2)
BILIRUB SERPL-MCNC: 0.3 MG/DL — SIGNIFICANT CHANGE UP (ref 0.2–1.2)
BUN SERPL-MCNC: 27 MG/DL — HIGH (ref 7–23)
CALCIUM SERPL-MCNC: 10 MG/DL — SIGNIFICANT CHANGE UP (ref 8.4–10.5)
CHLORIDE SERPL-SCNC: 103 MMOL/L — SIGNIFICANT CHANGE UP (ref 96–108)
CO2 SERPL-SCNC: 22 MMOL/L — SIGNIFICANT CHANGE UP (ref 22–31)
CREAT SERPL-MCNC: 1.33 MG/DL — HIGH (ref 0.5–1.3)
EGFR: 61 ML/MIN/1.73M2 — SIGNIFICANT CHANGE UP
EOSINOPHIL # BLD AUTO: 0.21 K/UL — SIGNIFICANT CHANGE UP (ref 0–0.5)
EOSINOPHIL NFR BLD AUTO: 4 % — SIGNIFICANT CHANGE UP (ref 0–6)
GLUCOSE SERPL-MCNC: 134 MG/DL — HIGH (ref 70–99)
HCT VFR BLD CALC: 35.5 % — LOW (ref 39–50)
HGB BLD-MCNC: 11.8 G/DL — LOW (ref 13–17)
IMM GRANULOCYTES NFR BLD AUTO: 0.6 % — SIGNIFICANT CHANGE UP (ref 0–0.9)
LYMPHOCYTES # BLD AUTO: 1.19 K/UL — SIGNIFICANT CHANGE UP (ref 1–3.3)
LYMPHOCYTES # BLD AUTO: 22.6 % — SIGNIFICANT CHANGE UP (ref 13–44)
MCHC RBC-ENTMCNC: 27.8 PG — SIGNIFICANT CHANGE UP (ref 27–34)
MCHC RBC-ENTMCNC: 33.2 GM/DL — SIGNIFICANT CHANGE UP (ref 32–36)
MCV RBC AUTO: 83.7 FL — SIGNIFICANT CHANGE UP (ref 80–100)
MONOCYTES # BLD AUTO: 0.43 K/UL — SIGNIFICANT CHANGE UP (ref 0–0.9)
MONOCYTES NFR BLD AUTO: 8.2 % — SIGNIFICANT CHANGE UP (ref 2–14)
NEUTROPHILS # BLD AUTO: 3.4 K/UL — SIGNIFICANT CHANGE UP (ref 1.8–7.4)
NEUTROPHILS NFR BLD AUTO: 64.4 % — SIGNIFICANT CHANGE UP (ref 43–77)
NRBC # BLD: 0 /100 WBCS — SIGNIFICANT CHANGE UP (ref 0–0)
PLATELET # BLD AUTO: 210 K/UL — SIGNIFICANT CHANGE UP (ref 150–400)
POTASSIUM SERPL-MCNC: 4.9 MMOL/L — SIGNIFICANT CHANGE UP (ref 3.5–5.3)
POTASSIUM SERPL-SCNC: 4.9 MMOL/L — SIGNIFICANT CHANGE UP (ref 3.5–5.3)
PROT SERPL-MCNC: 7.5 G/DL — SIGNIFICANT CHANGE UP (ref 6–8.3)
RBC # BLD: 4.24 M/UL — SIGNIFICANT CHANGE UP (ref 4.2–5.8)
RBC # FLD: 13.5 % — SIGNIFICANT CHANGE UP (ref 10.3–14.5)
SODIUM SERPL-SCNC: 138 MMOL/L — SIGNIFICANT CHANGE UP (ref 135–145)
WBC # BLD: 5.27 K/UL — SIGNIFICANT CHANGE UP (ref 3.8–10.5)
WBC # FLD AUTO: 5.27 K/UL — SIGNIFICANT CHANGE UP (ref 3.8–10.5)

## 2024-05-28 PROCEDURE — 99213 OFFICE O/P EST LOW 20 MIN: CPT

## 2024-05-28 PROCEDURE — G2211 COMPLEX E/M VISIT ADD ON: CPT

## 2024-05-28 RX ORDER — METOPROLOL TARTRATE 25 MG/1
25 TABLET, FILM COATED ORAL
Qty: 180 | Refills: 3 | Status: ACTIVE | COMMUNITY
Start: 2019-04-30

## 2024-05-28 NOTE — ASSESSMENT
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  S/p radical right nephrectomy (Dr. Kaplan) 3/19/24. pT3 pN1.  Recommend systemic therapy for minimal volume residual metastatic disease. If solitary nodule remains the only area of active disease after few months of systemic therapy would recommend wedge resection of lung nodule as it would appear to be a solitary met.  4/22/24 - Started on dual immunotherapy with nivolumab (3mg/kg) and ipilimumab (1mg/kg) Q21 x 4 -> Nivo maintenance.  Patient now C2 D15 Ipi + Nivo.  Tolerating as expected hitherto.   Plan: Continue on current regime.  Reminded that can develop adverse symptoms at any time - call if anything weird happens.  Continue with weekly labs for now.  C#3/4 due 6/3/24.   Dr John Grimm PCP) Dr. Gomez Kaplan (Surgery) Dr. Twila Cosme (Endo) Dr. Lucas Mai (Cardio) Dr. Say Sutherland (CTSX) Dr. Paramjit Ovalle (Urology)

## 2024-05-28 NOTE — HISTORY OF PRESENT ILLNESS
[de-identified] : BHANU REEDER is a 58 y.o. M with a PMH significant for HTN, HLD, CAD -> stent 4/2019, and DM2, who we are following for metastatic RCC (lug mets).   2/4/24 - 2/7/24 - Admitted to  with hematuria.  CT C/A/P - Dominant PRISCILLA lobulated 1.6 cm lung nodule new since 4/10/19. 2 adjacent pulmonary nodules within the peripheral aspect of the RLL, largest measuring about 5 mm, indeterminate. Heterogeneous enhancing mass measuring 7.3 x 5.8 x 7.2 cm (AP x TRV x CC) in the right renal midpole concerning for a renal neoplasm. Mild hydroureteronephrosis to the level of the distal right ureter with moderate right perinephric inflammatory change and a delayed nephrogram. Questionable 4 mm lesion within the distal right ureter. No left hydronephrosis. Mild nonspecific left perinephric fat stranding.  Enlarged portacaval LN measuring 2.2 x 1.0 cm. Enlarged right retroperitoneal LN measuring 1.5 x 1.2 cm.  2/424 - Cystoscopy with retrograde pyelogram and ureteral stent insertion. PATH - ureter wash negative for high grade UCC.   2/5/24 - MRI Abd - 9.6 cm solid partially necrotic right renal mass compatible with renal cell carcinoma. Several retroperitoneal lymph nodes suspect for metastatic disease.  During hospitalization he was also found to have intermittent Afib. Started on Diltiazem and metoprolol.  Eliquis recommedned but not started yet due to persistent hematuria. Due to high blood sugar levels - started on insulin and oral hypoglycemics.  2/22/24 - IR biopsy of PRISCILLA pulmonary nodule - clear cell carcinoma c/w renal primary.  3/15/24 - PETCT - 8.7 x 6.8cm right renal mass with necrotic appearing center, SUV 9.9. Upper abdominal LN's up to 2.4cm, SUV up to 11.3cm.  2cm PRISCILLA nodule with SUV 6.6.  3/19/24 - Right radical nephrectomy (Dr. Kaplan). Path: clear cell renal cell carcinoma, 11.0 cm, WHO grade 4, with extensive necrosis, extending to perinephric fat and invading renal vein and renal sinus.  Negative margins. 2/2 LN's positive for metastatic cancer. pT3a pN1.  4/22/24 - Started on dual immunotherapy with Ipi + Nivo. [de-identified] : clear cell carcinoma  [de-identified] : IMDC intermediate - poor risk disease (one risk factor)  [de-identified] : C2 D15 Ipi + Nivo. Patient reports he has flu-like symptoms for a few days after each infusion with body aches, stiffness and malaise, just no nasal symptoms.  The feels back to baseline.  Has continuous glucose monitoring.  States baseline glucose maybe ~ 30 points higher than usual - but chronically <200.   Overall feels doing well - no other complaints.  Denies any changes in his family, medical, or social history since his last visit of 5/13/24.

## 2024-06-03 ENCOUNTER — APPOINTMENT (OUTPATIENT)
Dept: INFUSION THERAPY | Facility: CLINIC | Age: 59
End: 2024-06-03

## 2024-06-03 ENCOUNTER — RESULT REVIEW (OUTPATIENT)
Age: 59
End: 2024-06-03

## 2024-06-03 LAB
ALBUMIN SERPL ELPH-MCNC: 4.6 G/DL — SIGNIFICANT CHANGE UP (ref 3.3–5)
ALP SERPL-CCNC: 53 U/L — SIGNIFICANT CHANGE UP (ref 40–120)
ALT FLD-CCNC: 12 U/L — SIGNIFICANT CHANGE UP (ref 10–45)
ANION GAP SERPL CALC-SCNC: 13 MMOL/L — SIGNIFICANT CHANGE UP (ref 5–17)
AST SERPL-CCNC: 14 U/L — SIGNIFICANT CHANGE UP (ref 10–40)
BASOPHILS # BLD AUTO: 0 K/UL — SIGNIFICANT CHANGE UP (ref 0–0.2)
BASOPHILS NFR BLD AUTO: 0 % — SIGNIFICANT CHANGE UP (ref 0–2)
BILIRUB SERPL-MCNC: 0.2 MG/DL — SIGNIFICANT CHANGE UP (ref 0.2–1.2)
BUN SERPL-MCNC: 31 MG/DL — HIGH (ref 7–23)
CALCIUM SERPL-MCNC: 9.7 MG/DL — SIGNIFICANT CHANGE UP (ref 8.4–10.5)
CHLORIDE SERPL-SCNC: 104 MMOL/L — SIGNIFICANT CHANGE UP (ref 96–108)
CO2 SERPL-SCNC: 19 MMOL/L — LOW (ref 22–31)
CREAT SERPL-MCNC: 1.25 MG/DL — SIGNIFICANT CHANGE UP (ref 0.5–1.3)
EGFR: 66 ML/MIN/1.73M2 — SIGNIFICANT CHANGE UP
EOSINOPHIL # BLD AUTO: 0.16 K/UL — SIGNIFICANT CHANGE UP (ref 0–0.5)
EOSINOPHIL NFR BLD AUTO: 2.7 % — SIGNIFICANT CHANGE UP (ref 0–6)
GLUCOSE SERPL-MCNC: 120 MG/DL — HIGH (ref 70–99)
HCT VFR BLD CALC: 34.8 % — LOW (ref 39–50)
HGB BLD-MCNC: 11.7 G/DL — LOW (ref 13–17)
IMM GRANULOCYTES NFR BLD AUTO: 0.3 % — SIGNIFICANT CHANGE UP (ref 0–0.9)
LYMPHOCYTES # BLD AUTO: 1.17 K/UL — SIGNIFICANT CHANGE UP (ref 1–3.3)
LYMPHOCYTES # BLD AUTO: 20 % — SIGNIFICANT CHANGE UP (ref 13–44)
MCHC RBC-ENTMCNC: 28.3 PG — SIGNIFICANT CHANGE UP (ref 27–34)
MCHC RBC-ENTMCNC: 33.6 GM/DL — SIGNIFICANT CHANGE UP (ref 32–36)
MCV RBC AUTO: 84.1 FL — SIGNIFICANT CHANGE UP (ref 80–100)
MONOCYTES # BLD AUTO: 0.42 K/UL — SIGNIFICANT CHANGE UP (ref 0–0.9)
MONOCYTES NFR BLD AUTO: 7.2 % — SIGNIFICANT CHANGE UP (ref 2–14)
NEUTROPHILS # BLD AUTO: 4.08 K/UL — SIGNIFICANT CHANGE UP (ref 1.8–7.4)
NEUTROPHILS NFR BLD AUTO: 69.8 % — SIGNIFICANT CHANGE UP (ref 43–77)
NRBC # BLD: 0 /100 WBCS — SIGNIFICANT CHANGE UP (ref 0–0)
PLATELET # BLD AUTO: 211 K/UL — SIGNIFICANT CHANGE UP (ref 150–400)
POTASSIUM SERPL-MCNC: 5.1 MMOL/L — SIGNIFICANT CHANGE UP (ref 3.5–5.3)
POTASSIUM SERPL-SCNC: 5.1 MMOL/L — SIGNIFICANT CHANGE UP (ref 3.5–5.3)
PROT SERPL-MCNC: 7.1 G/DL — SIGNIFICANT CHANGE UP (ref 6–8.3)
RBC # BLD: 4.14 M/UL — LOW (ref 4.2–5.8)
RBC # FLD: 13.6 % — SIGNIFICANT CHANGE UP (ref 10.3–14.5)
SODIUM SERPL-SCNC: 136 MMOL/L — SIGNIFICANT CHANGE UP (ref 135–145)
T4 FREE SERPL-MCNC: 1.2 NG/DL — SIGNIFICANT CHANGE UP (ref 0.9–1.8)
T4 FREE+ TSH PNL SERPL: 4.65 UIU/ML — HIGH (ref 0.27–4.2)
WBC # BLD: 5.85 K/UL — SIGNIFICANT CHANGE UP (ref 3.8–10.5)
WBC # FLD AUTO: 5.85 K/UL — SIGNIFICANT CHANGE UP (ref 3.8–10.5)

## 2024-06-04 ENCOUNTER — APPOINTMENT (OUTPATIENT)
Dept: ELECTROPHYSIOLOGY | Facility: CLINIC | Age: 59
End: 2024-06-04
Payer: COMMERCIAL

## 2024-06-04 VITALS
HEART RATE: 71 BPM | BODY MASS INDEX: 28.44 KG/M2 | DIASTOLIC BLOOD PRESSURE: 99 MMHG | HEIGHT: 69 IN | WEIGHT: 192 LBS | OXYGEN SATURATION: 99 % | SYSTOLIC BLOOD PRESSURE: 154 MMHG

## 2024-06-04 DIAGNOSIS — I48.0 PAROXYSMAL ATRIAL FIBRILLATION: ICD-10-CM

## 2024-06-04 PROCEDURE — 93000 ELECTROCARDIOGRAM COMPLETE: CPT

## 2024-06-04 PROCEDURE — 99204 OFFICE O/P NEW MOD 45 MIN: CPT | Mod: 25

## 2024-06-06 NOTE — DISCUSSION/SUMMARY
[FreeTextEntry1] : 58-year-old male with history of CAD PAF [id number] 2 patient had 1 isolated event of A-fib on February 2024 while he was treated in the hospital for hematuria and subsequent monitoring showed no sustained arrhythmias.  Patient is not on anticoagulation.  Patient benefits from long-term cardiac monitoring for A-fib and would start anticoagulation if he has more recurrent A-fib episodes.  Encouraged to control HTN diabetes aerobic exercise. CAD continue medical therapy follow-up with cardiology routine visits.  I spent a total of 45 minutes on the encounter evaluating and discussing treatment options with the patient, as well as counseling and coordination of care as stated above. [EKG obtained to assist in diagnosis and management of assessed problem(s)] : EKG obtained to assist in diagnosis and management of assessed problem(s)

## 2024-06-06 NOTE — HISTORY OF PRESENT ILLNESS
[FreeTextEntry1] : 58-year-old male with history of CAD PCI to LAD paroxysmal A-fib [id number] 2, diabetes, right renal mass and a right lower lobe pulmonary nodule 9 mm noted on CT scan from 2/2024, s/p total right nephrectomy and pt is taking immunotherapy follows with oncologist. He had pAF on Feb 2024 when he was in hospital for hematuria, denies palpitations. MCT from 3/2024 shows no sustained arrhythmia, SR, brief AT?AF. Denies cp, sob, palpitations, dizziness.  ecg today shows SR narrow qrs

## 2024-06-10 ENCOUNTER — OUTPATIENT (OUTPATIENT)
Dept: OUTPATIENT SERVICES | Facility: HOSPITAL | Age: 59
LOS: 1 days | Discharge: HOME | End: 2024-06-10

## 2024-06-10 DIAGNOSIS — Z98.890 OTHER SPECIFIED POSTPROCEDURAL STATES: Chronic | ICD-10-CM

## 2024-06-10 DIAGNOSIS — N28.89 OTHER SPECIFIED DISORDERS OF KIDNEY AND URETER: ICD-10-CM

## 2024-06-10 DIAGNOSIS — Z87.828 PERSONAL HISTORY OF OTHER (HEALED) PHYSICAL INJURY AND TRAUMA: Chronic | ICD-10-CM

## 2024-06-10 DIAGNOSIS — Z96.0 PRESENCE OF UROGENITAL IMPLANTS: Chronic | ICD-10-CM

## 2024-06-10 DIAGNOSIS — Z90.49 ACQUIRED ABSENCE OF OTHER SPECIFIED PARTS OF DIGESTIVE TRACT: Chronic | ICD-10-CM

## 2024-06-11 ENCOUNTER — RESULT REVIEW (OUTPATIENT)
Age: 59
End: 2024-06-11

## 2024-06-11 ENCOUNTER — APPOINTMENT (OUTPATIENT)
Dept: HEMATOLOGY ONCOLOGY | Facility: CLINIC | Age: 59
End: 2024-06-11

## 2024-06-11 ENCOUNTER — APPOINTMENT (OUTPATIENT)
Dept: ENDOCRINOLOGY | Facility: CLINIC | Age: 59
End: 2024-06-11
Payer: COMMERCIAL

## 2024-06-11 VITALS
HEIGHT: 69 IN | SYSTOLIC BLOOD PRESSURE: 140 MMHG | DIASTOLIC BLOOD PRESSURE: 90 MMHG | BODY MASS INDEX: 29.03 KG/M2 | WEIGHT: 196 LBS | HEART RATE: 96 BPM | OXYGEN SATURATION: 95 %

## 2024-06-11 DIAGNOSIS — E78.5 HYPERLIPIDEMIA, UNSPECIFIED: ICD-10-CM

## 2024-06-11 DIAGNOSIS — R79.89 OTHER SPECIFIED ABNORMAL FINDINGS OF BLOOD CHEMISTRY: ICD-10-CM

## 2024-06-11 DIAGNOSIS — Z92.89 PERSONAL HISTORY OF OTHER MEDICAL TREATMENT: ICD-10-CM

## 2024-06-11 DIAGNOSIS — E11.9 TYPE 2 DIABETES MELLITUS W/OUT COMPLICATIONS: ICD-10-CM

## 2024-06-11 DIAGNOSIS — I10 ESSENTIAL (PRIMARY) HYPERTENSION: ICD-10-CM

## 2024-06-11 LAB
ALBUMIN SERPL ELPH-MCNC: 4.9 G/DL — SIGNIFICANT CHANGE UP (ref 3.3–5)
ALP SERPL-CCNC: 57 U/L — SIGNIFICANT CHANGE UP (ref 40–120)
ALT FLD-CCNC: 12 U/L — SIGNIFICANT CHANGE UP (ref 10–45)
ANION GAP SERPL CALC-SCNC: 15 MMOL/L — SIGNIFICANT CHANGE UP (ref 5–17)
AST SERPL-CCNC: 12 U/L — SIGNIFICANT CHANGE UP (ref 10–40)
BASOPHILS # BLD AUTO: 0.01 K/UL — SIGNIFICANT CHANGE UP (ref 0–0.2)
BASOPHILS NFR BLD AUTO: 0.2 % — SIGNIFICANT CHANGE UP (ref 0–2)
BILIRUB SERPL-MCNC: 0.2 MG/DL — SIGNIFICANT CHANGE UP (ref 0.2–1.2)
BUN SERPL-MCNC: 28 MG/DL — HIGH (ref 7–23)
CALCIUM SERPL-MCNC: 9.6 MG/DL — SIGNIFICANT CHANGE UP (ref 8.4–10.5)
CHLORIDE SERPL-SCNC: 102 MMOL/L — SIGNIFICANT CHANGE UP (ref 96–108)
CO2 SERPL-SCNC: 19 MMOL/L — LOW (ref 22–31)
CREAT SERPL-MCNC: 1.33 MG/DL — HIGH (ref 0.5–1.3)
EGFR: 61 ML/MIN/1.73M2 — SIGNIFICANT CHANGE UP
EGFR: 61 ML/MIN/1.73M2 — SIGNIFICANT CHANGE UP
EOSINOPHIL # BLD AUTO: 0.18 K/UL — SIGNIFICANT CHANGE UP (ref 0–0.5)
EOSINOPHIL NFR BLD AUTO: 2.9 % — SIGNIFICANT CHANGE UP (ref 0–6)
GLUCOSE SERPL-MCNC: 165 MG/DL — HIGH (ref 70–99)
HBA1C MFR BLD HPLC: 6.5
HCT VFR BLD CALC: 37.4 % — LOW (ref 39–50)
HGB BLD-MCNC: 12.4 G/DL — LOW (ref 13–17)
IMM GRANULOCYTES NFR BLD AUTO: 0.5 % — SIGNIFICANT CHANGE UP (ref 0–0.9)
LYMPHOCYTES # BLD AUTO: 1.19 K/UL — SIGNIFICANT CHANGE UP (ref 1–3.3)
LYMPHOCYTES # BLD AUTO: 19.2 % — SIGNIFICANT CHANGE UP (ref 13–44)
MCHC RBC-ENTMCNC: 27.9 PG — SIGNIFICANT CHANGE UP (ref 27–34)
MCHC RBC-ENTMCNC: 33.2 GM/DL — SIGNIFICANT CHANGE UP (ref 32–36)
MCV RBC AUTO: 84.2 FL — SIGNIFICANT CHANGE UP (ref 80–100)
MONOCYTES # BLD AUTO: 0.42 K/UL — SIGNIFICANT CHANGE UP (ref 0–0.9)
MONOCYTES NFR BLD AUTO: 6.8 % — SIGNIFICANT CHANGE UP (ref 2–14)
NEUTROPHILS # BLD AUTO: 4.36 K/UL — SIGNIFICANT CHANGE UP (ref 1.8–7.4)
NEUTROPHILS NFR BLD AUTO: 70.4 % — SIGNIFICANT CHANGE UP (ref 43–77)
NRBC # BLD: 0 /100 WBCS — SIGNIFICANT CHANGE UP (ref 0–0)
NRBC BLD-RTO: 0 /100 WBCS — SIGNIFICANT CHANGE UP (ref 0–0)
PLATELET # BLD AUTO: 225 K/UL — SIGNIFICANT CHANGE UP (ref 150–400)
POTASSIUM SERPL-MCNC: 4.7 MMOL/L — SIGNIFICANT CHANGE UP (ref 3.5–5.3)
POTASSIUM SERPL-SCNC: 4.7 MMOL/L — SIGNIFICANT CHANGE UP (ref 3.5–5.3)
PROT SERPL-MCNC: 7.5 G/DL — SIGNIFICANT CHANGE UP (ref 6–8.3)
RBC # BLD: 4.44 M/UL — SIGNIFICANT CHANGE UP (ref 4.2–5.8)
RBC # FLD: 13.6 % — SIGNIFICANT CHANGE UP (ref 10.3–14.5)
SODIUM SERPL-SCNC: 136 MMOL/L — SIGNIFICANT CHANGE UP (ref 135–145)
WBC # BLD: 6.19 K/UL — SIGNIFICANT CHANGE UP (ref 3.8–10.5)
WBC # FLD AUTO: 6.19 K/UL — SIGNIFICANT CHANGE UP (ref 3.8–10.5)

## 2024-06-11 PROCEDURE — 99214 OFFICE O/P EST MOD 30 MIN: CPT

## 2024-06-11 PROCEDURE — 95251 CONT GLUC MNTR ANALYSIS I&R: CPT

## 2024-06-11 NOTE — HISTORY OF PRESENT ILLNESS
[FreeTextEntry1] : This is a 57 yo M /w new history of diabetes and metastatic clear cell renal cell carcinoma who presents for initial evaluation of new onset type 2 diabetes.  Interim history  Pt stopped insulin for past 2 months  Currently not taking any medications  Reports good appetite Gaining weight Currently has received 3x cycles of combined immunotherapy (20-30 points different on immunotherapy)   #T2DM, newly diagnosed Patient had prediabetes in 2019 with A1c 6.2% Patient was diagnosed with DM this past February when he presented with glucose in 300s at [facility]. He was found ot have kidney mass which was resected in March 2024 and resulted as grade 4 clear cell carcinoma A1c one month later in March down to 8.2% (hb 12.3) -> June 2024   CURRENT REGIMEN (NOT ON STEROIDS) He is now taking 10 units of lantus morning. Stopped metformin due to nose bleeds  Currently using the Markie 3 Data over last 7 days time in range 91% High 9% Very high 0% No lows No hypoglycemia Peaks usually after dinner  GMI 6.8%  24hr diet recall breakfast: 2 eggs and a keto roll w/ a table spoon of butter and an apple Lunch: tuna salad sandwich on keto bread and a bowl of mixed berries. Rubin slaw dinner: roasted chicken thighs and veggies Snacks: atkins peanut butter cup Drinks: He does not drink soda or juice. Drinks water or watered down gatorade light. Drinks hot tea (peppermint and green tea  Denies any tingling/numbness in fingers or toes denies polyuria, polydipsia, or blurry vision  Reports recent ophthalmology visit after gashing his cheek on a fall. He had dilated eye exam at that time which showed some macular edema. No history of retinopathy  He had lipid checked by outside PCP in March. He thinks his LDL is 100. Reports previously on statin therapy but had muscle pain Following with his cardiologist  Patient is planned to start immunotherapy. He has metastasis to the lung.

## 2024-06-11 NOTE — ASSESSMENT
[FreeTextEntry1] : This is a 59 yo M /w new history of diabetes and metastatic clear cell renal cell carcinoma who presents for initial evaluation of new onset type 2 diabetes. Patient reports oncology is planning immunotherapy NO STEROIDS   #Type 2 diabetes -HbA1c 6.5% OFF all insulin and diabetes medications  -Markie 3 data suggests excellent glycemic control currently -c/w OFF insulin for now  -will hold metformin given report of epistaxis -given recent weight loss patient is not a good candidate for a GLP1 receptor agonist at this time but may benefit in the future (does have grandmother with thyroid cancer history) -if steroids are given with the immunotherapy then would anticipate need for lantus on the day of the infusion and 2 days afterwords to around 14 units daily.  -continue with CGM monitoring -routine follow up with ophthalmology - was already seen and reports no retinopathy -nutrition counseling provided in office, and patient is already doing an excellent job  #immunotherapy -discussed the various endocrinopathies associated with immunotherapy including the possibility for hypothyroidism, adrenal insufficiency and autoimmune diabetes, as well as some of the symptoms associated with these disorders -Monitor TFTs as mildly elevated TSH   review in 3 months

## 2024-06-12 ENCOUNTER — NON-APPOINTMENT (OUTPATIENT)
Age: 59
End: 2024-06-12

## 2024-06-18 ENCOUNTER — RESULT REVIEW (OUTPATIENT)
Age: 59
End: 2024-06-18

## 2024-06-18 ENCOUNTER — APPOINTMENT (OUTPATIENT)
Dept: HEMATOLOGY ONCOLOGY | Facility: CLINIC | Age: 59
End: 2024-06-18

## 2024-06-18 LAB
ALBUMIN SERPL ELPH-MCNC: 5 G/DL — SIGNIFICANT CHANGE UP (ref 3.3–5)
ALP SERPL-CCNC: 59 U/L — SIGNIFICANT CHANGE UP (ref 40–120)
ALT FLD-CCNC: 10 U/L — SIGNIFICANT CHANGE UP (ref 10–45)
ANION GAP SERPL CALC-SCNC: 15 MMOL/L — SIGNIFICANT CHANGE UP (ref 5–17)
AST SERPL-CCNC: 13 U/L — SIGNIFICANT CHANGE UP (ref 10–40)
BASOPHILS # BLD AUTO: 0 K/UL — SIGNIFICANT CHANGE UP (ref 0–0.2)
BASOPHILS NFR BLD AUTO: 0 % — SIGNIFICANT CHANGE UP (ref 0–2)
BILIRUB SERPL-MCNC: 0.4 MG/DL — SIGNIFICANT CHANGE UP (ref 0.2–1.2)
BUN SERPL-MCNC: 25 MG/DL — HIGH (ref 7–23)
CALCIUM SERPL-MCNC: 10.1 MG/DL — SIGNIFICANT CHANGE UP (ref 8.4–10.5)
CHLORIDE SERPL-SCNC: 103 MMOL/L — SIGNIFICANT CHANGE UP (ref 96–108)
CO2 SERPL-SCNC: 21 MMOL/L — LOW (ref 22–31)
CREAT SERPL-MCNC: 1.41 MG/DL — HIGH (ref 0.5–1.3)
EGFR: 57 ML/MIN/1.73M2 — LOW
EGFR: 57 ML/MIN/1.73M2 — LOW
EOSINOPHIL # BLD AUTO: 0.15 K/UL — SIGNIFICANT CHANGE UP (ref 0–0.5)
EOSINOPHIL NFR BLD AUTO: 2.3 % — SIGNIFICANT CHANGE UP (ref 0–6)
GLUCOSE SERPL-MCNC: 158 MG/DL — HIGH (ref 70–99)
HCT VFR BLD CALC: 35.8 % — LOW (ref 39–50)
HGB BLD-MCNC: 12 G/DL — LOW (ref 13–17)
IMM GRANULOCYTES NFR BLD AUTO: 0.5 % — SIGNIFICANT CHANGE UP (ref 0–0.9)
LYMPHOCYTES # BLD AUTO: 1.25 K/UL — SIGNIFICANT CHANGE UP (ref 1–3.3)
LYMPHOCYTES # BLD AUTO: 19.1 % — SIGNIFICANT CHANGE UP (ref 13–44)
MCHC RBC-ENTMCNC: 27.8 PG — SIGNIFICANT CHANGE UP (ref 27–34)
MCHC RBC-ENTMCNC: 33.5 GM/DL — SIGNIFICANT CHANGE UP (ref 32–36)
MCV RBC AUTO: 83.1 FL — SIGNIFICANT CHANGE UP (ref 80–100)
MONOCYTES # BLD AUTO: 0.43 K/UL — SIGNIFICANT CHANGE UP (ref 0–0.9)
MONOCYTES NFR BLD AUTO: 6.6 % — SIGNIFICANT CHANGE UP (ref 2–14)
NEUTROPHILS # BLD AUTO: 4.7 K/UL — SIGNIFICANT CHANGE UP (ref 1.8–7.4)
NEUTROPHILS NFR BLD AUTO: 71.5 % — SIGNIFICANT CHANGE UP (ref 43–77)
NRBC # BLD: 0 /100 WBCS — SIGNIFICANT CHANGE UP (ref 0–0)
NRBC BLD-RTO: 0 /100 WBCS — SIGNIFICANT CHANGE UP (ref 0–0)
PLATELET # BLD AUTO: 251 K/UL — SIGNIFICANT CHANGE UP (ref 150–400)
POTASSIUM SERPL-MCNC: 4.7 MMOL/L — SIGNIFICANT CHANGE UP (ref 3.5–5.3)
POTASSIUM SERPL-SCNC: 4.7 MMOL/L — SIGNIFICANT CHANGE UP (ref 3.5–5.3)
PROT SERPL-MCNC: 7.4 G/DL — SIGNIFICANT CHANGE UP (ref 6–8.3)
RBC # BLD: 4.31 M/UL — SIGNIFICANT CHANGE UP (ref 4.2–5.8)
RBC # FLD: 13.8 % — SIGNIFICANT CHANGE UP (ref 10.3–14.5)
SODIUM SERPL-SCNC: 138 MMOL/L — SIGNIFICANT CHANGE UP (ref 135–145)
WBC # BLD: 6.56 K/UL — SIGNIFICANT CHANGE UP (ref 3.8–10.5)
WBC # FLD AUTO: 6.56 K/UL — SIGNIFICANT CHANGE UP (ref 3.8–10.5)

## 2024-06-24 ENCOUNTER — RESULT REVIEW (OUTPATIENT)
Age: 59
End: 2024-06-24

## 2024-06-24 ENCOUNTER — APPOINTMENT (OUTPATIENT)
Dept: HEMATOLOGY ONCOLOGY | Facility: CLINIC | Age: 59
End: 2024-06-24
Payer: COMMERCIAL

## 2024-06-24 ENCOUNTER — APPOINTMENT (OUTPATIENT)
Dept: INFUSION THERAPY | Facility: CLINIC | Age: 59
End: 2024-06-24
Payer: COMMERCIAL

## 2024-06-24 DIAGNOSIS — Z51.12 ENCOUNTER FOR ANTINEOPLASTIC IMMUNOTHERAPY: ICD-10-CM

## 2024-06-24 DIAGNOSIS — C78.00 SECONDARY MALIGNANT NEOPLASM OF UNSPECIFIED LUNG: ICD-10-CM

## 2024-06-24 DIAGNOSIS — C64.1 MALIGNANT NEOPLASM OF RIGHT KIDNEY, EXCEPT RENAL PELVIS: ICD-10-CM

## 2024-06-24 LAB
ALBUMIN SERPL ELPH-MCNC: 4.8 G/DL — SIGNIFICANT CHANGE UP (ref 3.3–5)
ALP SERPL-CCNC: 55 U/L — SIGNIFICANT CHANGE UP (ref 40–120)
ALT FLD-CCNC: 9 U/L — LOW (ref 10–45)
ANION GAP SERPL CALC-SCNC: 16 MMOL/L — SIGNIFICANT CHANGE UP (ref 5–17)
AST SERPL-CCNC: 12 U/L — SIGNIFICANT CHANGE UP (ref 10–40)
BASOPHILS # BLD AUTO: 0.01 K/UL — SIGNIFICANT CHANGE UP (ref 0–0.2)
BASOPHILS NFR BLD AUTO: 0.1 % — SIGNIFICANT CHANGE UP (ref 0–2)
BILIRUB SERPL-MCNC: 0.4 MG/DL — SIGNIFICANT CHANGE UP (ref 0.2–1.2)
BUN SERPL-MCNC: 23 MG/DL — SIGNIFICANT CHANGE UP (ref 7–23)
CALCIUM SERPL-MCNC: 10.3 MG/DL — SIGNIFICANT CHANGE UP (ref 8.4–10.5)
CHLORIDE SERPL-SCNC: 101 MMOL/L — SIGNIFICANT CHANGE UP (ref 96–108)
CO2 SERPL-SCNC: 20 MMOL/L — LOW (ref 22–31)
CREAT SERPL-MCNC: 1.27 MG/DL — SIGNIFICANT CHANGE UP (ref 0.5–1.3)
EGFR: 65 ML/MIN/1.73M2 — SIGNIFICANT CHANGE UP
EGFR: 65 ML/MIN/1.73M2 — SIGNIFICANT CHANGE UP
EOSINOPHIL # BLD AUTO: 0.1 K/UL — SIGNIFICANT CHANGE UP (ref 0–0.5)
EOSINOPHIL NFR BLD AUTO: 1.5 % — SIGNIFICANT CHANGE UP (ref 0–6)
GLUCOSE SERPL-MCNC: 114 MG/DL — HIGH (ref 70–99)
HCT VFR BLD CALC: 35.3 % — LOW (ref 39–50)
HGB BLD-MCNC: 11.9 G/DL — LOW (ref 13–17)
IMM GRANULOCYTES NFR BLD AUTO: 0.3 % — SIGNIFICANT CHANGE UP (ref 0–0.9)
LYMPHOCYTES # BLD AUTO: 1.23 K/UL — SIGNIFICANT CHANGE UP (ref 1–3.3)
LYMPHOCYTES # BLD AUTO: 18.3 % — SIGNIFICANT CHANGE UP (ref 13–44)
MCHC RBC-ENTMCNC: 27.7 PG — SIGNIFICANT CHANGE UP (ref 27–34)
MCHC RBC-ENTMCNC: 33.7 GM/DL — SIGNIFICANT CHANGE UP (ref 32–36)
MCV RBC AUTO: 82.3 FL — SIGNIFICANT CHANGE UP (ref 80–100)
MONOCYTES # BLD AUTO: 0.45 K/UL — SIGNIFICANT CHANGE UP (ref 0–0.9)
MONOCYTES NFR BLD AUTO: 6.7 % — SIGNIFICANT CHANGE UP (ref 2–14)
NEUTROPHILS # BLD AUTO: 4.9 K/UL — SIGNIFICANT CHANGE UP (ref 1.8–7.4)
NEUTROPHILS NFR BLD AUTO: 73.1 % — SIGNIFICANT CHANGE UP (ref 43–77)
NRBC # BLD: 0 /100 WBCS — SIGNIFICANT CHANGE UP (ref 0–0)
NRBC BLD-RTO: 0 /100 WBCS — SIGNIFICANT CHANGE UP (ref 0–0)
PLATELET # BLD AUTO: 243 K/UL — SIGNIFICANT CHANGE UP (ref 150–400)
POTASSIUM SERPL-MCNC: 4.5 MMOL/L — SIGNIFICANT CHANGE UP (ref 3.5–5.3)
POTASSIUM SERPL-SCNC: 4.5 MMOL/L — SIGNIFICANT CHANGE UP (ref 3.5–5.3)
PROT SERPL-MCNC: 7.4 G/DL — SIGNIFICANT CHANGE UP (ref 6–8.3)
RBC # BLD: 4.29 M/UL — SIGNIFICANT CHANGE UP (ref 4.2–5.8)
RBC # FLD: 13.6 % — SIGNIFICANT CHANGE UP (ref 10.3–14.5)
SODIUM SERPL-SCNC: 136 MMOL/L — SIGNIFICANT CHANGE UP (ref 135–145)
WBC # BLD: 6.71 K/UL — SIGNIFICANT CHANGE UP (ref 3.8–10.5)
WBC # FLD AUTO: 6.71 K/UL — SIGNIFICANT CHANGE UP (ref 3.8–10.5)

## 2024-06-24 PROCEDURE — 99214 OFFICE O/P EST MOD 30 MIN: CPT

## 2024-06-25 DIAGNOSIS — Z51.12 ENCOUNTER FOR ANTINEOPLASTIC IMMUNOTHERAPY: ICD-10-CM

## 2024-06-25 DIAGNOSIS — C78.00 SECONDARY MALIGNANT NEOPLASM OF UNSPECIFIED LUNG: ICD-10-CM

## 2024-06-25 DIAGNOSIS — C64.1 MALIGNANT NEOPLASM OF RIGHT KIDNEY, EXCEPT RENAL PELVIS: ICD-10-CM

## 2024-07-02 ENCOUNTER — APPOINTMENT (OUTPATIENT)
Dept: HEMATOLOGY ONCOLOGY | Facility: CLINIC | Age: 59
End: 2024-07-02

## 2024-07-09 ENCOUNTER — APPOINTMENT (OUTPATIENT)
Dept: HEMATOLOGY ONCOLOGY | Facility: CLINIC | Age: 59
End: 2024-07-09

## 2024-07-10 ENCOUNTER — APPOINTMENT (OUTPATIENT)
Dept: UROLOGY | Facility: CLINIC | Age: 59
End: 2024-07-10

## 2024-07-16 ENCOUNTER — RESULT REVIEW (OUTPATIENT)
Age: 59
End: 2024-07-16

## 2024-07-16 ENCOUNTER — APPOINTMENT (OUTPATIENT)
Dept: HEMATOLOGY ONCOLOGY | Facility: CLINIC | Age: 59
End: 2024-07-16
Payer: COMMERCIAL

## 2024-07-16 VITALS
DIASTOLIC BLOOD PRESSURE: 87 MMHG | OXYGEN SATURATION: 97 % | HEIGHT: 69 IN | WEIGHT: 200 LBS | BODY MASS INDEX: 29.62 KG/M2 | TEMPERATURE: 98.2 F | HEART RATE: 64 BPM | SYSTOLIC BLOOD PRESSURE: 142 MMHG

## 2024-07-16 DIAGNOSIS — M25.40 EFFUSION, UNSPECIFIED JOINT: ICD-10-CM

## 2024-07-16 DIAGNOSIS — R68.2 DRY MOUTH, UNSPECIFIED: ICD-10-CM

## 2024-07-16 DIAGNOSIS — R59.0 LOCALIZED ENLARGED LYMPH NODES: ICD-10-CM

## 2024-07-16 DIAGNOSIS — C64.1 MALIGNANT NEOPLASM OF RIGHT KIDNEY, EXCEPT RENAL PELVIS: ICD-10-CM

## 2024-07-16 DIAGNOSIS — R04.2 HEMOPTYSIS: ICD-10-CM

## 2024-07-16 DIAGNOSIS — M19.90 UNSPECIFIED OSTEOARTHRITIS, UNSPECIFIED SITE: ICD-10-CM

## 2024-07-16 DIAGNOSIS — Z51.12 ENCOUNTER FOR ANTINEOPLASTIC IMMUNOTHERAPY: ICD-10-CM

## 2024-07-16 DIAGNOSIS — M25.50 PAIN IN UNSPECIFIED JOINT: ICD-10-CM

## 2024-07-16 DIAGNOSIS — C78.00 SECONDARY MALIGNANT NEOPLASM OF UNSPECIFIED LUNG: ICD-10-CM

## 2024-07-16 LAB
BASOPHILS # BLD AUTO: 0 K/UL — SIGNIFICANT CHANGE UP (ref 0–0.2)
BASOPHILS NFR BLD AUTO: 0 % — SIGNIFICANT CHANGE UP (ref 0–2)
EOSINOPHIL # BLD AUTO: 0.13 K/UL — SIGNIFICANT CHANGE UP (ref 0–0.5)
EOSINOPHIL NFR BLD AUTO: 2 % — SIGNIFICANT CHANGE UP (ref 0–6)
HCT VFR BLD CALC: 34.9 % — LOW (ref 39–50)
HGB BLD-MCNC: 11.6 G/DL — LOW (ref 13–17)
IMM GRANULOCYTES NFR BLD AUTO: 0.3 % — SIGNIFICANT CHANGE UP (ref 0–0.9)
LYMPHOCYTES # BLD AUTO: 1.32 K/UL — SIGNIFICANT CHANGE UP (ref 1–3.3)
LYMPHOCYTES # BLD AUTO: 20 % — SIGNIFICANT CHANGE UP (ref 13–44)
MCHC RBC-ENTMCNC: 27.5 PG — SIGNIFICANT CHANGE UP (ref 27–34)
MCHC RBC-ENTMCNC: 33.2 GM/DL — SIGNIFICANT CHANGE UP (ref 32–36)
MCV RBC AUTO: 82.7 FL — SIGNIFICANT CHANGE UP (ref 80–100)
MONOCYTES # BLD AUTO: 0.47 K/UL — SIGNIFICANT CHANGE UP (ref 0–0.9)
MONOCYTES NFR BLD AUTO: 7.1 % — SIGNIFICANT CHANGE UP (ref 2–14)
NEUTROPHILS # BLD AUTO: 4.66 K/UL — SIGNIFICANT CHANGE UP (ref 1.8–7.4)
NEUTROPHILS NFR BLD AUTO: 70.6 % — SIGNIFICANT CHANGE UP (ref 43–77)
NRBC # BLD: 0 /100 WBCS — SIGNIFICANT CHANGE UP (ref 0–0)
NRBC BLD-RTO: 0 /100 WBCS — SIGNIFICANT CHANGE UP (ref 0–0)
PLATELET # BLD AUTO: 241 K/UL — SIGNIFICANT CHANGE UP (ref 150–400)
RBC # BLD: 4.22 M/UL — SIGNIFICANT CHANGE UP (ref 4.2–5.8)
RBC # FLD: 13.8 % — SIGNIFICANT CHANGE UP (ref 10.3–14.5)
WBC # BLD: 6.6 K/UL — SIGNIFICANT CHANGE UP (ref 3.8–10.5)
WBC # FLD AUTO: 6.6 K/UL — SIGNIFICANT CHANGE UP (ref 3.8–10.5)

## 2024-07-16 PROCEDURE — G2211 COMPLEX E/M VISIT ADD ON: CPT | Mod: NC

## 2024-07-16 PROCEDURE — 99214 OFFICE O/P EST MOD 30 MIN: CPT

## 2024-07-16 RX ORDER — PREDNISONE 5 MG/1
5 TABLET ORAL TWICE DAILY
Qty: 60 | Refills: 3 | Status: ACTIVE | COMMUNITY
Start: 2024-07-16 | End: 1900-01-01

## 2024-07-17 LAB
ANION GAP SERPL CALC-SCNC: 11 MMOL/L — SIGNIFICANT CHANGE UP (ref 5–17)
BUN SERPL-MCNC: 23 MG/DL — SIGNIFICANT CHANGE UP (ref 7–23)
CALCIUM SERPL-MCNC: 10.1 MG/DL — SIGNIFICANT CHANGE UP (ref 8.4–10.5)
CHLORIDE SERPL-SCNC: 101 MMOL/L — SIGNIFICANT CHANGE UP (ref 96–108)
CO2 SERPL-SCNC: 22 MMOL/L — SIGNIFICANT CHANGE UP (ref 22–31)
CREAT SERPL-MCNC: 1.54 MG/DL — HIGH (ref 0.5–1.3)
EGFR: 51 ML/MIN/1.73M2 — LOW
EGFR: 51 ML/MIN/1.73M2 — LOW
GLUCOSE SERPL-MCNC: 112 MG/DL — HIGH (ref 70–99)
POTASSIUM SERPL-MCNC: 5 MMOL/L — SIGNIFICANT CHANGE UP (ref 3.5–5.3)
POTASSIUM SERPL-SCNC: 5 MMOL/L — SIGNIFICANT CHANGE UP (ref 3.5–5.3)
SODIUM SERPL-SCNC: 135 MMOL/L — SIGNIFICANT CHANGE UP (ref 135–145)

## 2024-07-18 ENCOUNTER — APPOINTMENT (OUTPATIENT)
Dept: INFUSION THERAPY | Facility: CLINIC | Age: 59
End: 2024-07-18

## 2024-07-19 ENCOUNTER — NON-APPOINTMENT (OUTPATIENT)
Age: 59
End: 2024-07-19

## 2024-07-22 ENCOUNTER — RESULT REVIEW (OUTPATIENT)
Age: 59
End: 2024-07-22

## 2024-07-22 ENCOUNTER — APPOINTMENT (OUTPATIENT)
Dept: INFUSION THERAPY | Facility: CLINIC | Age: 59
End: 2024-07-22

## 2024-07-22 LAB
BASOPHILS # BLD AUTO: 0.01 K/UL — SIGNIFICANT CHANGE UP (ref 0–0.2)
BASOPHILS NFR BLD AUTO: 0.1 % — SIGNIFICANT CHANGE UP (ref 0–2)
EOSINOPHIL # BLD AUTO: 0.07 K/UL — SIGNIFICANT CHANGE UP (ref 0–0.5)
EOSINOPHIL NFR BLD AUTO: 0.7 % — SIGNIFICANT CHANGE UP (ref 0–6)
HCT VFR BLD CALC: 37.5 % — LOW (ref 39–50)
HGB BLD-MCNC: 12.3 G/DL — LOW (ref 13–17)
IMM GRANULOCYTES NFR BLD AUTO: 0.6 % — SIGNIFICANT CHANGE UP (ref 0–0.9)
LYMPHOCYTES # BLD AUTO: 1.25 K/UL — SIGNIFICANT CHANGE UP (ref 1–3.3)
LYMPHOCYTES # BLD AUTO: 12.9 % — LOW (ref 13–44)
MCHC RBC-ENTMCNC: 27.2 PG — SIGNIFICANT CHANGE UP (ref 27–34)
MCHC RBC-ENTMCNC: 32.8 GM/DL — SIGNIFICANT CHANGE UP (ref 32–36)
MCV RBC AUTO: 82.8 FL — SIGNIFICANT CHANGE UP (ref 80–100)
MONOCYTES # BLD AUTO: 0.54 K/UL — SIGNIFICANT CHANGE UP (ref 0–0.9)
MONOCYTES NFR BLD AUTO: 5.6 % — SIGNIFICANT CHANGE UP (ref 2–14)
NEUTROPHILS # BLD AUTO: 7.77 K/UL — HIGH (ref 1.8–7.4)
NEUTROPHILS NFR BLD AUTO: 80.1 % — HIGH (ref 43–77)
NRBC # BLD: 0 /100 WBCS — SIGNIFICANT CHANGE UP (ref 0–0)
NRBC BLD-RTO: 0 /100 WBCS — SIGNIFICANT CHANGE UP (ref 0–0)
PLATELET # BLD AUTO: 279 K/UL — SIGNIFICANT CHANGE UP (ref 150–400)
RBC # BLD: 4.53 M/UL — SIGNIFICANT CHANGE UP (ref 4.2–5.8)
RBC # FLD: 14.2 % — SIGNIFICANT CHANGE UP (ref 10.3–14.5)
WBC # BLD: 9.7 K/UL — SIGNIFICANT CHANGE UP (ref 3.8–10.5)
WBC # FLD AUTO: 9.7 K/UL — SIGNIFICANT CHANGE UP (ref 3.8–10.5)

## 2024-07-23 DIAGNOSIS — Z51.11 ENCOUNTER FOR ANTINEOPLASTIC CHEMOTHERAPY: ICD-10-CM

## 2024-07-23 LAB
ALBUMIN SERPL ELPH-MCNC: 5 G/DL — SIGNIFICANT CHANGE UP (ref 3.3–5)
ALP SERPL-CCNC: 60 U/L — SIGNIFICANT CHANGE UP (ref 40–120)
ALT FLD-CCNC: 9 U/L — LOW (ref 10–45)
ANION GAP SERPL CALC-SCNC: 16 MMOL/L — SIGNIFICANT CHANGE UP (ref 5–17)
AST SERPL-CCNC: 14 U/L — SIGNIFICANT CHANGE UP (ref 10–40)
BILIRUB SERPL-MCNC: 0.2 MG/DL — SIGNIFICANT CHANGE UP (ref 0.2–1.2)
BUN SERPL-MCNC: 41 MG/DL — HIGH (ref 7–23)
CALCIUM SERPL-MCNC: 10.3 MG/DL — SIGNIFICANT CHANGE UP (ref 8.4–10.5)
CHLORIDE SERPL-SCNC: 102 MMOL/L — SIGNIFICANT CHANGE UP (ref 96–108)
CO2 SERPL-SCNC: 20 MMOL/L — LOW (ref 22–31)
CREAT SERPL-MCNC: 1.35 MG/DL — HIGH (ref 0.5–1.3)
EGFR: 60 ML/MIN/1.73M2 — SIGNIFICANT CHANGE UP
EGFR: 60 ML/MIN/1.73M2 — SIGNIFICANT CHANGE UP
GLUCOSE SERPL-MCNC: 135 MG/DL — HIGH (ref 70–99)
POTASSIUM SERPL-MCNC: 5.7 MMOL/L — HIGH (ref 3.5–5.3)
POTASSIUM SERPL-SCNC: 5.7 MMOL/L — HIGH (ref 3.5–5.3)
PROT SERPL-MCNC: 8 G/DL — SIGNIFICANT CHANGE UP (ref 6–8.3)
SODIUM SERPL-SCNC: 137 MMOL/L — SIGNIFICANT CHANGE UP (ref 135–145)

## 2024-07-26 ENCOUNTER — APPOINTMENT (OUTPATIENT)
Dept: RHEUMATOLOGY | Facility: CLINIC | Age: 59
End: 2024-07-26
Payer: COMMERCIAL

## 2024-07-26 ENCOUNTER — LABORATORY RESULT (OUTPATIENT)
Age: 59
End: 2024-07-26

## 2024-07-26 VITALS
SYSTOLIC BLOOD PRESSURE: 138 MMHG | TEMPERATURE: 95.6 F | OXYGEN SATURATION: 99 % | BODY MASS INDEX: 28.29 KG/M2 | WEIGHT: 191 LBS | DIASTOLIC BLOOD PRESSURE: 86 MMHG | HEART RATE: 95 BPM | HEIGHT: 69 IN

## 2024-07-26 DIAGNOSIS — Z90.5 ACQUIRED ABSENCE OF KIDNEY: ICD-10-CM

## 2024-07-26 DIAGNOSIS — Z87.442 PERSONAL HISTORY OF URINARY CALCULI: ICD-10-CM

## 2024-07-26 DIAGNOSIS — Z78.9 OTHER SPECIFIED HEALTH STATUS: ICD-10-CM

## 2024-07-26 DIAGNOSIS — M25.40 EFFUSION, UNSPECIFIED JOINT: ICD-10-CM

## 2024-07-26 DIAGNOSIS — Z77.22 CONTACT WITH AND (SUSPECTED) EXPOSURE TO ENVIRONMENTAL TOBACCO SMOKE (ACUTE) (CHRONIC): ICD-10-CM

## 2024-07-26 PROCEDURE — 99205 OFFICE O/P NEW HI 60 MIN: CPT

## 2024-07-26 PROCEDURE — G2211 COMPLEX E/M VISIT ADD ON: CPT | Mod: NC

## 2024-07-26 PROCEDURE — 36415 COLL VENOUS BLD VENIPUNCTURE: CPT

## 2024-07-29 NOTE — HISTORY OF PRESENT ILLNESS
[FreeTextEntry1] : 59M with DM2, pulmonary HTN, pA.fib, metastatic renal carcinoma to lung s/p radical R. nephrectomy (removal), HTN, HLD, CAD, here for evaluation of joint pain which started 6/15/24. Initially started as L. knee pain, thought he twisted it.  Symptoms just worsened over the next several weeks, b/l knees became swollen, hands aching and swollen, also b/l elbows. MCP and PIP joints swollen.   Oncology office started prednisone 10 mg QD 10 days ago, has definitely helped with joint pain and swelling but has caused nausea and abdominal pain. Ipilimumab and nivolumab dual infusions started in 4/2024- and now just on nivolumab in 7/2024.   No rashes. No oral or nasal ulcers. +Dry mouth, has noticed that with immunotherapy. No dry eyes.  Had a lot of weight loss last year due to the cancer, 50 lbs. 10 lbs in last 2 weeks. No hematuria. +hemoptysis which started 3 weeks ago, 3 episodes of it. Oncologist thinks its side effect of immunotherapy. Hasnt occurred in about one week. No parethessias. No Raynauds.  No hx of DVT/PE.   Originally from [place].  Family history: no autoimmune dz.  [Weight Loss] : weight loss [Malar Facial Rash] : no malar facial rash [Skin Lesions] : no lesions [Skin Nodules] : no skin nodules [Oral Ulcers] : no oral ulcers [Dry Mouth] : dry mouth [Arthralgias] : arthralgias [Joint Swelling] : joint swelling [Morning Stiffness] : morning stiffness [Dry Eyes] : no dry eyes

## 2024-07-29 NOTE — PHYSICAL EXAM
[General Appearance - Alert] : alert [General Appearance - In No Acute Distress] : in no acute distress [General Appearance - Well Developed] : well developed [General Appearance - Well-Appearing] : healthy appearing [Extraocular Movements] : extraocular movements were intact [Outer Ear] : the ears and nose were normal in appearance [Neck Appearance] : the appearance of the neck was normal [Exaggerated Use Of Accessory Muscles For Inspiration] : no accessory muscle use [Edema] : there was no peripheral edema [Musculoskeletal - Swelling] : no joint swelling seen [FreeTextEntry1] : tenderness in PIP joints.   [Skin Color & Pigmentation] : normal skin color and pigmentation [] : no rash [Skin Lesions] : no skin lesions [No Focal Deficits] : no focal deficits [Oriented To Time, Place, And Person] : oriented to person, place, and time [Affect] : the affect was normal [Mood] : the mood was normal

## 2024-07-29 NOTE — ASSESSMENT
[FreeTextEntry1] : 59M with DM2, pulmonary HTN, pA.fib, metastatic renal carcinoma to lung s/p radical R. nephrectomy (removal), HTN, HLD, CAD, here for evaluation of joint pain which started 6/15/24. Currently on prednisone 10 mg QD started by oncology office, which has partially helped to alleviate joint pain/swelling symptoms.  Differential diagnosis includes inflammatory arthritis such as rheumatoid arthritis, Sjogren's, or systemic lupus,.  Alternatively his joint pain could have come from immunotherapy use, As immunotherapies can commonly have a side effect of joint pain- he is currently on nivolumab.  At this time we will check blood tests for the above autoimmune conditions, in addition to infectious workup including parvovirus antibodies, hepatitis antibodies, and Borrelia antibodies.  Patient was advised to continue prednisone 10 mg a day as prescribed by his oncologist in the meantime.

## 2024-07-29 NOTE — REVIEW OF SYSTEMS
[Feeling Tired] : feeling tired [Recent Weight Loss (___ Lbs)] : recent [unfilled] ~Ulb weight loss [Dry Eyes] : no dryness of the eyes [As Noted in HPI] : as noted in HPI [Arthralgias] : arthralgias [Joint Pain] : joint pain [Joint Swelling] : joint swelling [Joint Stiffness] : joint stiffness [Negative] : Heme/Lymph

## 2024-07-30 ENCOUNTER — RESULT REVIEW (OUTPATIENT)
Age: 59
End: 2024-07-30

## 2024-08-01 ENCOUNTER — APPOINTMENT (OUTPATIENT)
Dept: NUCLEAR MEDICINE | Facility: CLINIC | Age: 59
End: 2024-08-01

## 2024-08-01 ENCOUNTER — APPOINTMENT (OUTPATIENT)
Dept: RHEUMATOLOGY | Facility: CLINIC | Age: 59
End: 2024-08-01
Payer: COMMERCIAL

## 2024-08-01 DIAGNOSIS — M25.50 PAIN IN UNSPECIFIED JOINT: ICD-10-CM

## 2024-08-01 DIAGNOSIS — M19.90 UNSPECIFIED OSTEOARTHRITIS, UNSPECIFIED SITE: ICD-10-CM

## 2024-08-01 DIAGNOSIS — Z92.89 PERSONAL HISTORY OF OTHER MEDICAL TREATMENT: ICD-10-CM

## 2024-08-01 LAB
25(OH)D3 SERPL-MCNC: 42.3 NG/ML
ALBUMIN SERPL ELPH-MCNC: 5 G/DL
ALP BLD-CCNC: 67 U/L
ALT SERPL-CCNC: 6 U/L
ANA SER IF-ACNC: NEGATIVE
ANCA AB SER-IMP: ABNORMAL
ANION GAP SERPL CALC-SCNC: 19 MMOL/L
APPEARANCE: CLEAR
AST SERPL-CCNC: 11 U/L
B BURGDOR AB SER-IMP: NEGATIVE
B BURGDOR IGG+IGM SER QL: 0.04 INDEX
B19V IGG SER QL IA: 2.42 INDEX
B19V IGG+IGM SER-IMP: NORMAL
B19V IGG+IGM SER-IMP: POSITIVE
B19V IGM FLD-ACNC: 0.14 INDEX
B19V IGM SER-ACNC: NEGATIVE
BILIRUB SERPL-MCNC: 0.5 MG/DL
BILIRUBIN URINE: NEGATIVE
BLOOD URINE: NEGATIVE
BUN SERPL-MCNC: 43 MG/DL
C-ANCA SER-ACNC: NEGATIVE
C3 SERPL-MCNC: 110 MG/DL
C4 SERPL-MCNC: 18 MG/DL
CALCIUM SERPL-MCNC: 10.5 MG/DL
CCP AB SER IA-ACNC: <8 UNITS
CENTROMERE IGG SER-ACNC: <0.2 AL
CHLORIDE SERPL-SCNC: 100 MMOL/L
CO2 SERPL-SCNC: 19 MMOL/L
COLOR: YELLOW
CREAT SERPL-MCNC: 1.44 MG/DL
CREAT SPEC-SCNC: 162 MG/DL
CREAT/PROT UR: 0.2 RATIO
CRP SERPL-MCNC: 13 MG/L
DSDNA AB SER-ACNC: <1 IU/ML
EGFR: 55 ML/MIN/1.73M2
ENA RNP AB SER IA-ACNC: <0.2 AL
ENA SCL70 IGG SER IA-ACNC: <0.2 AL
ENA SM AB SER IA-ACNC: <0.2 AL
ENA SS-A AB SER IA-ACNC: <0.2 AL
ENA SS-B AB SER IA-ACNC: <0.2 AL
ERYTHROCYTE [SEDIMENTATION RATE] IN BLOOD BY WESTERGREN METHOD: 77 MM/HR
G6PD SER-CCNC: 15.5 U/G HGB
GLUCOSE QUALITATIVE U: NEGATIVE MG/DL
GLUCOSE SERPL-MCNC: 133 MG/DL
HAV IGM SER QL: NONREACTIVE
HBV CORE IGG+IGM SER QL: NONREACTIVE
HBV CORE IGM SER QL: NONREACTIVE
HBV SURFACE AG SER QL: NONREACTIVE
HCV AB SER QL: NONREACTIVE
HCV S/CO RATIO: 0.11 S/CO
KETONES URINE: NEGATIVE MG/DL
LEUKOCYTE ESTERASE URINE: NEGATIVE
M TB IFN-G BLD-IMP: NEGATIVE
NITRITE URINE: NEGATIVE
P-ANCA TITR SER IF: NEGATIVE
PH URINE: 5.5
POTASSIUM SERPL-SCNC: 4.6 MMOL/L
PROT SERPL-MCNC: 7.8 G/DL
PROT UR-MCNC: 38 MG/DL
PROTEIN URINE: 30 MG/DL
QUANTIFERON TB PLUS MITOGEN MINUS NIL: >10 IU/ML
QUANTIFERON TB PLUS NIL: 0.02 IU/ML
QUANTIFERON TB PLUS TB1 MINUS NIL: 0 IU/ML
QUANTIFERON TB PLUS TB2 MINUS NIL: 0.01 IU/ML
RF+CCP IGG SER-IMP: NEGATIVE
RHEUMATOID FACT SER QL: 10 IU/ML
RNA POLYMERASE III IGG: 6 UNITS
SODIUM SERPL-SCNC: 138 MMOL/L
SPECIFIC GRAVITY URINE: 1.02
UROBILINOGEN URINE: 0.2 MG/DL

## 2024-08-01 PROCEDURE — 99442: CPT

## 2024-08-01 RX ORDER — PREDNISONE 10 MG/1
10 TABLET ORAL
Qty: 21 | Refills: 0 | Status: ACTIVE | COMMUNITY
Start: 2024-08-01 | End: 1900-01-01

## 2024-08-03 ENCOUNTER — APPOINTMENT (OUTPATIENT)
Dept: CT IMAGING | Facility: CLINIC | Age: 59
End: 2024-08-03
Payer: COMMERCIAL

## 2024-08-03 ENCOUNTER — OUTPATIENT (OUTPATIENT)
Dept: OUTPATIENT SERVICES | Facility: HOSPITAL | Age: 59
LOS: 1 days | Discharge: HOME | End: 2024-08-03

## 2024-08-03 ENCOUNTER — OUTPATIENT (OUTPATIENT)
Dept: OUTPATIENT SERVICES | Facility: HOSPITAL | Age: 59
LOS: 1 days | End: 2024-08-03
Payer: COMMERCIAL

## 2024-08-03 DIAGNOSIS — C78.00 SECONDARY MALIGNANT NEOPLASM OF UNSPECIFIED LUNG: ICD-10-CM

## 2024-08-03 DIAGNOSIS — Z96.0 PRESENCE OF UROGENITAL IMPLANTS: Chronic | ICD-10-CM

## 2024-08-03 DIAGNOSIS — Z98.890 OTHER SPECIFIED POSTPROCEDURAL STATES: Chronic | ICD-10-CM

## 2024-08-03 DIAGNOSIS — Z90.49 ACQUIRED ABSENCE OF OTHER SPECIFIED PARTS OF DIGESTIVE TRACT: Chronic | ICD-10-CM

## 2024-08-03 DIAGNOSIS — C64.1 MALIGNANT NEOPLASM OF RIGHT KIDNEY, EXCEPT RENAL PELVIS: ICD-10-CM

## 2024-08-03 DIAGNOSIS — Z00.8 ENCOUNTER FOR OTHER GENERAL EXAMINATION: ICD-10-CM

## 2024-08-03 DIAGNOSIS — Z51.12 ENCOUNTER FOR ANTINEOPLASTIC IMMUNOTHERAPY: ICD-10-CM

## 2024-08-03 DIAGNOSIS — N28.89 OTHER SPECIFIED DISORDERS OF KIDNEY AND URETER: ICD-10-CM

## 2024-08-03 DIAGNOSIS — Z87.828 PERSONAL HISTORY OF OTHER (HEALED) PHYSICAL INJURY AND TRAUMA: Chronic | ICD-10-CM

## 2024-08-03 PROCEDURE — 71260 CT THORAX DX C+: CPT | Mod: 26

## 2024-08-03 PROCEDURE — 74177 CT ABD & PELVIS W/CONTRAST: CPT

## 2024-08-03 PROCEDURE — 74177 CT ABD & PELVIS W/CONTRAST: CPT | Mod: 26

## 2024-08-03 PROCEDURE — 71260 CT THORAX DX C+: CPT

## 2024-08-05 ENCOUNTER — APPOINTMENT (OUTPATIENT)
Dept: HEMATOLOGY ONCOLOGY | Facility: CLINIC | Age: 59
End: 2024-08-05

## 2024-08-07 PROBLEM — K21.9 GERD (GASTROESOPHAGEAL REFLUX DISEASE): Status: ACTIVE | Noted: 2024-08-07

## 2024-08-07 PROBLEM — R06.6 HICCUPS: Status: ACTIVE | Noted: 2024-08-07

## 2024-08-07 PROBLEM — R11.0 NAUSEA: Status: ACTIVE | Noted: 2024-08-07

## 2024-08-08 ENCOUNTER — NON-APPOINTMENT (OUTPATIENT)
Age: 59
End: 2024-08-08

## 2024-08-09 ENCOUNTER — NON-APPOINTMENT (OUTPATIENT)
Age: 59
End: 2024-08-09

## 2024-08-12 ENCOUNTER — NON-APPOINTMENT (OUTPATIENT)
Age: 59
End: 2024-08-12

## 2024-08-13 ENCOUNTER — APPOINTMENT (OUTPATIENT)
Dept: HEMATOLOGY ONCOLOGY | Facility: CLINIC | Age: 59
End: 2024-08-13

## 2024-08-14 ENCOUNTER — NON-APPOINTMENT (OUTPATIENT)
Age: 59
End: 2024-08-14

## 2024-08-14 ENCOUNTER — INPATIENT (INPATIENT)
Facility: HOSPITAL | Age: 59
LOS: 9 days | Discharge: HOME HEALTH | DRG: 97 | End: 2024-08-24
Attending: FAMILY MEDICINE | Admitting: STUDENT IN AN ORGANIZED HEALTH CARE EDUCATION/TRAINING PROGRAM
Payer: COMMERCIAL

## 2024-08-14 VITALS
TEMPERATURE: 101 F | HEIGHT: 70 IN | DIASTOLIC BLOOD PRESSURE: 138 MMHG | SYSTOLIC BLOOD PRESSURE: 165 MMHG | RESPIRATION RATE: 18 BRPM | HEART RATE: 123 BPM | WEIGHT: 179.9 LBS | OXYGEN SATURATION: 99 %

## 2024-08-14 DIAGNOSIS — Z96.0 PRESENCE OF UROGENITAL IMPLANTS: Chronic | ICD-10-CM

## 2024-08-14 DIAGNOSIS — J18.9 PNEUMONIA, UNSPECIFIED ORGANISM: ICD-10-CM

## 2024-08-14 DIAGNOSIS — Z98.890 OTHER SPECIFIED POSTPROCEDURAL STATES: Chronic | ICD-10-CM

## 2024-08-14 DIAGNOSIS — Z90.49 ACQUIRED ABSENCE OF OTHER SPECIFIED PARTS OF DIGESTIVE TRACT: Chronic | ICD-10-CM

## 2024-08-14 DIAGNOSIS — Z87.828 PERSONAL HISTORY OF OTHER (HEALED) PHYSICAL INJURY AND TRAUMA: Chronic | ICD-10-CM

## 2024-08-14 LAB
ALBUMIN SERPL ELPH-MCNC: 3.1 G/DL — LOW (ref 3.3–5)
ALP SERPL-CCNC: 139 U/L — HIGH (ref 40–120)
ALT FLD-CCNC: 16 U/L — SIGNIFICANT CHANGE UP (ref 12–78)
ANION GAP SERPL CALC-SCNC: 15 MMOL/L — SIGNIFICANT CHANGE UP (ref 5–17)
APTT BLD: 38.1 SEC — HIGH (ref 24.5–35.6)
AST SERPL-CCNC: 21 U/L — SIGNIFICANT CHANGE UP (ref 15–37)
BASOPHILS # BLD AUTO: 0.01 K/UL — SIGNIFICANT CHANGE UP (ref 0–0.2)
BASOPHILS NFR BLD AUTO: 0.1 % — SIGNIFICANT CHANGE UP (ref 0–2)
BILIRUB SERPL-MCNC: 0.9 MG/DL — SIGNIFICANT CHANGE UP (ref 0.2–1.2)
BUN SERPL-MCNC: 18 MG/DL — SIGNIFICANT CHANGE UP (ref 7–23)
CALCIUM SERPL-MCNC: 9.1 MG/DL — SIGNIFICANT CHANGE UP (ref 8.5–10.1)
CHLORIDE SERPL-SCNC: 102 MMOL/L — SIGNIFICANT CHANGE UP (ref 96–108)
CO2 SERPL-SCNC: 15 MMOL/L — LOW (ref 22–31)
CREAT SERPL-MCNC: 2.32 MG/DL — HIGH (ref 0.5–1.3)
EGFR: 31 ML/MIN/1.73M2 — LOW
EOSINOPHIL # BLD AUTO: 0.21 K/UL — SIGNIFICANT CHANGE UP (ref 0–0.5)
EOSINOPHIL NFR BLD AUTO: 2.8 % — SIGNIFICANT CHANGE UP (ref 0–6)
GLUCOSE SERPL-MCNC: 136 MG/DL — HIGH (ref 70–99)
HCT VFR BLD CALC: 29.7 % — LOW (ref 39–50)
HGB BLD-MCNC: 10 G/DL — LOW (ref 13–17)
IMM GRANULOCYTES NFR BLD AUTO: 0.8 % — SIGNIFICANT CHANGE UP (ref 0–0.9)
INR BLD: 1.49 RATIO — HIGH (ref 0.85–1.18)
LACTATE SERPL-SCNC: 1 MMOL/L — SIGNIFICANT CHANGE UP (ref 0.7–2)
LYMPHOCYTES # BLD AUTO: 1.38 K/UL — SIGNIFICANT CHANGE UP (ref 1–3.3)
LYMPHOCYTES # BLD AUTO: 18.2 % — SIGNIFICANT CHANGE UP (ref 13–44)
MCHC RBC-ENTMCNC: 27.7 PG — SIGNIFICANT CHANGE UP (ref 27–34)
MCHC RBC-ENTMCNC: 33.7 GM/DL — SIGNIFICANT CHANGE UP (ref 32–36)
MCV RBC AUTO: 82.3 FL — SIGNIFICANT CHANGE UP (ref 80–100)
MONOCYTES # BLD AUTO: 0.51 K/UL — SIGNIFICANT CHANGE UP (ref 0–0.9)
MONOCYTES NFR BLD AUTO: 6.7 % — SIGNIFICANT CHANGE UP (ref 2–14)
NEUTROPHILS # BLD AUTO: 5.43 K/UL — SIGNIFICANT CHANGE UP (ref 1.8–7.4)
NEUTROPHILS NFR BLD AUTO: 71.4 % — SIGNIFICANT CHANGE UP (ref 43–77)
PLATELET # BLD AUTO: 272 K/UL — SIGNIFICANT CHANGE UP (ref 150–400)
POTASSIUM SERPL-MCNC: 4.4 MMOL/L — SIGNIFICANT CHANGE UP (ref 3.5–5.3)
POTASSIUM SERPL-SCNC: 4.4 MMOL/L — SIGNIFICANT CHANGE UP (ref 3.5–5.3)
PROT SERPL-MCNC: 6.7 GM/DL — SIGNIFICANT CHANGE UP (ref 6–8.3)
PROTHROM AB SERPL-ACNC: 16.6 SEC — HIGH (ref 9.5–13)
RBC # BLD: 3.61 M/UL — LOW (ref 4.2–5.8)
RBC # FLD: 14.2 % — SIGNIFICANT CHANGE UP (ref 10.3–14.5)
SODIUM SERPL-SCNC: 132 MMOL/L — LOW (ref 135–145)
WBC # BLD: 7.6 K/UL — SIGNIFICANT CHANGE UP (ref 3.8–10.5)
WBC # FLD AUTO: 7.6 K/UL — SIGNIFICANT CHANGE UP (ref 3.8–10.5)

## 2024-08-14 PROCEDURE — 87483 CNS DNA AMP PROBE TYPE 12-25: CPT

## 2024-08-14 PROCEDURE — 86334 IMMUNOFIX E-PHORESIS SERUM: CPT

## 2024-08-14 PROCEDURE — 93010 ELECTROCARDIOGRAM REPORT: CPT

## 2024-08-14 PROCEDURE — 84157 ASSAY OF PROTEIN OTHER: CPT

## 2024-08-14 PROCEDURE — 85025 COMPLETE CBC W/AUTO DIFF WBC: CPT

## 2024-08-14 PROCEDURE — 82945 GLUCOSE OTHER FLUID: CPT

## 2024-08-14 PROCEDURE — 82024 ASSAY OF ACTH: CPT

## 2024-08-14 PROCEDURE — 85652 RBC SED RATE AUTOMATED: CPT

## 2024-08-14 PROCEDURE — 82746 ASSAY OF FOLIC ACID SERUM: CPT

## 2024-08-14 PROCEDURE — 80053 COMPREHEN METABOLIC PANEL: CPT

## 2024-08-14 PROCEDURE — 82728 ASSAY OF FERRITIN: CPT

## 2024-08-14 PROCEDURE — 86757 RICKETTSIA ANTIBODY: CPT

## 2024-08-14 PROCEDURE — 87899 AGENT NOS ASSAY W/OPTIC: CPT

## 2024-08-14 PROCEDURE — 87635 SARS-COV-2 COVID-19 AMP PRB: CPT

## 2024-08-14 PROCEDURE — 87641 MR-STAPH DNA AMP PROBE: CPT

## 2024-08-14 PROCEDURE — 70551 MRI BRAIN STEM W/O DYE: CPT | Mod: MC

## 2024-08-14 PROCEDURE — 87070 CULTURE OTHR SPECIMN AEROBIC: CPT

## 2024-08-14 PROCEDURE — 83916 OLIGOCLONAL BANDS: CPT

## 2024-08-14 PROCEDURE — 62328 DX LMBR SPI PNXR W/FLUOR/CT: CPT

## 2024-08-14 PROCEDURE — 83540 ASSAY OF IRON: CPT

## 2024-08-14 PROCEDURE — 86596 VOLTAGE-GTD CA CHNL ANTB EA: CPT

## 2024-08-14 PROCEDURE — 97530 THERAPEUTIC ACTIVITIES: CPT | Mod: GP

## 2024-08-14 PROCEDURE — 83615 LACTATE (LD) (LDH) ENZYME: CPT

## 2024-08-14 PROCEDURE — 85730 THROMBOPLASTIN TIME PARTIAL: CPT

## 2024-08-14 PROCEDURE — 99285 EMERGENCY DEPT VISIT HI MDM: CPT

## 2024-08-14 PROCEDURE — 95816 EEG AWAKE AND DROWSY: CPT

## 2024-08-14 PROCEDURE — 97161 PT EVAL LOW COMPLEX 20 MIN: CPT | Mod: GP

## 2024-08-14 PROCEDURE — 85610 PROTHROMBIN TIME: CPT

## 2024-08-14 PROCEDURE — 87798 DETECT AGENT NOS DNA AMP: CPT

## 2024-08-14 PROCEDURE — 83735 ASSAY OF MAGNESIUM: CPT

## 2024-08-14 PROCEDURE — 89051 BODY FLUID CELL COUNT: CPT

## 2024-08-14 PROCEDURE — 82607 VITAMIN B-12: CPT

## 2024-08-14 PROCEDURE — 86255 FLUORESCENT ANTIBODY SCREEN: CPT

## 2024-08-14 PROCEDURE — 84300 ASSAY OF URINE SODIUM: CPT

## 2024-08-14 PROCEDURE — 81001 URINALYSIS AUTO W/SCOPE: CPT

## 2024-08-14 PROCEDURE — 84100 ASSAY OF PHOSPHORUS: CPT

## 2024-08-14 PROCEDURE — 82550 ASSAY OF CK (CPK): CPT

## 2024-08-14 PROCEDURE — 82962 GLUCOSE BLOOD TEST: CPT

## 2024-08-14 PROCEDURE — 83935 ASSAY OF URINE OSMOLALITY: CPT

## 2024-08-14 PROCEDURE — 84484 ASSAY OF TROPONIN QUANT: CPT

## 2024-08-14 PROCEDURE — 93005 ELECTROCARDIOGRAM TRACING: CPT

## 2024-08-14 PROCEDURE — 84550 ASSAY OF BLOOD/URIC ACID: CPT

## 2024-08-14 PROCEDURE — 84155 ASSAY OF PROTEIN SERUM: CPT

## 2024-08-14 PROCEDURE — 83036 HEMOGLOBIN GLYCOSYLATED A1C: CPT

## 2024-08-14 PROCEDURE — A9579: CPT

## 2024-08-14 PROCEDURE — 87102 FUNGUS ISOLATION CULTURE: CPT

## 2024-08-14 PROCEDURE — 71045 X-RAY EXAM CHEST 1 VIEW: CPT | Mod: 26

## 2024-08-14 PROCEDURE — 86789 WEST NILE VIRUS ANTIBODY: CPT

## 2024-08-14 PROCEDURE — 70450 CT HEAD/BRAIN W/O DYE: CPT | Mod: MC

## 2024-08-14 PROCEDURE — 83930 ASSAY OF BLOOD OSMOLALITY: CPT

## 2024-08-14 PROCEDURE — 86592 SYPHILIS TEST NON-TREP QUAL: CPT

## 2024-08-14 PROCEDURE — 82803 BLOOD GASES ANY COMBINATION: CPT

## 2024-08-14 PROCEDURE — 84165 PROTEIN E-PHORESIS SERUM: CPT

## 2024-08-14 PROCEDURE — 82042 OTHER SOURCE ALBUMIN QUAN EA: CPT

## 2024-08-14 PROCEDURE — 82040 ASSAY OF SERUM ALBUMIN: CPT

## 2024-08-14 PROCEDURE — 36415 COLL VENOUS BLD VENIPUNCTURE: CPT

## 2024-08-14 PROCEDURE — 95813 EEG EXTND MNTR 61-119 MIN: CPT

## 2024-08-14 PROCEDURE — 87449 NOS EACH ORGANISM AG IA: CPT

## 2024-08-14 PROCEDURE — 71250 CT THORAX DX C-: CPT | Mod: MC

## 2024-08-14 PROCEDURE — 87468 ANAPLSMA PHGCYTOPHLM AMP PRB: CPT

## 2024-08-14 PROCEDURE — 87015 SPECIMEN INFECT AGNT CONCNTJ: CPT

## 2024-08-14 PROCEDURE — 84145 PROCALCITONIN (PCT): CPT

## 2024-08-14 PROCEDURE — 82140 ASSAY OF AMMONIA: CPT

## 2024-08-14 PROCEDURE — 83519 RIA NONANTIBODY: CPT

## 2024-08-14 PROCEDURE — 86140 C-REACTIVE PROTEIN: CPT

## 2024-08-14 PROCEDURE — 82533 TOTAL CORTISOL: CPT

## 2024-08-14 PROCEDURE — 87116 MYCOBACTERIA CULTURE: CPT

## 2024-08-14 PROCEDURE — 97116 GAIT TRAINING THERAPY: CPT | Mod: GP

## 2024-08-14 PROCEDURE — 86788 WEST NILE VIRUS AB IGM: CPT

## 2024-08-14 PROCEDURE — 86618 LYME DISEASE ANTIBODY: CPT

## 2024-08-14 PROCEDURE — 74176 CT ABD & PELVIS W/O CONTRAST: CPT | Mod: MC

## 2024-08-14 PROCEDURE — 85027 COMPLETE CBC AUTOMATED: CPT

## 2024-08-14 PROCEDURE — 82784 ASSAY IGA/IGD/IGG/IGM EACH: CPT

## 2024-08-14 PROCEDURE — 84443 ASSAY THYROID STIM HORMONE: CPT

## 2024-08-14 PROCEDURE — 70553 MRI BRAIN STEM W/O & W/DYE: CPT | Mod: MC

## 2024-08-14 PROCEDURE — 92523 SPEECH SOUND LANG COMPREHEN: CPT | Mod: GN

## 2024-08-14 PROCEDURE — 83550 IRON BINDING TEST: CPT

## 2024-08-14 PROCEDURE — 70544 MR ANGIOGRAPHY HEAD W/O DYE: CPT | Mod: MC

## 2024-08-14 PROCEDURE — 36600 WITHDRAWAL OF ARTERIAL BLOOD: CPT

## 2024-08-14 PROCEDURE — 80048 BASIC METABOLIC PNL TOTAL CA: CPT

## 2024-08-14 PROCEDURE — 87640 STAPH A DNA AMP PROBE: CPT

## 2024-08-14 PROCEDURE — 87484 EHRLICHA CHAFFEENSIS AMP PRB: CPT

## 2024-08-14 PROCEDURE — 86617 LYME DISEASE ANTIBODY: CPT

## 2024-08-14 PROCEDURE — 0241U: CPT

## 2024-08-14 RX ORDER — SODIUM CHLORIDE 9 MG/ML
2500 INJECTION INTRAMUSCULAR; INTRAVENOUS; SUBCUTANEOUS ONCE
Refills: 0 | Status: COMPLETED | OUTPATIENT
Start: 2024-08-14 | End: 2024-08-14

## 2024-08-14 RX ORDER — ONDANSETRON 2 MG/ML
1 INJECTION, SOLUTION INTRAMUSCULAR; INTRAVENOUS
Refills: 0 | DISCHARGE

## 2024-08-14 RX ORDER — BACLOFEN 0.5 MG/ML
1 INJECTION INTRATHECAL
Refills: 0 | DISCHARGE

## 2024-08-14 RX ORDER — VANCOMYCIN/0.9 % SOD CHLORIDE 1.75G/25
1250 PLASTIC BAG, INJECTION (ML) INTRAVENOUS ONCE
Refills: 0 | Status: COMPLETED | OUTPATIENT
Start: 2024-08-14 | End: 2024-08-15

## 2024-08-14 RX ORDER — ACETAMINOPHEN 325 MG/1
975 TABLET ORAL ONCE
Refills: 0 | Status: COMPLETED | OUTPATIENT
Start: 2024-08-14 | End: 2024-08-14

## 2024-08-14 RX ORDER — METFORMIN HYDROCHLORIDE 850 MG/1
1 TABLET, FILM COATED ORAL
Refills: 0 | DISCHARGE

## 2024-08-14 RX ORDER — MECLIZINE HYDROCHLORIDE 25 MG/1
0 TABLET ORAL
Refills: 0 | DISCHARGE

## 2024-08-14 RX ORDER — MEROPENEM 500 MG/10ML
1000 INJECTION, POWDER, FOR SOLUTION INTRAVENOUS ONCE
Refills: 0 | Status: COMPLETED | OUTPATIENT
Start: 2024-08-14 | End: 2024-08-14

## 2024-08-14 RX ORDER — MEROPENEM 500 MG/10ML
1000 INJECTION, POWDER, FOR SOLUTION INTRAVENOUS ONCE
Refills: 0 | Status: DISCONTINUED | OUTPATIENT
Start: 2024-08-14 | End: 2024-08-14

## 2024-08-14 RX ORDER — VANCOMYCIN/0.9 % SOD CHLORIDE 1.75G/25
1000 PLASTIC BAG, INJECTION (ML) INTRAVENOUS ONCE
Refills: 0 | Status: DISCONTINUED | OUTPATIENT
Start: 2024-08-14 | End: 2024-08-14

## 2024-08-14 RX ADMIN — ACETAMINOPHEN 975 MILLIGRAM(S): 325 TABLET ORAL at 23:57

## 2024-08-14 RX ADMIN — SODIUM CHLORIDE 2500 MILLILITER(S): 9 INJECTION INTRAMUSCULAR; INTRAVENOUS; SUBCUTANEOUS at 20:35

## 2024-08-14 NOTE — ED ADULT NURSE NOTE - NSFALLRISKINTERV_ED_ALL_ED

## 2024-08-14 NOTE — ED ADULT TRIAGE NOTE - HEIGHT IN CM
[facility]  *** FINAL REPORT ***    Name: Jessica Randall  MRN: [id number]    Outpatient  : 1943  HIS Order #: [id number]  [id number] [facility] Visit #: [id number]  Date: 11 May 2018    TYPE OF TEST: Visceral Arterial Duplex    REASON FOR TEST    Aortic PSV: 101.0 cm/s  Diameter AP: 1.1 cm   TV: 1.1 cm                   Right          Left  Renal Artery:- -------------  -------------  Proximal  PSV: 179.0          621.0  Mid       PSV: 178.0          261.0  Distal    PSV: 142.0          110.0  Aortic ratio :   1.8            6.1    Medullary PSV:  42.0            EDV:  10.0            EDR:   0.2            SDR:   4.2    Cortical  PSV:  29.0            EDV:   8.0            EDR:   0.3            SDR:   3.6  Stenosis:      Normal         Severe > 60%  Kidney size:    9.3 cm         6.3 cm               x  3.3 cm      x  2.2 cm    Hilar:-        Right          Left  Acc. Time  AT:   8 secs           secs  Acc. Index AI:             RI: 0.70    INTERPRETATION/FINDINGS  Duplex images were obtained using 2-D gray scale, color flow, and  spectral Doppler analysis. 1. Note: High aortic PSV invalidates the use of RAR as an indicator of   renal stenosis. RENAL:  1. No significant stenosis in the right renal artery. 2. Severe > 60% stenosis in the left renal artery. 3. The right kidney measures 9.3 cm.  4.  The right renal vein is patent. ADDITIONAL COMMENTS  Unable to  clearly visualize the left kidney due to intercoastal  shadowing. The visualize segment  measures 6.3 x  2.2 cm. No flow noted   inside of the kidney. Technically difficult scan due to intercostal shadowing. I have personally reviewed the data relevant to the interpretation of  this  study. TECHNOLOGIST: Jaycob Tejada, [facility], RVT/  Signed: 2018 11:29 AM    PHYSICIAN: Amanuel Abraham MD  Signed: 2018 05:24 PM 177.8

## 2024-08-14 NOTE — PHARMACOTHERAPY INTERVENTION NOTE - COMMENTS
Medication reconciliation completed.  Reviewed Medication list and confirmed med allergies with patient's wife at bedside; confirmed with Dr. First Medhuy.  Pt was discharged from a Hospital in [place], [place] yesterday 8/13 and was dc'd with new meds, most of which pt has NOT yet started taking.

## 2024-08-14 NOTE — ED PROVIDER NOTE - SKIN COLOR
Diffuse erythematous macular rash involving torso, and legs, spares palms and soles and mucous membranes, nonvesicular.

## 2024-08-14 NOTE — ED ADULT NURSE NOTE - OBJECTIVE STATEMENT
per wife at bedside, pt has pmh of chronic hiccups, and kidney cancer. he has had his right kidney removed which he is receiving immunotherapy for. pt is awake, speaking in full and complete sentences without difficulty. family endorses that he has become progressively more weak and fatigued and has been having subjective fevers. denies cp/sob/n/v/d. endorses generalized body aches and low appetite. urinating as normal, denies urinary sx.

## 2024-08-14 NOTE — ED ADULT TRIAGE NOTE - CHIEF COMPLAINT QUOTE
Pt BIBEMS c/o generalized weakness. Pt recently traveled to  and upon return developed generalized weakness. -BEFAST. Hx TIA and kidney cancer and chronic hiccups, undergoing chemotherapy. Endorsing 10/10 bodyaches.

## 2024-08-14 NOTE — ED PROVIDER NOTE - CLINICAL SUMMARY MEDICAL DECISION MAKING FREE TEXT BOX
Ddx: PNA, ro Sepsis/ generalized weakness, no focal neuro deficits  Plan: Cbc, cmp, lactate, blood cx, cxr, fluids, abx, admit

## 2024-08-14 NOTE — ED PROVIDER NOTE - OBJECTIVE STATEMENT
Patient is a 59-year-old gentleman with a past medical history of renal cell carcinoma status post right nephrectomy on immunotherapy, last dose 3 weeks ago, paroxysmal A-fib not on anticoagulation, recent discharge from hospital in [place] for CVA who presents to the ED because of diffuse generalized weakness, as well as fever.  Patient was in [place] visiting his new grand child, admitted for strokelike symptoms and found to have a CVA.  During hospital stay there, was found to have a developing pneumonia, started on Levaquin, given 1 dose, and then discharged yesterday.  Patient took the train to come back appear where he lives.  Has been having a cough.  As generalized weakness, and chills.  Had a diffuse macular rash 1 day after he was admitted at outside hospital, prior to Levaquin.

## 2024-08-15 DIAGNOSIS — C64.9 MALIGNANT NEOPLASM OF UNSPECIFIED KIDNEY, EXCEPT RENAL PELVIS: ICD-10-CM

## 2024-08-15 DIAGNOSIS — A41.9 SEPSIS, UNSPECIFIED ORGANISM: ICD-10-CM

## 2024-08-15 DIAGNOSIS — I10 ESSENTIAL (PRIMARY) HYPERTENSION: ICD-10-CM

## 2024-08-15 DIAGNOSIS — N17.9 ACUTE KIDNEY FAILURE, UNSPECIFIED: ICD-10-CM

## 2024-08-15 DIAGNOSIS — E78.5 HYPERLIPIDEMIA, UNSPECIFIED: ICD-10-CM

## 2024-08-15 DIAGNOSIS — Z29.9 ENCOUNTER FOR PROPHYLACTIC MEASURES, UNSPECIFIED: ICD-10-CM

## 2024-08-15 LAB
A1C WITH ESTIMATED AVERAGE GLUCOSE RESULT: 6.5 % — HIGH (ref 4–5.6)
ADD ON TEST-SPECIMEN IN LAB: SIGNIFICANT CHANGE UP
ALBUMIN SERPL ELPH-MCNC: 2.6 G/DL — LOW (ref 3.3–5)
ALP SERPL-CCNC: 118 U/L — SIGNIFICANT CHANGE UP (ref 40–120)
ALT FLD-CCNC: 15 U/L — SIGNIFICANT CHANGE UP (ref 12–78)
AMMONIA BLD-MCNC: 19 UMOL/L — SIGNIFICANT CHANGE UP (ref 11–32)
ANION GAP SERPL CALC-SCNC: 10 MMOL/L — SIGNIFICANT CHANGE UP (ref 5–17)
ANION GAP SERPL CALC-SCNC: 11 MMOL/L — SIGNIFICANT CHANGE UP (ref 5–17)
ANION GAP SERPL CALC-SCNC: 11 MMOL/L — SIGNIFICANT CHANGE UP (ref 5–17)
APPEARANCE UR: CLEAR — SIGNIFICANT CHANGE UP
AST SERPL-CCNC: 18 U/L — SIGNIFICANT CHANGE UP (ref 15–37)
BACTERIA # UR AUTO: NEGATIVE /HPF — SIGNIFICANT CHANGE UP
BASE EXCESS BLDA CALC-SCNC: -10.2 MMOL/L — LOW (ref -2–3)
BASOPHILS # BLD AUTO: 0 K/UL — SIGNIFICANT CHANGE UP (ref 0–0.2)
BASOPHILS NFR BLD AUTO: 0 % — SIGNIFICANT CHANGE UP (ref 0–2)
BILIRUB SERPL-MCNC: 0.6 MG/DL — SIGNIFICANT CHANGE UP (ref 0.2–1.2)
BILIRUB UR-MCNC: NEGATIVE — SIGNIFICANT CHANGE UP
BUN SERPL-MCNC: 14 MG/DL — SIGNIFICANT CHANGE UP (ref 7–23)
BUN SERPL-MCNC: 15 MG/DL — SIGNIFICANT CHANGE UP (ref 7–23)
BUN SERPL-MCNC: 18 MG/DL — SIGNIFICANT CHANGE UP (ref 7–23)
CALCIUM SERPL-MCNC: 8.5 MG/DL — SIGNIFICANT CHANGE UP (ref 8.5–10.1)
CALCIUM SERPL-MCNC: 8.6 MG/DL — SIGNIFICANT CHANGE UP (ref 8.5–10.1)
CALCIUM SERPL-MCNC: 8.9 MG/DL — SIGNIFICANT CHANGE UP (ref 8.5–10.1)
CAST: 6 /LPF — HIGH (ref 0–4)
CHLORIDE SERPL-SCNC: 106 MMOL/L — SIGNIFICANT CHANGE UP (ref 96–108)
CHLORIDE SERPL-SCNC: 107 MMOL/L — SIGNIFICANT CHANGE UP (ref 96–108)
CHLORIDE SERPL-SCNC: 108 MMOL/L — SIGNIFICANT CHANGE UP (ref 96–108)
CK SERPL-CCNC: 44 U/L — SIGNIFICANT CHANGE UP (ref 26–308)
CO2 SERPL-SCNC: 15 MMOL/L — LOW (ref 22–31)
CO2 SERPL-SCNC: 18 MMOL/L — LOW (ref 22–31)
CO2 SERPL-SCNC: 18 MMOL/L — LOW (ref 22–31)
COLOR SPEC: YELLOW — SIGNIFICANT CHANGE UP
CREAT SERPL-MCNC: 1.82 MG/DL — HIGH (ref 0.5–1.3)
CREAT SERPL-MCNC: 1.9 MG/DL — HIGH (ref 0.5–1.3)
CREAT SERPL-MCNC: 2.01 MG/DL — HIGH (ref 0.5–1.3)
CRP SERPL-MCNC: 128 MG/L — HIGH
DIFF PNL FLD: NEGATIVE — SIGNIFICANT CHANGE UP
EGFR: 37 ML/MIN/1.73M2 — LOW
EGFR: 40 ML/MIN/1.73M2 — LOW
EGFR: 42 ML/MIN/1.73M2 — LOW
EOSINOPHIL # BLD AUTO: 0.16 K/UL — SIGNIFICANT CHANGE UP (ref 0–0.5)
EOSINOPHIL NFR BLD AUTO: 3.3 % — SIGNIFICANT CHANGE UP (ref 0–6)
ERYTHROCYTE [SEDIMENTATION RATE] IN BLOOD: 60 MM/HR — HIGH (ref 0–20)
ESTIMATED AVERAGE GLUCOSE: 140 MG/DL — HIGH (ref 68–114)
FLUAV AG NPH QL: SIGNIFICANT CHANGE UP
FLUBV AG NPH QL: SIGNIFICANT CHANGE UP
GAS PNL BLDA: SIGNIFICANT CHANGE UP
GLUCOSE SERPL-MCNC: 118 MG/DL — HIGH (ref 70–99)
GLUCOSE SERPL-MCNC: 123 MG/DL — HIGH (ref 70–99)
GLUCOSE SERPL-MCNC: 126 MG/DL — HIGH (ref 70–99)
GLUCOSE UR QL: NEGATIVE MG/DL — SIGNIFICANT CHANGE UP
GRAN CASTS # UR COMP ASSIST: PRESENT
HCO3 BLDA-SCNC: 14 MMOL/L — LOW (ref 21–28)
HCT VFR BLD CALC: 28.3 % — LOW (ref 39–50)
HGB BLD-MCNC: 9.2 G/DL — LOW (ref 13–17)
HYALINE CASTS # UR AUTO: PRESENT
IMM GRANULOCYTES NFR BLD AUTO: 1.2 % — HIGH (ref 0–0.9)
KETONES UR-MCNC: 80 MG/DL
LEGIONELLA AG UR QL: NEGATIVE — SIGNIFICANT CHANGE UP
LEUKOCYTE ESTERASE UR-ACNC: NEGATIVE — SIGNIFICANT CHANGE UP
LYMPHOCYTES # BLD AUTO: 0.86 K/UL — LOW (ref 1–3.3)
LYMPHOCYTES # BLD AUTO: 17.6 % — SIGNIFICANT CHANGE UP (ref 13–44)
MAGNESIUM SERPL-MCNC: 1.6 MG/DL — SIGNIFICANT CHANGE UP (ref 1.6–2.6)
MCHC RBC-ENTMCNC: 27 PG — SIGNIFICANT CHANGE UP (ref 27–34)
MCHC RBC-ENTMCNC: 32.5 GM/DL — SIGNIFICANT CHANGE UP (ref 32–36)
MCV RBC AUTO: 83 FL — SIGNIFICANT CHANGE UP (ref 80–100)
MONOCYTES # BLD AUTO: 0.36 K/UL — SIGNIFICANT CHANGE UP (ref 0–0.9)
MONOCYTES NFR BLD AUTO: 7.3 % — SIGNIFICANT CHANGE UP (ref 2–14)
NEUTROPHILS # BLD AUTO: 3.46 K/UL — SIGNIFICANT CHANGE UP (ref 1.8–7.4)
NEUTROPHILS NFR BLD AUTO: 70.6 % — SIGNIFICANT CHANGE UP (ref 43–77)
NITRITE UR-MCNC: NEGATIVE — SIGNIFICANT CHANGE UP
OSMOLALITY UR: 170 MOSM/KG — SIGNIFICANT CHANGE UP (ref 50–1200)
PCO2 BLDA: 28 MMHG — LOW (ref 35–48)
PH BLDA: 7.32 — LOW (ref 7.35–7.45)
PH UR: 5.5 — SIGNIFICANT CHANGE UP (ref 5–8)
PHOSPHATE SERPL-MCNC: 3.5 MG/DL — SIGNIFICANT CHANGE UP (ref 2.5–4.5)
PLATELET # BLD AUTO: 218 K/UL — SIGNIFICANT CHANGE UP (ref 150–400)
PO2 BLDA: 91 MMHG — SIGNIFICANT CHANGE UP (ref 83–108)
POTASSIUM SERPL-MCNC: 3.8 MMOL/L — SIGNIFICANT CHANGE UP (ref 3.5–5.3)
POTASSIUM SERPL-MCNC: 4 MMOL/L — SIGNIFICANT CHANGE UP (ref 3.5–5.3)
POTASSIUM SERPL-MCNC: 4.3 MMOL/L — SIGNIFICANT CHANGE UP (ref 3.5–5.3)
POTASSIUM SERPL-SCNC: 3.8 MMOL/L — SIGNIFICANT CHANGE UP (ref 3.5–5.3)
POTASSIUM SERPL-SCNC: 4 MMOL/L — SIGNIFICANT CHANGE UP (ref 3.5–5.3)
POTASSIUM SERPL-SCNC: 4.3 MMOL/L — SIGNIFICANT CHANGE UP (ref 3.5–5.3)
PROT SERPL-MCNC: 6.2 GM/DL — SIGNIFICANT CHANGE UP (ref 6–8.3)
PROT UR-MCNC: 100 MG/DL
RBC # BLD: 3.41 M/UL — LOW (ref 4.2–5.8)
RBC # FLD: 14.5 % — SIGNIFICANT CHANGE UP (ref 10.3–14.5)
RBC CASTS # UR COMP ASSIST: 0 /HPF — SIGNIFICANT CHANGE UP (ref 0–4)
RSV RNA NPH QL NAA+NON-PROBE: SIGNIFICANT CHANGE UP
SAO2 % BLDA: 98 % — SIGNIFICANT CHANGE UP (ref 94–98)
SARS-COV-2 RNA SPEC QL NAA+PROBE: SIGNIFICANT CHANGE UP
SODIUM SERPL-SCNC: 132 MMOL/L — LOW (ref 135–145)
SODIUM SERPL-SCNC: 135 MMOL/L — SIGNIFICANT CHANGE UP (ref 135–145)
SODIUM SERPL-SCNC: 137 MMOL/L — SIGNIFICANT CHANGE UP (ref 135–145)
SODIUM UR-SCNC: <20 MMOL/L — SIGNIFICANT CHANGE UP
SP GR SPEC: 1.02 — SIGNIFICANT CHANGE UP (ref 1–1.03)
SQUAMOUS # UR AUTO: 1 /HPF — SIGNIFICANT CHANGE UP (ref 0–5)
TROPONIN I, HIGH SENSITIVITY RESULT: 14.02 NG/L — SIGNIFICANT CHANGE UP
TSH SERPL-MCNC: 6.46 UU/ML — HIGH (ref 0.34–4.82)
UROBILINOGEN FLD QL: 1 MG/DL — SIGNIFICANT CHANGE UP (ref 0.2–1)
VIT B12 SERPL-MCNC: >2000 PG/ML — HIGH (ref 232–1245)
WBC # BLD: 4.9 K/UL — SIGNIFICANT CHANGE UP (ref 3.8–10.5)
WBC # FLD AUTO: 4.9 K/UL — SIGNIFICANT CHANGE UP (ref 3.8–10.5)
WBC UR QL: 0 /HPF — SIGNIFICANT CHANGE UP (ref 0–5)

## 2024-08-15 PROCEDURE — 93010 ELECTROCARDIOGRAM REPORT: CPT

## 2024-08-15 PROCEDURE — 74176 CT ABD & PELVIS W/O CONTRAST: CPT | Mod: 26

## 2024-08-15 PROCEDURE — 71250 CT THORAX DX C-: CPT | Mod: 26

## 2024-08-15 PROCEDURE — 99223 1ST HOSP IP/OBS HIGH 75: CPT

## 2024-08-15 PROCEDURE — 70450 CT HEAD/BRAIN W/O DYE: CPT | Mod: 26

## 2024-08-15 RX ORDER — VANCOMYCIN/0.9 % SOD CHLORIDE 1.75G/25
1250 PLASTIC BAG, INJECTION (ML) INTRAVENOUS EVERY 24 HOURS
Refills: 0 | Status: DISCONTINUED | OUTPATIENT
Start: 2024-08-16 | End: 2024-08-15

## 2024-08-15 RX ORDER — DEXTROSE 15 G/33 G
12.5 GEL IN PACKET (GRAM) ORAL ONCE
Refills: 0 | Status: DISCONTINUED | OUTPATIENT
Start: 2024-08-15 | End: 2024-08-24

## 2024-08-15 RX ORDER — DEXTROSE 15 G/33 G
25 GEL IN PACKET (GRAM) ORAL ONCE
Refills: 0 | Status: DISCONTINUED | OUTPATIENT
Start: 2024-08-15 | End: 2024-08-24

## 2024-08-15 RX ORDER — ACETAMINOPHEN 325 MG/1
1000 TABLET ORAL ONCE
Refills: 0 | Status: COMPLETED | OUTPATIENT
Start: 2024-08-15 | End: 2024-08-16

## 2024-08-15 RX ORDER — ACETAMINOPHEN 325 MG/1
650 TABLET ORAL EVERY 6 HOURS
Refills: 0 | Status: DISCONTINUED | OUTPATIENT
Start: 2024-08-15 | End: 2024-08-24

## 2024-08-15 RX ORDER — FAMOTIDINE 10 MG/ML
20 INJECTION INTRAVENOUS DAILY
Refills: 0 | Status: DISCONTINUED | OUTPATIENT
Start: 2024-08-15 | End: 2024-08-17

## 2024-08-15 RX ORDER — MAGNESIUM, ALUMINUM HYDROXIDE 200-225/5
30 SUSPENSION, ORAL (FINAL DOSE FORM) ORAL EVERY 4 HOURS
Refills: 0 | Status: DISCONTINUED | OUTPATIENT
Start: 2024-08-15 | End: 2024-08-24

## 2024-08-15 RX ORDER — METOPROLOL TARTRATE 100 MG/1
25 TABLET ORAL DAILY
Refills: 0 | Status: DISCONTINUED | OUTPATIENT
Start: 2024-08-15 | End: 2024-08-19

## 2024-08-15 RX ORDER — ASPIRIN 81 MG
81 TABLET, DELAYED RELEASE (ENTERIC COATED) ORAL DAILY
Refills: 0 | Status: DISCONTINUED | OUTPATIENT
Start: 2024-08-15 | End: 2024-08-15

## 2024-08-15 RX ORDER — GLUCAGON INJECTION, SOLUTION 1 MG/.2ML
1 INJECTION, SOLUTION SUBCUTANEOUS ONCE
Refills: 0 | Status: DISCONTINUED | OUTPATIENT
Start: 2024-08-15 | End: 2024-08-24

## 2024-08-15 RX ORDER — HEPARIN SODIUM,BOVINE 1000/ML
5000 VIAL (ML) INJECTION EVERY 12 HOURS
Refills: 0 | Status: DISCONTINUED | OUTPATIENT
Start: 2024-08-15 | End: 2024-08-15

## 2024-08-15 RX ORDER — ONDANSETRON 2 MG/ML
4 INJECTION, SOLUTION INTRAMUSCULAR; INTRAVENOUS EVERY 8 HOURS
Refills: 0 | Status: DISCONTINUED | OUTPATIENT
Start: 2024-08-15 | End: 2024-08-24

## 2024-08-15 RX ORDER — PANTOPRAZOLE SODIUM 40 MG
40 TABLET, DELAYED RELEASE (ENTERIC COATED) ORAL
Refills: 0 | Status: DISCONTINUED | OUTPATIENT
Start: 2024-08-15 | End: 2024-08-15

## 2024-08-15 RX ORDER — SODIUM BICARBONATE 84 MG/ML
0.1 INJECTION, SOLUTION INTRAVENOUS
Qty: 150 | Refills: 0 | Status: DISCONTINUED | OUTPATIENT
Start: 2024-08-15 | End: 2024-08-17

## 2024-08-15 RX ORDER — DEXTROSE 15 G/33 G
15 GEL IN PACKET (GRAM) ORAL ONCE
Refills: 0 | Status: DISCONTINUED | OUTPATIENT
Start: 2024-08-15 | End: 2024-08-24

## 2024-08-15 RX ORDER — METRONIDAZOLE 250 MG
500 TABLET ORAL EVERY 8 HOURS
Refills: 0 | Status: DISCONTINUED | OUTPATIENT
Start: 2024-08-15 | End: 2024-08-15

## 2024-08-15 RX ORDER — ACETAMINOPHEN 325 MG/1
1000 TABLET ORAL ONCE
Refills: 0 | Status: COMPLETED | OUTPATIENT
Start: 2024-08-15 | End: 2024-08-15

## 2024-08-15 RX ORDER — CEFEPIME 2 G/1
1000 INJECTION, POWDER, FOR SOLUTION INTRAVENOUS EVERY 12 HOURS
Refills: 0 | Status: DISCONTINUED | OUTPATIENT
Start: 2024-08-15 | End: 2024-08-15

## 2024-08-15 RX ADMIN — SODIUM BICARBONATE 65 MEQ/KG/HR: 84 INJECTION, SOLUTION INTRAVENOUS at 12:25

## 2024-08-15 RX ADMIN — Medication 100 MILLIGRAM(S): at 05:37

## 2024-08-15 RX ADMIN — Medication 166.67 MILLIGRAM(S): at 02:04

## 2024-08-15 RX ADMIN — Medication 81 MILLIGRAM(S): at 10:28

## 2024-08-15 RX ADMIN — CEFEPIME 1000 MILLIGRAM(S): 2 INJECTION, POWDER, FOR SOLUTION INTRAVENOUS at 05:36

## 2024-08-15 RX ADMIN — Medication 20 MILLIGRAM(S): at 21:39

## 2024-08-15 RX ADMIN — FAMOTIDINE 20 MILLIGRAM(S): 10 INJECTION INTRAVENOUS at 13:57

## 2024-08-15 RX ADMIN — Medication 5000 UNIT(S): at 05:37

## 2024-08-15 RX ADMIN — MEROPENEM 1000 MILLIGRAM(S): 500 INJECTION, POWDER, FOR SOLUTION INTRAVENOUS at 00:04

## 2024-08-15 RX ADMIN — Medication 100 MILLILITER(S): at 04:11

## 2024-08-15 RX ADMIN — Medication 40 MILLIGRAM(S): at 07:00

## 2024-08-15 RX ADMIN — Medication 100 GRAM(S): at 10:30

## 2024-08-15 RX ADMIN — ACETAMINOPHEN 400 MILLIGRAM(S): 325 TABLET ORAL at 10:05

## 2024-08-15 RX ADMIN — ACETAMINOPHEN 1000 MILLIGRAM(S): 325 TABLET ORAL at 10:30

## 2024-08-15 RX ADMIN — METOPROLOL TARTRATE 25 MILLIGRAM(S): 100 TABLET ORAL at 05:39

## 2024-08-15 NOTE — PATIENT PROFILE ADULT - HOW PATIENT ADDRESSED, PROFILE
Outpatient office visit EXAM note      History    Liana Davenport is a 32 year old year old female who presents for the following issues:    1. Depression: patient changed to wellbutrin 150 mg daily 2 weeks ago. Feels this is helping but unsure if dose is high enough at this point. No concerns re: side effects.     2. Cold symptoms: patient with headaches, cough, congestion for the last 2 weeks. No fevers/chills. Has severe fatigue and body aches. Mother in law with similar symptoms as well as other family members.     mEDICATIONS    Current Outpatient Medications   Medication Sig   • methocarbamol (ROBAXIN) 500 MG tablet TAKE 1 TABLET BY MOUTH DAILY   • buPROPion (WELLBUTRIN XL) 150 MG 24 hr tablet Take 1 tablet by mouth daily.   • eflornithine 13.9 % cream Apply topically 2 times daily. Apply to affected areas twice daily.   • azithromycin (ZITHROMAX) 250 MG tablet Take 2 tabs on day 1, then 1 tab on days 2-5     No current facility-administered medications for this visit.        Pertinent past medical, surgical, social and family history reviewed and updated in [facility].      Review of systems    GENERAL:  Denies fever, chills, tiredness, malaise, unintentional weight changes.  HEENT: + congestion, rhinorrhea. Denies eye drainage, ear pain, sore throat.  CARDIOVASCULAR:  Denies chest pain, shortness of breath, palpitations.  RESPIRATORY: + cough. Denies wheezing, shortness of breath.  GASTROINTESTINAL:  Denies abdominal pain, nausea/vomiting, indigestion/heartburn, constipation, diarrhea.  GENITOURINARY:  Denies urine retention, painful urination, urinary frequency, blood in urine.    Physical Exam    Visit Vitals  /78 (BP Location: RUE - Right upper extremity, Patient Position: Sitting, Cuff Size: Large Adult)   Pulse 98   Temp 97.7 °F (36.5 °C) (Tympanic)   Ht 5' 3\" (1.6 m)   Wt 97.1 kg   LMP 07/03/2002   BMI 37.91 kg/m²     GENERAL:  Alert, no acute distress, appropriately dressed.  HEENT: no scleral  icterus, buccal mucosa moist, no oropharyngeal exudate or posterior pharynx erythema. Tympanic membranes clear bilaterally.   NECK: Supple, no cervical lymphadenopathy.   CHEST/LUNG: No respiratory distress, good air flow to bases, transmitted upper airway sounds.   CARDIOVASCULAR: Regular rate and rhythm, S1, S2 normal; no murmurs.  ABDOMEN:  Soft, + bowel sounds, nontender  PSYCH:  Mood and affect appropriate.    Assessment & Plan    Liana was seen today for follow-up.    Diagnoses and all orders for this visit:    Acute URI: given longevity of symptoms will treat with z-yaa as below.   -     azithromycin (ZITHROMAX) 250 MG tablet; Take 2 tabs on day 1, then 1 tab on days 2-5    Moderate episode of recurrent major depressive disorder (CMS/HCC): continue wellbutrin, plan to recheck in 1 month.     Return in about 1 month (around 11/22/2019) for f/u depression.    Elizabeth Packer MD   [place]

## 2024-08-15 NOTE — DIETITIAN INITIAL EVALUATION ADULT - ORAL NUTRITION SUPPLEMENTS
Once diet advanced, add Ensure Max BID to optimize nutritional needs (provides 150 kcal, 30 g protein/ shake)

## 2024-08-15 NOTE — CONSULT NOTE ADULT - ASSESSMENT
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   S/p radical right nephrectomy (Dr. Kaplan) 3/19/24. pT3 pN1.    Recommend systemic therapy for minimal volume residual metastatic disease. If solitary nodule remains the only area of active disease after few months of systemic therapy would recommend wedge resection of lung nodule as it would appear to be a solitary met.    4/22/24 - Started on dual immunotherapy with nivolumab (3mg/kg) and ipilimumab (1mg/kg) Q21 x 4 -> Nivo maintenance.   Was due to start maintenance Nivo 7/18/24.   Now with joint pains and swelling - symptoms c/w I/O induced inflammatory arthritis - grade 2. Patient also with c/o dry mouth.  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   - Patient apparently received CT imaging at [facility] recently that noted POD  - No planned inpatient cancer directed therapy  - Will need close outpatient follow up once stable & d/c'ed   - Continue supportive measures as clinically necessary       # AMS / Lethargy with Rash    - [place], [place] with improvement after workup / tx; do not have records  - ID following; febrile with macular rash, suspected drug rash 2/2 recent Levaquin; pending additional infectious work up / CXs  - Case discussed with Hospitalist who will start with CT head imaging   - Continue supportive measures       # Normocytic Anemia    - Hgb currently 9.2 g/dL ---> counts were better 06/2024, around 11.0 - 12.0 g/dL  - Etiology multifactorial in setting of known underlying metastatic disease on cancer directed therapy with recent hospitalization / sepsis / infectious process   - Continue to trend serial CBCs while admitted  - Continue supportive measures

## 2024-08-15 NOTE — H&P ADULT - PROBLEM SELECTOR PLAN 6
DVT ppx: HepsubQ  Fall and aspiration precautions.  Turn and reposition q2h to prevent bedsores  Skin checks as per RN

## 2024-08-15 NOTE — DIETITIAN INITIAL EVALUATION ADULT - ORAL INTAKE PTA/DIET HISTORY
Unable to obtain diet hx 2/2 pt sleeping and unarousable upon RD visit this morning, noted to be very lethargic as per H&P. As per H&P: recent mets to lungs noted on MRI, on immunotherapy w/ last dose received x3 weeks ago. Was previously in hospital x5 days PTA to HH.  Unable to obtain diet hx 2/2 pt sleeping and unarousable upon RD visit this morning, noted to be very lethargic. As per H&P: recent mets to lungs noted on MRI, on immunotherapy w/ last dose received x3 weeks ago. Was previously in hospital x5 days PTA to , likely consuming <50% ENN x ~1 week.

## 2024-08-15 NOTE — PATIENT PROFILE ADULT - NSTRANSFERBELONGINGSDISPO_GEN_A_NUR
Hpi Title: Evaluation of Skin Lesions How Severe Are Your Spot(S)?: mild Have Your Spot(S) Been Treated In The Past?: has not been treated Family Member: Mother Additional History: Mole x yrs on scalp, no symptoms, wife wants it looked at. given to family

## 2024-08-15 NOTE — CONSULT NOTE ADULT - ASSESSMENT
-MRI brain, MRV  -EEG-routine  -Consider LP-r/o meningitis  -check paraneoplastic panel  -F/U infectious w/u  -Hold ASA, Heparin SQ prior to LP  -Check PTT/PT/INR in AM 59M w/ h/o renal cell carcinoma status post right nephrectomy on immunotherapy, last dose 3 weeks ago, paroxysmal A-fib on ASA, presents with c/o lethargy.  Patient lethargic, collateral history provided by wife at bedside. As per wife, patient had been in his usual state of health until one week ago. They went to [place] last week on Wednesday to see there new grandchild. On Thursday patient started to act bizarre, had trouble walking and speaking. The wife called EMS and he was taken to the local hospital in [place]. As per the wife pt had a CT chest which was noted for progression of malignancy and PNA, and he had an MRI done which showed a chronic stroke. Patient was in the hospital for 5 days presumably getting antibiotics, and initially improved but developed a rash during the hospital course. Most of patients providers are in [facility] so they asked to be discharged from [place] and came back to [place] yesterday. As per wife the patient initially improved but got worse again while coming to [place], and currently has been lethargic, spiking fevers, and has a diffuse macular rash that's getting worse.  On arrival to ED, pt tachycardic, and febrile, vitals otherwise stable. CBC noted for WBC 7.6, H/H 10/29, PLt 272. CMP noted for BUN/Cr 18/2.32, UA sterile, flu/covid negative.  Admitted to  for sepsis.  Neurology consulted for worsening AMS.  Of note the patient has joint pains for 6 weeks attributed to side effects from immunotherapy as per their rheumatologist.  And as per patient's wife his ext rash started prior to the Levaquin. On PE he is obtunded and only wakes up when moving his legs because of joint pains.  When he is briefly awake he is oriented x 3.  NO obvious focal deficits.        #AMS-unclear etiology      Recommend  -MRI brain, MRV  -EEG-routine  -Consider LP-r/o meningitis  -check paraneoplastic panel  -F/U infectious w/u  -Hold ASA, Heparin SQ prior to LP  -Check PTT/PT/INR in AM    D/W patient's wife, Dr. Shin, Dr. Holcomb

## 2024-08-15 NOTE — EEG REPORT - NS EEG RECRD DROWSINESS Q5 RES
attenuation of the posterior dominant rhythm, the emergence of diffuse theta activity and an increase in beta activity

## 2024-08-15 NOTE — DIETITIAN INITIAL EVALUATION ADULT - OTHER INFO
59M w/ h/o renal cell carcinoma status post right nephrectomy on immunotherapy, last dose 3 weeks ago, paroxysmal A-fib not on anticoagulation, presents with c/o lethargy. Patient lethargic, collateral history provided by wife at bedside. As per wife, patient had been in his usual state of health until one week ago. They went to [place] last week on Wednesday to see there new grandchild. On Thursday patient started to act bizarre, had trouble walking and speaking. The wife called EMS and he was taken to the local hospital in [place]. As per the wife pt had a CT chest which was noted for progression of malignancy and PNA, and he had an MRI done which showed a chronic stroke. Patient was in the hospital for 5 days presumably getting antibiotics, and initially improved but developed a rash during the hospital course. Most of patients providers are in [facility] so they asked to be discharged from [place] and came back to [place] yesterday. As per wife the patient initially improved but got worse again while coming to [place], and currently has been lethargic, spiking fevers, and has a diffuse macular rash that's getting worse. Admitted to  for PNA. Admit for PNA, acute  on chronica renal failure, renal cell Ca, sepsis.     Known to nutr services w/ dx moderate PCM 3/18/24. Unable to obtain diet/ wt hx 2/2 pt sleeping and unarousable upon RD visit this morning, noted to be very lethargic. Wt hx as per previous RD notes: 186# (EMR wt) from 3/18/24; 195# (EMR wt) from 2/15/24. RD obtained bed scale wt 202# on 8/15, appears accurate but 1+ edema doc'd - possibly skewing wt and masking potential wt loss. Wt gain 16# / 8.6% x5 mo - considered appropriate and desirable 2/2 previous PCM and noted mets Ca. NFPE reveals mild-moderate muscle/fat wasting of face only, unable to determine muscle/fat wasting of upper body 2/2 positioning. Was on regular diet this AM but now made NPO ? 2/2 lethargic state. Consider SLP consult to determine most tolerated diet consistency, recommend to ADAT to regular + SLP recs for consistency to maximize caloric and protein intake. Once diet advanced, will add Ensure Max BID to optimize nutritional needs (provides 150 kcal, 30 g protein/ shake). STRONGLY recommend to confirm goals of care regarding nutrition support - Nutrition support is not recommended with advanced cancer as studies show that there is no improvement of dehydration symptoms, quality of life, or survival. It also increases the risk for peripheral edema, ascites, and pleural effusions. However, will provide nutrition/ hydration within goals of care. See below for other recs.

## 2024-08-15 NOTE — CONSULT NOTE ADULT - ASSESSMENT
58 y/o Male with h/o renal cell carcinoma s/p right nephrectomy on immunotherapy, last dose 3 weeks ago, paroxysmal A-fib not on anticoagulation was admitted on 8/14 for increased lethargy. Patient lethargic, collateral history provided by wife at bedside. As per wife, patient had been in his usual state of health until one week ago. They went to [place] last week on Wednesday to see there new grandchild. On Thursday patient started to act bizarre, had trouble walking and speaking. The wife called EMS and he was taken to the local hospital in [place]. As per the wife pt had a CT chest which was noted for progression of malignancy and PNA, and he had an MRI done which showed a chronic stroke. Patient was in the hospital for 5 days presumably getting antibiotics, and initially improved but developed a rash during the hospital course. Most of patients providers are in [facility] so they asked to be discharged from [place] and came back to [place] on the day PTA. As per wife the patient initially improved but got worse again while coming to [place], and currently has been lethargic, spiking fevers, and has a diffuse macular rash that's getting worse. In ED, pt tachycardic, and febrile. He received cefepime and vancomycin IV.     1. Febrile syndrome. Renal cell Ca s/p right nephrectomy on immunotherapy. CRF stage 4. Metabolic encephalopathy.   -obtain BC x 2, urine c/s     60 y/o Male with h/o renal cell carcinoma s/p right nephrectomy on immunotherapy, last dose 3 weeks ago, paroxysmal A-fib not on anticoagulation was admitted on 8/14 for increased lethargy. Patient lethargic, collateral history provided by wife at bedside. As per wife, patient had been in his usual state of health until one week ago. They went to [place] last week on Wednesday to see there new grandchild. On Thursday patient started to act bizarre, had trouble walking and speaking. The wife called EMS and he was taken to the local hospital in [place]. As per the wife pt had a CT chest which was noted for progression of malignancy and PNA, and he had an MRI done which showed a chronic stroke. Patient was in the hospital for 5 days presumably getting antibiotics, and initially improved but developed a rash during the hospital course. Most of patients providers are in [facility] so they asked to be discharged from [place] and came back to [place] on the day PTA. As per wife the patient initially improved but got worse again while coming to [place], and currently has been lethargic, spiking fevers, and has a diffuse macular rash that's getting worse. In ED, pt tachycardic, and febrile. He received cefepime and vancomycin IV.     1. Febrile syndrome. Macular rash, probable drug rash due to levofloxacin. Renal cell Ca s/p right nephrectomy on immunotherapy. CRF stage 4. Metabolic encephalopathy.   -low grade fever   -obtain BC x 2, urine c/s  -no clinical evidence of sepsis  -no pyuria to suggest urinary infection  -CXR - no pulmonary infiltrates noted  -he received abx therapy during recent hospitalization in [place], then oral levofloxacin as outpatient   -no infectious issue identified, but has a likely allergic reaction  -would hold off further abx therapy at this time  -f/u cultures  -old chart reviewed to assess prior cultures  -consider neurology evaluation and oncology evaluation  -monitor temps  -f/u CBC  -supportive care  2. Other issues:   -care per medicine    d/w medicine team

## 2024-08-15 NOTE — PROGRESS NOTE ADULT - SUBJECTIVE AND OBJECTIVE BOX
HPI: 59M w/ h/o renal cell carcinoma status post right nephrectomy on immunotherapy, last dose 3 weeks ago, paroxysmal A-fib not on anticoagulation, presents with c/o lethargy. Patient lethargic, collateral history provided by wife at bedside. As per wife, patient had been in his usual state of health until one week ago. They went to [place] last week on Wednesday to see there new grandchild. On Thursday patient started to act bizarre, had trouble walking and speaking. The wife called EMS and he was taken to the local hospital in [place]. As per the wife pt had a CT chest which was noted for progression of malignancy and PNA, and he had an MRI done which showed a chronic stroke. Patient was in the hospital for 5 days presumably getting antibiotics, and initially improved but developed a rash during the hospital course. Most of patients providers are in [facility] so they asked to be discharged from [place] and came back to [place] yesterday. As per wife the patient initially improved but got worse again while coming to [place], and currently has been lethargic, spiking fevers, and has a diffuse macular rash that's getting worse. On arrival to ED, pt tachycardic, and febrile, vitals otherwise stable. CBC noted for WBC 7.6, H/H 10/29, PLt 272. CMP noted for BUN/Cr 18/2.32, UA sterile, flu/covid negative. Admitted to  for PNA.   (15 Aug 2024 02:08)    8/15: Patient seen and evaluated today, lethargic, eye opening to name and painful stimuli, similar mentation to when he was admitted yesterday.     ROS: unable to obtain due to encephalopathy     MEDICATIONS  (STANDING):  atorvastatin 20 milliGRAM(s) Oral at bedtime  dextrose 5%. 1000 milliLiter(s) (50 mL/Hr) IV Continuous <Continuous>  dextrose 5%. 1000 milliLiter(s) (100 mL/Hr) IV Continuous <Continuous>  dextrose 50% Injectable 12.5 Gram(s) IV Push once  dextrose 50% Injectable 25 Gram(s) IV Push once  dextrose 50% Injectable 25 Gram(s) IV Push once  famotidine Injectable 20 milliGRAM(s) IV Push daily  glucagon  Injectable 1 milliGRAM(s) IntraMuscular once  insulin lispro (ADMELOG) corrective regimen sliding scale   SubCutaneous at bedtime  insulin lispro (ADMELOG) corrective regimen sliding scale   SubCutaneous three times a day before meals  metoprolol tartrate 25 milliGRAM(s) Oral daily  sodium bicarbonate  Infusion 0.102 mEq/kG/Hr (65 mL/Hr) IV Continuous <Continuous>    MEDICATIONS  (PRN):  acetaminophen     Tablet .. 650 milliGRAM(s) Oral every 6 hours PRN Temp greater or equal to 38C (100.4F), Mild Pain (1 - 3)  aluminum hydroxide/magnesium hydroxide/simethicone Suspension 30 milliLiter(s) Oral every 4 hours PRN Dyspepsia  dextrose Oral Gel 15 Gram(s) Oral once PRN Blood Glucose LESS THAN 70 milliGRAM(s)/deciliter  melatonin 3 milliGRAM(s) Oral at bedtime PRN Insomnia  ondansetron Injectable 4 milliGRAM(s) IV Push every 8 hours PRN Nausea and/or Vomiting      Vital Signs Last 24 Hrs  T(C): 36.6 (15 Aug 2024 12:40), Max: 38.2 (14 Aug 2024 19:38)  T(F): 97.9 (15 Aug 2024 12:40), Max: 100.8 (14 Aug 2024 19:38)  HR: 91 (15 Aug 2024 12:40) (86 - 123)  BP: 103/64 (15 Aug 2024 12:40) (103/64 - 165/138)  BP(mean): 83 (14 Aug 2024 23:28) (83 - 83)  RR: 18 (15 Aug 2024 12:40) (16 - 18)  SpO2: 99% (15 Aug 2024 12:40) (96% - 100%)    Parameters below as of 15 Aug 2024 12:40  Patient On (Oxygen Delivery Method): room air      PHYSICAL EXAM:  GENERAL: NAD, lying in bed comfortably  HEAD:  Atraumatic, Normocephalic  EYES: conjunctiva and sclera clear  ENT: Moist mucous membranes  NECK: Supple, No JVD  CHEST/LUNG: Clear to auscultation bilaterally; No rales, rhonchi, wheezing. Unlabored respirations  HEART: Regular rate and rhythm; No murmurs  ABDOMEN: Bowel sounds present; Soft, Nontender, Nondistended.   EXTREMITIES:  2+ Peripheral Pulses, brisk capillary refill. No clubbing, cyanosis, or edema  NERVOUS SYSTEM:  lethargic  MSK: some movement of b/l UE, moves feet spontaneously           LABS:                          9.2    4.90  )-----------( 218      ( 15 Aug 2024 06:54 )             28.3     15 Aug 2024 06:54    132    |  106    |  18     ----------------------------<  123    4.0     |  15     |  2.01     Ca    8.6        15 Aug 2024 06:54  Phos  3.5       15 Aug 2024 06:54  Mg     1.6       15 Aug 2024 06:54    TPro  6.2    /  Alb  2.6    /  TBili  0.6    /  DBili  x      /  AST  18     /  ALT  15     /  AlkPhos  118    15 Aug 2024 06:54    LIVER FUNCTIONS - ( 15 Aug 2024 06:54 )  Alb: 2.6 g/dL / Pro: 6.2 gm/dL / ALK PHOS: 118 U/L / ALT: 15 U/L / AST: 18 U/L / GGT: x           PT/INR - ( 14 Aug 2024 20:41 )   PT: 16.6 sec;   INR: 1.49 ratio         PTT - ( 14 Aug 2024 20:41 )  PTT:38.1 sec  CAPILLARY BLOOD GLUCOSE      POCT Blood Glucose.: 118 mg/dL (15 Aug 2024 12:11)  POCT Blood Glucose.: 119 mg/dL (15 Aug 2024 08:01)        Urinalysis Basic - ( 15 Aug 2024 06:54 )    Color: x / Appearance: x / SG: x / pH: x  Gluc: 123 mg/dL / Ketone: x  / Bili: x / Urobili: x   Blood: x / Protein: x / Nitrite: x   Leuk Esterase: x / RBC: x / WBC x   Sq Epi: x / Non Sq Epi: x / Bacteria: x        RADIOLOGY:    < from: CT Abdomen and Pelvis No Cont (08.15.24 @ 11:24) >  IMPRESSION:  Evaluation of the solid organs, vascular structures and GI tract is   limited without oral and IV contrast.    Left lower lobe nodule seen previously is now partially obscured by   atelectasis. Cannot exclude superimposed pneumonia.    Bilateral lung nodules suspicious for metastatic disease.    Redemonstrated paraesophageal and bulky retroperitoneal adenopathy. Right   lateral abdominal metastatic nodules.    Mild splenomegaly.    Cholelithiasis.      < from: CT Head No Cont (08.15.24 @ 11:24) >  FINDINGS:  There are areas of low attenuation in the periventricular white matter   likely related to mild chronic microvascular ischemic changes.    There is no acute intracranial hemorrhage, parenchymal mass, mass effect   or midline shift. There is no acute territorial infarct. There is no   hydrocephalus.    The cranium is intact. The visualized paranasal sinuses are well-aerated.        IMPRESSION:  No acute intracranial hemorrhage or acute territorial infarct.   If   symptoms persist, follow-up MRI exam recommended.       HPI: 59M w/ h/o renal cell carcinoma status post right nephrectomy on immunotherapy, last dose 3 weeks ago, paroxysmal A-fib not on anticoagulation, presents with c/o lethargy. Patient lethargic, collateral history provided by wife at bedside. As per wife, patient had been in his usual state of health until one week ago. They went to [place] last week on Wednesday to see there new grandchild. On Thursday patient started to act bizarre, had trouble walking and speaking. The wife called EMS and he was taken to the local hospital in [place]. As per the wife pt had a CT chest which was noted for progression of malignancy and PNA, and he had an MRI done which showed a chronic stroke. Patient was in the hospital for 5 days presumably getting antibiotics, and initially improved but developed a rash during the hospital course. Most of patients providers are in [facility] so they asked to be discharged from [place] and came back to [place] yesterday. As per wife the patient initially improved but got worse again while coming to [place], and currently has been lethargic, spiking fevers, and has a diffuse macular rash that's getting worse. On arrival to ED, pt tachycardic, and febrile, vitals otherwise stable. CBC noted for WBC 7.6, H/H 10/29, PLt 272. CMP noted for BUN/Cr 18/2.32, UA sterile, flu/covid negative. Admitted to  for PNA.   (15 Aug 2024 02:08)    8/15: Patient seen and evaluated today, lethargic, eye opening to name and painful stimuli, did not follow commands, similar mentation to when he was admitted yesterday, non verbal on my exam this AM. EEG with cerebral dysfunction, retaining urine. Ammonia levels normal, B12 normal. Pt re-evaluated ~ 4:30pm, eye opening to name, follows commands, knows name, good strength to b/l UE, ~ 3-4 to b/l LE, minimally against gravity, seems painful to move LE.     ROS: unable to obtain due to encephalopathy     MEDICATIONS  (STANDING):  atorvastatin 20 milliGRAM(s) Oral at bedtime  dextrose 5%. 1000 milliLiter(s) (50 mL/Hr) IV Continuous <Continuous>  dextrose 5%. 1000 milliLiter(s) (100 mL/Hr) IV Continuous <Continuous>  dextrose 50% Injectable 12.5 Gram(s) IV Push once  dextrose 50% Injectable 25 Gram(s) IV Push once  dextrose 50% Injectable 25 Gram(s) IV Push once  famotidine Injectable 20 milliGRAM(s) IV Push daily  glucagon  Injectable 1 milliGRAM(s) IntraMuscular once  insulin lispro (ADMELOG) corrective regimen sliding scale   SubCutaneous at bedtime  insulin lispro (ADMELOG) corrective regimen sliding scale   SubCutaneous three times a day before meals  metoprolol tartrate 25 milliGRAM(s) Oral daily  sodium bicarbonate  Infusion 0.102 mEq/kG/Hr (65 mL/Hr) IV Continuous <Continuous>    MEDICATIONS  (PRN):  acetaminophen     Tablet .. 650 milliGRAM(s) Oral every 6 hours PRN Temp greater or equal to 38C (100.4F), Mild Pain (1 - 3)  aluminum hydroxide/magnesium hydroxide/simethicone Suspension 30 milliLiter(s) Oral every 4 hours PRN Dyspepsia  dextrose Oral Gel 15 Gram(s) Oral once PRN Blood Glucose LESS THAN 70 milliGRAM(s)/deciliter  melatonin 3 milliGRAM(s) Oral at bedtime PRN Insomnia  ondansetron Injectable 4 milliGRAM(s) IV Push every 8 hours PRN Nausea and/or Vomiting      Vital Signs Last 24 Hrs  T(C): 36.6 (15 Aug 2024 12:40), Max: 38.2 (14 Aug 2024 19:38)  T(F): 97.9 (15 Aug 2024 12:40), Max: 100.8 (14 Aug 2024 19:38)  HR: 91 (15 Aug 2024 12:40) (86 - 123)  BP: 103/64 (15 Aug 2024 12:40) (103/64 - 165/138)  BP(mean): 83 (14 Aug 2024 23:28) (83 - 83)  RR: 18 (15 Aug 2024 12:40) (16 - 18)  SpO2: 99% (15 Aug 2024 12:40) (96% - 100%)    Parameters below as of 15 Aug 2024 12:40  Patient On (Oxygen Delivery Method): room air      PHYSICAL EXAM:  GENERAL: NAD, lying in bed comfortably  HEAD:  Atraumatic, Normocephalic  EYES: conjunctiva and sclera clear  ENT: Moist mucous membranes  NECK: Supple, No JVD  CHEST/LUNG: Clear to auscultation bilaterally; No rales, rhonchi, wheezing. Unlabored respirations  HEART: Regular rate and rhythm; No murmurs  ABDOMEN: Bowel sounds present; Soft, Nontender, Nondistended.   EXTREMITIES:  2+ Peripheral Pulses, brisk capillary refill. No clubbing, cyanosis, or edema  NERVOUS SYSTEM:  lethargic, eye opening to name, follows commands now   MSK: some movement of b/l UE, moves feet spontaneously           LABS:                          9.2    4.90  )-----------( 218      ( 15 Aug 2024 06:54 )             28.3     15 Aug 2024 06:54    132    |  106    |  18     ----------------------------<  123    4.0     |  15     |  2.01     Ca    8.6        15 Aug 2024 06:54  Phos  3.5       15 Aug 2024 06:54  Mg     1.6       15 Aug 2024 06:54    TPro  6.2    /  Alb  2.6    /  TBili  0.6    /  DBili  x      /  AST  18     /  ALT  15     /  AlkPhos  118    15 Aug 2024 06:54    LIVER FUNCTIONS - ( 15 Aug 2024 06:54 )  Alb: 2.6 g/dL / Pro: 6.2 gm/dL / ALK PHOS: 118 U/L / ALT: 15 U/L / AST: 18 U/L / GGT: x           PT/INR - ( 14 Aug 2024 20:41 )   PT: 16.6 sec;   INR: 1.49 ratio         PTT - ( 14 Aug 2024 20:41 )  PTT:38.1 sec  CAPILLARY BLOOD GLUCOSE      POCT Blood Glucose.: 118 mg/dL (15 Aug 2024 12:11)  POCT Blood Glucose.: 119 mg/dL (15 Aug 2024 08:01)        Urinalysis Basic - ( 15 Aug 2024 06:54 )    Color: x / Appearance: x / SG: x / pH: x  Gluc: 123 mg/dL / Ketone: x  / Bili: x / Urobili: x   Blood: x / Protein: x / Nitrite: x   Leuk Esterase: x / RBC: x / WBC x   Sq Epi: x / Non Sq Epi: x / Bacteria: x        RADIOLOGY:    < from: CT Abdomen and Pelvis No Cont (08.15.24 @ 11:24) >  IMPRESSION:  Evaluation of the solid organs, vascular structures and GI tract is   limited without oral and IV contrast.    Left lower lobe nodule seen previously is now partially obscured by   atelectasis. Cannot exclude superimposed pneumonia.    Bilateral lung nodules suspicious for metastatic disease.    Redemonstrated paraesophageal and bulky retroperitoneal adenopathy. Right   lateral abdominal metastatic nodules.    Mild splenomegaly.    Cholelithiasis.      < from: CT Head No Cont (08.15.24 @ 11:24) >  FINDINGS:  There are areas of low attenuation in the periventricular white matter   likely related to mild chronic microvascular ischemic changes.    There is no acute intracranial hemorrhage, parenchymal mass, mass effect   or midline shift. There is no acute territorial infarct. There is no   hydrocephalus.    The cranium is intact. The visualized paranasal sinuses are well-aerated.        IMPRESSION:  No acute intracranial hemorrhage or acute territorial infarct.   If   symptoms persist, follow-up MRI exam recommended.       HPI: 59M w/ h/o renal cell carcinoma status post right nephrectomy on immunotherapy, last dose 3 weeks ago, paroxysmal A-fib not on anticoagulation, presents with c/o lethargy. Patient lethargic, collateral history provided by wife at bedside. As per wife, patient had been in his usual state of health until one week ago. They went to [place] last week on Wednesday to see there new grandchild. On Thursday patient started to act bizarre, had trouble walking and speaking. The wife called EMS and he was taken to the local hospital in [place]. As per the wife pt had a CT chest which was noted for progression of malignancy and PNA, and he had an MRI done which showed a chronic stroke. Patient was in the hospital for 5 days presumably getting antibiotics, and initially improved but developed a rash during the hospital course. Most of patients providers are in [facility] so they asked to be discharged from [place] and came back to [place] yesterday. As per wife the patient initially improved but got worse again while coming to [place], and currently has been lethargic, spiking fevers, and has a diffuse macular rash that's getting worse. On arrival to ED, pt tachycardic, and febrile, vitals otherwise stable. CBC noted for WBC 7.6, H/H 10/29, PLt 272. CMP noted for BUN/Cr 18/2.32, UA sterile, flu/covid negative. Admitted to  for PNA.   (15 Aug 2024 02:08)    8/15: Patient seen and evaluated today, lethargic, eye opening to name and painful stimuli, did not follow commands, similar mentation to when he was admitted yesterday, non verbal on my exam this AM. EEG with cerebral dysfunction, retaining urine. Ammonia levels normal, B12 normal. Pt re-evaluated ~ 4:30pm, eye opening to name, follows commands, knows name, good strength to b/l UE, ~ 3-4 to b/l LE, minimally against gravity, seems painful to move LE.     ROS: unable to obtain due to encephalopathy     MEDICATIONS  (STANDING):  atorvastatin 20 milliGRAM(s) Oral at bedtime  dextrose 5%. 1000 milliLiter(s) (50 mL/Hr) IV Continuous <Continuous>  dextrose 5%. 1000 milliLiter(s) (100 mL/Hr) IV Continuous <Continuous>  dextrose 50% Injectable 12.5 Gram(s) IV Push once  dextrose 50% Injectable 25 Gram(s) IV Push once  dextrose 50% Injectable 25 Gram(s) IV Push once  famotidine Injectable 20 milliGRAM(s) IV Push daily  glucagon  Injectable 1 milliGRAM(s) IntraMuscular once  insulin lispro (ADMELOG) corrective regimen sliding scale   SubCutaneous at bedtime  insulin lispro (ADMELOG) corrective regimen sliding scale   SubCutaneous three times a day before meals  metoprolol tartrate 25 milliGRAM(s) Oral daily  sodium bicarbonate  Infusion 0.102 mEq/kG/Hr (65 mL/Hr) IV Continuous <Continuous>    MEDICATIONS  (PRN):  acetaminophen     Tablet .. 650 milliGRAM(s) Oral every 6 hours PRN Temp greater or equal to 38C (100.4F), Mild Pain (1 - 3)  aluminum hydroxide/magnesium hydroxide/simethicone Suspension 30 milliLiter(s) Oral every 4 hours PRN Dyspepsia  dextrose Oral Gel 15 Gram(s) Oral once PRN Blood Glucose LESS THAN 70 milliGRAM(s)/deciliter  melatonin 3 milliGRAM(s) Oral at bedtime PRN Insomnia  ondansetron Injectable 4 milliGRAM(s) IV Push every 8 hours PRN Nausea and/or Vomiting      Vital Signs Last 24 Hrs  T(C): 36.6 (15 Aug 2024 12:40), Max: 38.2 (14 Aug 2024 19:38)  T(F): 97.9 (15 Aug 2024 12:40), Max: 100.8 (14 Aug 2024 19:38)  HR: 91 (15 Aug 2024 12:40) (86 - 123)  BP: 103/64 (15 Aug 2024 12:40) (103/64 - 165/138)  BP(mean): 83 (14 Aug 2024 23:28) (83 - 83)  RR: 18 (15 Aug 2024 12:40) (16 - 18)  SpO2: 99% (15 Aug 2024 12:40) (96% - 100%)    Parameters below as of 15 Aug 2024 12:40  Patient On (Oxygen Delivery Method): room air      PHYSICAL EXAM:  GENERAL: NAD, lying in bed comfortably  HEAD:  Atraumatic, Normocephalic  EYES: conjunctiva and sclera clear  ENT: Moist mucous membranes  NECK: Supple, No JVD  CHEST/LUNG: Clear to auscultation bilaterally; No rales, rhonchi, wheezing. Unlabored respirations  HEART: Regular rate and rhythm; No murmurs  ABDOMEN: Bowel sounds present; Soft, Nontender, Nondistended.   EXTREMITIES:  2+ Peripheral Pulses, brisk capillary refill. No clubbing, cyanosis, or edema  NERVOUS SYSTEM:  lethargic, eye opening to name, follows commands now   MSK: some movement of b/l UE, moves feet spontaneously   SKIN: maculopapular rash to b/l LE, spotty rash to palms, no itching         LABS:                          9.2    4.90  )-----------( 218      ( 15 Aug 2024 06:54 )             28.3     15 Aug 2024 06:54    132    |  106    |  18     ----------------------------<  123    4.0     |  15     |  2.01     Ca    8.6        15 Aug 2024 06:54  Phos  3.5       15 Aug 2024 06:54  Mg     1.6       15 Aug 2024 06:54    TPro  6.2    /  Alb  2.6    /  TBili  0.6    /  DBili  x      /  AST  18     /  ALT  15     /  AlkPhos  118    15 Aug 2024 06:54    LIVER FUNCTIONS - ( 15 Aug 2024 06:54 )  Alb: 2.6 g/dL / Pro: 6.2 gm/dL / ALK PHOS: 118 U/L / ALT: 15 U/L / AST: 18 U/L / GGT: x           PT/INR - ( 14 Aug 2024 20:41 )   PT: 16.6 sec;   INR: 1.49 ratio         PTT - ( 14 Aug 2024 20:41 )  PTT:38.1 sec  CAPILLARY BLOOD GLUCOSE      POCT Blood Glucose.: 118 mg/dL (15 Aug 2024 12:11)  POCT Blood Glucose.: 119 mg/dL (15 Aug 2024 08:01)        Urinalysis Basic - ( 15 Aug 2024 06:54 )    Color: x / Appearance: x / SG: x / pH: x  Gluc: 123 mg/dL / Ketone: x  / Bili: x / Urobili: x   Blood: x / Protein: x / Nitrite: x   Leuk Esterase: x / RBC: x / WBC x   Sq Epi: x / Non Sq Epi: x / Bacteria: x        RADIOLOGY:    < from: CT Abdomen and Pelvis No Cont (08.15.24 @ 11:24) >  IMPRESSION:  Evaluation of the solid organs, vascular structures and GI tract is   limited without oral and IV contrast.    Left lower lobe nodule seen previously is now partially obscured by   atelectasis. Cannot exclude superimposed pneumonia.    Bilateral lung nodules suspicious for metastatic disease.    Redemonstrated paraesophageal and bulky retroperitoneal adenopathy. Right   lateral abdominal metastatic nodules.    Mild splenomegaly.    Cholelithiasis.      < from: CT Head No Cont (08.15.24 @ 11:24) >  FINDINGS:  There are areas of low attenuation in the periventricular white matter   likely related to mild chronic microvascular ischemic changes.    There is no acute intracranial hemorrhage, parenchymal mass, mass effect   or midline shift. There is no acute territorial infarct. There is no   hydrocephalus.    The cranium is intact. The visualized paranasal sinuses are well-aerated.        IMPRESSION:  No acute intracranial hemorrhage or acute territorial infarct.   If   symptoms persist, follow-up MRI exam recommended.

## 2024-08-15 NOTE — SBIRT NOTE ADULT - NSSBIRTUNABLESCROTHER_GEN_A_CORE
Chart reviewed. ETOH score is 3 and alcohol consumption is WNL. SBIRT not warranted at this time. Consult to be closed.

## 2024-08-15 NOTE — DIETITIAN INITIAL EVALUATION ADULT - ADD RECOMMEND
1. Consider SLP consult to determine most appropriate diet consistency, ADAT to regular + SLP recs for consistency   2. If diet advanced, add Ensure Max BID to optimize nutritional needs (provides 150 kcal, 30 g protein/ shake)   3. Encourage protein-rich foods, maximize food preferences   4. Consider obtaining vitamin D 25OH level to assess nutriture   5. Monitor bowel movements, if no BM for >3 days, consider implementing bowel regimen.   6. Consider adding thiamine 100 mg daily 2/2 malnutrition and MVI w/ minerals daily to ensure 100% RDA met   7. STRONGLY recommend to confirm goals of care regarding nutrition support - Nutrition support is not recommended with advanced cancer as studies show that there is no improvement of dehydration symptoms, quality of life, or survival. It also increases the risk for peripheral edema, ascites, and pleural effusions. However, will provide nutrition/ hydration within goals of care.   RD will continue to monitor PO intake, labs, hydration, and wt prn.

## 2024-08-15 NOTE — CONSULT NOTE ADULT - SUBJECTIVE AND OBJECTIVE BOX
HPI:    60 y/o M with known metastatic RCC s/p R nephrectomy currently on immunotherapy through [facility] On[facility] Dr Sánchez, PAF not on AC therapy presented with c/o lethargy /AMS. As per wife, patient had been in his usual state of health until about 1-2 weeks ago; they went to [place] last week to see new grandchild, then while there patient started to act bizarre, had trouble walking and speaking, called EMS and was taken to local hospital in [place]. As per wife, he had CT imaging there which was noted for progression of malignancy & PNA. He also had an MRI which showed chronic stroke.    08/15/24: Seen at bedside with Dr Edwards & Dr Holcomb, no acute distress but overall obtunded / lethargic, unable to hold conversation       PAST MEDICAL & SURGICAL HISTORY:    Pneumonia    Cough    Former smoker    Obesity (BMI 30-39.9)    HTN (hypertension)    HLD (hyperlipidemia)    CAD (coronary artery disease)    Diabetes    H/O pulmonary hypertension    Renal mass    Lung nodule    Stented coronary artery    Paroxysmal atrial fibrillation    Diverticulitis    Renal calculi    Subclinical hyperthyroidism    Metastasis to lung    History of appendectomy  1994    History of torn meniscus of left knee  1995    History of tonsillectomy and adenoidectomy  1968    S/P cystoscopy with ureteral stent placement    History of lung biopsy    S/P cardiac cath        MEDICATIONS  (STANDING):    aspirin enteric coated 81 milliGRAM(s) Oral daily  atorvastatin 20 milliGRAM(s) Oral at bedtime  dextrose 5%. 1000 milliLiter(s) (50 mL/Hr) IV Continuous <Continuous>  dextrose 5%. 1000 milliLiter(s) (100 mL/Hr) IV Continuous <Continuous>  dextrose 50% Injectable 25 Gram(s) IV Push once  dextrose 50% Injectable 25 Gram(s) IV Push once  dextrose 50% Injectable 12.5 Gram(s) IV Push once  famotidine Injectable 20 milliGRAM(s) IV Push daily  glucagon  Injectable 1 milliGRAM(s) IntraMuscular once  heparin   Injectable 5000 Unit(s) SubCutaneous every 12 hours  insulin lispro (ADMELOG) corrective regimen sliding scale   SubCutaneous three times a day before meals  insulin lispro (ADMELOG) corrective regimen sliding scale   SubCutaneous at bedtime  metoprolol tartrate 25 milliGRAM(s) Oral daily  pantoprazole    Tablet 40 milliGRAM(s) Oral before breakfast  sodium bicarbonate  Infusion 0.102 mEq/kG/Hr (65 mL/Hr) IV Continuous <Continuous>      MEDICATIONS  (PRN):    acetaminophen     Tablet .. 650 milliGRAM(s) Oral every 6 hours PRN Temp greater or equal to 38C (100.4F), Mild Pain (1 - 3)  aluminum hydroxide/magnesium hydroxide/simethicone Suspension 30 milliLiter(s) Oral every 4 hours PRN Dyspepsia  dextrose Oral Gel 15 Gram(s) Oral once PRN Blood Glucose LESS THAN 70 milliGRAM(s)/deciliter  melatonin 3 milliGRAM(s) Oral at bedtime PRN Insomnia  ondansetron Injectable 4 milliGRAM(s) IV Push every 8 hours PRN Nausea and/or Vomiting      ALLERGIES:    sulfa drugs (Unknown)  Levaquin (Rash (Mod to Severe))        FAMILY HISTORY:    breast cancer (Mother)        REVIEW OF SYSTEMS:    Constitutional, Eyes, ENT, Cardiovascular, Respiratory, Gastrointestinal, Genitourinary, Musculoskeletal, Integumentary, Neurological, Psychiatric, Endocrine, Heme/Lymph and Allergic/Immunologic review of systems are otherwise negative except as noted in HPI.       VITALS:    T(C): 36.6 (15 Aug 2024 12:40), Max: 38.2 (14 Aug 2024 19:38)  T(F): 97.9 (15 Aug 2024 12:40), Max: 100.8 (14 Aug 2024 19:38)  HR: 91 (15 Aug 2024 12:40) (86 - 123)  BP: 103/64 (15 Aug 2024 12:40) (103/64 - 165/138)  BP(mean): 83 (14 Aug 2024 23:28) (83 - 83)  RR: 18 (15 Aug 2024 12:40) (16 - 18)  SpO2: 99% (15 Aug 2024 12:40) (96% - 100%)    Parameters below as of 15 Aug 2024 12:40  Patient On (Oxygen Delivery Method): room air        PHYSICAL:    Constitutional: elderly / ill appearing   Eyes: eyes closed, did not assess   ENT: pharynx is unremarkable  Neck: supple without JVD   Pulmonary: regular rate, spontaneous respirations   Cardiac: RRR  Vascular: no calf tenderness, venous stasis changes  Abdomen: normoactive bowel sounds, soft and nontender, no hepatosplenomegaly or masses appreciated.   Lymphatic: no peripheral adenopathy appreciated.   Musculoskeletal: full range of motion and no deformities appreciated.   Skin: normal appearance   Neurology: poorly assessed      LABS:    CBC Full  -  ( 15 Aug 2024 06:54 )  WBC Count : 4.90 K/uL  RBC Count : 3.41 M/uL  Hemoglobin : 9.2 g/dL  Hematocrit : 28.3 %  Platelet Count - Automated : 218 K/uL  Mean Cell Volume : 83.0 fl  Mean Cell Hemoglobin : 27.0 pg  Mean Cell Hemoglobin Concentration : 32.5 gm/dL  Auto Neutrophil # : 3.46 K/uL  Auto Lymphocyte # : 0.86 K/uL  Auto Monocyte # : 0.36 K/uL  Auto Eosinophil # : 0.16 K/uL  Auto Basophil # : 0.00 K/uL  Auto Neutrophil % : 70.6 %  Auto Lymphocyte % : 17.6 %  Auto Monocyte % : 7.3 %  Auto Eosinophil % : 3.3 %  Auto Basophil % : 0.0 %    08-15    132<L>  |  106  |  18  ----------------------------<  123<H>  4.0   |  15<L>  |  2.01<H>    Ca    8.6      15 Aug 2024 06:54  Phos  3.5     08-15  Mg     1.6     08-15    TPro  6.2  /  Alb  2.6<L>  /  TBili  0.6  /  DBili  x   /  AST  18  /  ALT  15  /  AlkPhos  118  08-15    PT/INR - ( 14 Aug 2024 20:41 )   PT: 16.6 sec;   INR: 1.49 ratio         PTT - ( 14 Aug 2024 20:41 )  PTT:38.1 sec  Urinalysis Basic - ( 15 Aug 2024 06:54 )    Color: x / Appearance: x / SG: x / pH: x  Gluc: 123 mg/dL / Ketone: x  / Bili: x / Urobili: x   Blood: x / Protein: x / Nitrite: x   Leuk Esterase: x / RBC: x / WBC x   Sq Epi: x / Non Sq Epi: x / Bacteria: x        RADIOLOGY & ADDITIONAL STUDIES:

## 2024-08-15 NOTE — H&P ADULT - NSHPPHYSICALEXAM_GEN_ALL_CORE
T(C): 37.7 (08-15-24 @ 01:10), Max: 38.2 (08-14-24 @ 19:38)  HR: 108 (08-15-24 @ 01:10) (107 - 123)  BP: 110/77 (08-15-24 @ 01:10) (110/77 - 165/138)  RR: 18 (08-15-24 @ 01:10) (18 - 18)  SpO2: 96% (08-15-24 @ 01:10) (96% - 100%)    General: Ill appearing  HEENT: non-traumatic, perrla, eomi  Cardio: s1s2 regular rate and rhythm  Lungs: comfortable breathing, clear to auscultation  Abdomen: Soft, non-tender, non-distended  Neuro: Lethargic  Skin: petechial rash on b/l LE, macular rash on thighs, thorax, abdomen, neck and back.   Ext: Pulses +2

## 2024-08-15 NOTE — DIETITIAN INITIAL EVALUATION ADULT - PERTINENT LABORATORY DATA
08-15    132<L>  |  106  |  18  ----------------------------<  123<H>  4.0   |  15<L>  |  2.01<H>    Ca    8.6      15 Aug 2024 06:54  Phos  3.5     08-15  Mg     1.6     08-15    TPro  6.2  /  Alb  2.6<L>  /  TBili  0.6  /  DBili  x   /  AST  18  /  ALT  15  /  AlkPhos  118  08-15  POCT Blood Glucose.: 118 mg/dL (08-15-24 @ 12:11)  A1C with Estimated Average Glucose Result: 6.5 % (08-15-24 @ 06:54)  A1C with Estimated Average Glucose Result: 8.2 % (03-08-24 @ 14:39)  A1C with Estimated Average Glucose Result: 10.4 % (02-05-24 @ 06:37)

## 2024-08-15 NOTE — PROGRESS NOTE ADULT - ASSESSMENT
Acute toxic-metabolic encephalopathy unknown etiology at this time  SIRS POA - no obvious source of infection at this time - work up in progress  Metabolic acidosis   -Fever 100.8F, tachycardia in ER  -CT chest, abd/pelvis, head as above   -EEG w/o epileptiform activity, + cerebral disfunction   -Neurology consult appreciated, d/w Dr. Petersen today, plan for LP tomorrow  -ID consult appreciated, monitor off abx   -F/u cultures  -UA unremarkable    Maculopapular rash   -To bl LE  -started prior to initiation of Levaquin while in hospital in [place]  -As per wife, rash is improving     Acute kidney injury   Acute Urinary retention   Metabolic acidosis   -baseline Cr ~1.2-1.4  -Cr 2.32 > 2.01 now  -Bicarb 14  -ABG pH 7.32  pH, pCO2: 28 / pO2: 91 / HCO3: 14 / Base Excess: -10.2 /  SaO2: 98    -start bicarb ggt  -place chapman catheter for I&Os and urinary retention     -Repeat BMP/ABG this evening - if not improvement may require a higher level of care              Acute toxic-metabolic encephalopathy unknown etiology at this time  SIRS POA - no obvious source of infection at this time - work up in progress  Metabolic acidosis   -Fever 100.8F, tachycardia in ER  -CT chest, abd/pelvis, head as above   -EEG w/o epileptiform activity, + cerebral disfunction   -Neurology consult appreciated, d/w Dr. Petersen today, plan for LP tomorrow  -ID consult appreciated, monitor off abx, s/p vanc and glenda in ED  -F/u cultures  -UA unremarkable  -Hold ASA/ppx heparin for now   -Pt had work up in July for joint pains     Maculopapular rash   -To bl LE  -started prior to initiation of Levaquin while in hospital in [place]  -As per wife, rash is improving     Acute kidney injury   Acute Urinary retention   Metabolic acidosis   -baseline Cr ~1.2-1.4  -Cr 2.32 > 2.01 now  -straight cath with ~ 400cc this AM  -Bicarb 14  -ABG pH 7.32  pH, pCO2: 28 / pO2: 91 / HCO3: 14 / Base Excess: -10.2 /  SaO2: 98    -start bicarb ggt  -place chapman catheter for I&Os and urinary retention     -Repeat BMP/ABG this evening - if not improvement may require a higher level of care     hx RCC s/p R nephrectomy   -On immunotherapy  -Heme/onc consult appreciated     HTN/HLD  -continue home meds     VTE ppx: hold heparin for now, SCDs -- but pt with b/l LE pain/rash      Acute toxic-metabolic encephalopathy unknown etiology at this time  SIRS POA - no obvious source of infection at this time - work up in progress  Metabolic acidosis   -Fever 100.8F, tachycardia in ER  -CT chest, abd/pelvis, head as above   -EEG w/o epileptiform activity, + cerebral disfunction   -Neurology consult appreciated, d/w Dr. Petersen today, plan for LP tomorrow  -ID consult appreciated, monitor off abx, s/p vanc and glenda in ED  -F/u cultures  -UA unremarkable  -Hold ASA/ppx heparin for now   -Pt had work up in July for joint pains     Maculopapular rash   -To bl LE  -started prior to initiation of Levaquin while in hospital in [place]  -As per wife, rash is improving     Acute kidney injury   Acute Urinary retention   Metabolic acidosis   -baseline Cr ~1.2-1.4  -Cr 2.32 > 2.01 now  -straight cath with ~ 400cc this AM  -Bicarb 14  -ABG pH 7.32  pH, pCO2: 28 / pO2: 91 / HCO3: 14 / Base Excess: -10.2 /  SaO2: 98    -start bicarb ggt  -place chapman catheter for I&Os and urinary retention     -Repeat BMP/ABG this evening - if not improvement may require a higher level of care     hx RCC s/p R nephrectomy   -On immunotherapy  -Heme/onc consult appreciated     HTN/HLD  -continue home meds     VTE ppx: hold heparin for now, SCDs -- but pt with b/l LE pain/rash     DISPO: for LP in the morning

## 2024-08-15 NOTE — CONSULT NOTE ADULT - NS ATTEND AMEND GEN_ALL_CORE FT
59 yr old man MHx renal cell carcinoma status post right nephrectomy on immunotherapy, last dose 3 weeks ago, paroxysmal A-fib on ASA, presents with c/o lethargy. Recently d/c from hosp in [place]. tx for PNA, some improvement. ADM with persistent obtundation. non focal exam. CT head negative. Agree with above.

## 2024-08-15 NOTE — DIETITIAN INITIAL EVALUATION ADULT - PERTINENT MEDS FT
MEDICATIONS  (STANDING):  aspirin enteric coated 81 milliGRAM(s) Oral daily  atorvastatin 20 milliGRAM(s) Oral at bedtime  dextrose 5%. 1000 milliLiter(s) (50 mL/Hr) IV Continuous <Continuous>  dextrose 5%. 1000 milliLiter(s) (100 mL/Hr) IV Continuous <Continuous>  dextrose 50% Injectable 25 Gram(s) IV Push once  dextrose 50% Injectable 25 Gram(s) IV Push once  dextrose 50% Injectable 12.5 Gram(s) IV Push once  famotidine Injectable 20 milliGRAM(s) IV Push daily  glucagon  Injectable 1 milliGRAM(s) IntraMuscular once  heparin   Injectable 5000 Unit(s) SubCutaneous every 12 hours  insulin lispro (ADMELOG) corrective regimen sliding scale   SubCutaneous three times a day before meals  insulin lispro (ADMELOG) corrective regimen sliding scale   SubCutaneous at bedtime  metoprolol tartrate 25 milliGRAM(s) Oral daily  pantoprazole    Tablet 40 milliGRAM(s) Oral before breakfast  sodium bicarbonate  Infusion 0.102 mEq/kG/Hr (65 mL/Hr) IV Continuous <Continuous>    MEDICATIONS  (PRN):  acetaminophen     Tablet .. 650 milliGRAM(s) Oral every 6 hours PRN Temp greater or equal to 38C (100.4F), Mild Pain (1 - 3)  aluminum hydroxide/magnesium hydroxide/simethicone Suspension 30 milliLiter(s) Oral every 4 hours PRN Dyspepsia  dextrose Oral Gel 15 Gram(s) Oral once PRN Blood Glucose LESS THAN 70 milliGRAM(s)/deciliter  melatonin 3 milliGRAM(s) Oral at bedtime PRN Insomnia  ondansetron Injectable 4 milliGRAM(s) IV Push every 8 hours PRN Nausea and/or Vomiting

## 2024-08-15 NOTE — H&P ADULT - NSHPLABSRESULTS_GEN_ALL_CORE
LABS:  cret                        10.0   7.60  )-----------( 272      ( 14 Aug 2024 20:41 )             29.7     08-14    132<L>  |  102  |  18  ----------------------------<  136<H>  4.4   |  15<L>  |  2.32<H>    Ca    9.1      14 Aug 2024 20:41    TPro  6.7  /  Alb  3.1<L>  /  TBili  0.9  /  DBili  x   /  AST  21  /  ALT  16  /  AlkPhos  139<H>  08-14    PT/INR - ( 14 Aug 2024 20:41 )   PT: 16.6 sec;   INR: 1.49 ratio         PTT - ( 14 Aug 2024 20:41 )  PTT:38.1 sec      Urinalysis with Rflx Culture (collected 08-15-24 @ 00:12)

## 2024-08-15 NOTE — CONSULT NOTE ADULT - SUBJECTIVE AND OBJECTIVE BOX
Patient is a 59y old  Male who presents with a chief complaint of lethargy    HPI:  58 y/o Male with h/o renal cell carcinoma s/p right nephrectomy on immunotherapy, last dose 3 weeks ago, paroxysmal A-fib not on anticoagulation was admitted on  for increased lethargy. Patient lethargic, collateral history provided by wife at bedside. As per wife, patient had been in his usual state of health until one week ago. They went to [place] last week on Wednesday to see there new grandchild. On Thursday patient started to act bizarre, had trouble walking and speaking. The wife called EMS and he was taken to the local hospital in [place]. As per the wife pt had a CT chest which was noted for progression of malignancy and PNA, and he had an MRI done which showed a chronic stroke. Patient was in the hospital for 5 days presumably getting antibiotics, and initially improved but developed a rash during the hospital course. Most of patients providers are in [facility] so they asked to be discharged from [place] and came back to [place] on the day PTA. As per wife the patient initially improved but got worse again while coming to [place], and currently has been lethargic, spiking fevers, and has a diffuse macular rash that's getting worse. In ED, pt tachycardic, and febrile. He received cefepime and vancomycin IV.     PMH: as above  PSH: as above  Meds: per reconciliation sheet, noted below  MEDICATIONS  (STANDING):  aspirin enteric coated 81 milliGRAM(s) Oral daily  atorvastatin 20 milliGRAM(s) Oral at bedtime  cefepime  Injectable. 1000 milliGRAM(s) IV Push every 12 hours  dextrose 5%. 1000 milliLiter(s) (50 mL/Hr) IV Continuous <Continuous>  dextrose 5%. 1000 milliLiter(s) (100 mL/Hr) IV Continuous <Continuous>  dextrose 50% Injectable 25 Gram(s) IV Push once  dextrose 50% Injectable 25 Gram(s) IV Push once  dextrose 50% Injectable 12.5 Gram(s) IV Push once  glucagon  Injectable 1 milliGRAM(s) IntraMuscular once  heparin   Injectable 5000 Unit(s) SubCutaneous every 12 hours  insulin lispro (ADMELOG) corrective regimen sliding scale   SubCutaneous three times a day before meals  insulin lispro (ADMELOG) corrective regimen sliding scale   SubCutaneous at bedtime  lactated ringers. 1000 milliLiter(s) (100 mL/Hr) IV Continuous <Continuous>  metoprolol tartrate 25 milliGRAM(s) Oral daily  metroNIDAZOLE  IVPB 500 milliGRAM(s) IV Intermittent every 8 hours  pantoprazole    Tablet 40 milliGRAM(s) Oral before breakfast  vancomycin  IVPB 1250 milliGRAM(s) IV Intermittent every 24 hours    MEDICATIONS  (PRN):  acetaminophen     Tablet .. 650 milliGRAM(s) Oral every 6 hours PRN Temp greater or equal to 38C (100.4F), Mild Pain (1 - 3)  aluminum hydroxide/magnesium hydroxide/simethicone Suspension 30 milliLiter(s) Oral every 4 hours PRN Dyspepsia  dextrose Oral Gel 15 Gram(s) Oral once PRN Blood Glucose LESS THAN 70 milliGRAM(s)/deciliter  melatonin 3 milliGRAM(s) Oral at bedtime PRN Insomnia  ondansetron Injectable 4 milliGRAM(s) IV Push every 8 hours PRN Nausea and/or Vomiting    Allergies    sulfa drugs (Unknown)    Intolerances      Social: no smoking, no alcohol, no illegal drugs; no recent travel, no exposure to TB  FAMILY HISTORY:  FH: breast cancer (Mother)    ROS: the patient is confused, limited   All other systems reviewed and are negative    Vital Signs Last 24 Hrs  T(C): 36.6 (15 Aug 2024 07:42), Max: 38.2 (14 Aug 2024 19:38)  T(F): 97.9 (15 Aug 2024 07:42), Max: 100.8 (14 Aug 2024 19:38)  HR: 86 (15 Aug 2024 07:42) (86 - 123)  BP: 111/81 (15 Aug 2024 07:42) (110/77 - 165/138)  BP(mean): 83 (14 Aug 2024 23:28) (83 - 83)  RR: 16 (15 Aug 2024 07:42) (16 - 18)  SpO2: 99% (15 Aug 2024 07:42) (96% - 100%)    Parameters below as of 15 Aug 2024 07:42  Patient On (Oxygen Delivery Method): room air      Daily Height in cm: 175.26 (15 Aug 2024 01:45)    Daily Weight in k.5 (15 Aug 2024 01:45)    PE:    Constitutional:  No acute distress  HEENT: NC/AT, EOMI, PERRLA, conjunctivae clear; ears and nose atraumatic; pharynx benign  Neck: supple; thyroid not palpable  Back: no tenderness  Respiratory: respiratory effort normal; crackles at bases  Cardiovascular: S1S2 regular, no murmurs  Abdomen: soft, not tender, not distended, positive BS; no liver or spleen organomegaly  Genitourinary: no suprapubic tenderness  Lymphatic: no LN palpable  Musculoskeletal: no muscle tenderness, no joint swelling or tenderness  Extremities: no pedal edema  Neurological/ Psychiatric: Alert, judgement and insight impaired; moving all extremities  Skin: no rashes; no palpable lesions    Labs: all available labs reviewed                        9.2    4.90  )-----------( 218      ( 15 Aug 2024 06:54 )             28.3     -15    132<L>  |  106  |  18  ----------------------------<  123<H>  4.0   |  15<L>  |  2.01<H>    Ca    8.6      15 Aug 2024 06:54  Phos  3.5     08-15  Mg     1.6     08-15    TPro  6.2  /  Alb  2.6<L>  /  TBili  0.6  /  DBili  x   /  AST  18  /  ALT  15  /  AlkPhos  118  08-15     LIVER FUNCTIONS - ( 15 Aug 2024 06:54 )  Alb: 2.6 g/dL / Pro: 6.2 gm/dL / ALK PHOS: 118 U/L / ALT: 15 U/L / AST: 18 U/L / GGT: x           Urinalysis with Rflx Culture (08-15 @ 00:12)  Urine Appearance: Clear  Protein, Urine: 100 mg/dL  Urine Microscopic-Add On (NC) (08-15 @ 00:12)  White Blood Cell - Urine: 0 /HPF  Red Blood Cell - Urine: 0 /HPF    Urinalysis with Rflx Culture (collected 15 Aug 2024 00:12)    Radiology: all available radiological tests reviewed    < from: Xray Chest 1 View- PORTABLE-Urgent (24 @ 17:36) >  IMPRESSION:  Faint 2 cm peripheral left midlung nodular opacity corresponding to a finding on the PET/CT scan. Additional subcentimeter   nodule seen on that study are not able to be seen.The remainder of the lungs are clear.  < end of copied text >    Advanced directives addressed: full resuscitation Patient is a 59y old  Male who presents with a chief complaint of lethargy    HPI:  58 y/o Male with h/o renal cell carcinoma s/p right nephrectomy on immunotherapy, last dose 3 weeks ago, paroxysmal A-fib not on anticoagulation was admitted on  for increased lethargy. Patient lethargic, collateral history provided by wife at bedside. As per wife, patient had been in his usual state of health until one week ago. They went to [place] last week on Wednesday to see there new grandchild. On Thursday patient started to act bizarre, had trouble walking and speaking. The wife called EMS and he was taken to the local hospital in [place]. As per the wife pt had a CT chest which was noted for progression of malignancy and PNA, and he had an MRI done which showed a chronic stroke. Patient was in the hospital for 5 days presumably getting antibiotics, and initially improved but developed a rash during the hospital course. Most of patients providers are in [facility] so they asked to be discharged from [place] and came back to [place] on the day PTA. As per wife the patient initially improved but got worse again while coming to [place], and currently has been lethargic, spiking fevers, and has a diffuse macular rash that's getting worse. In ED, pt tachycardic, and febrile. He received cefepime, metronidazole and vancomycin IV.   He was noted with a diffuse macular rash over entire body that started after he was discharged from [facility] on oral levofloxacin.     PMH: as above  PSH: as above  Meds: per reconciliation sheet, noted below  MEDICATIONS  (STANDING):  aspirin enteric coated 81 milliGRAM(s) Oral daily  atorvastatin 20 milliGRAM(s) Oral at bedtime  cefepime  Injectable. 1000 milliGRAM(s) IV Push every 12 hours  dextrose 5%. 1000 milliLiter(s) (50 mL/Hr) IV Continuous <Continuous>  dextrose 5%. 1000 milliLiter(s) (100 mL/Hr) IV Continuous <Continuous>  dextrose 50% Injectable 25 Gram(s) IV Push once  dextrose 50% Injectable 25 Gram(s) IV Push once  dextrose 50% Injectable 12.5 Gram(s) IV Push once  glucagon  Injectable 1 milliGRAM(s) IntraMuscular once  heparin   Injectable 5000 Unit(s) SubCutaneous every 12 hours  insulin lispro (ADMELOG) corrective regimen sliding scale   SubCutaneous three times a day before meals  insulin lispro (ADMELOG) corrective regimen sliding scale   SubCutaneous at bedtime  lactated ringers. 1000 milliLiter(s) (100 mL/Hr) IV Continuous <Continuous>  metoprolol tartrate 25 milliGRAM(s) Oral daily  metroNIDAZOLE  IVPB 500 milliGRAM(s) IV Intermittent every 8 hours  pantoprazole    Tablet 40 milliGRAM(s) Oral before breakfast  vancomycin  IVPB 1250 milliGRAM(s) IV Intermittent every 24 hours    MEDICATIONS  (PRN):  acetaminophen     Tablet .. 650 milliGRAM(s) Oral every 6 hours PRN Temp greater or equal to 38C (100.4F), Mild Pain (1 - 3)  aluminum hydroxide/magnesium hydroxide/simethicone Suspension 30 milliLiter(s) Oral every 4 hours PRN Dyspepsia  dextrose Oral Gel 15 Gram(s) Oral once PRN Blood Glucose LESS THAN 70 milliGRAM(s)/deciliter  melatonin 3 milliGRAM(s) Oral at bedtime PRN Insomnia  ondansetron Injectable 4 milliGRAM(s) IV Push every 8 hours PRN Nausea and/or Vomiting    Allergies    sulfa drugs (Unknown)    Intolerances      Social: no smoking, no alcohol, no illegal drugs; no recent travel, no exposure to TB  FAMILY HISTORY:  FH: breast cancer (Mother)    ROS: the patient is confused, limited   All other systems reviewed and are negative    Vital Signs Last 24 Hrs  T(C): 36.6 (15 Aug 2024 07:42), Max: 38.2 (14 Aug 2024 19:38)  T(F): 97.9 (15 Aug 2024 07:42), Max: 100.8 (14 Aug 2024 19:38)  HR: 86 (15 Aug 2024 07:42) (86 - 123)  BP: 111/81 (15 Aug 2024 07:42) (110/77 - 165/138)  BP(mean): 83 (14 Aug 2024 23:28) (83 - 83)  RR: 16 (15 Aug 2024 07:42) (16 - 18)  SpO2: 99% (15 Aug 2024 07:42) (96% - 100%)    Parameters below as of 15 Aug 2024 07:42  Patient On (Oxygen Delivery Method): room air      Daily Height in cm: 175.26 (15 Aug 2024 01:45)    Daily Weight in k.5 (15 Aug 2024 01:45)    PE:    Constitutional:  No acute distress  HEENT: NC/AT, EOMI, PERRLA, conjunctivae clear; ears and nose atraumatic; pharynx benign  Neck: supple; thyroid not palpable  Back: no tenderness  Respiratory: respiratory effort normal; crackles at bases  Cardiovascular: S1S2 regular, no murmurs  Abdomen: soft, not tender, not distended, positive BS; no liver or spleen organomegaly  Genitourinary: no suprapubic tenderness  Lymphatic: no LN palpable  Musculoskeletal: no muscle tenderness, no joint swelling or tenderness  Extremities: no pedal edema  Neurological/ Psychiatric: Alert, judgement and insight impaired; moving all extremities  Skin: diffuse macular rash on chest, arms, legs; no palpable lesions    Labs: all available labs reviewed                        9.2    4.90  )-----------( 218      ( 15 Aug 2024 06:54 )             28.3     08-15    132<L>  |  106  |  18  ----------------------------<  123<H>  4.0   |  15<L>  |  2.01<H>    Ca    8.6      15 Aug 2024 06:54  Phos  3.5     08-15  Mg     1.6     -15    TPro  6.2  /  Alb  2.6<L>  /  TBili  0.6  /  DBili  x   /  AST  18  /  ALT  15  /  AlkPhos  118  08-15     LIVER FUNCTIONS - ( 15 Aug 2024 06:54 )  Alb: 2.6 g/dL / Pro: 6.2 gm/dL / ALK PHOS: 118 U/L / ALT: 15 U/L / AST: 18 U/L / GGT: x           Urinalysis with Rflx Culture (08-15 @ 00:12)  Urine Appearance: Clear  Protein, Urine: 100 mg/dL  Urine Microscopic-Add On (NC) (08-15 @ 00:12)  White Blood Cell - Urine: 0 /HPF  Red Blood Cell - Urine: 0 /HPF    Urinalysis with Rflx Culture (collected 15 Aug 2024 00:12)    Radiology: all available radiological tests reviewed    < from: Xray Chest 1 View- PORTABLE-Urgent (24 @ 17:36) >  IMPRESSION:  Faint 2 cm peripheral left midlung nodular opacity corresponding to a finding on the PET/CT scan. Additional subcentimeter   nodule seen on that study are not able to be seen.The remainder of the lungs are clear.  < end of copied text >    Advanced directives addressed: full resuscitation

## 2024-08-15 NOTE — DIETITIAN INITIAL EVALUATION ADULT - NUTRITION DIAGNOSIS
Pt come sin with lower abd pain adn lower back pain since Tuesday. Pt has hx of diverticulosis.   
yes...

## 2024-08-15 NOTE — H&P ADULT - HISTORY OF PRESENT ILLNESS
59M w/ h/o renal cell carcinoma status post right nephrectomy on immunotherapy, last dose 3 weeks ago, paroxysmal A-fib not on anticoagulation, presents with c/o lethargy. Patient lethargic, collateral history provided by wife at bedside. As per wife, patient had been in his usual state of health until one week ago. They went to [place] last week on Wednesday to see there new grandchild. On Thursday patient started to act bizarre, had trouble walking and speaking. The wife called EMS and he was taken to the local hospital in [place]. As per the wife they diagnosed him with PNA, and he had an MRI done which showed a chronic stroke. Patient was in the hospital for 5 days presumably getting antibiotics, and initially improved but developed a rash during the hospital course. Most of patients providers are in [facility] so they asked to be discharged from [place] and came back to [place] yesterday. As per wife the patient initially improved but got worse again while coming to [place], and currently has been lethargic, spiking fevers, and has a diffuse macular rash that's getting worse.    59M w/ h/o renal cell carcinoma status post right nephrectomy on immunotherapy, last dose 3 weeks ago, paroxysmal A-fib not on anticoagulation, presents with c/o lethargy. Patient lethargic, collateral history provided by wife at bedside. As per wife, patient had been in his usual state of health until one week ago. They went to [place] last week on Wednesday to see there new grandchild. On Thursday patient started to act bizarre, had trouble walking and speaking. The wife called EMS and he was taken to the local hospital in [place]. As per the wife pt had a CT chest which was noted for progression of malignancy and PNA, and he had an MRI done which showed a chronic stroke. Patient was in the hospital for 5 days presumably getting antibiotics, and initially improved but developed a rash during the hospital course. Most of patients providers are in [facility] so they asked to be discharged from [place] and came back to [place] yesterday. As per wife the patient initially improved but got worse again while coming to [place], and currently has been lethargic, spiking fevers, and has a diffuse macular rash that's getting worse.   On arrival to ED, pt tachycardic, and febrile, vitals otherwise stable. CBC noted for WBC 7.6, H/H 10/29, PLt 272. CMP noted for BUN/Cr 18/2.32, UA sterile, flu/covid negative. Admitted to  for PNA.

## 2024-08-15 NOTE — CONSULT NOTE ADULT - SUBJECTIVE AND OBJECTIVE BOX
CC: AMS      HPI:  59M w/ h/o renal cell carcinoma status post right nephrectomy on immunotherapy, last dose 3 weeks ago, paroxysmal A-fib not on anticoagulation, presents with c/o lethargy. Patient lethargic, collateral history provided by wife at bedside. As per wife, patient had been in his usual state of health until one week ago. They went to [place] last week on Wednesday to see there new grandchild. On Thursday patient started to act bizarre, had trouble walking and speaking. The wife called EMS and he was taken to the local hospital in [place]. As per the wife pt had a CT chest which was noted for progression of malignancy and PNA, and he had an MRI done which showed a chronic stroke. Patient was in the hospital for 5 days presumably getting antibiotics, and initially improved but developed a rash during the hospital course. Most of patients providers are in [facility] so they asked to be discharged from [place] and came back to [place] yesterday. As per wife the patient initially improved but got worse again while coming to [place], and currently has been lethargic, spiking fevers, and has a diffuse macular rash that's getting worse.   On arrival to ED, pt tachycardic, and febrile, vitals otherwise stable. CBC noted for WBC 7.6, H/H 10/29, PLt 272. CMP noted for BUN/Cr 18/2.32, UA sterile, flu/covid negative. Admitted to  for PNA.   (15 Aug 2024 02:08)      PAST MEDICAL & SURGICAL HISTORY:  Pneumonia      Cough      Former smoker      Obesity (BMI 30-39.9)      HTN (hypertension)      HLD (hyperlipidemia)      CAD (coronary artery disease)      Diabetes      H/O pulmonary hypertension      Renal mass      Lung nodule      Stented coronary artery      Paroxysmal atrial fibrillation      Diverticulitis      Renal calculi      Subclinical hyperthyroidism      Metastasis to lung      History of appendectomy  1994      History of torn meniscus of left knee  1995      History of tonsillectomy and adenoidectomy  1968      S/P cystoscopy with ureteral stent placement      History of lung biopsy      S/P cardiac cath          FAMILY HISTORY:  FH: breast cancer (Mother)        Social Hx:      MEDICATIONS  (STANDING):  aspirin enteric coated 81 milliGRAM(s) Oral daily  atorvastatin 20 milliGRAM(s) Oral at bedtime  dextrose 5%. 1000 milliLiter(s) (50 mL/Hr) IV Continuous <Continuous>  dextrose 5%. 1000 milliLiter(s) (100 mL/Hr) IV Continuous <Continuous>  dextrose 50% Injectable 12.5 Gram(s) IV Push once  dextrose 50% Injectable 25 Gram(s) IV Push once  dextrose 50% Injectable 25 Gram(s) IV Push once  famotidine Injectable 20 milliGRAM(s) IV Push daily  glucagon  Injectable 1 milliGRAM(s) IntraMuscular once  heparin   Injectable 5000 Unit(s) SubCutaneous every 12 hours  insulin lispro (ADMELOG) corrective regimen sliding scale   SubCutaneous at bedtime  insulin lispro (ADMELOG) corrective regimen sliding scale   SubCutaneous three times a day before meals  metoprolol tartrate 25 milliGRAM(s) Oral daily  sodium bicarbonate  Infusion 0.102 mEq/kG/Hr (65 mL/Hr) IV Continuous <Continuous>       Allergies  sulfa drugs (Unknown)  Levaquin (Rash (Mod to Severe))        ROS: Unable to obtain    Vital Signs Last 24 Hrs  T(C): 36.6 (15 Aug 2024 12:40), Max: 38.2 (14 Aug 2024 19:38)  T(F): 97.9 (15 Aug 2024 12:40), Max: 100.8 (14 Aug 2024 19:38)  HR: 91 (15 Aug 2024 12:40) (86 - 123)  BP: 103/64 (15 Aug 2024 12:40) (103/64 - 165/138)  BP(mean): 83 (14 Aug 2024 23:28) (83 - 83)  RR: 18 (15 Aug 2024 12:40) (16 - 18)  SpO2: 99% (15 Aug 2024 12:40) (96% - 100%)    Parameters below as of 15 Aug 2024 12:40  Patient On (Oxygen Delivery Method): room air            Constitutional: obtunded  HEAD: Normocephalic  Neck: unable to check, but complains of pain with movement  Extremities:  no edema, +pinpoint rash  Musculoskeletal: no abnormal movements  Skin: No rashes    Neurological exam:  HF: Patient is obtunded.  Not participating in the exam.  After touching LE's he was in pain and woke up for a few seconds and was x 3, then went into a deep sleep again.   Speech was fluent when awake otherwise did not speak, No Aphasia.    CN: pinpoint pupils, sluggish, grimaces when touched, no NLFD, does not stick out tongue, cannot evaluate palate or SCM  Motor: Moving all extremities spontaneously, normal bulk and tone, no tremors  Sens: withdraws to tactile stimulus  Reflexes: trace reflexes all over, downgoing toes b/l  Coord:  unable to check  Gait/Balance: Cannot test          Labs:   08-15    132<L>  |  106  |  18  ----------------------------<  123<H>  4.0   |  15<L>  |  2.01<H>    Ca    8.6      15 Aug 2024 06:54  Phos  3.5     08-15  Mg     1.6     08-15    TPro  6.2  /  Alb  2.6<L>  /  TBili  0.6  /  DBili  x   /  AST  18  /  ALT  15  /  AlkPhos  118  08-15                              9.2    4.90  )-----------( 218      ( 15 Aug 2024 06:54 )             28.3       Radiology:     CC: AMS      HPI:  59M w/ h/o renal cell carcinoma status post right nephrectomy on immunotherapy, last dose 3 weeks ago, paroxysmal A-fib on ASA, presents with c/o lethargy.  Patient lethargic, collateral history provided by wife at bedside. As per wife, patient had been in his usual state of health until one week ago. They went to [place] last week on Wednesday to see there new grandchild. On Thursday patient started to act bizarre, had trouble walking and speaking. The wife called EMS and he was taken to the local hospital in [place]. As per the wife pt had a CT chest which was noted for progression of malignancy and PNA, and he had an MRI done which showed a chronic stroke. Patient was in the hospital for 5 days presumably getting antibiotics, and initially improved but developed a rash during the hospital course. Most of patients providers are in [facility] so they asked to be discharged from [place] and came back to [place] yesterday. As per wife the patient initially improved but got worse again while coming to [place], and currently has been lethargic, spiking fevers, and has a diffuse macular rash that's getting worse.  On arrival to ED, pt tachycardic, and febrile, vitals otherwise stable. CBC noted for WBC 7.6, H/H 10/29, PLt 272. CMP noted for BUN/Cr 18/2.32, UA sterile, flu/covid negative.  Admitted to  for sepsis.  Neurology consulted for worsening AMS.  Of note the patient has joint pains for 6 weeks attributed to side effects from immunotherapy as per their rheumatologist.  And as per patient's wife his ext rash started prior to the Levaquin.         PAST MEDICAL & SURGICAL HISTORY:  Pneumonia      Cough      Former smoker      Obesity (BMI 30-39.9)      HTN (hypertension)      HLD (hyperlipidemia)      CAD (coronary artery disease)      Diabetes      H/O pulmonary hypertension      Renal mass      Lung nodule      Stented coronary artery      Paroxysmal atrial fibrillation      Diverticulitis      Renal calculi      Subclinical hyperthyroidism      Metastasis to lung      History of appendectomy  1994      History of torn meniscus of left knee  1995      History of tonsillectomy and adenoidectomy  1968      S/P cystoscopy with ureteral stent placement      History of lung biopsy      S/P cardiac cath          FAMILY HISTORY:  FH: breast cancer (Mother)        Social Hx:      MEDICATIONS  (STANDING):  aspirin enteric coated 81 milliGRAM(s) Oral daily  atorvastatin 20 milliGRAM(s) Oral at bedtime  dextrose 5%. 1000 milliLiter(s) (50 mL/Hr) IV Continuous <Continuous>  dextrose 5%. 1000 milliLiter(s) (100 mL/Hr) IV Continuous <Continuous>  dextrose 50% Injectable 12.5 Gram(s) IV Push once  dextrose 50% Injectable 25 Gram(s) IV Push once  dextrose 50% Injectable 25 Gram(s) IV Push once  famotidine Injectable 20 milliGRAM(s) IV Push daily  glucagon  Injectable 1 milliGRAM(s) IntraMuscular once  heparin   Injectable 5000 Unit(s) SubCutaneous every 12 hours  insulin lispro (ADMELOG) corrective regimen sliding scale   SubCutaneous at bedtime  insulin lispro (ADMELOG) corrective regimen sliding scale   SubCutaneous three times a day before meals  metoprolol tartrate 25 milliGRAM(s) Oral daily  sodium bicarbonate  Infusion 0.102 mEq/kG/Hr (65 mL/Hr) IV Continuous <Continuous>       Allergies  sulfa drugs (Unknown)  Levaquin (Rash (Mod to Severe))        ROS: Unable to obtain    Vital Signs Last 24 Hrs  T(C): 36.6 (15 Aug 2024 12:40), Max: 38.2 (14 Aug 2024 19:38)  T(F): 97.9 (15 Aug 2024 12:40), Max: 100.8 (14 Aug 2024 19:38)  HR: 91 (15 Aug 2024 12:40) (86 - 123)  BP: 103/64 (15 Aug 2024 12:40) (103/64 - 165/138)  BP(mean): 83 (14 Aug 2024 23:28) (83 - 83)  RR: 18 (15 Aug 2024 12:40) (16 - 18)  SpO2: 99% (15 Aug 2024 12:40) (96% - 100%)    Parameters below as of 15 Aug 2024 12:40  Patient On (Oxygen Delivery Method): room air          Constitutional: obtunded  HEAD: Normocephalic  Neck: unable to check, but complains of pain with movement  Extremities:  no edema, +pinpoint rash  Musculoskeletal: no abnormal movements  Skin: No rashes    Neurological exam:  HF: Patient is obtunded.  Not participating in the exam.  After touching LE's he was in pain and woke up for a few seconds and was x 3, then went into a deep sleep again.   Speech was fluent when awake otherwise did not speak, No Aphasia.    CN: pinpoint pupils, sluggish, grimaces when touched, no NLFD, does not stick out tongue, cannot evaluate palate or SCM  Motor: Moving all extremities spontaneously, normal bulk and tone, no tremors  Sens: withdraws to tactile stimulus  Reflexes: trace reflexes all over, downgoing toes b/l  Coord:  unable to check  Gait/Balance: Cannot test          Labs:   08-15    132<L>  |  106  |  18  ----------------------------<  123<H>  4.0   |  15<L>  |  2.01<H>    Ca    8.6      15 Aug 2024 06:54  Phos  3.5     08-15  Mg     1.6     08-15    TPro  6.2  /  Alb  2.6<L>  /  TBili  0.6  /  DBili  x   /  AST  18  /  ALT  15  /  AlkPhos  118  08-15                              9.2    4.90  )-----------( 218      ( 15 Aug 2024 06:54 )             28.3       Radiology:

## 2024-08-15 NOTE — CONSULT NOTE ADULT - NS ATTEND AMEND GEN_ALL_CORE FT
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 Symptoms started while in [place]. ID following s/t fever and macular rash, suspected drug rash 2/2 recent Levaquin at OSH.     Ct head negative.  MRI recommended. Neurology following: ? encephalitis, subclinical seizures. Plan for LP under IR and prolonged EEG.    CT AP non con: bilateral lung nodules suspicious for metastatic disease Redemonstrated paraesophageal and bulky retroperitoneal adenopathy. Right lateral abdominal metastatic nodules.     Normocytic Anemia with Hb ~ 9 gm/dL. Etiology multifactorial in setting of known underlying metastatic disease with recent hospitalization / sepsis / infectious process. 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 Symptoms started while in [place]. ID following s/t fever and macular rash, suspected drug rash 2/2 recent Levaquin at OSH.     Ct head negative.  MRI recommended. Neurology following: ? encephalitis, subclinical seizures. Plan for LP under IR and prolonged EEG.    CT AP non con: bilateral lung nodules suspicious for metastatic disease Redemonstrated paraesophageal and bulky retroperitoneal adenopathy. Right lateral abdominal metastatic nodules.     Normocytic Anemia with Hb ~ 9 gm/dL. Etiology multifactorial in setting of known underlying metastatic disease with recent hospitalization / sepsis / infectious process.

## 2024-08-16 LAB
ALBUMIN SERPL ELPH-MCNC: 2.5 G/DL — LOW (ref 3.3–5)
ALP SERPL-CCNC: 108 U/L — SIGNIFICANT CHANGE UP (ref 40–120)
ALT FLD-CCNC: 17 U/L — SIGNIFICANT CHANGE UP (ref 12–78)
ANION GAP SERPL CALC-SCNC: 9 MMOL/L — SIGNIFICANT CHANGE UP (ref 5–17)
APPEARANCE CSF: CLEAR — SIGNIFICANT CHANGE UP
AST SERPL-CCNC: 24 U/L — SIGNIFICANT CHANGE UP (ref 15–37)
BASOPHILS # BLD AUTO: 0.01 K/UL — SIGNIFICANT CHANGE UP (ref 0–0.2)
BASOPHILS NFR BLD AUTO: 0.2 % — SIGNIFICANT CHANGE UP (ref 0–2)
BILIRUB SERPL-MCNC: 0.6 MG/DL — SIGNIFICANT CHANGE UP (ref 0.2–1.2)
BUN SERPL-MCNC: 12 MG/DL — SIGNIFICANT CHANGE UP (ref 7–23)
CALCIUM SERPL-MCNC: 8.6 MG/DL — SIGNIFICANT CHANGE UP (ref 8.5–10.1)
CHLORIDE SERPL-SCNC: 107 MMOL/L — SIGNIFICANT CHANGE UP (ref 96–108)
CO2 SERPL-SCNC: 23 MMOL/L — SIGNIFICANT CHANGE UP (ref 22–31)
COLOR CSF: SIGNIFICANT CHANGE UP
CORTIS AM PEAK SERPL-MCNC: 0.9 UG/DL — LOW (ref 6–18.4)
CREAT SERPL-MCNC: 1.8 MG/DL — HIGH (ref 0.5–1.3)
EGFR: 42 ML/MIN/1.73M2 — LOW
EOSINOPHIL # BLD AUTO: 0.16 K/UL — SIGNIFICANT CHANGE UP (ref 0–0.5)
EOSINOPHIL NFR BLD AUTO: 3.4 % — SIGNIFICANT CHANGE UP (ref 0–6)
GLUCOSE CSF-MCNC: 77 MG/DL — HIGH (ref 40–70)
GLUCOSE SERPL-MCNC: 135 MG/DL — HIGH (ref 70–99)
GRAM STN FLD: SIGNIFICANT CHANGE UP
HCT VFR BLD CALC: 26.6 % — LOW (ref 39–50)
HGB BLD-MCNC: 8.9 G/DL — LOW (ref 13–17)
IMM GRANULOCYTES NFR BLD AUTO: 0.8 % — SIGNIFICANT CHANGE UP (ref 0–0.9)
INR BLD: 1.34 RATIO — HIGH (ref 0.85–1.18)
LYMPHOCYTES # BLD AUTO: 0.88 K/UL — LOW (ref 1–3.3)
LYMPHOCYTES # BLD AUTO: 18.5 % — SIGNIFICANT CHANGE UP (ref 13–44)
LYMPHOCYTES # CSF: 80 % — SIGNIFICANT CHANGE UP (ref 40–80)
MAGNESIUM SERPL-MCNC: 1.8 MG/DL — SIGNIFICANT CHANGE UP (ref 1.6–2.6)
MCHC RBC-ENTMCNC: 27.1 PG — SIGNIFICANT CHANGE UP (ref 27–34)
MCHC RBC-ENTMCNC: 33.5 GM/DL — SIGNIFICANT CHANGE UP (ref 32–36)
MCV RBC AUTO: 81.1 FL — SIGNIFICANT CHANGE UP (ref 80–100)
MONOCYTES # BLD AUTO: 0.35 K/UL — SIGNIFICANT CHANGE UP (ref 0–0.9)
MONOCYTES NFR BLD AUTO: 7.4 % — SIGNIFICANT CHANGE UP (ref 2–14)
MONOS+MACROS NFR CSF: 14 % — LOW (ref 15–45)
MRSA PCR RESULT.: SIGNIFICANT CHANGE UP
NEUTROPHILS # BLD AUTO: 3.32 K/UL — SIGNIFICANT CHANGE UP (ref 1.8–7.4)
NEUTROPHILS # CSF: 6 % — SIGNIFICANT CHANGE UP (ref 0–6)
NEUTROPHILS NFR BLD AUTO: 69.7 % — SIGNIFICANT CHANGE UP (ref 43–77)
NIGHT BLUE STAIN TISS: SIGNIFICANT CHANGE UP
NRBC NFR CSF: 8 /UL — HIGH (ref 0–5)
PHOSPHATE SERPL-MCNC: 2.6 MG/DL — SIGNIFICANT CHANGE UP (ref 2.5–4.5)
PLATELET # BLD AUTO: 233 K/UL — SIGNIFICANT CHANGE UP (ref 150–400)
POTASSIUM SERPL-MCNC: 3.6 MMOL/L — SIGNIFICANT CHANGE UP (ref 3.5–5.3)
POTASSIUM SERPL-SCNC: 3.6 MMOL/L — SIGNIFICANT CHANGE UP (ref 3.5–5.3)
PROT CSF-MCNC: 78 MG/DL — HIGH (ref 15–45)
PROT SERPL-MCNC: 6.1 GM/DL — SIGNIFICANT CHANGE UP (ref 6–8.3)
PROTHROM AB SERPL-ACNC: 15 SEC — HIGH (ref 9.5–13)
RBC # BLD: 3.28 M/UL — LOW (ref 4.2–5.8)
RBC # CSF: 151 /UL — HIGH (ref 0–0)
RBC # FLD: 14.3 % — SIGNIFICANT CHANGE UP (ref 10.3–14.5)
S AUREUS DNA NOSE QL NAA+PROBE: SIGNIFICANT CHANGE UP
S PNEUM AG UR QL: NEGATIVE — SIGNIFICANT CHANGE UP
SODIUM SERPL-SCNC: 139 MMOL/L — SIGNIFICANT CHANGE UP (ref 135–145)
SPECIMEN SOURCE: SIGNIFICANT CHANGE UP
SPECIMEN SOURCE: SIGNIFICANT CHANGE UP
TUBE TYPE: SIGNIFICANT CHANGE UP
WBC # BLD: 4.76 K/UL — SIGNIFICANT CHANGE UP (ref 3.8–10.5)
WBC # FLD AUTO: 4.76 K/UL — SIGNIFICANT CHANGE UP (ref 3.8–10.5)

## 2024-08-16 PROCEDURE — 70544 MR ANGIOGRAPHY HEAD W/O DYE: CPT | Mod: 26,59

## 2024-08-16 PROCEDURE — 95816 EEG AWAKE AND DROWSY: CPT | Mod: 26,1L

## 2024-08-16 PROCEDURE — 99223 1ST HOSP IP/OBS HIGH 75: CPT

## 2024-08-16 PROCEDURE — 99233 SBSQ HOSP IP/OBS HIGH 50: CPT

## 2024-08-16 PROCEDURE — 70551 MRI BRAIN STEM W/O DYE: CPT | Mod: 26

## 2024-08-16 PROCEDURE — 62328 DX LMBR SPI PNXR W/FLUOR/CT: CPT | Mod: 26

## 2024-08-16 RX ORDER — HYDROCORTISONE 10 MG/1
100 TABLET ORAL ONCE
Refills: 0 | Status: COMPLETED | OUTPATIENT
Start: 2024-08-16 | End: 2024-08-16

## 2024-08-16 RX ORDER — SODIUM CHLORIDE 9 MG/ML
1000 INJECTION INTRAMUSCULAR; INTRAVENOUS; SUBCUTANEOUS
Refills: 0 | Status: DISCONTINUED | OUTPATIENT
Start: 2024-08-16 | End: 2024-08-16

## 2024-08-16 RX ORDER — HYDROCORTISONE 10 MG/1
25 TABLET ORAL EVERY 6 HOURS
Refills: 0 | Status: DISCONTINUED | OUTPATIENT
Start: 2024-08-16 | End: 2024-08-17

## 2024-08-16 RX ADMIN — FAMOTIDINE 20 MILLIGRAM(S): 10 INJECTION INTRAVENOUS at 10:31

## 2024-08-16 RX ADMIN — Medication 20 MILLIGRAM(S): at 21:08

## 2024-08-16 RX ADMIN — ACETAMINOPHEN 650 MILLIGRAM(S): 325 TABLET ORAL at 15:49

## 2024-08-16 RX ADMIN — HYDROCORTISONE 25 MILLIGRAM(S): 10 TABLET ORAL at 23:31

## 2024-08-16 RX ADMIN — HYDROCORTISONE 100 MILLIGRAM(S): 10 TABLET ORAL at 17:09

## 2024-08-16 RX ADMIN — SODIUM BICARBONATE 65 MEQ/KG/HR: 84 INJECTION, SOLUTION INTRAVENOUS at 05:23

## 2024-08-16 RX ADMIN — Medication 266 MILLIGRAM(S): at 16:30

## 2024-08-16 RX ADMIN — SODIUM CHLORIDE 125 MILLILITER(S): 9 INJECTION INTRAMUSCULAR; INTRAVENOUS; SUBCUTANEOUS at 14:56

## 2024-08-16 RX ADMIN — ACETAMINOPHEN 650 MILLIGRAM(S): 325 TABLET ORAL at 16:30

## 2024-08-16 RX ADMIN — ACETAMINOPHEN 400 MILLIGRAM(S): 325 TABLET ORAL at 00:00

## 2024-08-16 RX ADMIN — ACETAMINOPHEN 1000 MILLIGRAM(S): 325 TABLET ORAL at 00:30

## 2024-08-16 RX ADMIN — METOPROLOL TARTRATE 25 MILLIGRAM(S): 100 TABLET ORAL at 05:23

## 2024-08-16 NOTE — PROGRESS NOTE ADULT - SUBJECTIVE AND OBJECTIVE BOX
HPI: 59M w/ h/o renal cell carcinoma status post right nephrectomy on immunotherapy, last dose 3 weeks ago, paroxysmal A-fib not on anticoagulation, presents with c/o lethargy. Patient lethargic, collateral history provided by wife at bedside. As per wife, patient had been in his usual state of health until one week ago. They went to [place] last week on Wednesday to see there new grandchild. On Thursday patient started to act bizarre, had trouble walking and speaking. The wife called EMS and he was taken to the local hospital in [place]. As per the wife pt had a CT chest which was noted for progression of malignancy and PNA, and he had an MRI done which showed a chronic stroke. Patient was in the hospital for 5 days presumably getting antibiotics, and initially improved but developed a rash during the hospital course. Most of patients providers are in [facility] so they asked to be discharged from [place] and came back to [place] yesterday. As per wife the patient initially improved but got worse again while coming to [place], and currently has been lethargic, spiking fevers, and has a diffuse macular rash that's getting worse. On arrival to ED, pt tachycardic, and febrile, vitals otherwise stable. CBC noted for WBC 7.6, H/H 10/29, PLt 272. CMP noted for BUN/Cr 18/2.32, UA sterile, flu/covid negative. Admitted to  for PNA.   (15 Aug 2024 02:08)    8/15: Patient seen and evaluated today, lethargic, eye opening to name and painful stimuli, did not follow commands, similar mentation to when he was admitted yesterday, non verbal on my exam this AM. EEG with cerebral dysfunction, retaining urine. Ammonia levels normal, B12 normal. Pt re-evaluated ~ 4:30pm, eye opening to name, follows commands, knows name, good strength to b/l UE, ~ 3-4 to b/l LE, minimally against gravity, seems painful to move LE.     8/16: pt seen this AM, more awake, alert today, speaking, but confused, laughs when not able to answer questions, knows name, T 101.1F overnight. Acidosis improved, AM cortisol 0.9.     ROS: unable to obtain due to encephalopathy     MEDICATIONS  (STANDING):  atorvastatin 20 milliGRAM(s) Oral at bedtime  dextrose 5%. 1000 milliLiter(s) (50 mL/Hr) IV Continuous <Continuous>  dextrose 5%. 1000 milliLiter(s) (100 mL/Hr) IV Continuous <Continuous>  dextrose 50% Injectable 12.5 Gram(s) IV Push once  dextrose 50% Injectable 25 Gram(s) IV Push once  dextrose 50% Injectable 25 Gram(s) IV Push once  famotidine Injectable 20 milliGRAM(s) IV Push daily  glucagon  Injectable 1 milliGRAM(s) IntraMuscular once  insulin lispro (ADMELOG) corrective regimen sliding scale   SubCutaneous at bedtime  insulin lispro (ADMELOG) corrective regimen sliding scale   SubCutaneous three times a day before meals  metoprolol tartrate 25 milliGRAM(s) Oral daily  sodium bicarbonate  Infusion 0.102 mEq/kG/Hr (65 mL/Hr) IV Continuous <Continuous>    MEDICATIONS  (PRN):  acetaminophen     Tablet .. 650 milliGRAM(s) Oral every 6 hours PRN Temp greater or equal to 38C (100.4F), Mild Pain (1 - 3)  aluminum hydroxide/magnesium hydroxide/simethicone Suspension 30 milliLiter(s) Oral every 4 hours PRN Dyspepsia  dextrose Oral Gel 15 Gram(s) Oral once PRN Blood Glucose LESS THAN 70 milliGRAM(s)/deciliter  melatonin 3 milliGRAM(s) Oral at bedtime PRN Insomnia  ondansetron Injectable 4 milliGRAM(s) IV Push every 8 hours PRN Nausea and/or Vomiting      Vital Signs Last 24 Hrs  T(C): 36.8 (16 Aug 2024 14:06), Max: 38.4 (15 Aug 2024 23:30)  T(F): 98.3 (16 Aug 2024 14:06), Max: 101.1 (15 Aug 2024 23:30)  HR: 101 (16 Aug 2024 15:00) (94 - 130)  BP: 111/76 (16 Aug 2024 15:00) (96/72 - 133/74)  RR: 13 (16 Aug 2024 15:00) (13 - 27)  SpO2: 100% (16 Aug 2024 15:00) (90% - 100%)    Parameters below as of 16 Aug 2024 14:21  Patient On (Oxygen Delivery Method): nasal cannula  O2 Flow (L/min): 4        PHYSICAL EXAM:  GENERAL: NAD, lying in bed comfortably  HEAD:  Atraumatic, Normocephalic  EYES: conjunctiva and sclera clear  ENT: Moist mucous membranes  NECK: Supple, No JVD  CHEST/LUNG: Clear to auscultation bilaterally; No rales, rhonchi, wheezing. Unlabored respirations  HEART: Regular rate and rhythm; No murmurs  ABDOMEN: Bowel sounds present; Soft, Nontender, Nondistended.   EXTREMITIES:  2+ Peripheral Pulses, brisk capillary refill. No clubbing, cyanosis, or edema  NERVOUS SYSTEM:  lethargic, eye opening to name, follows commands now   MSK: some movement of b/l UE, moves feet spontaneously   SKIN: maculopapular rash to b/l LE, spotty rash to palms, no itching         LABS:                          9.2    4.90  )-----------( 218      ( 15 Aug 2024 06:54 )             28.3     15 Aug 2024 06:54    132    |  106    |  18     ----------------------------<  123    4.0     |  15     |  2.01     Ca    8.6        15 Aug 2024 06:54  Phos  3.5       15 Aug 2024 06:54  Mg     1.6       15 Aug 2024 06:54    TPro  6.2    /  Alb  2.6    /  TBili  0.6    /  DBili  x      /  AST  18     /  ALT  15     /  AlkPhos  118    15 Aug 2024 06:54    LIVER FUNCTIONS - ( 15 Aug 2024 06:54 )  Alb: 2.6 g/dL / Pro: 6.2 gm/dL / ALK PHOS: 118 U/L / ALT: 15 U/L / AST: 18 U/L / GGT: x           PT/INR - ( 14 Aug 2024 20:41 )   PT: 16.6 sec;   INR: 1.49 ratio         PTT - ( 14 Aug 2024 20:41 )  PTT:38.1 sec  CAPILLARY BLOOD GLUCOSE      POCT Blood Glucose.: 118 mg/dL (15 Aug 2024 12:11)  POCT Blood Glucose.: 119 mg/dL (15 Aug 2024 08:01)        Urinalysis Basic - ( 15 Aug 2024 06:54 )    Color: x / Appearance: x / SG: x / pH: x  Gluc: 123 mg/dL / Ketone: x  / Bili: x / Urobili: x   Blood: x / Protein: x / Nitrite: x   Leuk Esterase: x / RBC: x / WBC x   Sq Epi: x / Non Sq Epi: x / Bacteria: x        RADIOLOGY:    < from: CT Abdomen and Pelvis No Cont (08.15.24 @ 11:24) >  IMPRESSION:  Evaluation of the solid organs, vascular structures and GI tract is   limited without oral and IV contrast.    Left lower lobe nodule seen previously is now partially obscured by   atelectasis. Cannot exclude superimposed pneumonia.    Bilateral lung nodules suspicious for metastatic disease.    Redemonstrated paraesophageal and bulky retroperitoneal adenopathy. Right   lateral abdominal metastatic nodules.    Mild splenomegaly.    Cholelithiasis.      < from: CT Head No Cont (08.15.24 @ 11:24) >  FINDINGS:  There are areas of low attenuation in the periventricular white matter   likely related to mild chronic microvascular ischemic changes.    There is no acute intracranial hemorrhage, parenchymal mass, mass effect   or midline shift. There is no acute territorial infarct. There is no   hydrocephalus.    The cranium is intact. The visualized paranasal sinuses are well-aerated.        IMPRESSION:  No acute intracranial hemorrhage or acute territorial infarct.   If   symptoms persist, follow-up MRI exam recommended.       HPI: 59M w/ h/o renal cell carcinoma status post right nephrectomy on immunotherapy, last dose 3 weeks ago, paroxysmal A-fib not on anticoagulation, presents with c/o lethargy. Patient lethargic, collateral history provided by wife at bedside. As per wife, patient had been in his usual state of health until one week ago. They went to [place] last week on Wednesday to see there new grandchild. On Thursday patient started to act bizarre, had trouble walking and speaking. The wife called EMS and he was taken to the local hospital in [place]. As per the wife pt had a CT chest which was noted for progression of malignancy and PNA, and he had an MRI done which showed a chronic stroke. Patient was in the hospital for 5 days presumably getting antibiotics, and initially improved but developed a rash during the hospital course. Most of patients providers are in [facility] so they asked to be discharged from [place] and came back to [place] yesterday. As per wife the patient initially improved but got worse again while coming to [place], and currently has been lethargic, spiking fevers, and has a diffuse macular rash that's getting worse. On arrival to ED, pt tachycardic, and febrile, vitals otherwise stable. CBC noted for WBC 7.6, H/H 10/29, PLt 272. CMP noted for BUN/Cr 18/2.32, UA sterile, flu/covid negative. Admitted to  for PNA.   (15 Aug 2024 02:08)    8/15: Patient seen and evaluated today, lethargic, eye opening to name and painful stimuli, did not follow commands, similar mentation to when he was admitted yesterday, non verbal on my exam this AM. EEG with cerebral dysfunction, retaining urine. Ammonia levels normal, B12 normal. Pt re-evaluated ~ 4:30pm, eye opening to name, follows commands, knows name, good strength to b/l UE, ~ 3-4 to b/l LE, minimally against gravity, seems painful to move LE.     8/16: pt seen this AM, more awake, alert today, speaking, but confused, laughs when not able to answer questions, knows name, T 101.1F overnight. Acidosis improved, AM cortisol 0.9.     ROS: unable to obtain due to encephalopathy   Vital Signs Last 24 Hrs  T(C): 36.8 (16 Aug 2024 14:06), Max: 38.4 (15 Aug 2024 23:30)  T(F): 98.3 (16 Aug 2024 14:06), Max: 101.1 (15 Aug 2024 23:30)  HR: 101 (16 Aug 2024 15:00) (94 - 130)  BP: 111/76 (16 Aug 2024 15:00) (96/72 - 133/74)  RR: 13 (16 Aug 2024 15:00) (13 - 27)  SpO2: 100% (16 Aug 2024 15:00) (90% - 100%)    Parameters below as of 16 Aug 2024 14:21  Patient On (Oxygen Delivery Method): nasal cannula  O2 Flow (L/min): 4    PHYSICAL EXAM:  GENERAL: NAD, lying in bed comfortably  HEAD:  Atraumatic, Normocephalic  EYES: conjunctiva and sclera clear  ENT: Moist mucous membranes  NECK: Supple, No JVD  CHEST/LUNG: Clear to auscultation bilaterally; No rales, rhonchi, wheezing. Unlabored respirations  HEART: Regular rate and rhythm; No murmurs  ABDOMEN: Bowel sounds present; Soft, Nontender, Nondistended.   EXTREMITIES:  2+ Peripheral Pulses, brisk capillary refill. No clubbing, cyanosis, or edema  NERVOUS SYSTEM:  drowsy, easy to awake, follows commands now   MSK: some movement of b/l UE, moves feet spontaneously   SKIN: maculopapular rash to b/l LE, spotty rash to palms, no itching         LABS:                          8.9    4.76  )-----------( 233      ( 16 Aug 2024 06:10 )             26.6   08-16    139  |  107  |  12  ----------------------------<  135<H>  3.6   |  23  |  1.80<H>    Ca    8.6      16 Aug 2024 06:10  Phos  2.6     08-16  Mg     1.8     08-16    TPro  6.1  /  Alb  2.5<L>  /  TBili  0.6  /  DBili  x   /  AST  24  /  ALT  17  /  AlkPhos  108  08-16    CAPILLARY BLOOD GLUCOSE      POCT Blood Glucose.: 134 mg/dL (16 Aug 2024 11:31)  POCT Blood Glucose.: 126 mg/dL (16 Aug 2024 07:38)  POCT Blood Glucose.: 133 mg/dL (15 Aug 2024 21:30)  POCT Blood Glucose.: 112 mg/dL (15 Aug 2024 17:10)        Urinalysis Basic - ( 15 Aug 2024 06:54 )    Color: x / Appearance: x / SG: x / pH: x  Gluc: 123 mg/dL / Ketone: x  / Bili: x / Urobili: x   Blood: x / Protein: x / Nitrite: x   Leuk Esterase: x / RBC: x / WBC x   Sq Epi: x / Non Sq Epi: x / Bacteria: x        RADIOLOGY:    < from: CT Abdomen and Pelvis No Cont (08.15.24 @ 11:24) >  IMPRESSION:  Evaluation of the solid organs, vascular structures and GI tract is   limited without oral and IV contrast.    Left lower lobe nodule seen previously is now partially obscured by   atelectasis. Cannot exclude superimposed pneumonia.    Bilateral lung nodules suspicious for metastatic disease.    Redemonstrated paraesophageal and bulky retroperitoneal adenopathy. Right   lateral abdominal metastatic nodules.    Mild splenomegaly.    Cholelithiasis.      < from: CT Head No Cont (08.15.24 @ 11:24) >  FINDINGS:  There are areas of low attenuation in the periventricular white matter   likely related to mild chronic microvascular ischemic changes.    There is no acute intracranial hemorrhage, parenchymal mass, mass effect   or midline shift. There is no acute territorial infarct. There is no   hydrocephalus.    The cranium is intact. The visualized paranasal sinuses are well-aerated.        IMPRESSION:  No acute intracranial hemorrhage or acute territorial infarct.   If   symptoms persist, follow-up MRI exam recommended.      < from: MR Head No Cont (08.16.24 @ 09:24) >    FINDINGS:  MRI of the brain:  High T2 signal periventricular subcortical white matter noted which may   be related to mild chronic microvascular ischemic changes.    Focal encephalomalacia/gliosis noted in the right frontal lobe.    There is no acute parenchymal hemorrhage, mass effect or midline shift.   There is no extra-axial fluid collection.  There is no hydrocephalus.    There is no acute infarct. Nonspecific mild prominence bilateral optic   nerve sheath.    Small retention cyst/polyp sphenoid sinus. Minimal mucosal thickening   ethmoidsinus.    Nonspecific low T1 bone marrow signal noted which may be related to red   marrow hyperplasia. Marrow infiltrative disease not excluded.    MRV of the head:  There is normal flow within the superior sagittal sinus, internal   cerebral veins,vein of Avinash and straight sinus.    The transverse and sigmoid sinuses are patent.        IMPRESSION:  No acute intracranial hemorrhage or acute infarct. If metastasis is a   clinical concern, repeat exam with contrast recommended.    Chronic ischemic changes.

## 2024-08-16 NOTE — PROGRESS NOTE ADULT - ASSESSMENT
59yr old man  h/o renal cell carcinoma status post right nephrectomy on immunotherapy, paroxysmal A-fib on ASA, presents with c/o lethargy, confusion. ?drug reaction, today more awake, but confused. ? encephalitis, subclinical seizures.   Discussed with wife, consent obtained last night. LP attempted at bedside, but patient not cooperative.   suggest:  Will need sedation, LP under IR.  prolonged EEG  f/u cultures    Discussed with Dr. Holcomb 59yr old man  h/o renal cell carcinoma status post right nephrectomy on immunotherapy, paroxysmal A-fib on ASA, presents with c/o lethargy, confusion. ?drug reaction. MRI old lacunar infarct, MRV no venous thrombosis.  today more awake, but confused. ? encephalitis, subclinical seizures.   Discussed with wife, consent obtained last night. LP attempted at bedside, but patient not cooperative.   suggest:  Will need sedation, LP under IR.  prolonged EEG  f/u cultures    Discussed with Dr. Holcomb

## 2024-08-16 NOTE — PROGRESS NOTE ADULT - ASSESSMENT
58 y/o Male with h/o renal cell carcinoma s/p right nephrectomy on immunotherapy, last dose 3 weeks ago, paroxysmal A-fib not on anticoagulation was admitted on 8/14 for increased lethargy. Patient lethargic, collateral history provided by wife at bedside. As per wife, patient had been in his usual state of health until one week ago. They went to [place] last week on Wednesday to see there new grandchild. On Thursday patient started to act bizarre, had trouble walking and speaking. The wife called EMS and he was taken to the local hospital in [place]. As per the wife pt had a CT chest which was noted for progression of malignancy and PNA, and he had an MRI done which showed a chronic stroke. Patient was in the hospital for 5 days presumably getting antibiotics, and initially improved but developed a rash during the hospital course. Most of patients providers are in [facility] so they asked to be discharged from [place] and came back to [place] on the day PTA. As per wife the patient initially improved but got worse again while coming to [place], and currently has been lethargic, spiking fevers, and has a diffuse macular rash that's getting worse. In ED, pt tachycardic, and febrile. He received cefepime and vancomycin IV.     1. Febrile syndrome. Macular rash, probable drug rash due to levofloxacin. Renal cell Ca s/p right nephrectomy on immunotherapy. Possible encephalitis. CRF stage 4. Metabolic encephalopathy.   -low grade fever   -rash is improving  -BC x 2, urine c/s noted  -no clinical evidence of sepsis  -no pyuria to suggest urinary infection  -CXR - no pulmonary infiltrates noted  -he received abx therapy during recent hospitalization in [place], then oral levofloxacin as outpatient   -no infectious issue identified, but has a likely allergic reaction  -would continue to observe off abx therapy at this time  -f/u cultures  -neurology evaluation appreciated - plan for LP  -monitor temps  -f/u CBC  -supportive care  2. Other issues:   -care per medicine    d/w medicine team and Dr. Holcomb

## 2024-08-16 NOTE — PROGRESS NOTE ADULT - SUBJECTIVE AND OBJECTIVE BOX
HPI:  59 yr old man RH PMHx renal cell carcinoma status post right nephrectomy on immunotherapy, last dose 3 weeks ago, paroxysmal A-fib not on anticoagulation, presents with c/o lethargy. Admitted to  for PNA.          MEDICATIONS  (STANDING):  atorvastatin 20 milliGRAM(s) Oral at bedtime  famotidine Injectable 20 milliGRAM(s) IV Push daily  glucagon  Injectable 1 milliGRAM(s) IntraMuscular once  insulin lispro (ADMELOG) corrective regimen sliding scale   SubCutaneous at bedtime  insulin lispro (ADMELOG) corrective regimen sliding scale   SubCutaneous three times a day before meals  metoprolol tartrate 25 milliGRAM(s) Oral daily  sodium bicarbonate  Infusion 0.102 mEq/kG/Hr (65 mL/Hr) IV Continuous <Continuous>    MEDICATIONS  (PRN):  acetaminophen     Tablet .. 650 milliGRAM(s) Oral every 6 hours PRN Temp greater or equal to 38C (100.4F), Mild Pain (1 - 3)  aluminum hydroxide/magnesium hydroxide/simethicone Suspension 30 milliLiter(s) Oral every 4 hours PRN Dyspepsia  dextrose Oral Gel 15 Gram(s) Oral once PRN Blood Glucose LESS THAN 70 milliGRAM(s)/deciliter  melatonin 3 milliGRAM(s) Oral at bedtime PRN Insomnia  ondansetron Injectable 4 milliGRAM(s) IV Push every 8 hours PRN Nausea and/or Vomiting      Vital Signs Last 24 Hrs  T(C): 36.9 (16 Aug 2024 10:19), Max: 38.4 (15 Aug 2024 23:30)  T(F): 98.4 (16 Aug 2024 10:19), Max: 101.1 (15 Aug 2024 23:30)  HR: 105 (16 Aug 2024 10:19) (91 - 130)  BP: 133/74 (16 Aug 2024 10:19) (103/64 - 133/74)  BP(mean): --  RR: 18 (16 Aug 2024 10:19) (17 - 20)  SpO2: 98% (16 Aug 2024 10:19) (93% - 100%)    Parameters below as of 16 Aug 2024 10:19  Patient On (Oxygen Delivery Method): room air      Neurological Exam:    HF: Patient is alert, O X1, can, diff with repetition, Follows some commands.   CN: Vision is intact to confrontation. Pupils are equal and reactive. Extra ocular muscles are intact. There is no facial droop or asymmetry. Tongue is midline. Sensation is intact in the face. Other CN II-XII are intact.   Motor: motor examination all muscles are 4/5 and there is no pronator drift.   Sensory: intact to touch.   DTR: 0-1/4 all 4 extremities. Babinski is negative bilateral.  Co-ord:  Finger to finger to nose is intact.     Comprehensive Metabolic Panel in AM (08.16.24 @ 06:10)   Sodium: 139 mmol/L  Potassium: 3.6 mmol/L  Chloride: 107 mmol/L  Carbon Dioxide: 23 mmol/L  Anion Gap: 9 mmol/L  Blood Urea Nitrogen: 12 mg/dL  Creatinine: 1.80 mg/dL  Glucose: 135 mg/dL  Calcium: 8.6 mg/dL  Protein Total: 6.1 gm/dL  Albumin: 2.5 g/dL  Bilirubin Total: 0.6 mg/dL  Alkaline Phosphatase: 108 U/L  Aspartate Aminotransferase (AST/SGOT): 24 U/L  Alanine Aminotransferase (ALT/SGPT): 17 U/L  eGFR: 42:    Complete Blood Count + Automated Diff in AM (08.16.24 @ 06:10)   WBC Count: 4.76 K/uL  RBC Count: 3.28 M/uL  Hemoglobin: 8.9 g/dL  Hematocrit: 26.6 %  Mean Cell Volume: 81.1 fl  Mean Cell Hemoglobin: 27.1 pg  Mean Cell Hemoglobin Conc: 33.5 gm/dL  Red Cell Distrib Width: 14.3 %  Platelet Count - Automated: 233 K/uL    Ammonia, Serum: 19 umol/L (08.15.24 @ 11:45)   Thyroid Stimulating Hormone, Serum: 6.46 uU/mL (08.15.24 @ 10:25)   C-Reactive Protein: 128 mg/L (08.15.24 @ 06:54)   Sedimentation Rate, Erythrocyte: 60 mm/hr (08.15.24 @ 06:54)       < from: CT Head No Cont (08.15.24 @ 11:24) >  FINDINGS:  There are areas of low attenuation in the periventricular white matter   likely related to mild chronic microvascular ischemic changes.    There is no acute intracranial hemorrhage, parenchymal mass, mass effect   or midline shift. There is no acute territorial infarct. There is no   hydrocephalus.    The cranium is intact. The visualized paranasal sinuses are well-aerated.        IMPRESSION:  No acute intracranial hemorrhage or acute territorial infarct.   If   symptoms persist, follow-up MRI exam recommended.    < end of copied text >  < from: CT Chest No Cont (08.15.24 @ 11:24) >  IMPRESSION:  Evaluation of the solid organs, vascular structures and GI tract is   limited without oral and IV contrast.    Left lower lobe nodule seen previously is now partially obscured by   atelectasis. Cannot exclude superimposed pneumonia.    Bilateral lung nodules suspicious for metastatic disease.    Redemonstrated paraesophageal and bulky retroperitoneal adenopathy. Right   lateral abdominal metastatic nodules.    Mild splenomegaly.    Cholelithiasis.    < end of copied text >  INTERPRETATION:   No focal, lateralized or epileptiform features were present.     No seizure sequences occurred.     This is an abnormal EEG.     This is an abnormal EEG consistent with moderate bilateral diffuse cerebral dysfunction.     This recording is consistent with metabolic derangement.   HPI:  59 yr old man RH PMHx renal cell carcinoma status post right nephrectomy on immunotherapy, last dose 3 weeks ago, paroxysmal A-fib not on anticoagulation, presents with c/o lethargy. Admitted to  for PNA.          MEDICATIONS  (STANDING):  atorvastatin 20 milliGRAM(s) Oral at bedtime  famotidine Injectable 20 milliGRAM(s) IV Push daily  glucagon  Injectable 1 milliGRAM(s) IntraMuscular once  insulin lispro (ADMELOG) corrective regimen sliding scale   SubCutaneous at bedtime  insulin lispro (ADMELOG) corrective regimen sliding scale   SubCutaneous three times a day before meals  metoprolol tartrate 25 milliGRAM(s) Oral daily  sodium bicarbonate  Infusion 0.102 mEq/kG/Hr (65 mL/Hr) IV Continuous <Continuous>    MEDICATIONS  (PRN):  acetaminophen     Tablet .. 650 milliGRAM(s) Oral every 6 hours PRN Temp greater or equal to 38C (100.4F), Mild Pain (1 - 3)  aluminum hydroxide/magnesium hydroxide/simethicone Suspension 30 milliLiter(s) Oral every 4 hours PRN Dyspepsia  dextrose Oral Gel 15 Gram(s) Oral once PRN Blood Glucose LESS THAN 70 milliGRAM(s)/deciliter  melatonin 3 milliGRAM(s) Oral at bedtime PRN Insomnia  ondansetron Injectable 4 milliGRAM(s) IV Push every 8 hours PRN Nausea and/or Vomiting      Vital Signs Last 24 Hrs  T(C): 36.9 (16 Aug 2024 10:19), Max: 38.4 (15 Aug 2024 23:30)  T(F): 98.4 (16 Aug 2024 10:19), Max: 101.1 (15 Aug 2024 23:30)  HR: 105 (16 Aug 2024 10:19) (91 - 130)  BP: 133/74 (16 Aug 2024 10:19) (103/64 - 133/74)  BP(mean): --  RR: 18 (16 Aug 2024 10:19) (17 - 20)  SpO2: 98% (16 Aug 2024 10:19) (93% - 100%)    Parameters below as of 16 Aug 2024 10:19  Patient On (Oxygen Delivery Method): room air      Neurological Exam:    HF: Patient is alert, O X1, can, diff with repetition, Follows some commands.   CN: Vision is intact to confrontation. Pupils are equal and reactive. Extra ocular muscles are intact. There is no facial droop or asymmetry. Tongue is midline. Sensation is intact in the face. Other CN II-XII are intact.   Motor: motor examination all muscles are 4/5 and there is no pronator drift.   Sensory: intact to touch.   DTR: 0-1/4 all 4 extremities. Babinski is negative bilateral.  Co-ord:  Finger to finger to nose is intact.     Comprehensive Metabolic Panel in AM (08.16.24 @ 06:10)   Sodium: 139 mmol/L  Potassium: 3.6 mmol/L  Chloride: 107 mmol/L  Carbon Dioxide: 23 mmol/L  Anion Gap: 9 mmol/L  Blood Urea Nitrogen: 12 mg/dL  Creatinine: 1.80 mg/dL  Glucose: 135 mg/dL  Calcium: 8.6 mg/dL  Protein Total: 6.1 gm/dL  Albumin: 2.5 g/dL  Bilirubin Total: 0.6 mg/dL  Alkaline Phosphatase: 108 U/L  Aspartate Aminotransferase (AST/SGOT): 24 U/L  Alanine Aminotransferase (ALT/SGPT): 17 U/L  eGFR: 42:    Complete Blood Count + Automated Diff in AM (08.16.24 @ 06:10)   WBC Count: 4.76 K/uL  RBC Count: 3.28 M/uL  Hemoglobin: 8.9 g/dL  Hematocrit: 26.6 %  Mean Cell Volume: 81.1 fl  Mean Cell Hemoglobin: 27.1 pg  Mean Cell Hemoglobin Conc: 33.5 gm/dL  Red Cell Distrib Width: 14.3 %  Platelet Count - Automated: 233 K/uL    Ammonia, Serum: 19 umol/L (08.15.24 @ 11:45)   Thyroid Stimulating Hormone, Serum: 6.46 uU/mL (08.15.24 @ 10:25)   C-Reactive Protein: 128 mg/L (08.15.24 @ 06:54)   Sedimentation Rate, Erythrocyte: 60 mm/hr (08.15.24 @ 06:54)       < from: CT Head No Cont (08.15.24 @ 11:24) >  FINDINGS:  There are areas of low attenuation in the periventricular white matter   likely related to mild chronic microvascular ischemic changes.    There is no acute intracranial hemorrhage, parenchymal mass, mass effect   or midline shift. There is no acute territorial infarct. There is no   hydrocephalus.    The cranium is intact. The visualized paranasal sinuses are well-aerated.        IMPRESSION:  No acute intracranial hemorrhage or acute territorial infarct.   If   symptoms persist, follow-up MRI exam recommended.    < end of copied text >  < from: CT Chest No Cont (08.15.24 @ 11:24) >  IMPRESSION:  Evaluation of the solid organs, vascular structures and GI tract is   limited without oral and IV contrast.    Left lower lobe nodule seen previously is now partially obscured by   atelectasis. Cannot exclude superimposed pneumonia.    Bilateral lung nodules suspicious for metastatic disease.    Redemonstrated paraesophageal and bulky retroperitoneal adenopathy. Right   lateral abdominal metastatic nodules.    Mild splenomegaly.    Cholelithiasis.    < from: MR Head No Cont (08.16.24 @ 09:24) >    FINDINGS:  MRI of the brain:  High T2 signal periventricular subcortical white matter noted which may   be related to mild chronic microvascular ischemic changes.    Focal encephalomalacia/gliosis noted in the right frontal lobe.    There is no acute parenchymal hemorrhage, mass effect or midline shift.   There is no extra-axial fluid collection.  There is no hydrocephalus.    There is no acute infarct. Nonspecific mild prominence bilateral optic   nerve sheath.    Small retention cyst/polyp sphenoid sinus. Minimal mucosal thickening   ethmoidsinus.    Nonspecific low T1 bone marrow signal noted which may be related to red   marrow hyperplasia. Marrow infiltrative disease not excluded.    MRV of the head:  There is normal flow within the superior sagittal sinus, internal   cerebral veins,vein of Avinash and straight sinus.    The transverse and sigmoid sinuses are patent.        IMPRESSION:  No acute intracranial hemorrhage or acute infarct. If metastasis is a   clinical concern, repeat exam with contrast recommended.    < end of copied text >                  < end of copied text >  INTERPRETATION:   No focal, lateralized or epileptiform features were present.     No seizure sequences occurred.     This is an abnormal EEG.     This is an abnormal EEG consistent with moderate bilateral diffuse cerebral dysfunction.     This recording is consistent with metabolic derangement.

## 2024-08-16 NOTE — PROGRESS NOTE ADULT - SUBJECTIVE AND OBJECTIVE BOX
Date of service: 08-16-24 @ 10:13    Lying in bed in [facility]  More alert today  Febrile to 101.1F  Rash is improving, less extensive    ROS: no fever or chills; denies dizziness, no HA, no SOB or cough, no abdominal pain, no diarrhea or constipation; no dysuria, no legs pain, no rashes    MEDICATIONS  (STANDING):  atorvastatin 20 milliGRAM(s) Oral at bedtime  dextrose 5%. 1000 milliLiter(s) (50 mL/Hr) IV Continuous <Continuous>  dextrose 5%. 1000 milliLiter(s) (100 mL/Hr) IV Continuous <Continuous>  dextrose 50% Injectable 25 Gram(s) IV Push once  dextrose 50% Injectable 25 Gram(s) IV Push once  dextrose 50% Injectable 12.5 Gram(s) IV Push once  famotidine Injectable 20 milliGRAM(s) IV Push daily  glucagon  Injectable 1 milliGRAM(s) IntraMuscular once  insulin lispro (ADMELOG) corrective regimen sliding scale   SubCutaneous three times a day before meals  insulin lispro (ADMELOG) corrective regimen sliding scale   SubCutaneous at bedtime  metoprolol tartrate 25 milliGRAM(s) Oral daily  sodium bicarbonate  Infusion 0.102 mEq/kG/Hr (65 mL/Hr) IV Continuous <Continuous>    Vital Signs Last 24 Hrs  T(C): 36.8 (16 Aug 2024 08:10), Max: 38.4 (15 Aug 2024 23:30)  T(F): 98.2 (16 Aug 2024 08:10), Max: 101.1 (15 Aug 2024 23:30)  HR: 99 (16 Aug 2024 08:10) (91 - 130)  BP: 115/96 (16 Aug 2024 08:10) (103/64 - 115/96)  BP(mean): --  RR: 18 (16 Aug 2024 08:10) (17 - 20)  SpO2: 93% (16 Aug 2024 08:10) (93% - 100%)    Parameters below as of 16 Aug 2024 08:10  Patient On (Oxygen Delivery Method): room air     Physical exam:    Constitutional:  No acute distress  HEENT: NC/AT, EOMI, PERRLA, conjunctivae clear; ears and nose atraumatic; pharynx benign  Neck: supple; thyroid not palpable  Back: no tenderness  Respiratory: respiratory effort normal; crackles at bases  Cardiovascular: S1S2 regular, no murmurs  Abdomen: soft, not tender, not distended, positive BS; no liver or spleen organomegaly  Genitourinary: no suprapubic tenderness  Lymphatic: no LN palpable  Musculoskeletal: no muscle tenderness, no joint swelling or tenderness  Extremities: no pedal edema  Neurological/ Psychiatric: Alert, judgement and insight impaired; moving all extremities  Skin: diffuse macular rash on chest, arms, legs; no palpable lesions - improving    Labs: all available labs reviewed                        9.2    4.90  )-----------( 218      ( 15 Aug 2024 06:54 )             28.3     08-15    132<L>  |  106  |  18  ----------------------------<  123<H>  4.0   |  15<L>  |  2.01<H>    Ca    8.6      15 Aug 2024 06:54  Phos  3.5     08-15  Mg     1.6     08-15    TPro  6.2  /  Alb  2.6<L>  /  TBili  0.6  /  DBili  x   /  AST  18  /  ALT  15  /  AlkPhos  118  08-15     LIVER FUNCTIONS - ( 15 Aug 2024 06:54 )  Alb: 2.6 g/dL / Pro: 6.2 gm/dL / ALK PHOS: 118 U/L / ALT: 15 U/L / AST: 18 U/L / GGT: x           Urinalysis with Rflx Culture (08-15 @ 00:12)  Urine Appearance: Clear  Protein, Urine: 100 mg/dL  Urine Microscopic-Add On (NC) (08-15 @ 00:12)  White Blood Cell - Urine: 0 /HPF  Red Blood Cell - Urine: 0 /HPF    Urinalysis with Rflx Culture (collected 15 Aug 2024 00:12)    Culture - Blood (collected 14 Aug 2024 20:41)  Source: .Blood None  Preliminary Report (16 Aug 2024 03:01):    No growth at 24 hours    Culture - Blood (collected 14 Aug 2024 20:41)  Source: .Blood None  Preliminary Report (16 Aug 2024 03:01):    No growth at 24 hours    Radiology: all available radiological tests reviewed    < from: Xray Chest 1 View- PORTABLE-Urgent (03.17.24 @ 17:36) >  IMPRESSION:  Faint 2 cm peripheral left midlung nodular opacity corresponding to a finding on the PET/CT scan. Additional subcentimeter   nodule seen on that study are not able to be seen.The remainder of the lungs are clear.  < end of copied text >    Advanced directives addressed: full resuscitation

## 2024-08-16 NOTE — PROGRESS NOTE ADULT - ASSESSMENT
Acute toxic-metabolic encephalopathy unknown etiology at this time  SIRS POA - no obvious source of infection at this time - work up in progress  Metabolic acidosis   -Fever 100.8F, tachycardia in ER  -CT chest, abd/pelvis, head as above   -EEG w/o epileptiform activity, + cerebral disfunction   -Neurology consult appreciated, d/w Dr. Petersen today, plan for LP tomorrow  -ID consult appreciated, monitor off abx, s/p vanc and glenda in ED  -F/u cultures  -UA unremarkable  -Hold ASA/ppx heparin for now   -Pt had work up in July for joint pains     Maculopapular rash   -To bl LE  -started prior to initiation of Levaquin while in hospital in [place]  -As per wife, rash is improving     Acute kidney injury   Acute Urinary retention   Metabolic acidosis   -baseline Cr ~1.2-1.4  -Cr 2.32 > 2.01 now  -straight cath with ~ 400cc this AM  -Bicarb 14  -ABG pH 7.32  pH, pCO2: 28 / pO2: 91 / HCO3: 14 / Base Excess: -10.2 /  SaO2: 98    -start bicarb ggt  -place chapman catheter for I&Os and urinary retention     -Repeat BMP/ABG this evening - if not improvement may require a higher level of care     hx RCC s/p R nephrectomy   -On immunotherapy  -Heme/onc consult appreciated     HTN/HLD  -continue home meds     VTE ppx: hold heparin for now, SCDs -- but pt with b/l LE pain/rash     DISPO: for LP in the morning  Acute toxic-metabolic encephalopathy unknown etiology at this time  SIRS POA - no obvious source of infection at this time - work up in progress  Metabolic acidosis - improved   -Fever 100.8F, tachycardia in ER  -CT chest, abd/pelvis, head as above  -MR head, MR venogram as above - no acute findings    -EEG w/o epileptiform activity, + cerebral disfunction   -Neurology consult appreciated, d/w Dr. Petersen today  -ID consult appreciated, monitor off abx, s/p vanc and glenda in ED  -F/u cultures  -UA unremarkable  -Pt on immunotherapy, could be related   -Hold ASA/ppx heparin for now   -8/16: fever 101.1F overnight, tachy to 130s  -For LP today with IR, f/u CSF studies     Adrenal insufficiency  -AM cortisol 0.9   -start hydrocortisone   -ACTH stim test in AM  -likely related to immunotherapy  -Pt was started on course of prednisone 8/1 for itching/arthritis    Maculopapular rash - improved   -started prior to initiation of Levaquin while in hospital in [place]  -As per wife, rash is improving     Acute kidney injury - improving   Acute Urinary retention - resolved   Metabolic acidosis - HCO3 improved   -baseline Cr ~1.2-1.4  -Cr 2.32 > 2.01 > 1.8  -straight cath with ~ 400cc this AM  -Bicarb 14  -ABG pH 7.32  pH, pCO2: 28 / pO2: 91 / HCO3: 14 / Base Excess: -10.2 /  SaO2: 98    -start bicarb ggt  -place chapman catheter for I&Os and urinary retention     -Repeat BMP/ABG this evening - if not improvement may require a higher level of care     hx RCC s/p R nephrectomy   -On immunotherapy  -Heme/onc consult appreciated     HTN/HLD  -continue home meds     VTE ppx: hold heparin for now, SCDs -- but pt with b/l LE pain/rash     DISPO: for LP in the morning  Acute toxic-metabolic encephalopathy unknown etiology at this time  SIRS POA - no obvious source of infection at this time - work up in progress  Metabolic acidosis - improved   -Fever 100.8F, tachycardia in ER  -CT chest, abd/pelvis, head as above  -MR head, MR venogram as above - no acute findings    -EEG w/o epileptiform activity, + cerebral disfunction   -Neurology consult appreciated, d/w Dr. Petersen today  -ID consult appreciated, monitor off abx, s/p vanc and glenda in ED  -F/u cultures  -UA unremarkable  -Pt on immunotherapy, could be related   -Hold ASA/ppx heparin for now   -8/16: fever 101.1F overnight, tachy to 130s  -For LP today with IR, f/u CSF studies     Adrenal insufficiency  -AM cortisol 0.9   -start hydrocortisone   -ACTH stim test in AM  -likely related to immunotherapy  -Pt was started on course of prednisone 8/1 for itching/arthritis    Maculopapular rash - improved   -started prior to initiation of Levaquin while in hospital in [place]  -As per wife, rash is improving     Acute kidney injury - improving   Acute Urinary retention - resolved   Metabolic acidosis - HCO3 improved   -baseline Cr ~1.2-1.4  -Cr 2.32 > 2.01 > 1.8  -straight cath with ~ 400cc this AM  -Bicarb 14 > 18 > 20  -ABG 8/15: pH 7.32  pH, pCO2: 28 / pO2: 91 / HCO3: 14 / Base Excess: -10.2 /  SaO2: 98    -will d/c bicarb ggt     hx RCC s/p R nephrectomy   -On immunotherapy  -nivolumab and ipilimumab Q21 x 4 -> Nivo maintenance -most recent dosing 07/22/2024  -Heme/onc consult appreciated     HTN/HLD  -continue home meds     VTE ppx: hold heparin for now, SCDs     DISPO: for LP in the morning Acute toxic-metabolic encephalopathy unknown etiology at this time  SIRS POA - no obvious source of infection at this time - work up in progress  Metabolic acidosis - improved   -Fever 100.8F, tachycardia in ER  -CT chest, abd/pelvis, head as above  -MR head, MR venogram as above - no acute findings    -EEG w/o epileptiform activity, + cerebral disfunction   -Neurology consult appreciated, d/w Dr. Petersen today  -ID consult appreciated, monitor off abx, s/p vanc and glenda in ED  -F/u cultures  -UA unremarkable  -Pt on immunotherapy, could be related   -Hold ASA/ppx heparin for now   -8/16: fever 101.1F overnight, tachy to 130s  -For LP today with IR, f/u CSF studies     Adrenal insufficiency  -AM cortisol 0.9   -start hydrocortisone   -ACTH stim test in AM  -likely related to immunotherapy  -Pt was started on course of prednisone 8/1 for itching/arthritis    Maculopapular rash - improved   -started prior to initiation of Levaquin while in hospital in [place]  -As per wife, rash is improving     Acute kidney injury - improving   Acute Urinary retention - resolved   Metabolic acidosis - HCO3 improved   -baseline Cr ~1.2-1.4  -Cr 2.32 > 2.01 > 1.8  -straight cath with ~ 400cc on 8/14  -Bicarb 14 > 18 > 20  -ABG 8/15: pH 7.32  pH, pCO2: 28 / pO2: 91 / HCO3: 14 / Base Excess: -10.2 /  SaO2: 98    -will d/c bicarb ggt     hx RCC s/p R nephrectomy   -On immunotherapy  -nivolumab and ipilimumab Q21 x 4 -> Nivo maintenance -most recent dosing 07/22/2024  -Heme/onc consult appreciated     HTN/HLD  -continue home meds     VTE ppx: hold heparin for now, SCDs

## 2024-08-17 LAB
ACTH STIM CORTISOL 30 MIN: 35.2 UG/DL — SIGNIFICANT CHANGE UP
ACTH STIM CORTISOL BASELINE: 58 UG/DL — HIGH (ref 6–18.4)
ALBUMIN SERPL ELPH-MCNC: 2.6 G/DL — LOW (ref 3.3–5)
ALP SERPL-CCNC: 95 U/L — SIGNIFICANT CHANGE UP (ref 40–120)
ALT FLD-CCNC: 18 U/L — SIGNIFICANT CHANGE UP (ref 12–78)
ANION GAP SERPL CALC-SCNC: 8 MMOL/L — SIGNIFICANT CHANGE UP (ref 5–17)
AST SERPL-CCNC: 24 U/L — SIGNIFICANT CHANGE UP (ref 15–37)
BASOPHILS # BLD AUTO: 0.01 K/UL — SIGNIFICANT CHANGE UP (ref 0–0.2)
BASOPHILS NFR BLD AUTO: 0.2 % — SIGNIFICANT CHANGE UP (ref 0–2)
BILIRUB SERPL-MCNC: 0.4 MG/DL — SIGNIFICANT CHANGE UP (ref 0.2–1.2)
BUN SERPL-MCNC: 14 MG/DL — SIGNIFICANT CHANGE UP (ref 7–23)
C NEOFORM RRNA SPEC NAA+PROBE-ACNC: SIGNIFICANT CHANGE UP
CALCIUM SERPL-MCNC: 8.7 MG/DL — SIGNIFICANT CHANGE UP (ref 8.5–10.1)
CHLORIDE SERPL-SCNC: 104 MMOL/L — SIGNIFICANT CHANGE UP (ref 96–108)
CMV DNA CSF QL NAA+PROBE: SIGNIFICANT CHANGE UP
CO2 SERPL-SCNC: 27 MMOL/L — SIGNIFICANT CHANGE UP (ref 22–31)
CREAT SERPL-MCNC: 1.52 MG/DL — HIGH (ref 0.5–1.3)
CSF PCR RESULT: SIGNIFICANT CHANGE UP
E COLI K1 DNA CSF QL NAA+NON-PROBE: SIGNIFICANT CHANGE UP
EGFR: 52 ML/MIN/1.73M2 — LOW
EOSINOPHIL # BLD AUTO: 0.01 K/UL — SIGNIFICANT CHANGE UP (ref 0–0.5)
EOSINOPHIL NFR BLD AUTO: 0.2 % — SIGNIFICANT CHANGE UP (ref 0–6)
ESCHERICHIA COLI K1: SIGNIFICANT CHANGE UP
EV RNA CSF QL NAA+PROBE: SIGNIFICANT CHANGE UP
GLUCOSE SERPL-MCNC: 275 MG/DL — HIGH (ref 70–99)
GP B STREP DNA SPEC QL NAA+PROBE: SIGNIFICANT CHANGE UP
HAEM INFLU DNA SPEC QL NAA+PROBE: SIGNIFICANT CHANGE UP
HCT VFR BLD CALC: 28.9 % — LOW (ref 39–50)
HGB BLD-MCNC: 9.5 G/DL — LOW (ref 13–17)
HHV6 DNA CSF QL NAA+PROBE: SIGNIFICANT CHANGE UP
HSV1 DNA CSF QL NAA+PROBE: SIGNIFICANT CHANGE UP
HSV2 DNA CSF QL NAA+PROBE: SIGNIFICANT CHANGE UP
IMM GRANULOCYTES NFR BLD AUTO: 1 % — HIGH (ref 0–0.9)
L MONOCYTOG DNA SPEC QL NAA+PROBE: SIGNIFICANT CHANGE UP
LYMPHOCYTES # BLD AUTO: 0.54 K/UL — LOW (ref 1–3.3)
LYMPHOCYTES # BLD AUTO: 9.2 % — LOW (ref 13–44)
MCHC RBC-ENTMCNC: 26.5 PG — LOW (ref 27–34)
MCHC RBC-ENTMCNC: 32.9 GM/DL — SIGNIFICANT CHANGE UP (ref 32–36)
MCV RBC AUTO: 80.7 FL — SIGNIFICANT CHANGE UP (ref 80–100)
MONOCYTES # BLD AUTO: 0.16 K/UL — SIGNIFICANT CHANGE UP (ref 0–0.9)
MONOCYTES NFR BLD AUTO: 2.7 % — SIGNIFICANT CHANGE UP (ref 2–14)
N MEN DNA SPEC QL NAA+PROBE: SIGNIFICANT CHANGE UP
NEUTROPHILS # BLD AUTO: 5.06 K/UL — SIGNIFICANT CHANGE UP (ref 1.8–7.4)
NEUTROPHILS NFR BLD AUTO: 86.7 % — HIGH (ref 43–77)
PARECHOVIRUS A RNA SPEC QL NAA+PROBE: SIGNIFICANT CHANGE UP
PLATELET # BLD AUTO: 302 K/UL — SIGNIFICANT CHANGE UP (ref 150–400)
POTASSIUM SERPL-MCNC: 3.6 MMOL/L — SIGNIFICANT CHANGE UP (ref 3.5–5.3)
POTASSIUM SERPL-SCNC: 3.6 MMOL/L — SIGNIFICANT CHANGE UP (ref 3.5–5.3)
PROT SERPL-MCNC: 5.6 G/DL — LOW (ref 6–8.3)
PROT SERPL-MCNC: 6.1 GM/DL — SIGNIFICANT CHANGE UP (ref 6–8.3)
RBC # BLD: 3.58 M/UL — LOW (ref 4.2–5.8)
RBC # FLD: 14 % — SIGNIFICANT CHANGE UP (ref 10.3–14.5)
S PNEUM DNA SPEC QL NAA+PROBE: SIGNIFICANT CHANGE UP
SODIUM SERPL-SCNC: 139 MMOL/L — SIGNIFICANT CHANGE UP (ref 135–145)
VZV DNA CSF QL NAA+PROBE: SIGNIFICANT CHANGE UP
WBC # BLD: 5.84 K/UL — SIGNIFICANT CHANGE UP (ref 3.8–10.5)
WBC # FLD AUTO: 5.84 K/UL — SIGNIFICANT CHANGE UP (ref 3.8–10.5)

## 2024-08-17 PROCEDURE — 95813 EEG EXTND MNTR 61-119 MIN: CPT | Mod: 26,1L

## 2024-08-17 PROCEDURE — 99233 SBSQ HOSP IP/OBS HIGH 50: CPT

## 2024-08-17 RX ORDER — METHYLPREDNISOLONE 4 MG
100 TABLET ORAL DAILY
Refills: 0 | Status: DISCONTINUED | OUTPATIENT
Start: 2024-08-17 | End: 2024-08-18

## 2024-08-17 RX ORDER — COSYNTROPIN 0.25 MG
0.25 VIAL (EA) INJECTION ONCE
Refills: 0 | Status: COMPLETED | OUTPATIENT
Start: 2024-08-17 | End: 2024-08-17

## 2024-08-17 RX ORDER — METHYLPREDNISOLONE 4 MG
100 TABLET ORAL DAILY
Refills: 0 | Status: DISCONTINUED | OUTPATIENT
Start: 2024-08-17 | End: 2024-08-17

## 2024-08-17 RX ORDER — PANTOPRAZOLE SODIUM 40 MG
40 TABLET, DELAYED RELEASE (ENTERIC COATED) ORAL
Refills: 0 | Status: DISCONTINUED | OUTPATIENT
Start: 2024-08-17 | End: 2024-08-24

## 2024-08-17 RX ORDER — INSULIN GLARGINE 100 [IU]/ML
10 INJECTION, SOLUTION SUBCUTANEOUS AT BEDTIME
Refills: 0 | Status: DISCONTINUED | OUTPATIENT
Start: 2024-08-17 | End: 2024-08-18

## 2024-08-17 RX ADMIN — FAMOTIDINE 20 MILLIGRAM(S): 10 INJECTION INTRAVENOUS at 10:30

## 2024-08-17 RX ADMIN — HYDROCORTISONE 25 MILLIGRAM(S): 10 TABLET ORAL at 12:13

## 2024-08-17 RX ADMIN — Medication 100 MILLIGRAM(S): at 16:19

## 2024-08-17 RX ADMIN — INSULIN GLARGINE 10 UNIT(S): 100 INJECTION, SOLUTION SUBCUTANEOUS at 21:14

## 2024-08-17 RX ADMIN — METOPROLOL TARTRATE 25 MILLIGRAM(S): 100 TABLET ORAL at 05:12

## 2024-08-17 RX ADMIN — Medication 6: at 08:11

## 2024-08-17 RX ADMIN — SODIUM BICARBONATE 65 MEQ/KG/HR: 84 INJECTION, SOLUTION INTRAVENOUS at 02:20

## 2024-08-17 RX ADMIN — Medication 266 MILLIGRAM(S): at 05:12

## 2024-08-17 RX ADMIN — Medication 4: at 16:22

## 2024-08-17 RX ADMIN — Medication 4: at 12:12

## 2024-08-17 RX ADMIN — Medication 0.25 MILLIGRAM(S): at 10:53

## 2024-08-17 RX ADMIN — Medication 20 MILLIGRAM(S): at 21:13

## 2024-08-17 RX ADMIN — Medication 1: at 21:15

## 2024-08-17 NOTE — EEG REPORT - NS EEG TEXT BOX
Study Date: 		08-17-24  Duration in hours:  1 hr    --------------------------------------------------------------------------------------------------  History:  CC/ HPI Patient is a 59 y old  male who presents with a chief complaint of   MEDICATIONS  (STANDING):    --------------------------------------------------------------------------------------------------  Study Interpretation:    [Abbreviation Key:  PDR=alpha rhythm/posterior dominant rhythm. A-P=anterior posterior.  Amplitude: ‘very low’:<20; ‘low’:20-49; ‘medium’:; ‘high’:>150uV.  Persistence for periodic/rhythmic patterns (% of epoch) ‘rare’:<1%; ‘occasional’:1-10%; ‘frequent’:10-50%; ‘abundant’:50-90%; ‘continuous’:>90%.  Persistence for sporadic discharges: ‘rare’:<1/hr; ‘occasional’:1/min-1/hr; ‘frequent’:>1/min; ‘abundant’:>1/10 sec.  RPP=rhythmic and periodic patterns; GRDA=generalized rhythmic delta activity; FIRDA=frontal intermittent GRDA; LRDA=lateralized rhythmic delta activity; TIRDA=temporal intermittent rhythmic delta activity;  LPD=PLED=lateralized periodic discharges; GPD=generalized periodic discharges; BIPDs =bilateral independent periodic discharges; Mf=multifocal; SIRPDs=stimulus induced rhythmic, periodic, or ictal appearing discharges; BIRDs=brief potentially ictal rhythmic discharges >4 Hz, lasting .5-10s; PFA (paroxysmal bursts >13 Hz or =8 Hz <10s).  Modifiers: +F=with fast component; +S=with spike component; +R=with rhythmic component.  S-B=burst suppression pattern.  Max=maximal. N1-drowsy; N2-stage II sleep; N3-slow wave sleep. SSS/BETS=small sharp spikes/benign epileptiform transients of sleep. HV=hyperventilation; PS=photic stimulation]    FINDINGS:      Background:  Continuity: continuous  Symmetry: symmetric  PDR: 7 Hz activity, with amplitude to 40 uV, that attenuated to eye opening.    Reactivity: present  Voltage: normal (between 20-150uV)  Anterior Posterior Gradient: present  Other background findings: none  Breach: absent    Background Slowing:  Generalized slowing: diffuse irregular theta activity  Focal slowing: none was present.    State Changes:   -Drowsiness noted with increased slowing, attenuation of fast activity, vertex transients.  -N2 sleep transients were not recorded.    Sporadic Epileptiform Discharges:    None    Rhythmic and Periodic Patterns (RPPs):  None     Electrographic and Electroclinical seizures:  None    Other Clinical Events:  None    Activation Procedures:   -Hyperventilation was not performed.     -Photic stimulation was performed and did not elicit any abnormalities.       Artifacts:  Intermittent myogenic and movement artifacts were noted.    ECG:  The heart rate on single channel ECG was irregular and predominantly between 70-80 BPM.    EEG Classification / Summary:  Abnormal EEG study  Mild generalized background slowing    -----------------------------------------------------------------------------------------------------    Clinical Impression:  Mild diffuse/multi-focal cerebral dysfunction, not specific as to etiology.  Irregular heart rate seen in single lead EKG.   There were no epileptiform abnormalities recorded.      This is a preliminary fellow impression only pending attending review    Lester Shafer MD - Epilepsy Fellow          Study Date: 		08-17-24 09:53-10:59  Duration in hours:  >1 hr REEG    --------------------------------------------------------------------------------------------------  History:  CC/ HPI Patient is a 59 y old  male who presents with a chief complaint of   MEDICATIONS  (STANDING):    --------------------------------------------------------------------------------------------------  Study Interpretation:    [Abbreviation Key:  PDR=alpha rhythm/posterior dominant rhythm. A-P=anterior posterior.  Amplitude: ‘very low’:<20; ‘low’:20-49; ‘medium’:; ‘high’:>150uV.  Persistence for periodic/rhythmic patterns (% of epoch) ‘rare’:<1%; ‘occasional’:1-10%; ‘frequent’:10-50%; ‘abundant’:50-90%; ‘continuous’:>90%.  Persistence for sporadic discharges: ‘rare’:<1/hr; ‘occasional’:1/min-1/hr; ‘frequent’:>1/min; ‘abundant’:>1/10 sec.  RPP=rhythmic and periodic patterns; GRDA=generalized rhythmic delta activity; FIRDA=frontal intermittent GRDA; LRDA=lateralized rhythmic delta activity; TIRDA=temporal intermittent rhythmic delta activity;  LPD=PLED=lateralized periodic discharges; GPD=generalized periodic discharges; BIPDs =bilateral independent periodic discharges; Mf=multifocal; SIRPDs=stimulus induced rhythmic, periodic, or ictal appearing discharges; BIRDs=brief potentially ictal rhythmic discharges >4 Hz, lasting .5-10s; PFA (paroxysmal bursts >13 Hz or =8 Hz <10s).  Modifiers: +F=with fast component; +S=with spike component; +R=with rhythmic component.  S-B=burst suppression pattern.  Max=maximal. N1-drowsy; N2-stage II sleep; N3-slow wave sleep. SSS/BETS=small sharp spikes/benign epileptiform transients of sleep. HV=hyperventilation; PS=photic stimulation]    FINDINGS:      Background:  Continuity: continuous  Symmetry: symmetric  PDR: 7 Hz activity, with amplitude to 40 uV, that attenuated to eye opening.    Reactivity: present  Voltage: normal (between 20-150uV)  Anterior Posterior Gradient: present  Other background findings: none  Breach: absent    Background Slowing:  Generalized slowing: diffuse irregular theta activity  Focal slowing: none was present.    State Changes:   -Drowsiness noted with increased slowing, attenuation of fast activity, vertex transients.  -N2 sleep transients were not recorded.    Sporadic Epileptiform Discharges:    None    Rhythmic and Periodic Patterns (RPPs):  GRDA, quasiperiodic triphasic delta near 1.5-2hz at times    Electrographic and Electroclinical seizures:  None    Other Clinical Events:  None    Activation Procedures:   -Hyperventilation was not performed.     -Photic stimulation was performed and did not elicit any abnormalities.       Artifacts:  Intermittent myogenic and movement artifacts were noted.    ECG:  The heart rate on single channel ECG was irregular and predominantly between 70-80 BPM.    EEG Classification / Summary:  Abnormal EEG study  Mild-moderate generalized background slowing  GRDA, quasiperiodic triphasic delta near 1.5-2hz at times    -----------------------------------------------------------------------------------------------------    Clinical Impression:  Moderate diffuse/multi-focal cerebral dysfunction, not specific as to etiology.  Irregular heart rate seen in single lead EKG.   There were no epileptiform abnormalities recorded.        Lester Shafer MD - Epilepsy Fellow     MD HITESH Fischer  Director, Continuous EEG Monitoring Program, [facility]   and Epilepsy Fellowship ,   Department of Neurology, [facility] [employer] [facility] [employer]    [facility] EEG Reading Room Ph#: [phone]  Epilepsy Answering Service after 5PM and before 8:30AM: Ph#: [phone]

## 2024-08-17 NOTE — PROGRESS NOTE ADULT - SUBJECTIVE AND OBJECTIVE BOX
HPI: 59M w/ h/o renal cell carcinoma status post right nephrectomy on immunotherapy, last dose 3 weeks ago, paroxysmal A-fib not on anticoagulation, presents with c/o lethargy. Patient lethargic, collateral history provided by wife at bedside. As per wife, patient had been in his usual state of health until one week ago. They went to [place] last week on Wednesday to see there new grandchild. On Thursday patient started to act bizarre, had trouble walking and speaking. The wife called EMS and he was taken to the local hospital in [place]. As per the wife pt had a CT chest which was noted for progression of malignancy and PNA, and he had an MRI done which showed a chronic stroke. Patient was in the hospital for 5 days presumably getting antibiotics, and initially improved but developed a rash during the hospital course. Most of patients providers are in [facility] so they asked to be discharged from [place] and came back to [place] yesterday. As per wife the patient initially improved but got worse again while coming to [place], and currently has been lethargic, spiking fevers, and has a diffuse macular rash that's getting worse. On arrival to ED, pt tachycardic, and febrile, vitals otherwise stable. CBC noted for WBC 7.6, H/H 10/29, PLt 272. CMP noted for BUN/Cr 18/2.32, UA sterile, flu/covid negative. Admitted to  for PNA.   (15 Aug 2024 02:08)    8/15: Patient seen and evaluated today, lethargic, eye opening to name and painful stimuli, did not follow commands, similar mentation to when he was admitted yesterday, non verbal on my exam this AM. EEG with cerebral dysfunction, retaining urine. Ammonia levels normal, B12 normal. Pt re-evaluated ~ 4:30pm, eye opening to name, follows commands, knows name, good strength to b/l UE, ~ 3-4 to b/l LE, minimally against gravity, seems painful to move LE.     8/16: pt seen this AM, more awake, alert today, speaking, but confused, laughs when not able to answer questions, knows name, T 101.1F overnight. Acidosis improved, AM cortisol 0.9.     8/17: pt seen today, awake, alert, mentation improved, able to maintain some conversation, oriented x3, tolerating regular diet    REVIEW OF SYSTEMS:    CONSTITUTIONAL: No weakness, fevers or chills  EYES/ENT: No visual changes;  No vertigo or throat pain   NECK: No pain or stiffness  RESPIRATORY: No cough, wheezing, hemoptysis; No shortness of breath  CARDIOVASCULAR: No chest pain or palpitations  GASTROINTESTINAL: No abdominal or epigastric pain. No nausea, vomiting, or hematemesis; No diarrhea or constipation. No melena or hematochezia.  GENITOURINARY: No dysuria, frequency or hematuria  NEUROLOGICAL: No numbness or weakness  SKIN: No itching, rashes    PHYSICAL EXAM:  GENERAL: NAD, lying in bed comfortably  HEAD:  Atraumatic, Normocephalic  EYES: conjunctiva and sclera clear  ENT: Moist mucous membranes  NECK: Supple, No JVD  CHEST/LUNG: Clear to auscultation bilaterally; No rales, rhonchi, wheezing. Unlabored respirations  HEART: Regular rate and rhythm; No murmurs  ABDOMEN: Bowel sounds present; Soft, Nontender, Nondistended.   EXTREMITIES:  2+ Peripheral Pulses, brisk capillary refill. No clubbing, cyanosis, or edema  NERVOUS SYSTEM:  Alert & Oriented X3, speech clear. No deficits   MSK: FROM all 4 extremities, full and equal strength      GENERAL: NAD, lying in bed comfortably  HEAD:  Atraumatic, Normocephalic  EYES: conjunctiva and sclera clear  ENT: Moist mucous membranes  NECK: Supple, No JVD  CHEST/LUNG: Clear to auscultation bilaterally; No rales, rhonchi, wheezing. Unlabored respirations  HEART: Regular rate and rhythm; No murmurs  ABDOMEN: Bowel sounds present; Soft, Nontender, Nondistended.   EXTREMITIES:  2+ Peripheral Pulses, brisk capillary refill. No clubbing, cyanosis, or edema  NERVOUS SYSTEM:  awake, alert, oriented x 3, speech fluency impaired  MSK: 5/5 strength to b/l UE, 3/5 to b/l LE (barely able to lift)  SKIN: maculopapular rash to b/l LE, spotty rash to palms, no itching --- improved         LABS:                                     9.5    5.84  )-----------( 302      ( 17 Aug 2024 05:58 )             28.9   08-17    139  |  104  |  14  ----------------------------<  275<H>  3.6   |  27  |  1.52<H>    Ca    8.7      17 Aug 2024 05:58  Phos  2.6     08-16  Mg     1.8     08-16    TPro  5.6<L>  /  Alb  2.6<L>  /  TBili  0.4  /  DBili  x   /  AST  24  /  ALT  18  /  AlkPhos  95  08-17      CAPILLARY BLOOD GLUCOSE      POCT Blood Glucose.: 219 mg/dL (17 Aug 2024 12:07)  POCT Blood Glucose.: 258 mg/dL (17 Aug 2024 08:01)  POCT Blood Glucose.: 183 mg/dL (16 Aug 2024 21:16)  POCT Blood Glucose.: 127 mg/dL (16 Aug 2024 16:29)        Urinalysis Basic - ( 15 Aug 2024 06:54 )    Color: x / Appearance: x / SG: x / pH: x  Gluc: 123 mg/dL / Ketone: x  / Bili: x / Urobili: x   Blood: x / Protein: x / Nitrite: x   Leuk Esterase: x / RBC: x / WBC x   Sq Epi: x / Non Sq Epi: x / Bacteria: x        RADIOLOGY:    < from: CT Abdomen and Pelvis No Cont (08.15.24 @ 11:24) >  IMPRESSION:  Evaluation of the solid organs, vascular structures and GI tract is   limited without oral and IV contrast.    Left lower lobe nodule seen previously is now partially obscured by   atelectasis. Cannot exclude superimposed pneumonia.    Bilateral lung nodules suspicious for metastatic disease.    Redemonstrated paraesophageal and bulky retroperitoneal adenopathy. Right   lateral abdominal metastatic nodules.    Mild splenomegaly.    Cholelithiasis.      < from: CT Head No Cont (08.15.24 @ 11:24) >  FINDINGS:  There are areas of low attenuation in the periventricular white matter   likely related to mild chronic microvascular ischemic changes.    There is no acute intracranial hemorrhage, parenchymal mass, mass effect   or midline shift. There is no acute territorial infarct. There is no   hydrocephalus.    The cranium is intact. The visualized paranasal sinuses are well-aerated.        IMPRESSION:  No acute intracranial hemorrhage or acute territorial infarct.   If   symptoms persist, follow-up MRI exam recommended.      < from: MR Head No Cont (08.16.24 @ 09:24) >    FINDINGS:  MRI of the brain:  High T2 signal periventricular subcortical white matter noted which may   be related to mild chronic microvascular ischemic changes.    Focal encephalomalacia/gliosis noted in the right frontal lobe.    There is no acute parenchymal hemorrhage, mass effect or midline shift.   There is no extra-axial fluid collection.  There is no hydrocephalus.    There is no acute infarct. Nonspecific mild prominence bilateral optic   nerve sheath.    Small retention cyst/polyp sphenoid sinus. Minimal mucosal thickening   ethmoidsinus.    Nonspecific low T1 bone marrow signal noted which may be related to red   marrow hyperplasia. Marrow infiltrative disease not excluded.    MRV of the head:  There is normal flow within the superior sagittal sinus, internal   cerebral veins,vein of Avinash and straight sinus.    The transverse and sigmoid sinuses are patent.        IMPRESSION:  No acute intracranial hemorrhage or acute infarct. If metastasis is a   clinical concern, repeat exam with contrast recommended.    Chronic ischemic changes.     HPI: 59M w/ h/o renal cell carcinoma status post right nephrectomy on immunotherapy, last dose 3 weeks ago, paroxysmal A-fib not on anticoagulation, presents with c/o lethargy. Patient lethargic, collateral history provided by wife at bedside. As per wife, patient had been in his usual state of health until one week ago. They went to [place] last week on Wednesday to see there new grandchild. On Thursday patient started to act bizarre, had trouble walking and speaking. The wife called EMS and he was taken to the local hospital in [place]. As per the wife pt had a CT chest which was noted for progression of malignancy and PNA, and he had an MRI done which showed a chronic stroke. Patient was in the hospital for 5 days presumably getting antibiotics, and initially improved but developed a rash during the hospital course. Most of patients providers are in [facility] so they asked to be discharged from [place] and came back to [place] yesterday. As per wife the patient initially improved but got worse again while coming to [place], and currently has been lethargic, spiking fevers, and has a diffuse macular rash that's getting worse. On arrival to ED, pt tachycardic, and febrile, vitals otherwise stable. CBC noted for WBC 7.6, H/H 10/29, PLt 272. CMP noted for BUN/Cr 18/2.32, UA sterile, flu/covid negative. Admitted to  for PNA.   (15 Aug 2024 02:08)    8/15: Patient seen and evaluated today, lethargic, eye opening to name and painful stimuli, did not follow commands, similar mentation to when he was admitted yesterday, non verbal on my exam this AM. EEG with cerebral dysfunction, retaining urine. Ammonia levels normal, B12 normal. Pt re-evaluated ~ 4:30pm, eye opening to name, follows commands, knows name, good strength to b/l UE, ~ 3-4 to b/l LE, minimally against gravity, seems painful to move LE.     8/16: pt seen this AM, more awake, alert today, speaking, but confused, laughs when not able to answer questions, knows name, T 101.1F overnight. Acidosis improved, AM cortisol 0.9.     8/17: pt seen today, awake, alert, mentation improved, able to maintain some conversation, oriented x3, tolerating regular diet, some visual hallucinations this afternoon - seeing Halloween decoration - cob webs     REVIEW OF SYSTEMS:    CONSTITUTIONAL: No weakness, fevers or chills  EYES/ENT: No visual changes;  No vertigo or throat pain   NECK: No pain or stiffness  RESPIRATORY: No cough, wheezing, hemoptysis; No shortness of breath  CARDIOVASCULAR: No chest pain or palpitations  GASTROINTESTINAL: No abdominal or epigastric pain. No nausea, vomiting, or hematemesis; No diarrhea or constipation. No melena or hematochezia.  GENITOURINARY: No dysuria, frequency or hematuria  NEUROLOGICAL: No numbness or weakness  SKIN: No itching, rashes    PHYSICAL EXAM:  GENERAL: NAD, lying in bed comfortably  HEAD:  Atraumatic, Normocephalic  EYES: conjunctiva and sclera clear  ENT: Moist mucous membranes  NECK: Supple, No JVD  CHEST/LUNG: Clear to auscultation bilaterally; No rales, rhonchi, wheezing. Unlabored respirations  HEART: Regular rate and rhythm; No murmurs  ABDOMEN: Bowel sounds present; Soft, Nontender, Nondistended.   EXTREMITIES:  2+ Peripheral Pulses, brisk capillary refill. No clubbing, cyanosis, or edema  NERVOUS SYSTEM:  Alert & Oriented X3, speech clear. No deficits   MSK: FROM all 4 extremities, full and equal strength      GENERAL: NAD, lying in bed comfortably  HEAD:  Atraumatic, Normocephalic  EYES: conjunctiva and sclera clear  ENT: Moist mucous membranes  NECK: Supple, No JVD  CHEST/LUNG: Clear to auscultation bilaterally; No rales, rhonchi, wheezing. Unlabored respirations  HEART: Regular rate and rhythm; No murmurs  ABDOMEN: Bowel sounds present; Soft, Nontender, Nondistended.   EXTREMITIES:  2+ Peripheral Pulses, brisk capillary refill. No clubbing, cyanosis, or edema  NERVOUS SYSTEM:  awake, alert, oriented x 3, speech fluency impaired  MSK: 5/5 strength to b/l UE, 3/5 to b/l LE (barely able to lift)  SKIN: maculopapular rash to b/l LE, spotty rash to palms, no itching --- improved         LABS:                                     9.5    5.84  )-----------( 302      ( 17 Aug 2024 05:58 )             28.9   08-17    139  |  104  |  14  ----------------------------<  275<H>  3.6   |  27  |  1.52<H>    Ca    8.7      17 Aug 2024 05:58  Phos  2.6     08-16  Mg     1.8     08-16    TPro  5.6<L>  /  Alb  2.6<L>  /  TBili  0.4  /  DBili  x   /  AST  24  /  ALT  18  /  AlkPhos  95  08-17      CAPILLARY BLOOD GLUCOSE      POCT Blood Glucose.: 219 mg/dL (17 Aug 2024 12:07)  POCT Blood Glucose.: 258 mg/dL (17 Aug 2024 08:01)  POCT Blood Glucose.: 183 mg/dL (16 Aug 2024 21:16)  POCT Blood Glucose.: 127 mg/dL (16 Aug 2024 16:29)        Urinalysis Basic - ( 15 Aug 2024 06:54 )    Color: x / Appearance: x / SG: x / pH: x  Gluc: 123 mg/dL / Ketone: x  / Bili: x / Urobili: x   Blood: x / Protein: x / Nitrite: x   Leuk Esterase: x / RBC: x / WBC x   Sq Epi: x / Non Sq Epi: x / Bacteria: x        RADIOLOGY:    < from: CT Abdomen and Pelvis No Cont (08.15.24 @ 11:24) >  IMPRESSION:  Evaluation of the solid organs, vascular structures and GI tract is   limited without oral and IV contrast.    Left lower lobe nodule seen previously is now partially obscured by   atelectasis. Cannot exclude superimposed pneumonia.    Bilateral lung nodules suspicious for metastatic disease.    Redemonstrated paraesophageal and bulky retroperitoneal adenopathy. Right   lateral abdominal metastatic nodules.    Mild splenomegaly.    Cholelithiasis.      < from: CT Head No Cont (08.15.24 @ 11:24) >  FINDINGS:  There are areas of low attenuation in the periventricular white matter   likely related to mild chronic microvascular ischemic changes.    There is no acute intracranial hemorrhage, parenchymal mass, mass effect   or midline shift. There is no acute territorial infarct. There is no   hydrocephalus.    The cranium is intact. The visualized paranasal sinuses are well-aerated.        IMPRESSION:  No acute intracranial hemorrhage or acute territorial infarct.   If   symptoms persist, follow-up MRI exam recommended.      < from: MR Head No Cont (08.16.24 @ 09:24) >    FINDINGS:  MRI of the brain:  High T2 signal periventricular subcortical white matter noted which may   be related to mild chronic microvascular ischemic changes.    Focal encephalomalacia/gliosis noted in the right frontal lobe.    There is no acute parenchymal hemorrhage, mass effect or midline shift.   There is no extra-axial fluid collection.  There is no hydrocephalus.    There is no acute infarct. Nonspecific mild prominence bilateral optic   nerve sheath.    Small retention cyst/polyp sphenoid sinus. Minimal mucosal thickening   ethmoidsinus.    Nonspecific low T1 bone marrow signal noted which may be related to red   marrow hyperplasia. Marrow infiltrative disease not excluded.    MRV of the head:  There is normal flow within the superior sagittal sinus, internal   cerebral veins,vein of Avinash and straight sinus.    The transverse and sigmoid sinuses are patent.        IMPRESSION:  No acute intracranial hemorrhage or acute infarct. If metastasis is a   clinical concern, repeat exam with contrast recommended.    Chronic ischemic changes.

## 2024-08-17 NOTE — PROGRESS NOTE ADULT - ASSESSMENT
Acute toxic-metabolic encephalopathy possibly due to encephalitis related to immunotherapy  SIRS POA - no obvious source of infection at this time - work up in progress  Metabolic acidosis - improved   -Fever 100.8F, tachycardia in ER  -CT chest, abd/pelvis, head as above  -MR head, MR venogram as above - no acute findings    -EEG w/o epileptiform activity, + cerebral disfunction   -Neurology consult appreciated, d/w Dr. Barrera today, suspected encephalitis possibly related to immunotherapy   -ID consult appreciated, monitor off abx, s/p vanc and glenda in ED  -UA unremarkable  -Pt on immunotherapy, likely related related   -Hold ASA/ppx heparin for now   -8/16: fever 101.1F overnight, tachy to 130s  -s/p LP 8/16  with IR, f/u rest of CSF studies   -Was started on acyclovir yesterday, now d/c  -start solumedrol 1000mg IV daily for 5 days (day 1/5)  -F/u MRI brain with contrast     Adrenal insufficiency  -AM cortisol 0.9   -started hydrocortisone 8/16, now d/c and started on solumedrol for encephalitis  -likely related to immunotherapy  -Pt was started on course of prednisone 8/1 for itching/arthritis    Maculopapular rash - improved   -started prior to initiation of Levaquin while in hospital in [place]  -As per wife, rash is improving     Acute kidney injury - improving   Acute Urinary retention - resolved   Metabolic acidosis - HCO3 improved   -baseline Cr ~1.2-1.4  -Cr 2.32 > 2.01 > 1.8 > 1.52   -straight cath with ~ 400cc on 8/14  -Bicarb normalized   -ABG 8/15: pH 7.32  pH, pCO2: 28 / pO2: 91 / HCO3: 14 / Base Excess: -10.2 /  SaO2: 98    -s/p bicarb ggt     hx RCC s/p R nephrectomy   -On immunotherapy  -nivolumab and ipilimumab Q21 x 4 -> Nivo maintenance -most recent dosing 07/22/2024  -Heme/onc consult appreciated     HTN/HLD  -continue home meds     VTE ppx: hold heparin for now, SCDs     medically active  Acute toxic-metabolic encephalopathy possibly due to encephalitis related to immunotherapy  SIRS POA - no obvious source of infection at this time - work up in progress  Metabolic acidosis - improved   -Fever 100.8F, tachycardia in ER  -CT chest, abd/pelvis, head as above  -MR head, MR venogram as above - no acute findings    -EEG w/o epileptiform activity, + cerebral disfunction   -Neurology consult appreciated, d/w Dr. Barrera today, suspected encephalitis possibly related to immunotherapy   -ID consult appreciated, monitor off abx, s/p vanc and glenda in ED  -UA unremarkable  -Pt on immunotherapy, likely related related   -Hold ASA/ppx heparin for now   -8/16: fever 101.1F overnight, tachy to 130s  -s/p LP 8/16  with IR, f/u rest of CSF studies   -Was started on acyclovir yesterday, now d/c  -start solumedrol 1000mg IV daily for 5 days (day 1/5)  -F/u MRI brain with contrast     Adrenal insufficiency  -AM cortisol 0.9   -started hydrocortisone 8/16, now d/c and started on solumedrol for encephalitis  -likely related to immunotherapy  -Pt was started on course of prednisone 8/1 for itching/arthritis    Maculopapular rash - improved   -started prior to initiation of Levaquin while in hospital in [place]  -As per wife, rash is improving     Acute kidney injury - improving   Acute Urinary retention - resolved   Metabolic acidosis - HCO3 improved   -baseline Cr ~1.2-1.4  -Cr 2.32 > 2.01 > 1.8 > 1.52   -straight cath with ~ 400cc on 8/14  -Bicarb normalized   -ABG 8/15: pH 7.32  pH, pCO2: 28 / pO2: 91 / HCO3: 14 / Base Excess: -10.2 /  SaO2: 98    -s/p bicarb ggt     Hallucinations   -Likely steroid related, cont to monitor     hx RCC s/p R nephrectomy   -On immunotherapy  -nivolumab and ipilimumab Q21 x 4 -> Nivo maintenance -most recent dosing 07/22/2024  -Heme/onc consult appreciated     HTN/HLD  -continue home meds     VTE ppx: hold heparin for now, SCDs     medically active

## 2024-08-17 NOTE — PROGRESS NOTE ADULT - ASSESSMENT
59 year old man with RCC, s/p R nephrectomy, on novolumib, last infusion 07/17/24, afib not on AC, presenting with lethargy on 8/14, in setting of recent arthralgias, and rash.  Now more awake, but has impaired fluency, and attention.  MRI brain not showing anything acute at this time, but there is a R frontal gliotic lesion.  CSF shows elevated WBC 8, predominantly lymphocytes, and elevated proteins.  EEG showing multifocal slowing.    Overall suspicion for encephalitic process, possibly related to immunotherapy.    -would obtain MRi brain with contrast  -follow remaining CSF labs  -start on pulse steroids, solumedrol 1000mg for 5 days.   -monitor for infection  -neurology to follow. Please page or call with any acute neuro changes, or other issues we can help address.     Discussed with Dr. Holcomb from Internal Medicine

## 2024-08-17 NOTE — PROGRESS NOTE ADULT - SUBJECTIVE AND OBJECTIVE BOX
Patient seen and examined at the bedside.   Wife was present providing collateral history.      His last infusion of novolumib was July 17, 2024.      He began experiencing arthralgias, and was started on prednisone.  he then developed skin rash.      Last week, while on vacation in [place], he developed encephalopathy.  He was discharged from hospital in [place], and his wife brought him to .      Today, he is more awake, and oriented than previously.  He continues to have hallucinations, sees streaks along the wall, says it looks like Halloween decorations, or a chickenwire along the wall.    His wife says he is still somewhat inattentive and off topic in conversations.      On exam:   GEN: sitting up in bed, NAD  CV; irregularly irregulary  PULM: CTAB  NEURO:   Awake, alert, attending examiner, mildly altered sensorium.  He is oriented to month, year, not date, and oriented to hospital.  He names common objects, but cannot repeat a phrase.  His fluency is impaired.  He spells his own name forward and reverse, and he calculates q's in 1.50 briskly  Pupils 3-2mm, symmetric, full Vf's, no papilledema, full EOM, conjugate gaze, no nystagmus, no facial weakness, no dysarthria, tongue midline, palate symmetric.   MOTOR: normal bulka nd tone.  Diminished strength bilateral  4/5, wrist extension 4/5, biceps 4/5, deltoids 4/5, hip flexors 4-/5, and knee extensors 4/5.    SENSRY; intact symmetrically to light touch  COORDINATION: normal FNF    MRI brain images reviewed: no diffusion restriction.  FLAIR images showing R frontal white matter gliosis.

## 2024-08-18 LAB
ACTH STIM CORTISOL 60 MIN: 35.3 UG/DL — SIGNIFICANT CHANGE UP
ALBUMIN SERPL ELPH-MCNC: 2.9 G/DL — LOW (ref 3.3–5)
ALP SERPL-CCNC: 85 U/L — SIGNIFICANT CHANGE UP (ref 40–120)
ALT FLD-CCNC: 25 U/L — SIGNIFICANT CHANGE UP (ref 12–78)
ANION GAP SERPL CALC-SCNC: 6 MMOL/L — SIGNIFICANT CHANGE UP (ref 5–17)
APTT BLD: 33.8 SEC — SIGNIFICANT CHANGE UP (ref 24.5–35.6)
AST SERPL-CCNC: 29 U/L — SIGNIFICANT CHANGE UP (ref 15–37)
BASOPHILS # BLD AUTO: 0.01 K/UL — SIGNIFICANT CHANGE UP (ref 0–0.2)
BASOPHILS NFR BLD AUTO: 0.1 % — SIGNIFICANT CHANGE UP (ref 0–2)
BILIRUB SERPL-MCNC: 0.3 MG/DL — SIGNIFICANT CHANGE UP (ref 0.2–1.2)
BUN SERPL-MCNC: 18 MG/DL — SIGNIFICANT CHANGE UP (ref 7–23)
CALCIUM SERPL-MCNC: 8.8 MG/DL — SIGNIFICANT CHANGE UP (ref 8.5–10.1)
CHLORIDE SERPL-SCNC: 109 MMOL/L — HIGH (ref 96–108)
CO2 SERPL-SCNC: 28 MMOL/L — SIGNIFICANT CHANGE UP (ref 22–31)
CREAT SERPL-MCNC: 1.47 MG/DL — HIGH (ref 0.5–1.3)
CRP SERPL-MCNC: 39 MG/L — HIGH
EGFR: 54 ML/MIN/1.73M2 — LOW
EOSINOPHIL # BLD AUTO: 0.01 K/UL — SIGNIFICANT CHANGE UP (ref 0–0.5)
EOSINOPHIL NFR BLD AUTO: 0.1 % — SIGNIFICANT CHANGE UP (ref 0–6)
ERYTHROCYTE [SEDIMENTATION RATE] IN BLOOD: 50 MM/HR — HIGH (ref 0–20)
GLUCOSE SERPL-MCNC: 242 MG/DL — HIGH (ref 70–99)
HCT VFR BLD CALC: 26.4 % — LOW (ref 39–50)
HGB BLD-MCNC: 8.9 G/DL — LOW (ref 13–17)
IMM GRANULOCYTES NFR BLD AUTO: 1.6 % — HIGH (ref 0–0.9)
INR BLD: 1.21 RATIO — HIGH (ref 0.85–1.18)
LYMPHOCYTES # BLD AUTO: 0.88 K/UL — LOW (ref 1–3.3)
LYMPHOCYTES # BLD AUTO: 10.7 % — LOW (ref 13–44)
MCHC RBC-ENTMCNC: 27.3 PG — SIGNIFICANT CHANGE UP (ref 27–34)
MCHC RBC-ENTMCNC: 33.7 GM/DL — SIGNIFICANT CHANGE UP (ref 32–36)
MCV RBC AUTO: 81 FL — SIGNIFICANT CHANGE UP (ref 80–100)
MONOCYTES # BLD AUTO: 0.24 K/UL — SIGNIFICANT CHANGE UP (ref 0–0.9)
MONOCYTES NFR BLD AUTO: 2.9 % — SIGNIFICANT CHANGE UP (ref 2–14)
NEUTROPHILS # BLD AUTO: 6.95 K/UL — SIGNIFICANT CHANGE UP (ref 1.8–7.4)
NEUTROPHILS NFR BLD AUTO: 84.6 % — HIGH (ref 43–77)
PLATELET # BLD AUTO: 309 K/UL — SIGNIFICANT CHANGE UP (ref 150–400)
POTASSIUM SERPL-MCNC: 3.5 MMOL/L — SIGNIFICANT CHANGE UP (ref 3.5–5.3)
POTASSIUM SERPL-SCNC: 3.5 MMOL/L — SIGNIFICANT CHANGE UP (ref 3.5–5.3)
PROT SERPL-MCNC: 6.2 GM/DL — SIGNIFICANT CHANGE UP (ref 6–8.3)
PROTHROM AB SERPL-ACNC: 13.6 SEC — HIGH (ref 9.5–13)
RBC # BLD: 3.26 M/UL — LOW (ref 4.2–5.8)
RBC # FLD: 13.9 % — SIGNIFICANT CHANGE UP (ref 10.3–14.5)
SARS-COV-2 RNA SPEC QL NAA+PROBE: SIGNIFICANT CHANGE UP
SODIUM SERPL-SCNC: 143 MMOL/L — SIGNIFICANT CHANGE UP (ref 135–145)
WBC # BLD: 8.22 K/UL — SIGNIFICANT CHANGE UP (ref 3.8–10.5)
WBC # FLD AUTO: 8.22 K/UL — SIGNIFICANT CHANGE UP (ref 3.8–10.5)

## 2024-08-18 PROCEDURE — 70553 MRI BRAIN STEM W/O & W/DYE: CPT | Mod: 26

## 2024-08-18 PROCEDURE — 99233 SBSQ HOSP IP/OBS HIGH 50: CPT

## 2024-08-18 RX ORDER — LORAZEPAM 4 MG/ML
0.5 INJECTION INTRAMUSCULAR; INTRAVENOUS ONCE
Refills: 0 | Status: DISCONTINUED | OUTPATIENT
Start: 2024-08-18 | End: 2024-08-18

## 2024-08-18 RX ORDER — METHYLPREDNISOLONE 4 MG
1000 TABLET ORAL DAILY
Refills: 0 | Status: COMPLETED | OUTPATIENT
Start: 2024-08-18 | End: 2024-08-22

## 2024-08-18 RX ORDER — HEPARIN SODIUM,BOVINE 1000/ML
5000 VIAL (ML) INJECTION EVERY 8 HOURS
Refills: 0 | Status: DISCONTINUED | OUTPATIENT
Start: 2024-08-18 | End: 2024-08-24

## 2024-08-18 RX ORDER — LORAZEPAM 4 MG/ML
0.5 INJECTION INTRAMUSCULAR; INTRAVENOUS ONCE
Refills: 0 | Status: COMPLETED | OUTPATIENT
Start: 2024-08-18

## 2024-08-18 RX ORDER — POVIDONE, PROPYLENE GLYCOL 6.8; 3 MG/ML; MG/ML
2 LIQUID OPHTHALMIC EVERY 8 HOURS
Refills: 0 | Status: DISCONTINUED | OUTPATIENT
Start: 2024-08-18 | End: 2024-08-24

## 2024-08-18 RX ORDER — INSULIN GLARGINE 100 [IU]/ML
14 INJECTION, SOLUTION SUBCUTANEOUS AT BEDTIME
Refills: 0 | Status: DISCONTINUED | OUTPATIENT
Start: 2024-08-18 | End: 2024-08-19

## 2024-08-18 RX ORDER — SODIUM CHLORIDE 9 MG/ML
1000 INJECTION INTRAMUSCULAR; INTRAVENOUS; SUBCUTANEOUS
Refills: 0 | Status: DISCONTINUED | OUTPATIENT
Start: 2024-08-18 | End: 2024-08-21

## 2024-08-18 RX ADMIN — Medication 4: at 07:59

## 2024-08-18 RX ADMIN — Medication 3 MILLIGRAM(S): at 21:49

## 2024-08-18 RX ADMIN — Medication 40 MILLIGRAM(S): at 06:28

## 2024-08-18 RX ADMIN — Medication 4: at 14:43

## 2024-08-18 RX ADMIN — Medication 20 MILLIGRAM(S): at 21:48

## 2024-08-18 RX ADMIN — Medication 4: at 17:45

## 2024-08-18 RX ADMIN — SODIUM CHLORIDE 75 MILLILITER(S): 9 INJECTION INTRAMUSCULAR; INTRAVENOUS; SUBCUTANEOUS at 17:42

## 2024-08-18 RX ADMIN — Medication 5000 UNIT(S): at 21:47

## 2024-08-18 RX ADMIN — LORAZEPAM 0.5 MILLIGRAM(S): 4 INJECTION INTRAMUSCULAR; INTRAVENOUS at 11:42

## 2024-08-18 RX ADMIN — POVIDONE, PROPYLENE GLYCOL 2 DROP(S): 6.8; 3 LIQUID OPHTHALMIC at 20:22

## 2024-08-18 RX ADMIN — INSULIN GLARGINE 14 UNIT(S): 100 INJECTION, SOLUTION SUBCUTANEOUS at 21:48

## 2024-08-18 RX ADMIN — Medication 3 MILLIGRAM(S): at 00:32

## 2024-08-18 RX ADMIN — Medication 250 MILLIGRAM(S): at 15:32

## 2024-08-18 RX ADMIN — METOPROLOL TARTRATE 25 MILLIGRAM(S): 100 TABLET ORAL at 06:28

## 2024-08-18 RX ADMIN — Medication 4 UNIT(S): at 17:42

## 2024-08-18 NOTE — PROGRESS NOTE ADULT - SUBJECTIVE AND OBJECTIVE BOX
HPI: 59M w/ h/o renal cell carcinoma status post right nephrectomy on immunotherapy, last dose 3 weeks ago, paroxysmal A-fib not on anticoagulation, presents with c/o lethargy. Patient lethargic, collateral history provided by wife at bedside. As per wife, patient had been in his usual state of health until one week ago. They went to [place] last week on Wednesday to see there new grandchild. On Thursday patient started to act bizarre, had trouble walking and speaking. The wife called EMS and he was taken to the local hospital in [place]. As per the wife pt had a CT chest which was noted for progression of malignancy and PNA, and he had an MRI done which showed a chronic stroke. Patient was in the hospital for 5 days presumably getting antibiotics, and initially improved but developed a rash during the hospital course. Most of patients providers are in [facility] so they asked to be discharged from [place] and came back to [place] yesterday. As per wife the patient initially improved but got worse again while coming to [place], and currently has been lethargic, spiking fevers, and has a diffuse macular rash that's getting worse. On arrival to ED, pt tachycardic, and febrile, vitals otherwise stable. CBC noted for WBC 7.6, H/H 10/29, PLt 272. CMP noted for BUN/Cr 18/2.32, UA sterile, flu/covid negative. Admitted to  for PNA.   (15 Aug 2024 02:08)    8/15: Patient seen and evaluated today, lethargic, eye opening to name and painful stimuli, did not follow commands, similar mentation to when he was admitted yesterday, non verbal on my exam this AM. EEG with cerebral dysfunction, retaining urine. Ammonia levels normal, B12 normal. Pt re-evaluated ~ 4:30pm, eye opening to name, follows commands, knows name, good strength to b/l UE, ~ 3-4 to b/l LE, minimally against gravity, seems painful to move LE.     8/16: pt seen this AM, more awake, alert today, speaking, but confused, laughs when not able to answer questions, knows name, T 101.1F overnight. Acidosis improved, AM cortisol 0.9.     8/17: pt seen today, awake, alert, mentation improved, able to maintain some conversation, oriented x3, tolerating regular diet, some visual hallucinations this afternoon - seeing Halloween decoration - cob webs     8/18: pt seen this AM, awake, alert, oriented x3, able to lift legs against gravity now, improved strength, able to sit up, continues with visual hallucinations, ambulated with PT today, pt seen later in the afternoon, sitting up in chair, wife present.     REVIEW OF SYSTEMS:    CONSTITUTIONAL: No weakness, fevers or chills  EYES/ENT: No visual changes;  No vertigo or throat pain   NECK: No pain or stiffness  RESPIRATORY: No cough, wheezing, hemoptysis; No shortness of breath  CARDIOVASCULAR: No chest pain or palpitations  GASTROINTESTINAL: No abdominal or epigastric pain. No nausea, vomiting, or hematemesis; No diarrhea or constipation. No melena or hematochezia.  GENITOURINARY: No dysuria, frequency or hematuria  NEUROLOGICAL: No numbness or weakness, + hallucinations   SKIN: No itching, rashes      GENERAL: NAD, lying in bed comfortably  HEAD:  Atraumatic, Normocephalic  EYES: conjunctiva and sclera clear  ENT: Moist mucous membranes  NECK: Supple, No JVD  CHEST/LUNG: Clear to auscultation bilaterally; No rales, rhonchi, wheezing. Unlabored respirations  HEART: Regular rate and rhythm; No murmurs  ABDOMEN: Bowel sounds present; Soft, Nontender, Nondistended.   EXTREMITIES:  2+ Peripheral Pulses, brisk capillary refill. No clubbing, cyanosis, or edema  NERVOUS SYSTEM:  awake, alert, oriented x 3, speech fluency impaired  MSK: improved strength to all extremities, 4-5/5 to b/l LE  SKIN: maculopapular rash to b/l LE, spotty rash to palms, no itching --- improved     MEDICATIONS  (STANDING):  atorvastatin 20 milliGRAM(s) Oral at bedtime  dextrose 5%. 1000 milliLiter(s) (50 mL/Hr) IV Continuous <Continuous>  dextrose 5%. 1000 milliLiter(s) (100 mL/Hr) IV Continuous <Continuous>  dextrose 50% Injectable 25 Gram(s) IV Push once  dextrose 50% Injectable 25 Gram(s) IV Push once  dextrose 50% Injectable 12.5 Gram(s) IV Push once  glucagon  Injectable 1 milliGRAM(s) IntraMuscular once  insulin glargine Injectable (LANTUS) 14 Unit(s) SubCutaneous at bedtime  insulin lispro (ADMELOG) corrective regimen sliding scale   SubCutaneous three times a day before meals  insulin lispro (ADMELOG) corrective regimen sliding scale   SubCutaneous at bedtime  insulin lispro Injectable (ADMELOG) 4 Unit(s) SubCutaneous three times a day before meals  methylPREDNISolone sodium succinate IVPB 1000 milliGRAM(s) IV Intermittent daily  metoprolol tartrate 25 milliGRAM(s) Oral daily  pantoprazole    Tablet 40 milliGRAM(s) Oral before breakfast    MEDICATIONS  (PRN):  acetaminophen     Tablet .. 650 milliGRAM(s) Oral every 6 hours PRN Temp greater or equal to 38C (100.4F), Mild Pain (1 - 3)  aluminum hydroxide/magnesium hydroxide/simethicone Suspension 30 milliLiter(s) Oral every 4 hours PRN Dyspepsia  dextrose Oral Gel 15 Gram(s) Oral once PRN Blood Glucose LESS THAN 70 milliGRAM(s)/deciliter  melatonin 3 milliGRAM(s) Oral at bedtime PRN Insomnia  ondansetron Injectable 4 milliGRAM(s) IV Push every 8 hours PRN Nausea and/or Vomiting      LABS:                                     8.9    8.22  )-----------( 309      ( 18 Aug 2024 06:36 )             26.4   08-18    143  |  109<H>  |  18  ----------------------------<  242<H>  3.5   |  28  |  1.47<H>    Ca    8.8      18 Aug 2024 06:36    TPro  6.2  /  Alb  2.9<L>  /  TBili  0.3  /  DBili  x   /  AST  29  /  ALT  25  /  AlkPhos  85  08-18    CAPILLARY BLOOD GLUCOSE      POCT Blood Glucose.: 249 mg/dL (18 Aug 2024 14:39)  POCT Blood Glucose.: 242 mg/dL (18 Aug 2024 07:30)  POCT Blood Glucose.: 262 mg/dL (17 Aug 2024 21:10)  POCT Blood Glucose.: 225 mg/dL (17 Aug 2024 16:18)          Urinalysis Basic - ( 15 Aug 2024 06:54 )    Color: x / Appearance: x / SG: x / pH: x  Gluc: 123 mg/dL / Ketone: x  / Bili: x / Urobili: x   Blood: x / Protein: x / Nitrite: x   Leuk Esterase: x / RBC: x / WBC x   Sq Epi: x / Non Sq Epi: x / Bacteria: x        RADIOLOGY:    < from: CT Abdomen and Pelvis No Cont (08.15.24 @ 11:24) >  IMPRESSION:  Evaluation of the solid organs, vascular structures and GI tract is   limited without oral and IV contrast.    Left lower lobe nodule seen previously is now partially obscured by   atelectasis. Cannot exclude superimposed pneumonia.    Bilateral lung nodules suspicious for metastatic disease.    Redemonstrated paraesophageal and bulky retroperitoneal adenopathy. Right   lateral abdominal metastatic nodules.    Mild splenomegaly.    Cholelithiasis.      < from: CT Head No Cont (08.15.24 @ 11:24) >  FINDINGS:  There are areas of low attenuation in the periventricular white matter   likely related to mild chronic microvascular ischemic changes.    There is no acute intracranial hemorrhage, parenchymal mass, mass effect   or midline shift. There is no acute territorial infarct. There is no   hydrocephalus.    The cranium is intact. The visualized paranasal sinuses are well-aerated.        IMPRESSION:  No acute intracranial hemorrhage or acute territorial infarct.   If   symptoms persist, follow-up MRI exam recommended.      < from: MR Head No Cont (08.16.24 @ 09:24) >    FINDINGS:  MRI of the brain:  High T2 signal periventricular subcortical white matter noted which may   be related to mild chronic microvascular ischemic changes.    Focal encephalomalacia/gliosis noted in the right frontal lobe.    There is no acute parenchymal hemorrhage, mass effect or midline shift.   There is no extra-axial fluid collection.  There is no hydrocephalus.    There is no acute infarct. Nonspecific mild prominence bilateral optic   nerve sheath.    Small retention cyst/polyp sphenoid sinus. Minimal mucosal thickening   ethmoid sinus.    Nonspecific low T1 bone marrow signal noted which may be related to red   marrow hyperplasia. Marrow infiltrative disease not excluded.    MRV of the head:  There is normal flow within the superior sagittal sinus, internal   cerebral veins,vein of Avinash and straight sinus.    The transverse and sigmoid sinuses are patent.        IMPRESSION:  No acute intracranial hemorrhage or acute infarct. If metastasis is a   clinical concern, repeat exam with contrast recommended.    Chronic ischemic changes.

## 2024-08-18 NOTE — PROGRESS NOTE ADULT - ASSESSMENT
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 Barrera, suspected encephalitis possibly related to immunotherapy   -ID consult appreciated, monitor off abx, s/p vanc and glenda in ED  -UA unremarkable  -Pt on immunotherapy, likely related  -Hold ASA/ppx heparin for now   -8/16: fever 101.1F overnight, tachy to 130s  -s/p LP 8/16  with IR  -Was started on acyclovir, now d/c  -start solumedrol 1000mg IV daily for 5 days (day 1/5)  -F/u MRI brain with contrast     Adrenal insufficiency  -AM cortisol 0.9   -started hydrocortisone 8/16, now d/c and started on solumedrol for encephalitis  -likely related to immunotherapy  -Pt was started on course of prednisone 8/1 for itching/arthritis    Maculopapular rash - improved   -started prior to initiation of Levaquin while in hospital in [place]  -As per wife, rash is improving     Acute kidney injury - improving   Acute Urinary retention - resolved   Metabolic acidosis - HCO3 improved   -baseline Cr ~1.2-1.4  -Cr 2.32 > 2.01 > 1.8 > 1.52 > 1.47  -straight cath with ~ 400cc on 8/14  -Bicarb normalized   -ABG 8/15: pH 7.32  pH, pCO2: 28 / pO2: 91 / HCO3: 14 / Base Excess: -10.2 /  SaO2: 98    -s/p bicarb ggt     Hallucinations   -Likely steroid related, cont to monitor     hx RCC s/p R nephrectomy   -On immunotherapy  -nivolumab and ipilimumab Q21 x 4 -> Nivo maintenance -most recent dosing 07/22/2024  -Heme/onc consult appreciated     HTN/HLD  -continue home meds     VTE ppx: hold heparin for now, SCDs   GOC: full code, d/w pt and his wife today     medically active  Acute toxic-metabolic encephalopathy possibly due to encephalitis related to immunotherapy  SIRS POA - no obvious source of infection   Metabolic acidosis - improved   -Fever 100.8F, tachycardia in ER  -CT chest, abd/pelvis, head as above  -MR head, MR venogram as above - no acute findings    -EEG w/o epileptiform activity, + cerebral dysfunction   -Neurology consult appreciated, d/w Dr. Barrera, suspected encephalitis possibly related to immunotherapy   -ID consult appreciated, monitor off abx, s/p vanc and glenda in ED  -UA unremarkable  -Pt on immunotherapy, likely related  -Hold ASA/ppx heparin for now   -8/16: fever 101.1F overnight, tachy to 130s  -s/p LP 8/16  with IR  -Was started on acyclovir, now d/c  -start solumedrol 1000mg IV daily for 5 days (day 1/5)  -ESR: 60 > 50  -CRP: 128 > 39  -F/u MRI brain with contrast     Adrenal insufficiency  -AM cortisol 0.9   -started hydrocortisone 8/16, now d/c and started on solumedrol for encephalitis  -likely related to immunotherapy  -Pt was started on course of prednisone 8/1 for itching/arthritis    Maculopapular rash - improved   -started prior to initiation of Levaquin while in hospital in [place]  -As per wife, rash is improving     Acute kidney injury - improving   Acute Urinary retention - resolved   Metabolic acidosis - HCO3 improved   -baseline Cr ~1.2-1.4  -Cr 2.32 > 2.01 > 1.8 > 1.52 > 1.47  -straight cath with ~ 400cc on 8/14  -Bicarb normalized   -ABG 8/15: pH 7.32  pH, pCO2: 28 / pO2: 91 / HCO3: 14 / Base Excess: -10.2 /  SaO2: 98    -s/p bicarb ggt     Hallucinations   -Likely steroid related, cont to monitor     hx RCC s/p R nephrectomy   -On immunotherapy  -nivolumab and ipilimumab Q21 x 4 -> Nivo maintenance -most recent dosing 07/22/2024  -Heme/onc consult appreciated     HTN/HLD  -continue home meds     VTE ppx: hold heparin for now, SCDs   GOC: full code, d/w pt and his wife today     medically active  Acute toxic-metabolic encephalopathy possibly due to encephalitis related to immunotherapy  SIRS POA - no obvious source of infection   Metabolic acidosis - improved   -Fever 100.8F, tachycardia in ER  -CT chest, abd/pelvis, head as above  -MR head, MR venogram as above - no acute findings    -EEG w/o epileptiform activity, + cerebral dysfunction   -Neurology consult appreciated, d/w Dr. Barrera, suspected encephalitis possibly related to immunotherapy   -ID consult appreciated, monitor off abx, s/p vanc and glenda in ED  -UA unremarkable  -Pt on immunotherapy, likely related  -Hold ASA/ppx heparin for now   -8/16: fever 101.1F overnight, tachy to 130s  -s/p LP 8/16  with IR  -Was started on acyclovir, now d/c  -start solumedrol 1000mg IV daily for 5 days (day 1/5)  -ESR: 60 > 50  -CRP: 128 > 39  -F/u MRI brain with contrast     Adrenal insufficiency  -AM cortisol 0.9   -started hydrocortisone 8/16, now d/c and started on solumedrol for encephalitis  -likely related to immunotherapy  -Pt was started on course of prednisone 8/1 for itching/arthritis    Maculopapular rash - improved   -started prior to initiation of Levaquin while in hospital in [place]  -As per wife, rash is improving     Acute kidney injury - improving   Acute Urinary retention - resolved   Metabolic acidosis - HCO3 improved   -baseline Cr ~1.2-1.4  -Cr 2.32 > 2.01 > 1.8 > 1.52 > 1.47  -straight cath with ~ 400cc on 8/14  -Bicarb normalized   -ABG 8/15: pH 7.32  pH, pCO2: 28 / pO2: 91 / HCO3: 14 / Base Excess: -10.2 /  SaO2: 98    -s/p bicarb ggt     Hallucinations   -Likely steroid related, cont to monitor     Diabetes Mellitus type 2 with steroid induced hyperglycemia  -Hypoglycemia Protocol, ISS, Accuchecks AC&HS.  -Diet: Consistent Carbs w/ Evening Snack  -HbA1c 6.5%  -expect hyperglycemia with high dose steroids  -Lantus increased to 14u qhs, pre-meals 4u added  -IVF NS maintenance for now    hx RCC s/p R nephrectomy   -On immunotherapy  -nivolumab and ipilimumab Q21 x 4 -> Nivo maintenance -most recent dosing 07/22/2024  -Heme/onc consult appreciated     HTN/HLD  -continue home meds     VTE ppx: hold heparin for now, SCD  GOC: full code, d/w pt and his wife today     medically active

## 2024-08-18 NOTE — PROGRESS NOTE ADULT - ASSESSMENT
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   - Patient apparently received CT imaging at [facility] recently that noted POD  - No planned inpatient cancer directed therapy  - Will need close outpatient follow up once stable & d/c'ed   - Continue supportive measures as clinically necessary       # AMS / Lethargy with Rash    - [place]oms started while in [place] there with improvement after workup / tx; do not have records  - ID following; febrile with macular rash, suspected drug rash 2/2 recent Levaquin; pending additional infectious work up / CXs  - CT & MR head without contrast completed ----> plan for MR head with contrast given concern for frontal lobe lesion, could be mets   - Continue supportive measures       # Normocytic Anemia    - Hgb remains low but overall stable   - Etiology multifactorial in setting of known underlying metastatic disease on cancer directed therapy with recent hospitalization / sepsis / infectious process   - Continue to trend serial CBCs while admitted  - Continue supportive measures           Bruce Hraley PA-C  Hematology / Oncology  [facility] [place]  [phone]

## 2024-08-18 NOTE — DISCHARGE NOTE NURSING/CASE MANAGEMENT/SOCIAL WORK - NSDCFUADDAPPT_GEN_ALL_CORE_FT
Primary Care Appointment  Dr Sargent  [address] [facility]   phone   August 26 at 11:45am

## 2024-08-18 NOTE — PHYSICAL THERAPY INITIAL EVALUATION ADULT - SHORT TERM MEMORY, REHAB EVAL
Pediatric Physical Therapy Outpatient Progress Note    Name: Colton Mosquera  Date: 9/19/2017  Clinic #: [id number]  Time in: 1020  Time out:1100    Visit 2 of 10 approved visits, referral expiring 11/7/2017    Subjective:  Colton was brought to therapy by mom.  Parent/Caregiver reports: no new complaints      Pain: Colton is unable to reate pain on numeric scale. No pain behaviors noted through session.     Objective:  Parent/Caregiver waited in the observation room during session.   Colton was seen for 40 minutes of physical therapy services; including: therapeutic exercise, neuromuscular re-ed, gain training, sensory integration, therapeutic activities, wheelchair management/training skills, fit/training of orthotic.    Education:  Patient's mother was educated on patient's current functional status and progress.  Patient's mother was educated on updated HEP.  Patient's mother verbalized understanding.    Treatment:  Session focused on: exercises to develop LE strength and muscular endurance, LE range of motion and flexibility, sitting balance, standing balance, coordination, posture, kinesthetic sense and proprioception, desensitization techniques, facilitation of gait, stair negotiation, enhacement of sensory processing, promotion of adaptive responses to environmental demands, gross motor stimulation, cardiovascular endurance training, parent education and training, initiation/progression of HEP eye-hand coordination, core muscle activation.    Activities included:  Total Motion Release  MOTION Upper Twist Side Bend Leg Raise Arm Raise Lower Twist Leg Dangle Stand to Sit Arm Press   Hard Side/Rank L/red + L/Red R/yellow = R red ++ R/yellow NT NT       Exercise 1: Lower twist static hold to the L in supine x 1 minutes x 3 trials    Post Test: R lower twist Red +   Exercise 2: facilitation of R side bend in sitting on MobPanelll for play x 1 minutes x 3 trials    Post Test: L side bend Red -   Exercise 3: R  upper twist AAROM in sported sitting x 3 minutes    Post Test: L upper test Red -    -tailor sitting with hand over hand for reaching and placing blocks in box x 12 trials  -tall kneeling at bench with mod A      Treatment was tolerated: well    Assessment  Colton was seen for follow up treatment today.  Decreased tolerance for TMR positioning. Mild improvements in ROM to opposite side after facilitation of movements to preferred sides. Mom taught movements for HEP.  Improved tolerance for tall kneeling at bench during today's session. Continues to require max A for full extension in tall kneel.   Initiating prone crawling with UE use to pull forward, minimal to no push off with BLEs however improved flexion of BLEs noted.  Colton demonstrates improved pivoting in prone. Improved tolerance of quad positioning requires use of bolster to maintain position, requires min A for hand placement.Colton presents with decreased strength, delayed gross motor milestones, increased tone, and poor motor planning.  He demonstrates solid skills in the 3-5 month ranges with scattered skills in 6-8 month range.  Colton demonstrates motivation to stand and participate in therapy. The patient will benefit from Physical Therapy to progress towards the following goals to address the above impairments and functional limitations.       Goals  Short term 3 months: (06/04/2017 continue to11/07/2017)  1. Colton will roll supine to prone independently 3/4 trials bilaterally Goal Met 5/30/2017  2. Colton will transition supine to sit with CGA x 3/4 trials Progressing requires min A  3. Colton will demo independent ring sitting for play without UE propping x 2 minutes for play Progressing requires UE x 1      Long term 6 months: (09/04/2017 continue 11/07/2017)  1. Family will be independent with given HEP.  2. Colton will roll prone to supine independently 3/4 trials  3. Colton will demo sit to stand from bench with SBA with BUEs on  support surface x 5 trials         Plan:  Continue PT treatments 1x a week with current POC to progress toward goals.    Lizzie Black, PT  09/19/2017   intact

## 2024-08-18 NOTE — PHYSICAL THERAPY INITIAL EVALUATION ADULT - PERTINENT HX OF CURRENT PROBLEM, REHAB EVAL
59M w/ h/o renal cell carcinoma status post right nephrectomy on immunotherapy, last dose 3 weeks ago, paroxysmal A-fib not on anticoagulation, presents with c/o lethargy. Patient lethargic, collateral history provided by wife at bedside. As per wife, patient had been in his usual state of health until one week ago. They went to [place] last week on Wednesday to see there new grandchild. On Thursday patient started to act bizarre, had trouble walking and speaking. The wife called EMS and he was taken to the local hospital in [place]. As per the wife pt had a CT chest which was noted for progression of malignancy and PNA, and he had an MRI done which showed a chronic stroke. Patient was in the hospital for 5 days presumably getting antibiotics, and initially improved but developed a rash during the hospital course. Most of patients providers are in [facility] so they asked to be discharged from [place] and came back to [place] yesterday. As per wife the patient initially improved but got worse again while coming to [place], and currently has been lethargic, spiking fevers, and has a diffuse macular rash that's getting worse. On arrival to ED, pt tachycardic, and febrile, vitals otherwise stable.

## 2024-08-18 NOTE — PROGRESS NOTE ADULT - SUBJECTIVE AND OBJECTIVE BOX
HPI:    58 y/o M with known metastatic RCC s/p R nephrectomy currently on immunotherapy through [facility] Onc Dr Sánchez, PAF not on AC therapy presented with c/o lethargy /AMS. As per wife, patient had been in his usual state of health until about 1-2 weeks ago; they went to [place] last week to see new grandchild, then while there patient started to act bizarre, had trouble walking and speaking, called EMS and was taken to local hospital in [place]. As per wife, he had CT imaging there which was noted for progression of malignancy & PNA. He also had an MRI which showed chronic stroke.    08/15/24: Seen at bedside with Dr Edwards & Dr Holcomb, no acute distress but overall obtunded / lethargic, unable to hold conversation     08/18/24:       PAST MEDICAL & SURGICAL HISTORY:    Pneumonia    Cough    Former smoker    Obesity (BMI 30-39.9)    HTN (hypertension)    HLD (hyperlipidemia)    CAD (coronary artery disease)    Diabetes    H/O pulmonary hypertension    Renal mass    Lung nodule    Stented coronary artery    Paroxysmal atrial fibrillation    Diverticulitis    Renal calculi    Subclinical hyperthyroidism    Metastasis to lung    History of appendectomy  1994    History of torn meniscus of left knee  1995    History of tonsillectomy and adenoidectomy  1968    S/P cystoscopy with ureteral stent placement    History of lung biopsy    S/P cardiac cath        MEDICATIONS  (STANDING):    atorvastatin 20 milliGRAM(s) Oral at bedtime  dextrose 5%. 1000 milliLiter(s) (50 mL/Hr) IV Continuous <Continuous>  dextrose 5%. 1000 milliLiter(s) (100 mL/Hr) IV Continuous <Continuous>  dextrose 50% Injectable 12.5 Gram(s) IV Push once  dextrose 50% Injectable 25 Gram(s) IV Push once  dextrose 50% Injectable 25 Gram(s) IV Push once  glucagon  Injectable 1 milliGRAM(s) IntraMuscular once  insulin glargine Injectable (LANTUS) 14 Unit(s) SubCutaneous at bedtime  insulin lispro (ADMELOG) corrective regimen sliding scale   SubCutaneous three times a day before meals  insulin lispro (ADMELOG) corrective regimen sliding scale   SubCutaneous at bedtime  insulin lispro Injectable (ADMELOG) 4 Unit(s) SubCutaneous three times a day before meals  methylPREDNISolone sodium succinate IVPB 1000 milliGRAM(s) IV Intermittent daily  metoprolol tartrate 25 milliGRAM(s) Oral daily  pantoprazole    Tablet 40 milliGRAM(s) Oral before breakfast      MEDICATIONS  (PRN):    acetaminophen     Tablet .. 650 milliGRAM(s) Oral every 6 hours PRN Temp greater or equal to 38C (100.4F), Mild Pain (1 - 3)  aluminum hydroxide/magnesium hydroxide/simethicone Suspension 30 milliLiter(s) Oral every 4 hours PRN Dyspepsia  dextrose Oral Gel 15 Gram(s) Oral once PRN Blood Glucose LESS THAN 70 milliGRAM(s)/deciliter  melatonin 3 milliGRAM(s) Oral at bedtime PRN Insomnia  ondansetron Injectable 4 milliGRAM(s) IV Push every 8 hours PRN Nausea and/or Vomiting        ALLERGIES:    sulfa drugs (Unknown)  Levaquin (Rash (Mod to Severe))        FAMILY HISTORY:    breast cancer (Mother)        REVIEW OF SYSTEMS:    Constitutional, Eyes, ENT, Cardiovascular, Respiratory, Gastrointestinal, Genitourinary, Musculoskeletal, Integumentary, Neurological, Psychiatric, Endocrine, Heme/Lymph and Allergic/Immunologic review of systems are otherwise negative except as noted in HPI.       VITALS:    T(C): 36.8 (18 Aug 2024 09:22), Max: 36.8 (17 Aug 2024 20:30)  T(F): 98.2 (18 Aug 2024 09:22), Max: 98.2 (17 Aug 2024 20:30)  HR: 90 (18 Aug 2024 09:22) (90 - 102)  BP: 129/90 (18 Aug 2024 09:22) (105/70 - 131/83)  BP(mean): 103 (18 Aug 2024 04:10) (103 - 103)  ABP: --  ABP(mean): --  RR: 18 (18 Aug 2024 09:22) (17 - 18)  SpO2: 93% (18 Aug 2024 09:22) (93% - 96%)    O2 Parameters below as of 18 Aug 2024 09:22  Patient On (Oxygen Delivery Method): room air        PHYSICAL:    Constitutional: elderly / ill appearing   Eyes: eyes closed, did not assess   ENT: pharynx is unremarkable  Neck: supple without JVD   Pulmonary: regular rate, spontaneous respirations   Cardiac: RRR  Vascular: no calf tenderness, venous stasis changes  Abdomen: normoactive bowel sounds, soft and nontender, no hepatosplenomegaly or masses appreciated.   Lymphatic: no peripheral adenopathy appreciated.   Musculoskeletal: full range of motion and no deformities appreciated.   Skin: normal appearance   Neurology: poorly assessed      LABS:                          8.9    8.22  )-----------( 309      ( 18 Aug 2024 06:36 )             26.4     08-18    143  |  109<H>  |  18  ----------------------------<  242<H>  3.5   |  28  |  1.47<H>    Ca    8.8      18 Aug 2024 06:36    TPro  6.2  /  Alb  2.9<L>  /  TBili  0.3  /  DBili  x   /  AST  29  /  ALT  25  /  AlkPhos  85  08-18        RADIOLOGY & ADDITIONAL STUDIES:          CT Head No Cont (08.15.24 @ 11:24)    IMPRESSION:  No acute intracranial hemorrhage or acute territorial infarct.   If   symptoms persist, follow-up MRI exam recommended.            MR Head No Cont (08.16.24 @ 09:24)    IMPRESSION:  No acute intracranial hemorrhage or acute infarct. If metastasis is a   clinical concern, repeat exam with contrast recommended.    Chronic ischemic changes.       HPI:    58 y/o M with known metastatic RCC s/p R nephrectomy currently on immunotherapy through [facility] Onc Dr Sánchez, PAF not on AC therapy presented with c/o lethargy /AMS. As per wife, patient had been in his usual state of health until about 1-2 weeks ago; they went to [place] last week to see new grandchild, then while there patient started to act bizarre, had trouble walking and speaking, called EMS and was taken to local hospital in [place]. As per wife, he had CT imaging there which was noted for progression of malignancy & PNA. He also had an MRI which showed chronic stroke.    08/15/24: Seen at bedside with Dr Edwards & Dr Holcomb, no acute distress but overall obtunded / lethargic, unable to hold conversation     08/18/24: Patient to MRI      PAST MEDICAL & SURGICAL HISTORY:    Pneumonia    Cough    Former smoker    Obesity (BMI 30-39.9)    HTN (hypertension)    HLD (hyperlipidemia)    CAD (coronary artery disease)    Diabetes    H/O pulmonary hypertension    Renal mass    Lung nodule    Stented coronary artery    Paroxysmal atrial fibrillation    Diverticulitis    Renal calculi    Subclinical hyperthyroidism    Metastasis to lung    History of appendectomy  1994    History of torn meniscus of left knee  1995    History of tonsillectomy and adenoidectomy  1968    S/P cystoscopy with ureteral stent placement    History of lung biopsy    S/P cardiac cath        MEDICATIONS  (STANDING):    atorvastatin 20 milliGRAM(s) Oral at bedtime  dextrose 5%. 1000 milliLiter(s) (50 mL/Hr) IV Continuous <Continuous>  dextrose 5%. 1000 milliLiter(s) (100 mL/Hr) IV Continuous <Continuous>  dextrose 50% Injectable 12.5 Gram(s) IV Push once  dextrose 50% Injectable 25 Gram(s) IV Push once  dextrose 50% Injectable 25 Gram(s) IV Push once  glucagon  Injectable 1 milliGRAM(s) IntraMuscular once  insulin glargine Injectable (LANTUS) 14 Unit(s) SubCutaneous at bedtime  insulin lispro (ADMELOG) corrective regimen sliding scale   SubCutaneous three times a day before meals  insulin lispro (ADMELOG) corrective regimen sliding scale   SubCutaneous at bedtime  insulin lispro Injectable (ADMELOG) 4 Unit(s) SubCutaneous three times a day before meals  methylPREDNISolone sodium succinate IVPB 1000 milliGRAM(s) IV Intermittent daily  metoprolol tartrate 25 milliGRAM(s) Oral daily  pantoprazole    Tablet 40 milliGRAM(s) Oral before breakfast      MEDICATIONS  (PRN):    acetaminophen     Tablet .. 650 milliGRAM(s) Oral every 6 hours PRN Temp greater or equal to 38C (100.4F), Mild Pain (1 - 3)  aluminum hydroxide/magnesium hydroxide/simethicone Suspension 30 milliLiter(s) Oral every 4 hours PRN Dyspepsia  dextrose Oral Gel 15 Gram(s) Oral once PRN Blood Glucose LESS THAN 70 milliGRAM(s)/deciliter  melatonin 3 milliGRAM(s) Oral at bedtime PRN Insomnia  ondansetron Injectable 4 milliGRAM(s) IV Push every 8 hours PRN Nausea and/or Vomiting        ALLERGIES:    sulfa drugs (Unknown)  Levaquin (Rash (Mod to Severe))        FAMILY HISTORY:    breast cancer (Mother)        REVIEW OF SYSTEMS:    Constitutional, Eyes, ENT, Cardiovascular, Respiratory, Gastrointestinal, Genitourinary, Musculoskeletal, Integumentary, Neurological, Psychiatric, Endocrine, Heme/Lymph and Allergic/Immunologic review of systems are otherwise negative except as noted in HPI.       VITALS:    T(C): 36.8 (18 Aug 2024 09:22), Max: 36.8 (17 Aug 2024 20:30)  T(F): 98.2 (18 Aug 2024 09:22), Max: 98.2 (17 Aug 2024 20:30)  HR: 90 (18 Aug 2024 09:22) (90 - 102)  BP: 129/90 (18 Aug 2024 09:22) (105/70 - 131/83)  BP(mean): 103 (18 Aug 2024 04:10) (103 - 103)  ABP: --  ABP(mean): --  RR: 18 (18 Aug 2024 09:22) (17 - 18)  SpO2: 93% (18 Aug 2024 09:22) (93% - 96%)    O2 Parameters below as of 18 Aug 2024 09:22  Patient On (Oxygen Delivery Method): room air        PHYSICAL:    Constitutional: elderly / ill appearing   Eyes: eyes closed, did not assess   ENT: pharynx is unremarkable  Neck: supple without JVD   Pulmonary: regular rate, spontaneous respirations   Cardiac: RRR  Vascular: no calf tenderness, venous stasis changes  Abdomen: normoactive bowel sounds, soft and nontender, no hepatosplenomegaly or masses appreciated.   Lymphatic: no peripheral adenopathy appreciated.   Musculoskeletal: full range of motion and no deformities appreciated.   Skin: normal appearance   Neurology: poorly assessed      LABS:                          8.9    8.22  )-----------( 309      ( 18 Aug 2024 06:36 )             26.4     08-18    143  |  109<H>  |  18  ----------------------------<  242<H>  3.5   |  28  |  1.47<H>    Ca    8.8      18 Aug 2024 06:36    TPro  6.2  /  Alb  2.9<L>  /  TBili  0.3  /  DBili  x   /  AST  29  /  ALT  25  /  AlkPhos  85  08-18        RADIOLOGY & ADDITIONAL STUDIES:          CT Head No Cont (08.15.24 @ 11:24)    IMPRESSION:  No acute intracranial hemorrhage or acute territorial infarct.   If   symptoms persist, follow-up MRI exam recommended.            MR Head No Cont (08.16.24 @ 09:24)    IMPRESSION:  No acute intracranial hemorrhage or acute infarct. If metastasis is a   clinical concern, repeat exam with contrast recommended.    Chronic ischemic changes.

## 2024-08-19 ENCOUNTER — APPOINTMENT (OUTPATIENT)
Dept: HEMATOLOGY ONCOLOGY | Facility: CLINIC | Age: 59
End: 2024-08-19

## 2024-08-19 ENCOUNTER — APPOINTMENT (OUTPATIENT)
Dept: INFUSION THERAPY | Facility: CLINIC | Age: 59
End: 2024-08-19

## 2024-08-19 LAB
ALBUMIN CSF-MCNC: 54.6 MG/DL — HIGH (ref 14–25)
ALBUMIN SERPL ELPH-MCNC: 2.9 G/DL — LOW (ref 3.3–5)
ALBUMIN SERPL ELPH-MCNC: 2746 MG/DL — LOW (ref 3500–5200)
ALP SERPL-CCNC: 79 U/L — SIGNIFICANT CHANGE UP (ref 40–120)
ALT FLD-CCNC: 23 U/L — SIGNIFICANT CHANGE UP (ref 12–78)
ANION GAP SERPL CALC-SCNC: 8 MMOL/L — SIGNIFICANT CHANGE UP (ref 5–17)
AST SERPL-CCNC: 24 U/L — SIGNIFICANT CHANGE UP (ref 15–37)
BASOPHILS # BLD AUTO: 0 K/UL — SIGNIFICANT CHANGE UP (ref 0–0.2)
BASOPHILS NFR BLD AUTO: 0 % — SIGNIFICANT CHANGE UP (ref 0–2)
BILIRUB SERPL-MCNC: 0.3 MG/DL — SIGNIFICANT CHANGE UP (ref 0.2–1.2)
BUN SERPL-MCNC: 27 MG/DL — HIGH (ref 7–23)
CALCIUM SERPL-MCNC: 8.8 MG/DL — SIGNIFICANT CHANGE UP (ref 8.5–10.1)
CHLORIDE SERPL-SCNC: 105 MMOL/L — SIGNIFICANT CHANGE UP (ref 96–108)
CO2 SERPL-SCNC: 24 MMOL/L — SIGNIFICANT CHANGE UP (ref 22–31)
CREAT SERPL-MCNC: 1.45 MG/DL — HIGH (ref 0.5–1.3)
EGFR: 55 ML/MIN/1.73M2 — LOW
EOSINOPHIL # BLD AUTO: 0 K/UL — SIGNIFICANT CHANGE UP (ref 0–0.5)
EOSINOPHIL NFR BLD AUTO: 0 % — SIGNIFICANT CHANGE UP (ref 0–6)
GLUCOSE SERPL-MCNC: 260 MG/DL — HIGH (ref 70–99)
HCT VFR BLD CALC: 26.6 % — LOW (ref 39–50)
HGB BLD-MCNC: 8.9 G/DL — LOW (ref 13–17)
IGG CSF-MCNC: 6.9 MG/DL — HIGH
IGG FLD-MCNC: 738 MG/DL — SIGNIFICANT CHANGE UP (ref 610–1660)
IGG SYNTH RATE SER+CSF CALC-MRATE: -0.2 MG/DAY — SIGNIFICANT CHANGE UP
IGG/ALB CLEAR SER+CSF-RTO: 0.5 — SIGNIFICANT CHANGE UP
IGG/ALB CSF: 0.13 RATIO — SIGNIFICANT CHANGE UP
IGG/ALB SER: 0.27 RATIO — SIGNIFICANT CHANGE UP
LYMPHOCYTES # BLD AUTO: 0.96 K/UL — LOW (ref 1–3.3)
LYMPHOCYTES # BLD AUTO: 10 % — LOW (ref 13–44)
MCHC RBC-ENTMCNC: 27.2 PG — SIGNIFICANT CHANGE UP (ref 27–34)
MCHC RBC-ENTMCNC: 33.5 GM/DL — SIGNIFICANT CHANGE UP (ref 32–36)
MCV RBC AUTO: 81.3 FL — SIGNIFICANT CHANGE UP (ref 80–100)
MONOCYTES # BLD AUTO: 0.29 K/UL — SIGNIFICANT CHANGE UP (ref 0–0.9)
MONOCYTES NFR BLD AUTO: 3 % — SIGNIFICANT CHANGE UP (ref 2–14)
NEUTROPHILS # BLD AUTO: 8.06 K/UL — HIGH (ref 1.8–7.4)
NEUTROPHILS NFR BLD AUTO: 81 % — HIGH (ref 43–77)
NRBC # BLD: SIGNIFICANT CHANGE UP /100 WBCS (ref 0–0)
PLATELET # BLD AUTO: 347 K/UL — SIGNIFICANT CHANGE UP (ref 150–400)
POTASSIUM SERPL-MCNC: 3.3 MMOL/L — LOW (ref 3.5–5.3)
POTASSIUM SERPL-SCNC: 3.3 MMOL/L — LOW (ref 3.5–5.3)
PROT SERPL-MCNC: 6.4 GM/DL — SIGNIFICANT CHANGE UP (ref 6–8.3)
RBC # BLD: 3.27 M/UL — LOW (ref 4.2–5.8)
RBC # FLD: 14.1 % — SIGNIFICANT CHANGE UP (ref 10.3–14.5)
SARS-COV-2 RNA SPEC QL NAA+PROBE: SIGNIFICANT CHANGE UP
SODIUM SERPL-SCNC: 137 MMOL/L — SIGNIFICANT CHANGE UP (ref 135–145)
WBC # BLD: 9.6 K/UL — SIGNIFICANT CHANGE UP (ref 3.8–10.5)
WBC # FLD AUTO: 9.6 K/UL — SIGNIFICANT CHANGE UP (ref 3.8–10.5)

## 2024-08-19 PROCEDURE — 99232 SBSQ HOSP IP/OBS MODERATE 35: CPT

## 2024-08-19 PROCEDURE — 99233 SBSQ HOSP IP/OBS HIGH 50: CPT

## 2024-08-19 RX ORDER — ZOLPIDEM TARTRATE 5 MG/1
5 TABLET, FILM COATED ORAL AT BEDTIME
Refills: 0 | Status: DISCONTINUED | OUTPATIENT
Start: 2024-08-19 | End: 2024-08-24

## 2024-08-19 RX ORDER — INSULIN GLARGINE 100 [IU]/ML
15 INJECTION, SOLUTION SUBCUTANEOUS AT BEDTIME
Refills: 0 | Status: DISCONTINUED | OUTPATIENT
Start: 2024-08-19 | End: 2024-08-24

## 2024-08-19 RX ORDER — METOPROLOL TARTRATE 100 MG/1
25 TABLET ORAL
Refills: 0 | Status: DISCONTINUED | OUTPATIENT
Start: 2024-08-19 | End: 2024-08-24

## 2024-08-19 RX ORDER — POTASSIUM CHLORIDE 10 MEQ
40 TABLET, EXT RELEASE, PARTICLES/CRYSTALS ORAL ONCE
Refills: 0 | Status: COMPLETED | OUTPATIENT
Start: 2024-08-19 | End: 2024-08-19

## 2024-08-19 RX ORDER — INSULIN GLARGINE 100 [IU]/ML
18 INJECTION, SOLUTION SUBCUTANEOUS AT BEDTIME
Refills: 0 | Status: DISCONTINUED | OUTPATIENT
Start: 2024-08-19 | End: 2024-08-19

## 2024-08-19 RX ORDER — SENNA 187 MG
2 TABLET ORAL AT BEDTIME
Refills: 0 | Status: DISCONTINUED | OUTPATIENT
Start: 2024-08-19 | End: 2024-08-24

## 2024-08-19 RX ADMIN — INSULIN GLARGINE 15 UNIT(S): 100 INJECTION, SOLUTION SUBCUTANEOUS at 21:58

## 2024-08-19 RX ADMIN — METOPROLOL TARTRATE 25 MILLIGRAM(S): 100 TABLET ORAL at 05:01

## 2024-08-19 RX ADMIN — Medication 2: at 18:15

## 2024-08-19 RX ADMIN — Medication 5000 UNIT(S): at 05:02

## 2024-08-19 RX ADMIN — Medication 6 UNIT(S): at 13:26

## 2024-08-19 RX ADMIN — Medication 40 MILLIEQUIVALENT(S): at 10:19

## 2024-08-19 RX ADMIN — METOPROLOL TARTRATE 25 MILLIGRAM(S): 100 TABLET ORAL at 21:59

## 2024-08-19 RX ADMIN — POVIDONE, PROPYLENE GLYCOL 2 DROP(S): 6.8; 3 LIQUID OPHTHALMIC at 05:02

## 2024-08-19 RX ADMIN — Medication 5000 UNIT(S): at 21:57

## 2024-08-19 RX ADMIN — Medication 2 TABLET(S): at 21:59

## 2024-08-19 RX ADMIN — Medication 6 UNIT(S): at 08:42

## 2024-08-19 RX ADMIN — Medication 20 MILLIGRAM(S): at 21:56

## 2024-08-19 RX ADMIN — Medication 6: at 08:40

## 2024-08-19 RX ADMIN — ZOLPIDEM TARTRATE 5 MILLIGRAM(S): 5 TABLET, FILM COATED ORAL at 22:00

## 2024-08-19 RX ADMIN — Medication 250 MILLIGRAM(S): at 10:17

## 2024-08-19 RX ADMIN — Medication 5000 UNIT(S): at 14:17

## 2024-08-19 RX ADMIN — Medication 6 UNIT(S): at 18:15

## 2024-08-19 RX ADMIN — Medication 4: at 13:22

## 2024-08-19 RX ADMIN — Medication 40 MILLIGRAM(S): at 05:02

## 2024-08-19 NOTE — PROGRESS NOTE ADULT - SUBJECTIVE AND OBJECTIVE BOX
HPI: 59M w/ h/o renal cell carcinoma status post right nephrectomy on immunotherapy, last dose 3 weeks ago, paroxysmal A-fib not on anticoagulation, presents with c/o lethargy. Patient lethargic, collateral history provided by wife at bedside. As per wife, patient had been in his usual state of health until one week ago. They went to [place] last week on Wednesday to see there new grandchild. On Thursday patient started to act bizarre, had trouble walking and speaking. The wife called EMS and he was taken to the local hospital in [place]. As per the wife pt had a CT chest which was noted for progression of malignancy and PNA, and he had an MRI done which showed a chronic stroke. Patient was in the hospital for 5 days presumably getting antibiotics, and initially improved but developed a rash during the hospital course. Most of patients providers are in [facility] so they asked to be discharged from [place] and came back to [place] yesterday. As per wife the patient initially improved but got worse again while coming to [place], and currently has been lethargic, spiking fevers, and has a diffuse macular rash that's getting worse. On arrival to ED, pt tachycardic, and febrile, vitals otherwise stable. CBC noted for WBC 7.6, H/H 10/29, PLt 272. CMP noted for BUN/Cr 18/2.32, UA sterile, flu/covid negative. Admitted to  for PNA.   (15 Aug 2024 02:08)    8/15: Patient seen and evaluated today, lethargic, eye opening to name and painful stimuli, did not follow commands, similar mentation to when he was admitted yesterday, non verbal on my exam this AM. EEG with cerebral dysfunction, retaining urine. Ammonia levels normal, B12 normal. Pt re-evaluated ~ 4:30pm, eye opening to name, follows commands, knows name, good strength to b/l UE, ~ 3-4 to b/l LE, minimally against gravity, seems painful to move LE.     8/16: pt seen this AM, more awake, alert today, speaking, but confused, laughs when not able to answer questions, knows name, T 101.1F overnight. Acidosis improved, AM cortisol 0.9.     8/17: pt seen today, awake, alert, mentation improved, able to maintain some conversation, oriented x3, tolerating regular diet, some visual hallucinations this afternoon - seeing Halloween decoration - cob webs     8/18: pt seen this AM, awake, alert, oriented x3, able to lift legs against gravity now, improved strength, able to sit up, continues with visual hallucinations, ambulated with PT today, pt seen later in the afternoon, sitting up in chair, wife present.     8/19: pt seen today, sitting up in chair at bedside, feeling much better, tolerating orally, VSS. Continued on IV steroids.     REVIEW OF SYSTEMS:    CONSTITUTIONAL: No weakness, fevers or chills  EYES/ENT: No visual changes;  No vertigo or throat pain   NECK: No pain or stiffness  RESPIRATORY: No cough, wheezing, hemoptysis; No shortness of breath  CARDIOVASCULAR: No chest pain or palpitations  GASTROINTESTINAL: No abdominal or epigastric pain. No nausea, vomiting, or hematemesis; No diarrhea or constipation. No melena or hematochezia.  GENITOURINARY: No dysuria, frequency or hematuria  NEUROLOGICAL: No numbness or weakness, + hallucinations   SKIN: No itching, rashes      GENERAL: NAD, lying in bed comfortably  HEAD:  Atraumatic, Normocephalic  EYES: conjunctiva and sclera clear  ENT: Moist mucous membranes  NECK: Supple, No JVD  CHEST/LUNG: Clear to auscultation bilaterally; No rales, rhonchi, wheezing. Unlabored respirations  HEART: Regular rate and rhythm; No murmurs  ABDOMEN: Bowel sounds present; Soft, Nontender, Nondistended.   EXTREMITIES:  2+ Peripheral Pulses, brisk capillary refill. No clubbing, cyanosis, or edema  NERVOUS SYSTEM:  awake, alert, oriented x 3, speech fluency impaired  MSK: improved strength to all extremities, 4-5/5 to b/l LE  SKIN: maculopapular rash to b/l LE, spotty rash to palms, no itching --- improved     MEDICATIONS  (STANDING):  atorvastatin 20 milliGRAM(s) Oral at bedtime  dextrose 5%. 1000 milliLiter(s) (50 mL/Hr) IV Continuous <Continuous>  dextrose 5%. 1000 milliLiter(s) (100 mL/Hr) IV Continuous <Continuous>  dextrose 50% Injectable 25 Gram(s) IV Push once  dextrose 50% Injectable 25 Gram(s) IV Push once  dextrose 50% Injectable 12.5 Gram(s) IV Push once  glucagon  Injectable 1 milliGRAM(s) IntraMuscular once  insulin glargine Injectable (LANTUS) 14 Unit(s) SubCutaneous at bedtime  insulin lispro (ADMELOG) corrective regimen sliding scale   SubCutaneous three times a day before meals  insulin lispro (ADMELOG) corrective regimen sliding scale   SubCutaneous at bedtime  insulin lispro Injectable (ADMELOG) 4 Unit(s) SubCutaneous three times a day before meals  methylPREDNISolone sodium succinate IVPB 1000 milliGRAM(s) IV Intermittent daily  metoprolol tartrate 25 milliGRAM(s) Oral daily  pantoprazole    Tablet 40 milliGRAM(s) Oral before breakfast    MEDICATIONS  (PRN):  acetaminophen     Tablet .. 650 milliGRAM(s) Oral every 6 hours PRN Temp greater or equal to 38C (100.4F), Mild Pain (1 - 3)  aluminum hydroxide/magnesium hydroxide/simethicone Suspension 30 milliLiter(s) Oral every 4 hours PRN Dyspepsia  dextrose Oral Gel 15 Gram(s) Oral once PRN Blood Glucose LESS THAN 70 milliGRAM(s)/deciliter  melatonin 3 milliGRAM(s) Oral at bedtime PRN Insomnia  ondansetron Injectable 4 milliGRAM(s) IV Push every 8 hours PRN Nausea and/or Vomiting      LABS:                                         8.9    9.60  )-----------( 347      ( 19 Aug 2024 05:49 )             26.6   08-19    137  |  105  |  27<H>  ----------------------------<  260<H>  3.3<L>   |  24  |  1.45<H>    Ca    8.8      19 Aug 2024 05:49    TPro  6.4  /  Alb  2.9<L>  /  TBili  0.3  /  DBili  x   /  AST  24  /  ALT  23  /  AlkPhos  79  08-19          Urinalysis Basic - ( 15 Aug 2024 06:54 )    Color: x / Appearance: x / SG: x / pH: x  Gluc: 123 mg/dL / Ketone: x  / Bili: x / Urobili: x   Blood: x / Protein: x / Nitrite: x   Leuk Esterase: x / RBC: x / WBC x   Sq Epi: x / Non Sq Epi: x / Bacteria: x        RADIOLOGY:    < from: CT Abdomen and Pelvis No Cont (08.15.24 @ 11:24) >  IMPRESSION:  Evaluation of the solid organs, vascular structures and GI tract is   limited without oral and IV contrast.    Left lower lobe nodule seen previously is now partially obscured by   atelectasis. Cannot exclude superimposed pneumonia.    Bilateral lung nodules suspicious for metastatic disease.    Redemonstrated paraesophageal and bulky retroperitoneal adenopathy. Right   lateral abdominal metastatic nodules.    Mild splenomegaly.    Cholelithiasis.      < from: CT Head No Cont (08.15.24 @ 11:24) >  FINDINGS:  There are areas of low attenuation in the periventricular white matter   likely related to mild chronic microvascular ischemic changes.    There is no acute intracranial hemorrhage, parenchymal mass, mass effect   or midline shift. There is no acute territorial infarct. There is no   hydrocephalus.    The cranium is intact. The visualized paranasal sinuses are well-aerated.        IMPRESSION:  No acute intracranial hemorrhage or acute territorial infarct.   If   symptoms persist, follow-up MRI exam recommended.      < from: MR Head No Cont (08.16.24 @ 09:24) >    FINDINGS:  MRI of the brain:  High T2 signal periventricular subcortical white matter noted which may   be related to mild chronic microvascular ischemic changes.    Focal encephalomalacia/gliosis noted in the right frontal lobe.    There is no acute parenchymal hemorrhage, mass effect or midline shift.   There is no extra-axial fluid collection.  There is no hydrocephalus.    There is no acute infarct. Nonspecific mild prominence bilateral optic   nerve sheath.    Small retention cyst/polyp sphenoid sinus. Minimal mucosal thickening   ethmoid sinus.    Nonspecific low T1 bone marrow signal noted which may be related to red   marrow hyperplasia. Marrow infiltrative disease not excluded.    MRV of the head:  There is normal flow within the superior sagittal sinus, internal   cerebral veins,vein of Avinash and straight sinus.    The transverse and sigmoid sinuses are patent.        IMPRESSION:  No acute intracranial hemorrhage or acute infarct. If metastasis is a   clinical concern, repeat exam with contrast recommended.    Chronic ischemic changes.    < from: MR Head w/wo IV Cont (08.18.24 @ 12:42) >    IMPRESSION: NO EVIDENCE OF INTRACRANIAL HEMORRHAGE, ACUTE TERRITORIAL   INFARCT OR AREA OF ABNORMAL ENHANCEMENT. NO EVIDENCE OF INTRACRANIAL   METASTATIC DISEASE.

## 2024-08-19 NOTE — PROGRESS NOTE ADULT - SUBJECTIVE AND OBJECTIVE BOX
HPI:    60 y/o M with known metastatic RCC s/p R nephrectomy currently on immunotherapy through [facility] Onc Dr Sánchez, PAF not on AC therapy presented with c/o lethargy /AMS. As per wife, patient had been in his usual state of health until about 1-2 weeks ago; they went to [place] last week to see new grandchild, then while there patient started to act bizarre, had trouble walking and speaking, called EMS and was taken to local hospital in [place]. As per wife, he had CT imaging there which was noted for progression of malignancy & PNA. He also had an MRI which showed chronic stroke.    08/15/24: Seen at bedside with Dr Edwards & Dr Holcomb, no acute distress but overall obtunded / lethargic, unable to hold conversation     08/18/24: Patient to MRI    8/19/24; Seen at bedside along with Dr. Senior, OOB to chair, A X O x 3, struggling intermittent with aphasia      PAST MEDICAL & SURGICAL HISTORY:    Pneumonia    Cough    Former smoker    Obesity (BMI 30-39.9)    HTN (hypertension)    HLD (hyperlipidemia)    CAD (coronary artery disease)    Diabetes    H/O pulmonary hypertension    Renal mass    Lung nodule    Stented coronary artery    Paroxysmal atrial fibrillation    Diverticulitis    Renal calculi    Subclinical hyperthyroidism    Metastasis to lung    History of appendectomy  1994    History of torn meniscus of left knee  1995    History of tonsillectomy and adenoidectomy  1968    S/P cystoscopy with ureteral stent placement    History of lung biopsy    S/P cardiac cath        MEDICATIONS  (STANDING):    atorvastatin 20 milliGRAM(s) Oral at bedtime  dextrose 5%. 1000 milliLiter(s) (50 mL/Hr) IV Continuous <Continuous>  dextrose 5%. 1000 milliLiter(s) (100 mL/Hr) IV Continuous <Continuous>  dextrose 50% Injectable 25 Gram(s) IV Push once  dextrose 50% Injectable 25 Gram(s) IV Push once  dextrose 50% Injectable 12.5 Gram(s) IV Push once  glucagon  Injectable 1 milliGRAM(s) IntraMuscular once  heparin   Injectable 5000 Unit(s) SubCutaneous every 8 hours  insulin glargine Injectable (LANTUS) 18 Unit(s) SubCutaneous at bedtime  insulin lispro (ADMELOG) corrective regimen sliding scale   SubCutaneous three times a day before meals  insulin lispro (ADMELOG) corrective regimen sliding scale   SubCutaneous at bedtime  insulin lispro Injectable (ADMELOG) 6 Unit(s) SubCutaneous three times a day before meals  methylPREDNISolone sodium succinate IVPB 1000 milliGRAM(s) IV Intermittent daily  metoprolol tartrate 25 milliGRAM(s) Oral two times a day  pantoprazole    Tablet 40 milliGRAM(s) Oral before breakfast  sodium chloride 0.9%. 1000 milliLiter(s) (75 mL/Hr) IV Continuous <Continuous>    MEDICATIONS  (PRN):    acetaminophen     Tablet .. 650 milliGRAM(s) Oral every 6 hours PRN Temp greater or equal to 38C (100.4F), Mild Pain (1 - 3)  aluminum hydroxide/magnesium hydroxide/simethicone Suspension 30 milliLiter(s) Oral every 4 hours PRN Dyspepsia  artificial tears (preservative free) Ophthalmic Solution 2 Drop(s) Both EYES every 8 hours PRN Dry Eyes  dextrose Oral Gel 15 Gram(s) Oral once PRN Blood Glucose LESS THAN 70 milliGRAM(s)/deciliter  ondansetron Injectable 4 milliGRAM(s) IV Push every 8 hours PRN Nausea and/or Vomiting  zolpidem 5 milliGRAM(s) Oral at bedtime PRN Insomnia    ALLERGIES:    sulfa drugs (Unknown)  Levaquin (Rash (Mod to Severe))      FAMILY HISTORY:    breast cancer (Mother)      REVIEW OF SYSTEMS:    Constitutional, Eyes, ENT, Cardiovascular, Respiratory, Gastrointestinal, Genitourinary, Musculoskeletal, Integumentary, Neurological, Psychiatric, Endocrine, Heme/Lymph and Allergic/Immunologic review of systems are otherwise negative except as noted in HPI.       VITALS:    Vital Signs Last 24 Hrs  T(C): 36.9 (19 Aug 2024 09:06), Max: 36.9 (19 Aug 2024 09:06)  T(F): 98.4 (19 Aug 2024 09:06), Max: 98.4 (19 Aug 2024 09:06)  HR: 78 (19 Aug 2024 10:30) (75 - 81)  BP: 143/81 (19 Aug 2024 10:30) (119/69 - 147/99)  BP(mean): --  RR: 18 (19 Aug 2024 09:06) (18 - 18)  SpO2: 100% (19 Aug 2024 09:06) (96% - 100%)    Parameters below as of 19 Aug 2024 09:06  Patient On (Oxygen Delivery Method): room air    PHYSICAL:    Constitutional: in no acute distress  Eyes: eyes closed, did not assess   ENT: pharynx is unremarkable  Neck: supple without JVD   Pulmonary: regular rate, spontaneous respirations   Cardiac: RRR  Vascular: no calf tenderness, venous stasis changes  Abdomen: normoactive bowel sounds, soft and nontender, no hepatosplenomegaly or masses appreciated.   Lymphatic: no peripheral adenopathy appreciated.   Musculoskeletal: full range of motion and no deformities appreciated.   Skin: normal appearance   Neurology: poorly assessed      LABS:                                     8.9    9.60  )-----------( 347      ( 19 Aug 2024 05:49 )             26.6     08-19    137  |  105  |  27<H>  ----------------------------<  260<H>  3.3<L>   |  24  |  1.45<H>    Ca    8.8      19 Aug 2024 05:49    TPro  6.4  /  Alb  2.9<L>  /  TBili  0.3  /  DBili  x   /  AST  24  /  ALT  23  /  AlkPhos  79  08-19          RADIOLOGY & ADDITIONAL STUDIES:    EXAM:  MR BRAIN WAW IC      PROCEDURE DATE:  08/18/2024      INTERPRETATION:  CLINICAL INDICATION: ENCEPHALOPATHY.    TECHNIQUE: Multi-planar, multi-sequence magnetic resonance imaging of the   brain wasperformed with and without intravenous contrast.    CONTRAST: Post-contrast MR images were obtained following infusion of 9   mL of Gadavist    COMPARISON: 8-16-24    FINDINGS:    VENTRICLES AND SULCI:  Normal.  INTRA-AXIAL:  No acute intracranial hemorrhage, midline shift or evidence   of acute cerebral ischemia. No abnormal enhancement.  Focal encephalomalacia/gliosis noted in the right frontal lobe.  There are a few scattered foci of hyperintense T2 signal within the   subcortical and periventricular white matter which are nonspecific but   likely related to chronic microvascular ischemic disease.  EXTRA-AXIAL:  No mass or collection.  VISUALIZED SINUSES: Mild mucosal thickening.  VISUALIZED MASTOIDS:  Clear.  CALVARIUM:  Nonspecific low T1 bone marrow signal noted which may be   related to red marrow hyperplasia. Marrow infiltrative disease not   excluded.  CAROTID FLOW VOIDS: Normal.  MISCELLANEOUS:  None.    IMPRESSION: NO EVIDENCE OF INTRACRANIAL HEMORRHAGE, ACUTE TERRITORIAL   INFARCT OR AREA OF ABNORMAL ENHANCEMENT. NO EVIDENCE OF INTRACRANIAL   METASTATIC DISEASE.        CT Head No Cont (08.15.24 @ 11:24)    IMPRESSION:  No acute intracranial hemorrhage or acute territorial infarct.   If   symptoms persist, follow-up MRI exam recommended.            MR Head No Cont (08.16.24 @ 09:24)    IMPRESSION:  No acute intracranial hemorrhage or acute infarct. If metastasis is a   clinical concern, repeat exam with contrast recommended.    Chronic ischemic changes.

## 2024-08-19 NOTE — SPEECH LANGUAGE PATHOLOGY EVALUATION - COMMENTS
pragmatic function is intact:  pt is able to maintain an appropriate conversation with topic maintenance, and turn taking.  Comprehension for Complex ideational material is intact:  Repetition is intact: no evidence of agrammatism, ST memory, no motor speech deficits.  Picture Description: Pt provided fully formed sentences with intact grammar and syntax.  Lexical retrieval: Picture Naming: intact.  As stated above, formal testing showed performance on specific speech-language tasks to be normal.  pt In self-generated verbal output, however, hesitations and dysfluencies appear: there were occasional "tip of the tongue" instances where pt "lost" the word as he was about to utter it, causing a rethinking and reformulating of the sentence which sometimes results in circumlocutions, or in re starting the sentence/thought. .  Underlying train of thought is coherent. pt and his wife stated that his "speech" is improving.  results disc with the Pt. He was advised to seek professional help if symptoms worsen or do not improve. Service will not follow in house 59M w/ h/o renal cell carcinoma status post right nephrectomy on immunotherapy, last dose 3 weeks ago, paroxysmal A-fib not on anticoagulation, presents with c/o lethargy. Patient lethargic, collateral history provided by wife at bedside. As per wife, patient had been in his usual state of health until one week ago. They went to [place] last week on Wednesday to see there new grandchild. On Thursday patient started to act bizarre, had trouble walking and speaking. The wife called EMS and he was taken to the local hospital in [place]. As per the wife pt had a CT chest which was noted for progression of malignancy and PNA, and he had an MRI done which showed a chronic stroke. Patient was in the hospital for 5 days presumably getting antibiotics, and initially improved but developed a rash during the hospital course. Most of patients providers are in [facility] so they asked to be discharged from [place] and came back to [place] yesterday. As per wife the patient initially improved but got worse again while coming to [place], and currently has been lethargic, spiking fevers, and has a diffuse macular rash that's getting worse.   Pt mentation has improved since admission, but fluency on speaking is still impaired. Service is consulted for speech-language evaluation.

## 2024-08-19 NOTE — PROGRESS NOTE ADULT - SUBJECTIVE AND OBJECTIVE BOX
Patient seen and examined at the bedside.     Reports stable symptoms.  Continues to have some hallucinations, mostly some reticular structures that overlay the architecture.      No new complaints, including headache, new visual disturbance, speech or language problem.   He is reporting improvement in his cognition.  His wife said he is 'beginning to sound like himself again.'    He was walking in the hallway today.      Today, he is undergoing IV infusion of solumedrol, 2/5       On exam:   On exam:   GEN: sitting up in bed, NAD  CV; irregularly irregulary  PULM: CTAB  NEURO:   Awake, alert, attending examiner.  He is oriented to month, year, and oriented to hospital.  He names common objects, but cannot repeat a phrase.  His fluency is improved.  His attention is normal.   Pupils 3-2mm, symmetric, full Vf's, no papilledema, full EOM, conjugate gaze, no nystagmus, no facial weakness, no dysarthria, tongue midline, palate symmetric.   MOTOR: normal bulka nd tone.   5/5, wrist extension 5/5, biceps 5/5, deltoids 5/5, hip flexors 4+/5, and knee extensors 5/5.    SENSRY; intact symmetrically to light touch  COORDINATION: normal FNF    MRI brain images reviewed: no diffusion restriction.  FLAIR images showing R frontal white matter gliosis.

## 2024-08-19 NOTE — PROGRESS NOTE ADULT - ASSESSMENT
60 y/o Male with h/o renal cell carcinoma s/p right nephrectomy on immunotherapy, last dose 3 weeks ago, paroxysmal A-fib not on anticoagulation was admitted on 8/14 for increased lethargy. Patient lethargic, collateral history provided by wife at bedside. As per wife, patient had been in his usual state of health until one week ago. They went to [place] last week on Wednesday to see there new grandchild. On Thursday patient started to act bizarre, had trouble walking and speaking. The wife called EMS and he was taken to the local hospital in [place]. As per the wife pt had a CT chest which was noted for progression of malignancy and PNA, and he had an MRI done which showed a chronic stroke. Patient was in the hospital for 5 days presumably getting antibiotics, and initially improved but developed a rash during the hospital course. Most of patients providers are in [facility] so they asked to be discharged from [place] and came back to [place] on the day PTA. As per wife the patient initially improved but got worse again while coming to [place], and currently has been lethargic, spiking fevers, and has a diffuse macular rash that's getting worse. In ED, pt tachycardic, and febrile. He received cefepime and vancomycin IV.     1. Febrile syndrome resolved. Macular rash, probable drug rash due to levofloxacin. Renal cell Ca s/p right nephrectomy on immunotherapy. 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 Metabolic encephalopathy resp;alexandria.   -low grade fever   -rash is improving  -BC x 2, urine c/s noted  -CSF c/s are negative to date  -CSF PCR is negative   -no clinical evidence of sepsis  -no pyuria to suggest urinary infection  -CXR - no pulmonary infiltrates noted  -he received abx therapy during recent hospitalization in [place], then oral levofloxacin as outpatient   -no infectious issue identified, but has a likely allergic reaction  -would continue to observe off abx therapy at this time  -f/u cultures  -neurology evaluation appreciated - on steroids  -monitor temps  -supportive care  2. Other issues:   -care per medicine    d/w medicine team and Dr. Holcomb

## 2024-08-19 NOTE — PROGRESS NOTE ADULT - ASSESSMENT
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 Barrera, suspected encephalitis possibly related to immunotherapy   -ID consult appreciated, monitor off abx, s/p vanc and glenda in ED  -UA unremarkable  -Pt on immunotherapy, likely related  -Hold ASA/ppx heparin for now   -8/16: fever 101.1F overnight, tachy to 130s  -s/p LP 8/16  with IR  -Was started on acyclovir, now d/c  -start solumedrol 1000mg IV daily for 5 days (day 2/5)  -ESR: 60 > 50  -CRP: 128 > 39  -MRI brain with contrast w/o acute findings     Adrenal insufficiency  -AM cortisol 0.9   -started hydrocortisone 8/16, now d/c and started on solumedrol for encephalitis  -likely related to immunotherapy  -Pt was started on course of prednisone 8/1 for itching/arthritis    Maculopapular rash - improved   -started prior to initiation of Levaquin while in hospital in [place]    Acute kidney injury - improving   Acute Urinary retention - resolved   Metabolic acidosis - HCO3 improved   -baseline Cr ~1.2-1.4  -Cr 2.32 > 2.01 > 1.8 > 1.52 > 1.47 > 1.45   -straight cath with ~ 400cc on 8/14, voiding spontaneously now  -Bicarb normalized   -ABG 8/15: pH 7.32  pH, pCO2: 28 / pO2: 91 / HCO3: 14 / Base Excess: -10.2 /  SaO2: 98    -s/p bicarb ggt     Hallucinations   -Likely steroid related, cont to monitor     Diabetes Mellitus type 2 with steroid induced hyperglycemia  -Hypoglycemia Protocol, ISS, Accuchecks AC&HS.  -Diet: Consistent Carbs w/ Evening Snack  -HbA1c 6.5%  -expect hyperglycemia with high dose steroids  -Lantus increased to 18u qhs, pre-meals 6u   -s/p IVF NS     hx RCC s/p R nephrectomy   -On immunotherapy  -nivolumab and ipilimumab Q21 x 4 -> Nivo maintenance -most recent dosing 07/22/2024  -Heme/onc consult appreciated     HTN/HLD  -continue home meds     VTE ppx: hold heparin for now, SCD  GOC: full code    medically active

## 2024-08-19 NOTE — SPEECH LANGUAGE PATHOLOGY EVALUATION - SLP DIAGNOSIS
Pt does well in formal testing: hesitations, dysfluency, lexical retrieval deficits appear more in "free flow" speaking where Pt is generating and articualitng his own thoughts and ideas.

## 2024-08-19 NOTE — PROGRESS NOTE ADULT - ASSESSMENT
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   - Patient apparently received CT imaging at [facility] recently that noted POD  - No planned inpatient cancer directed therapy  - Will need close outpatient follow up once stable & d/c'ed   - Continue supportive measures as clinically necessary       # AMS / Lethargy with Rash    - [place] with improvement after workup / tx; do not have records  - ID following; febrile with macular rash, suspected drug rash 2/2 recent Levaquin; pending additional infectious work up / CXs  - CT & MR head w/ without contrast completed - BEATA  - Neurology following  - CSF shows elevated WBC 8, predominantly lymphocytes, and elevated proteins.    - EEG showing multifocal slowing.    - Overall suspicion for encephalitic process, possibly related to immunotherapy.    - On Steroids responding well   - Continue supportive measures     # Normocytic Anemia    - Hgb remains low but overall stable   - Etiology multifactorial in setting of known underlying metastatic disease on cancer directed therapy with recent hospitalization / sepsis / infectious process   - Continue to trend serial CBCs while admitted  - Continue supportive measures     Yomi Clayton DNP, FNP-C  Hematology/Oncology [facility]  [phone]

## 2024-08-19 NOTE — PROGRESS NOTE ADULT - SUBJECTIVE AND OBJECTIVE BOX
Date of service: 08-19-24 @ 16:44    Lying in bed in NAD  Events noted  Concern for herpes meningitis was raised and he received acyclovir for 24h  Later herpes PCR in CSF was reported negative and acyclovir was discontinued  Mental status is much improved on steroids  No HA    ROS: no fever or chills; denies dizziness, no HA, no SOB or cough, no abdominal pain, no diarrhea or constipation; no dysuria, no legs pain, no rashes    MEDICATIONS  (STANDING):  atorvastatin 20 milliGRAM(s) Oral at bedtime  dextrose 5%. 1000 milliLiter(s) (50 mL/Hr) IV Continuous <Continuous>  dextrose 5%. 1000 milliLiter(s) (100 mL/Hr) IV Continuous <Continuous>  dextrose 50% Injectable 12.5 Gram(s) IV Push once  dextrose 50% Injectable 25 Gram(s) IV Push once  dextrose 50% Injectable 25 Gram(s) IV Push once  glucagon  Injectable 1 milliGRAM(s) IntraMuscular once  heparin   Injectable 5000 Unit(s) SubCutaneous every 8 hours  insulin glargine Injectable (LANTUS) 18 Unit(s) SubCutaneous at bedtime  insulin lispro (ADMELOG) corrective regimen sliding scale   SubCutaneous three times a day before meals  insulin lispro (ADMELOG) corrective regimen sliding scale   SubCutaneous at bedtime  insulin lispro Injectable (ADMELOG) 6 Unit(s) SubCutaneous three times a day before meals  methylPREDNISolone sodium succinate IVPB 1000 milliGRAM(s) IV Intermittent daily  metoprolol tartrate 25 milliGRAM(s) Oral two times a day  pantoprazole    Tablet 40 milliGRAM(s) Oral before breakfast  sodium chloride 0.9%. 1000 milliLiter(s) (75 mL/Hr) IV Continuous <Continuous>    Vital Signs Last 24 Hrs  T(C): 36.7 (19 Aug 2024 15:30), Max: 36.9 (19 Aug 2024 09:06)  T(F): 98 (19 Aug 2024 15:30), Max: 98.4 (19 Aug 2024 09:06)  HR: 77 (19 Aug 2024 15:30) (75 - 81)  BP: 119/78 (19 Aug 2024 15:30) (119/69 - 147/99)  BP(mean): --  RR: 18 (19 Aug 2024 15:30) (18 - 18)  SpO2: 95% (19 Aug 2024 15:30) (95% - 100%)    Parameters below as of 19 Aug 2024 15:30  Patient On (Oxygen Delivery Method): room air     Physical exam:    Constitutional:  No acute distress  HEENT: NC/AT, EOMI, PERRLA, conjunctivae clear; ears and nose atraumatic; pharynx benign  Neck: supple; thyroid not palpable  Back: no tenderness  Respiratory: respiratory effort normal; crackles at bases  Cardiovascular: S1S2 regular, no murmurs  Abdomen: soft, not tender, not distended, positive BS; no liver or spleen organomegaly  Genitourinary: no suprapubic tenderness  Lymphatic: no LN palpable  Musculoskeletal: no muscle tenderness, no joint swelling or tenderness  Extremities: no pedal edema  Neurological/ Psychiatric: Alert, moving all extremities  Skin: diffuse macular rash on chest, arms, legs; no palpable lesions - improving    Labs: reviewed                        8.9    9.60  )-----------( 347      ( 19 Aug 2024 05:49 )             26.6     08-19    137  |  105  |  27<H>  ----------------------------<  260<H>  3.3<L>   |  24  |  1.45<H>    Ca    8.8      19 Aug 2024 05:49    TPro  6.4  /  Alb  2.9<L>  /  TBili  0.3  /  DBili  x   /  AST  24  /  ALT  23  /  AlkPhos  79  08-19    C-Reactive Protein: 39 mg/L (08-18-24 @ 06:36)  C-Reactive Protein: 128 mg/L (08-15-24 @ 06:54)                        9.2    4.90  )-----------( 218      ( 15 Aug 2024 06:54 )             28.3     08-15    132<L>  |  106  |  18  ----------------------------<  123<H>  4.0   |  15<L>  |  2.01<H>    Ca    8.6      15 Aug 2024 06:54  Phos  3.5     08-15  Mg     1.6     08-15    TPro  6.2  /  Alb  2.6<L>  /  TBili  0.6  /  DBili  x   /  AST  18  /  ALT  15  /  AlkPhos  118  08-15     LIVER FUNCTIONS - ( 15 Aug 2024 06:54 )  Alb: 2.6 g/dL / Pro: 6.2 gm/dL / ALK PHOS: 118 U/L / ALT: 15 U/L / AST: 18 U/L / GGT: x           Urinalysis with Rflx Culture (08-15 @ 00:12)  Urine Appearance: Clear  Protein, Urine: 100 mg/dL  Urine Microscopic-Add On (NC) (08-15 @ 00:12)  White Blood Cell - Urine: 0 /HPF  Red Blood Cell - Urine: 0 /HPF    Culture - Acid Fast - CSF (collected 16 Aug 2024 13:44)  Source: .CSF  Preliminary Report (17 Aug 2024 23:08):    Culture is being performed.    Culture - CSF with Gram Stain (collected 16 Aug 2024 13:44)  Source: .CSF  Gram Stain (16 Aug 2024 19:54):    No polymorphonuclear cells seen    No organisms seen    by cytocentrifuge  Preliminary Report (17 Aug 2024 14:59):    No growth    Culture - Fungal, CSF (collected 16 Aug 2024 13:44)  Source: .CSF  Preliminary Report (17 Aug 2024 23:03):    Culture is being performed. Fungal cultures are held for 4 weeks.    Urinalysis with Rflx Culture (collected 15 Aug 2024 00:12)    Culture - Blood (collected 14 Aug 2024 20:41)  Source: .Blood None  Preliminary Report (19 Aug 2024 03:00):    No growth at 4 days    Culture - Blood (collected 14 Aug 2024 20:41)  Source: .Blood None  Preliminary Report (19 Aug 2024 03:00):    No growth at 4 days    Radiology: all available radiological tests reviewed    < from: Xray Chest 1 View- PORTABLE-Urgent (03.17.24 @ 17:36) >  IMPRESSION:  Faint 2 cm peripheral left midlung nodular opacity corresponding to a finding on the PET/CT scan. Additional subcentimeter   nodule seen on that study are not able to be seen.The remainder of the lungs are clear.  < end of copied text >    Advanced directives addressed: full resuscitation

## 2024-08-19 NOTE — PROGRESS NOTE ADULT - ASSESSMENT
59 year old man with RCC, s/p R nephrectomy, on novolumib, last infusion 07/17/24, afib not on AC, presenting with lethargy on 8/14, in setting of recent arthralgias, and rash.  Now more awake, but has impaired fluency, and attention.  MRI brain not showing anything acute at this time, but there is a R frontal gliotic lesion.  CSF shows elevated WBC 8, predominantly lymphocytes, and elevated proteins.  EEG showing multifocal slowing.    Overall suspicion for encephalitic process, possibly related to immunotherapy.  Now responding well to IV steroids, today is day 2/5 of solumedrol 1g daily.    -would obtain MRi brain with contrast  -follow remaining CSF labs  -continue solumedrol 1000mg for 5 days.   -monitor for infection  -neurology to follow. Please page or call with any acute neuro changes, or other issues we can help address.

## 2024-08-20 LAB
ANION GAP SERPL CALC-SCNC: 9 MMOL/L — SIGNIFICANT CHANGE UP (ref 5–17)
BUN SERPL-MCNC: 33 MG/DL — HIGH (ref 7–23)
CALCIUM SERPL-MCNC: 8.5 MG/DL — SIGNIFICANT CHANGE UP (ref 8.5–10.1)
CHLORIDE SERPL-SCNC: 106 MMOL/L — SIGNIFICANT CHANGE UP (ref 96–108)
CK SERPL-CCNC: 87 U/L — SIGNIFICANT CHANGE UP (ref 26–308)
CO2 SERPL-SCNC: 25 MMOL/L — SIGNIFICANT CHANGE UP (ref 22–31)
CREAT SERPL-MCNC: 1.49 MG/DL — HIGH (ref 0.5–1.3)
CULTURE RESULTS: SIGNIFICANT CHANGE UP
CULTURE RESULTS: SIGNIFICANT CHANGE UP
EGFR: 53 ML/MIN/1.73M2 — LOW
GLUCOSE BLDC GLUCOMTR-MCNC: 148 MG/DL — HIGH (ref 70–99)
GLUCOSE BLDC GLUCOMTR-MCNC: 181 MG/DL — HIGH (ref 70–99)
GLUCOSE BLDC GLUCOMTR-MCNC: 194 MG/DL — HIGH (ref 70–99)
GLUCOSE BLDC GLUCOMTR-MCNC: 246 MG/DL — HIGH (ref 70–99)
GLUCOSE SERPL-MCNC: 243 MG/DL — HIGH (ref 70–99)
LDH SERPL L TO P-CCNC: 261 U/L — HIGH (ref 84–241)
POTASSIUM SERPL-MCNC: 3.6 MMOL/L — SIGNIFICANT CHANGE UP (ref 3.5–5.3)
POTASSIUM SERPL-SCNC: 3.6 MMOL/L — SIGNIFICANT CHANGE UP (ref 3.5–5.3)
SODIUM SERPL-SCNC: 140 MMOL/L — SIGNIFICANT CHANGE UP (ref 135–145)
SPECIMEN SOURCE: SIGNIFICANT CHANGE UP
SPECIMEN SOURCE: SIGNIFICANT CHANGE UP
VDRL CSF-TITR: SIGNIFICANT CHANGE UP

## 2024-08-20 PROCEDURE — 99232 SBSQ HOSP IP/OBS MODERATE 35: CPT

## 2024-08-20 PROCEDURE — 99233 SBSQ HOSP IP/OBS HIGH 50: CPT

## 2024-08-20 RX ORDER — CHLORHEXIDINE GLUCONATE 40 MG/ML
1 SOLUTION TOPICAL
Refills: 0 | Status: DISCONTINUED | OUTPATIENT
Start: 2024-08-20 | End: 2024-08-24

## 2024-08-20 RX ADMIN — Medication 2: at 18:18

## 2024-08-20 RX ADMIN — METOPROLOL TARTRATE 25 MILLIGRAM(S): 100 TABLET ORAL at 22:52

## 2024-08-20 RX ADMIN — Medication 4: at 08:58

## 2024-08-20 RX ADMIN — INSULIN GLARGINE 15 UNIT(S): 100 INJECTION, SOLUTION SUBCUTANEOUS at 22:53

## 2024-08-20 RX ADMIN — Medication 6 UNIT(S): at 18:19

## 2024-08-20 RX ADMIN — Medication 5000 UNIT(S): at 05:50

## 2024-08-20 RX ADMIN — Medication 250 MILLIGRAM(S): at 10:05

## 2024-08-20 RX ADMIN — Medication 40 MILLIGRAM(S): at 05:50

## 2024-08-20 RX ADMIN — Medication 6 UNIT(S): at 09:01

## 2024-08-20 RX ADMIN — Medication 6 UNIT(S): at 14:44

## 2024-08-20 RX ADMIN — METOPROLOL TARTRATE 25 MILLIGRAM(S): 100 TABLET ORAL at 10:03

## 2024-08-20 RX ADMIN — Medication 2 TABLET(S): at 22:52

## 2024-08-20 RX ADMIN — ZOLPIDEM TARTRATE 5 MILLIGRAM(S): 5 TABLET, FILM COATED ORAL at 22:59

## 2024-08-20 RX ADMIN — Medication 20 MILLIGRAM(S): at 22:52

## 2024-08-20 RX ADMIN — Medication 5000 UNIT(S): at 14:45

## 2024-08-20 RX ADMIN — Medication 5000 UNIT(S): at 22:53

## 2024-08-20 NOTE — PROGRESS NOTE ADULT - SUBJECTIVE AND OBJECTIVE BOX
cc - generalized weakness, hallucinations, poor sleep        HPI: 59M with PMH of h/o RCC s/p  right nephrectomy on immunotherapy, last dose 3 weeks ago, PAF  not on AC, admitted on 8/14/24  with c/o lethargy. As per wife, patient had been in his usual state of health until one week ago. They went to [place] last week on Wednesday to see there new grandchild. On Thursday patient started to act bizarre, had trouble walking and speaking. The wife called EMS and he was taken to the local hospital in [place]. As per the wife pt had a CT chest which was noted for progression of malignancy and PNA, and he had an MRI done which showed a chronic stroke. Patient was in the hospital for 5 days presumably getting antibiotics, and initially improved but developed a rash during the hospital course. Most of patients providers are in [facility] so they asked to be discharged from [place] and came back to [place] yesterday. As per wife the patient initially improved but got worse again while coming to [place], and currently has been lethargic, spiking fevers, and has a diffuse macular rash that's getting worse. On arrival to ED, pt tachycardic, and febrile, vitals otherwise stable. CBC noted for WBC 7.6, H/H 10/29, PLt 272. CMP noted for BUN/Cr 18/2.32, UA sterile, flu/covid negative. Admitted to  for PNA.         Hospital course :   8/15:  lethargic, eye opening to name and painful stimuli, did not follow commands, similar mentation to when he was admitted yesterday, non verbal on my exam this AM. EEG with cerebral dysfunction, retaining urine. Ammonia levels normal, B12 normal. Pt re-evaluated ~ 4:30pm, eye opening to name, follows commands, knows name, good strength to b/l UE, ~ 3-4 to b/l LE, minimally against gravity, seems painful to move LE.   8/16:  more awake, alert today, speaking, but confused, laughs when not able to answer questions, knows name, T 101.1F overnight. Acidosis improved, AM cortisol 0.9.   8/17: awake, alert, mentation improved, able to maintain some conversation, oriented x3, tolerating regular diet, some visual hallucinations this afternoon - seeing Halloween decoration - cob webs   8/18: awake, alert, oriented x3, able to lift legs against gravity now, improved strength, able to sit up, continues with visual hallucinations, ambulated with PT today, pt seen later in the afternoon, sitting up in chair, wife present.   8/19:  sitting up in chair at bedside, feeling much better, tolerating orally, VSS. Continued on IV steroids.   8/20 - hallucinations over night, poor sleep, weakness improving, denies HA, visual changes, cp, dyspnea, abdominal pain, afebrile     Review of system- Rest of the review of system are negative except mentioned in HPI    Vital sings reviewed for last 24 h  T(C): 36.6 (08-20-24 @ 08:31), Max: 36.7 (08-19-24 @ 15:30)  T(F): 97.8 (08-20-24 @ 08:31), Max: 98 (08-19-24 @ 15:30)  HR: 63 (08-20-24 @ 08:31) (63 - 77)  BP: 134/74 (08-20-24 @ 08:31) (102/62 - 134/74)  RR: 18 (08-20-24 @ 08:31) (18 - 18)  SpO2: 98% (08-20-24 @ 08:31) (95% - 98%)  CAPILLARY BLOOD GLUCOSE  POCT Blood Glucose.: 148 mg/dL (20 Aug 2024 12:56)  POCT Blood Glucose.: 246 mg/dL (20 Aug 2024 08:12)  POCT Blood Glucose.: 224 mg/dL (19 Aug 2024 21:49)  POCT Blood Glucose.: 181 mg/dL (19 Aug 2024 17:39)    Physical exam :   GENERAL: NAD, lying in bed comfortably  HEAD:  Atraumatic, Normocephalic  EYES: conjunctiva and sclera clear  ENT: Moist mucous membranes  NECK: Supple, No JVD  CHEST/LUNG: Clear to auscultation bilaterally; No rales, rhonchi, wheezing. Unlabored respirations  HEART: Regular rate and rhythm; No murmurs  ABDOMEN: Bowel sounds present; Soft, Nontender, Nondistended.   EXTREMITIES:  2+ Peripheral Pulses, brisk capillary refill. No clubbing, cyanosis, or edema  NERVOUS SYSTEM:  awake, alert, oriented x 3, speech fluency impaired  MSK: improved strength to all extremities, 4-5/5 to b/l LE  SKIN: maculopapular rash to b/l LE, spotty rash to palms, no itching --- improved     All labs radiology and other studies reviewed and interpreted :                         8.9    9.60  )-----------( 347      ( 19 Aug 2024 05:49 )             26.6     08-20    140  |  106  |  33<H>  ----------------------------<  243<H>  3.6   |  25  |  1.49<H>    Ca    8.5      20 Aug 2024 07:23    TPro  6.4  /  Alb  2.9<L>  /  TBili  0.3  /  DBili  x   /  AST  24  /  ALT  23  /  AlkPhos  79  08-19        LIVER FUNCTIONS - ( 19 Aug 2024 05:49 )  Alb: 2.9 g/dL / Pro: 6.4 gm/dL / ALK PHOS: 79 U/L / ALT: 23 U/L / AST: 24 U/L / GGT: x           Vitamin B12, Serum (08.15.24 @ 11:45)    Vitamin B12, Serum: >2000 pg/mL    Folate, Serum: 7.7 ng/mL (05.10.24 @ 08:54)    Thyroid Stimulating Hormone, Serum: 6.46 uU/mL (08.15.24 @ 10:25)    Ammonia, Serum: 19 umol/L (08.15.24 @ 11:45)    Troponin I, High Sensitivity Result: 14.02:  (08.15.24 @ 10:25)    Culture - Fungal, CSF (08.16.24 @ 13:44)    Specimen Source: .CSF   Culture Results:   Culture is being performed. Fungal cultures are held for 4 weeks.    Culture - Blood (08.14.24 @ 20:41)    Specimen Source: .Blood None   Culture Results:   No growth at 5 days    Culture - Blood (08.14.24 @ 20:41)    Specimen Source: .Blood None   Culture Results:   No growth at 5 days      RADIOLOGY:    CT Abdomen and Pelvis No Cont (08.15.24 @ 11:24) >  IMPRESSION:  Evaluation of the solid organs, vascular structures and GI tract is   limited without oral and IV contrast.    Left lower lobe nodule seen previously is now partially obscured by   atelectasis. Cannot exclude superimposed pneumonia.    Bilateral lung nodules suspicious for metastatic disease.    Redemonstrated paraesophageal and bulky retroperitoneal adenopathy. Right   lateral abdominal metastatic nodules.    Mild splenomegaly.    Cholelithiasis.     CT Head No Cont (08.15.24 @ 11:24) >  IMPRESSION:  No acute intracranial hemorrhage or acute territorial infarct.   If   symptoms persist, follow-up MRI exam recommended.       MR Head No Cont (08.16.24 @ 09:24) >  High T2 signal periventricular subcortical white matter noted which may   be related to mild chronic microvascular ischemic changes.  Focal encephalomalacia/gliosis noted in the right frontal lobe.  There is no acute parenchymal hemorrhage, mass effect or midline shift.   There is no extra-axial fluid collection.  There is no hydrocephalus.    There is no acute infarct. Nonspecific mild prominence bilateral optic   nerve sheath.  Small retention cyst/polyp sphenoid sinus. Minimal mucosal thickening   ethmoid sinus.  Nonspecific low T1 bone marrow signal noted which may be related to red   marrow hyperplasia. Marrow infiltrative disease not excluded.  MRV of the head:  There is normal flow within the superior sagittal sinus, internal   cerebral veins,vein of Avinash and straight sinus.  The transverse and sigmoid sinuses are patent.    IMPRESSION:  No acute intracranial hemorrhage or acute infarct. If metastasis is a   clinical concern, repeat exam with contrast recommended.    Chronic ischemic changes.     MR Head w/wo IV Cont (08.18.24 @ 12:42) >    IMPRESSION: NO EVIDENCE OF INTRACRANIAL HEMORRHAGE, ACUTE TERRITORIAL   INFARCT OR AREA OF ABNORMAL ENHANCEMENT. NO EVIDENCE OF INTRACRANIAL   METASTATIC DISEASE.    MEDICATIONS  (STANDING):  atorvastatin 20 milliGRAM(s) Oral at bedtime  chlorhexidine 4% Liquid 1 Application(s) Topical <User Schedule>  dextrose 5%. 1000 milliLiter(s) (50 mL/Hr) IV Continuous <Continuous>  dextrose 5%. 1000 milliLiter(s) (100 mL/Hr) IV Continuous <Continuous>  dextrose 50% Injectable 25 Gram(s) IV Push once  dextrose 50% Injectable 25 Gram(s) IV Push once  dextrose 50% Injectable 12.5 Gram(s) IV Push once  glucagon  Injectable 1 milliGRAM(s) IntraMuscular once  heparin   Injectable 5000 Unit(s) SubCutaneous every 8 hours  insulin glargine Injectable (LANTUS) 15 Unit(s) SubCutaneous at bedtime  insulin lispro (ADMELOG) corrective regimen sliding scale   SubCutaneous three times a day before meals  insulin lispro (ADMELOG) corrective regimen sliding scale   SubCutaneous at bedtime  insulin lispro Injectable (ADMELOG) 6 Unit(s) SubCutaneous three times a day before meals  methylPREDNISolone sodium succinate IVPB 1000 milliGRAM(s) IV Intermittent daily  metoprolol tartrate 25 milliGRAM(s) Oral two times a day  pantoprazole    Tablet 40 milliGRAM(s) Oral before breakfast  senna 2 Tablet(s) Oral at bedtime  sodium chloride 0.9%. 1000 milliLiter(s) (75 mL/Hr) IV Continuous <Continuous>    MEDICATIONS  (PRN):  acetaminophen     Tablet .. 650 milliGRAM(s) Oral every 6 hours PRN Temp greater or equal to 38C (100.4F), Mild Pain (1 - 3)  aluminum hydroxide/magnesium hydroxide/simethicone Suspension 30 milliLiter(s) Oral every 4 hours PRN Dyspepsia  artificial tears (preservative free) Ophthalmic Solution 2 Drop(s) Both EYES every 8 hours PRN Dry Eyes  dextrose Oral Gel 15 Gram(s) Oral once PRN Blood Glucose LESS THAN 70 milliGRAM(s)/deciliter  ondansetron Injectable 4 milliGRAM(s) IV Push every 8 hours PRN Nausea and/or Vomiting  zolpidem 5 milliGRAM(s) Oral at bedtime PRN Insomnia

## 2024-08-20 NOTE — PROGRESS NOTE ADULT - ASSESSMENT
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   - Patient apparently received CT imaging at [facility] recently that noted POD  - No planned inpatient cancer directed therapy  - Will need close outpatient follow up once stable & d/c'ed   - Continue supportive measures as clinically necessary       # AMS / Lethargy with Rash    - [place] with improvement after workup / tx; do not have records  - ID following; febrile with macular rash, suspected drug rash 2/2 recent Levaquin; pending additional infectious work up / CXs  - CT & MR head w/ without contrast completed - BEATA  - EEG showing multifocal slowing.  - CSF shows elevated WBC 8, predominantly lymphocytes, and elevated proteins.     - Neurology following  - Overall suspicion for encephalitic process, possibly related to immunotherapy. On steroids responding well   - Continue supportive measures     # Normocytic Anemia    - Hgb remains low but overall stable   - Etiology multifactorial in setting of known underlying metastatic disease on cancer directed therapy with recent hospitalization / sepsis / infectious process   - Continue to trend serial CBCs while admitted  - Continue supportive measures     Yomi Clayton DNP, FNP-C  Hematology/Oncology [facility]  [phone]

## 2024-08-20 NOTE — PROGRESS NOTE ADULT - SUBJECTIVE AND OBJECTIVE BOX
HPI:    60 y/o M with known metastatic RCC s/p R nephrectomy currently on immunotherapy through [facility] Onc Dr Sánchez, PAF not on AC therapy presented with c/o lethargy /AMS. As per wife, patient had been in his usual state of health until about 1-2 weeks ago; they went to [place] last week to see new grandchild, then while there patient started to act bizarre, had trouble walking and speaking, called EMS and was taken to local hospital in [place]. As per wife, he had CT imaging there which was noted for progression of malignancy & PNA. He also had an MRI which showed chronic stroke.    08/15/24: Seen at bedside with Dr Edwards & Dr Holcomb, no acute distress but overall obtunded / lethargic, unable to hold conversation     08/18/24: Patient to MRI    8/19/24; Seen at bedside along with Dr. Senior, OOB to chair, A X O x 3, struggling intermittent with aphasia    8/20/24: Seen at bedside along with Dr. Senior, reports feeling better, endorsed hallucination last night.      PAST MEDICAL & SURGICAL HISTORY:    Pneumonia    Cough    Former smoker    Obesity (BMI 30-39.9)    HTN (hypertension)    HLD (hyperlipidemia)    CAD (coronary artery disease)    Diabetes    H/O pulmonary hypertension    Renal mass    Lung nodule    Stented coronary artery    Paroxysmal atrial fibrillation    Diverticulitis    Renal calculi    Subclinical hyperthyroidism    Metastasis to lung    History of appendectomy  1994    History of torn meniscus of left knee  1995    History of tonsillectomy and adenoidectomy  1968    S/P cystoscopy with ureteral stent placement    History of lung biopsy    S/P cardiac cath        MEDICATIONS  (STANDING):    atorvastatin 20 milliGRAM(s) Oral at bedtime  chlorhexidine 4% Liquid 1 Application(s) Topical <User Schedule>  dextrose 5%. 1000 milliLiter(s) (50 mL/Hr) IV Continuous <Continuous>  dextrose 5%. 1000 milliLiter(s) (100 mL/Hr) IV Continuous <Continuous>  dextrose 50% Injectable 25 Gram(s) IV Push once  dextrose 50% Injectable 25 Gram(s) IV Push once  dextrose 50% Injectable 12.5 Gram(s) IV Push once  glucagon  Injectable 1 milliGRAM(s) IntraMuscular once  heparin   Injectable 5000 Unit(s) SubCutaneous every 8 hours  insulin glargine Injectable (LANTUS) 15 Unit(s) SubCutaneous at bedtime  insulin lispro (ADMELOG) corrective regimen sliding scale   SubCutaneous three times a day before meals  insulin lispro (ADMELOG) corrective regimen sliding scale   SubCutaneous at bedtime  insulin lispro Injectable (ADMELOG) 6 Unit(s) SubCutaneous three times a day before meals  methylPREDNISolone sodium succinate IVPB 1000 milliGRAM(s) IV Intermittent daily  metoprolol tartrate 25 milliGRAM(s) Oral two times a day  pantoprazole    Tablet 40 milliGRAM(s) Oral before breakfast  senna 2 Tablet(s) Oral at bedtime  sodium chloride 0.9%. 1000 milliLiter(s) (75 mL/Hr) IV Continuous <Continuous>    MEDICATIONS  (PRN):    acetaminophen     Tablet .. 650 milliGRAM(s) Oral every 6 hours PRN Temp greater or equal to 38C (100.4F), Mild Pain (1 - 3)  aluminum hydroxide/magnesium hydroxide/simethicone Suspension 30 milliLiter(s) Oral every 4 hours PRN Dyspepsia  artificial tears (preservative free) Ophthalmic Solution 2 Drop(s) Both EYES every 8 hours PRN Dry Eyes  dextrose Oral Gel 15 Gram(s) Oral once PRN Blood Glucose LESS THAN 70 milliGRAM(s)/deciliter  ondansetron Injectable 4 milliGRAM(s) IV Push every 8 hours PRN Nausea and/or Vomiting  zolpidem 5 milliGRAM(s) Oral at bedtime PRN Insomnia      ALLERGIES:    sulfa drugs (Unknown)  Levaquin (Rash (Mod to Severe))      FAMILY HISTORY:    breast cancer (Mother)      REVIEW OF SYSTEMS:    Constitutional, Eyes, ENT, Cardiovascular, Respiratory, Gastrointestinal, Genitourinary, Musculoskeletal, Integumentary, Neurological, Psychiatric, Endocrine, Heme/Lymph and Allergic/Immunologic review of systems are otherwise negative except as noted in HPI.       VITALS:    Vital Signs Last 24 Hrs  T(C): 36.6 (20 Aug 2024 08:31), Max: 36.7 (19 Aug 2024 15:30)  T(F): 97.8 (20 Aug 2024 08:31), Max: 98 (19 Aug 2024 15:30)  HR: 63 (20 Aug 2024 08:31) (63 - 77)  BP: 134/74 (20 Aug 2024 08:31) (102/62 - 134/74)  BP(mean): --  RR: 18 (20 Aug 2024 08:31) (18 - 18)  SpO2: 98% (20 Aug 2024 08:31) (95% - 98%)    Parameters below as of 20 Aug 2024 08:31  Patient On (Oxygen Delivery Method): room air    PHYSICAL:    Constitutional: in no acute distress  Eyes: eyes closed, did not assess   ENT: pharynx is unremarkable  Neck: supple without JVD   Pulmonary: regular rate, spontaneous respirations   Cardiac: RRR  Vascular: no calf tenderness, venous stasis changes  Abdomen: normoactive bowel sounds, soft and nontender, no hepatosplenomegaly or masses appreciated.   Lymphatic: no peripheral adenopathy appreciated.   Musculoskeletal: full range of motion and no deformities appreciated.   Skin: normal appearance   Neurology: poorly assessed      LABS:                          8.9    9.60  )-----------( 347      ( 19 Aug 2024 05:49 )             26.6     08-20    140  |  106  |  33<H>  ----------------------------<  243<H>  3.6   |  25  |  1.49<H>    Ca    8.5      20 Aug 2024 07:23    TPro  6.4  /  Alb  2.9<L>  /  TBili  0.3  /  DBili  x   /  AST  24  /  ALT  23  /  AlkPhos  79  08-19      RADIOLOGY & ADDITIONAL STUDIES:    EXAM:  MR BRAIN WAW IC      PROCEDURE DATE:  08/18/2024      INTERPRETATION:  CLINICAL INDICATION: ENCEPHALOPATHY.    TECHNIQUE: Multi-planar, multi-sequence magnetic resonance imaging of the   brain was performed with and without intravenous contrast.    CONTRAST: Post-contrast MR images were obtained following infusion of 9   mL of Gadavist    COMPARISON: 8-16-24    FINDINGS:    VENTRICLES AND SULCI:  Normal.  INTRA-AXIAL:  No acute intracranial hemorrhage, midline shift or evidence   of acute cerebral ischemia. No abnormal enhancement.  Focal encephalomalacia/gliosis noted in the right frontal lobe.  There are a few scattered foci of hyperintense T2 signal within the   subcortical and periventricular white matter which are nonspecific but   likely related to chronic microvascular ischemic disease.  EXTRA-AXIAL:  No mass or collection.  VISUALIZED SINUSES: Mild mucosal thickening.  VISUALIZED MASTOIDS:  Clear.  CALVARIUM:  Nonspecific low T1 bone marrow signal noted which may be   related to red marrow hyperplasia. Marrow infiltrative disease not   excluded.  CAROTID FLOW VOIDS: Normal.  MISCELLANEOUS:  None.    IMPRESSION: NO EVIDENCE OF INTRACRANIAL HEMORRHAGE, ACUTE TERRITORIAL   INFARCT OR AREA OF ABNORMAL ENHANCEMENT. NO EVIDENCE OF INTRACRANIAL   METASTATIC DISEASE.        CT Head No Cont (08.15.24 @ 11:24)    IMPRESSION:  No acute intracranial hemorrhage or acute territorial infarct.   If   symptoms persist, follow-up MRI exam recommended.        MR Head No Cont (08.16.24 @ 09:24)    IMPRESSION:  No acute intracranial hemorrhage or acute infarct. If metastasis is a   clinical concern, repeat exam with contrast recommended.    Chronic ischemic changes.

## 2024-08-20 NOTE — PROGRESS NOTE ADULT - ASSESSMENT
59M with PMH of h/o RCC s/p  right nephrectomy on Nivolumab , last dose 3 weeks ago, PAF  not on AC, admitted on 8/14/24  with c/o lethargy for 1 week.  They went to [place] last week on Wednesday to see there new grandchild. On Thursday patient started to act bizarre, had trouble walking and speaking. The wife called EMS and he was taken to the local hospital in [place]. As per the wife pt had a CT chest which was noted for progression of malignancy and PNA, and he had an MRI done which showed a chronic stroke. Patient was in the hospital for 5 days presumably getting antibiotics, and initially improved but developed a rash during the hospital course. As per wife the patient initially improved but got worse again while coming to [place], and currently has been lethargic, spiking fevers, and has a diffuse macular rash that's getting worse.     #Acute toxic-metabolic encephalopathy possibly due to Immunotherapy ( Nivolumab ) induced encephalitis  # SIRS POA - no obvious source of infection , Metabolic acidosis - improved   - On arrival to ED, pt tachycardic, and febrile   - UA sterile, flu/covid negative.  - CT chest, abd/pelvis  - bilateral pulmonary nodules, abdominal metastases, paraesophageal and retroperitoneal LAD, LLL nodule possible PNA  -MR head with contrast , MR venogram -  mild chronic microvascular ischemic changes. Focal encephalomalacia/gliosis noted in the right frontal lobe.  -EEG w/o epileptiform activity, + cerebral dysfunction   -Neurology consult appreciated, d/w Dr. Barrera, suspected encephalitis possibly related to immunotherapy   -ID consult appreciated, monitor off abx, s/p vanc and glenda in ED  -8/16: fever 101.1F overnight, tachy to 130s  -s/p LP 8/16  with IR  -Was started on acyclovir, now d/c  - c/w IV  solumedrol 1000mg IV daily for 5 days (day 3/5) plan for 5 days then taper as per oncology , with PPI   -ESR: 60 > 50,  trend CRP: 128 > 39    # Weakness , lethargy due to Adrenal insufficiency  -AM cortisol 0.9   -started hydrocortisone 8/16, now d/c and started on solumedrol for encephalitis  -likely related to immunotherapy  -Pt was started on course of prednisone 8/1 for itching/arthritis    # Acute kidney injury -  resolved at baseline Cr  1.4   # Acute Urinary retention - resolved   # Metabolic acidosis - HCO3 improved   -baseline Cr ~1.2-1.4  -Cr1.4 <-- 2.32 > 2.01 > 1.8 > 1.52 > 1.47 > 1.45   -straight cath with ~ 400cc on 8/14, voiding spontaneously now    -ABG 8/15: pH 7.32  pH, pCO2: 28 / pO2: 91 / HCO3: 14 / Base Excess: -10.2 /  SaO2: 98    -s/p bicarb ggt     # Diabetes Mellitus type 2 with steroid induced hyperglycemia  -Hypoglycemia Protocol, ISS, Accuchecks AC&HS.  -Diet: Consistent Carbs w/ Evening Snack  -HbA1c 6.5%  -expect hyperglycemia with high dose steroids  -Lantus increased to 18u qhs, pre-meals 6u   -s/p IVF NS     # Metastatic  RCC s/p R nephrectomy   - s/p nivolumab and ipilimumab Q21 x 4 -> Nivo maintenance -most recent dosing 07/22/2024  -Heme/onc consult appreciated   - send lyme and [place] virus, ticks panel    # Normocytic anemia   - B12 wnl, check iron studies, folate    # Maculopapular rash - improved   -started prior to initiation of Levaquin while in hospital in [place]    # Hallucinations steroids induced   # Insomnia  -  cont to monitor   - ambien hs     # HTN   - c/w BB    # HLD   - c/w statin       VTE ppx: heparin for now,  GOC: full code    Dispo - IV steroids as per oncology , d/c planning in 2-3 days

## 2024-08-21 LAB
% ALBUMIN: 51.3 % — SIGNIFICANT CHANGE UP
% ALPHA 1: 8.6 % — SIGNIFICANT CHANGE UP
% ALPHA 2: 12.4 % — SIGNIFICANT CHANGE UP
% BETA: 13.3 % — SIGNIFICANT CHANGE UP
% GAMMA: 14.4 % — SIGNIFICANT CHANGE UP
% M SPIKE: SIGNIFICANT CHANGE UP
ALBUMIN SERPL ELPH-MCNC: 2.9 G/DL — LOW (ref 3.3–5)
ALBUMIN SERPL ELPH-MCNC: 2.9 G/DL — LOW (ref 3.6–5.5)
ALBUMIN/GLOB SERPL ELPH: 1.1 RATIO — SIGNIFICANT CHANGE UP
ALP SERPL-CCNC: 68 U/L — SIGNIFICANT CHANGE UP (ref 40–120)
ALPHA1 GLOB SERPL ELPH-MCNC: 0.5 G/DL — HIGH (ref 0.1–0.4)
ALPHA2 GLOB SERPL ELPH-MCNC: 0.7 G/DL — SIGNIFICANT CHANGE UP (ref 0.5–1)
ALT FLD-CCNC: 27 U/L — SIGNIFICANT CHANGE UP (ref 12–78)
AMPHIPHYSIN AB TITR SER: NEGATIVE — SIGNIFICANT CHANGE UP
ANION GAP SERPL CALC-SCNC: 7 MMOL/L — SIGNIFICANT CHANGE UP (ref 5–17)
AST SERPL-CCNC: 26 U/L — SIGNIFICANT CHANGE UP (ref 15–37)
B BURGDOR C6 AB SER-ACNC: NEGATIVE — SIGNIFICANT CHANGE UP
B BURGDOR IGG+IGM SER QL IB: SIGNIFICANT CHANGE UP
B BURGDOR IGG+IGM SER-ACNC: 0.06 INDEX — SIGNIFICANT CHANGE UP (ref 0.01–0.9)
B MICROTI DNA BLD QL NAA+PROBE: SIGNIFICANT CHANGE UP
B-GLOBULIN SERPL ELPH-MCNC: 0.7 G/DL — SIGNIFICANT CHANGE UP (ref 0.5–1)
BASOPHILS # BLD AUTO: 0.02 K/UL — SIGNIFICANT CHANGE UP (ref 0–0.2)
BASOPHILS NFR BLD AUTO: 0.2 % — SIGNIFICANT CHANGE UP (ref 0–2)
BILIRUB SERPL-MCNC: 0.4 MG/DL — SIGNIFICANT CHANGE UP (ref 0.2–1.2)
BUN SERPL-MCNC: 33 MG/DL — HIGH (ref 7–23)
CALCIUM SERPL-MCNC: 8.5 MG/DL — SIGNIFICANT CHANGE UP (ref 8.5–10.1)
CHLORIDE SERPL-SCNC: 105 MMOL/L — SIGNIFICANT CHANGE UP (ref 96–108)
CO2 SERPL-SCNC: 26 MMOL/L — SIGNIFICANT CHANGE UP (ref 22–31)
CREAT SERPL-MCNC: 1.6 MG/DL — HIGH (ref 0.5–1.3)
CRMP-5-IGG WESTERN BLOT: NEGATIVE — SIGNIFICANT CHANGE UP
CRP SERPL-MCNC: 8 MG/L — HIGH
CULTURE RESULTS: SIGNIFICANT CHANGE UP
EGFR: 49 ML/MIN/1.73M2 — LOW
EOSINOPHIL # BLD AUTO: 0 K/UL — SIGNIFICANT CHANGE UP (ref 0–0.5)
EOSINOPHIL NFR BLD AUTO: 0 % — SIGNIFICANT CHANGE UP (ref 0–6)
ERYTHROCYTE [SEDIMENTATION RATE] IN BLOOD: 15 MM/HR — SIGNIFICANT CHANGE UP (ref 0–20)
FERRITIN SERPL-MCNC: 862 NG/ML — HIGH (ref 30–400)
FOLATE SERPL-MCNC: 6.5 NG/ML — SIGNIFICANT CHANGE UP
GAMMA GLOBULIN: 0.8 G/DL — SIGNIFICANT CHANGE UP (ref 0.6–1.6)
GLIAL NUC TYPE 1 AB TITR SER: NEGATIVE — SIGNIFICANT CHANGE UP
GLUCOSE BLDC GLUCOMTR-MCNC: 125 MG/DL — HIGH (ref 70–99)
GLUCOSE BLDC GLUCOMTR-MCNC: 148 MG/DL — HIGH (ref 70–99)
GLUCOSE BLDC GLUCOMTR-MCNC: 201 MG/DL — HIGH (ref 70–99)
GLUCOSE BLDC GLUCOMTR-MCNC: 229 MG/DL — HIGH (ref 70–99)
GLUCOSE SERPL-MCNC: 221 MG/DL — HIGH (ref 70–99)
HCT VFR BLD CALC: 27.6 % — LOW (ref 39–50)
HGB BLD-MCNC: 9.3 G/DL — LOW (ref 13–17)
HU1 AB TITR SER: NEGATIVE — SIGNIFICANT CHANGE UP
HU2 AB TITR SER IF: NEGATIVE — SIGNIFICANT CHANGE UP
HU3 AB TITR SER: NEGATIVE — SIGNIFICANT CHANGE UP
IFA NOTES: SIGNIFICANT CHANGE UP
IMM GRANULOCYTES NFR BLD AUTO: 10.3 % — HIGH (ref 0–0.9)
INTERPRETATION SERPL IFE-IMP: SIGNIFICANT CHANGE UP
IRON SATN MFR SERPL: 28 % — SIGNIFICANT CHANGE UP (ref 16–55)
IRON SATN MFR SERPL: 61 UG/DL — SIGNIFICANT CHANGE UP (ref 45–165)
LYMPHOCYTES # BLD AUTO: 1.43 K/UL — SIGNIFICANT CHANGE UP (ref 1–3.3)
LYMPHOCYTES # BLD AUTO: 11.3 % — LOW (ref 13–44)
M-SPIKE: SIGNIFICANT CHANGE UP (ref 0–0)
MAGNESIUM SERPL-MCNC: 2.2 MG/DL — SIGNIFICANT CHANGE UP (ref 1.6–2.6)
MCHC RBC-ENTMCNC: 27.5 PG — SIGNIFICANT CHANGE UP (ref 27–34)
MCHC RBC-ENTMCNC: 33.7 GM/DL — SIGNIFICANT CHANGE UP (ref 32–36)
MCV RBC AUTO: 81.7 FL — SIGNIFICANT CHANGE UP (ref 80–100)
MONOCYTES # BLD AUTO: 0.55 K/UL — SIGNIFICANT CHANGE UP (ref 0–0.9)
MONOCYTES NFR BLD AUTO: 4.3 % — SIGNIFICANT CHANGE UP (ref 2–14)
NEUTROPHILS # BLD AUTO: 9.35 K/UL — HIGH (ref 1.8–7.4)
NEUTROPHILS NFR BLD AUTO: 73.9 % — SIGNIFICANT CHANGE UP (ref 43–77)
P/Q TYPE ANTIBODY: 0 NMOL/L — SIGNIFICANT CHANGE UP
PARANEOPLASTIC AB SER-IMP: SIGNIFICANT CHANGE UP
PCA-1 AB TITR SER: NEGATIVE — SIGNIFICANT CHANGE UP
PCA-2 AB TITR SER: NEGATIVE — SIGNIFICANT CHANGE UP
PCA-TR AB TITR SER: NEGATIVE — SIGNIFICANT CHANGE UP
PHOSPHATE SERPL-MCNC: 3.6 MG/DL — SIGNIFICANT CHANGE UP (ref 2.5–4.5)
PLATELET # BLD AUTO: 390 K/UL — SIGNIFICANT CHANGE UP (ref 150–400)
POTASSIUM SERPL-MCNC: 3.2 MMOL/L — LOW (ref 3.5–5.3)
POTASSIUM SERPL-SCNC: 3.2 MMOL/L — LOW (ref 3.5–5.3)
PROCALCITONIN SERPL-MCNC: 0.07 NG/ML — SIGNIFICANT CHANGE UP (ref 0.02–0.1)
PROT PATTERN SERPL ELPH-IMP: SIGNIFICANT CHANGE UP
PROT SERPL-MCNC: 5.6 G/DL — LOW (ref 6–8.3)
PROT SERPL-MCNC: 6.2 GM/DL — SIGNIFICANT CHANGE UP (ref 6–8.3)
RBC # BLD: 3.38 M/UL — LOW (ref 4.2–5.8)
RBC # FLD: 13.9 % — SIGNIFICANT CHANGE UP (ref 10.3–14.5)
SARS-COV-2 RNA SPEC QL NAA+PROBE: DETECTED
SODIUM SERPL-SCNC: 138 MMOL/L — SIGNIFICANT CHANGE UP (ref 135–145)
SPECIMEN SOURCE: SIGNIFICANT CHANGE UP
TIBC SERPL-MCNC: 218 UG/DL — LOW (ref 220–430)
UIBC SERPL-MCNC: 157 UG/DL — SIGNIFICANT CHANGE UP (ref 110–370)
URATE SERPL-MCNC: 5.1 MG/DL — SIGNIFICANT CHANGE UP (ref 3.4–8.8)
VGKC AB SER-SCNC: 0 NMOL/L — SIGNIFICANT CHANGE UP
WBC # BLD: 12.65 K/UL — HIGH (ref 3.8–10.5)
WBC # FLD AUTO: 12.65 K/UL — HIGH (ref 3.8–10.5)

## 2024-08-21 PROCEDURE — 99232 SBSQ HOSP IP/OBS MODERATE 35: CPT

## 2024-08-21 PROCEDURE — 99233 SBSQ HOSP IP/OBS HIGH 50: CPT

## 2024-08-21 RX ORDER — POTASSIUM CHLORIDE 10 MEQ
40 TABLET, EXT RELEASE, PARTICLES/CRYSTALS ORAL EVERY 4 HOURS
Refills: 0 | Status: COMPLETED | OUTPATIENT
Start: 2024-08-21 | End: 2024-08-21

## 2024-08-21 RX ORDER — SODIUM CHLORIDE 9 MG/ML
1000 INJECTION INTRAMUSCULAR; INTRAVENOUS; SUBCUTANEOUS
Refills: 0 | Status: DISCONTINUED | OUTPATIENT
Start: 2024-08-21 | End: 2024-08-23

## 2024-08-21 RX ADMIN — METOPROLOL TARTRATE 25 MILLIGRAM(S): 100 TABLET ORAL at 11:45

## 2024-08-21 RX ADMIN — METOPROLOL TARTRATE 25 MILLIGRAM(S): 100 TABLET ORAL at 21:34

## 2024-08-21 RX ADMIN — Medication 6 UNIT(S): at 17:45

## 2024-08-21 RX ADMIN — Medication 40 MILLIGRAM(S): at 05:55

## 2024-08-21 RX ADMIN — CHLORHEXIDINE GLUCONATE 1 APPLICATION(S): 40 SOLUTION TOPICAL at 11:54

## 2024-08-21 RX ADMIN — ZOLPIDEM TARTRATE 5 MILLIGRAM(S): 5 TABLET, FILM COATED ORAL at 21:33

## 2024-08-21 RX ADMIN — Medication 40 MILLIEQUIVALENT(S): at 11:45

## 2024-08-21 RX ADMIN — Medication 250 MILLIGRAM(S): at 11:47

## 2024-08-21 RX ADMIN — Medication 20 MILLIGRAM(S): at 21:34

## 2024-08-21 RX ADMIN — Medication 5000 UNIT(S): at 05:56

## 2024-08-21 RX ADMIN — Medication 6 UNIT(S): at 08:02

## 2024-08-21 RX ADMIN — Medication 6 UNIT(S): at 12:31

## 2024-08-21 RX ADMIN — Medication 40 MILLIEQUIVALENT(S): at 13:39

## 2024-08-21 RX ADMIN — Medication 5000 UNIT(S): at 13:38

## 2024-08-21 RX ADMIN — Medication 4: at 08:01

## 2024-08-21 RX ADMIN — INSULIN GLARGINE 15 UNIT(S): 100 INJECTION, SOLUTION SUBCUTANEOUS at 21:34

## 2024-08-21 RX ADMIN — Medication 2 TABLET(S): at 21:34

## 2024-08-21 RX ADMIN — Medication 5000 UNIT(S): at 21:34

## 2024-08-21 RX ADMIN — SODIUM CHLORIDE 75 MILLILITER(S): 9 INJECTION INTRAMUSCULAR; INTRAVENOUS; SUBCUTANEOUS at 13:39

## 2024-08-21 NOTE — PROGRESS NOTE ADULT - ASSESSMENT
59M with PMH of h/o RCC s/p  right nephrectomy on Nivolumab , last dose 3 weeks ago, PAF  not on AC, admitted on 8/14/24  with c/o lethargy for 1 week.  They went to [place] last week on Wednesday to see there new grandchild. On Thursday patient started to act bizarre, had trouble walking and speaking. The wife called EMS and he was taken to the local hospital in [place]. As per the wife pt had a CT chest which was noted for progression of malignancy and PNA, and he had an MRI done which showed a chronic stroke. Patient was in the hospital for 5 days presumably getting antibiotics, and initially improved but developed a rash during the hospital course. As per wife the patient initially improved but got worse again while coming to [place], and currently has been lethargic, spiking fevers, and has a diffuse macular rash that's getting worse.     #Acute toxic-metabolic encephalopathy possibly due to Immunotherapy ( Nivolumab ) induced encephalitis  # SIRS POA - no obvious source of infection    - On arrival to ED, pt tachycardic, and febrile   - UA sterile, flu/covid negative.  -EEG w/o epileptiform activity, + cerebral dysfunction   -Neurology consult appreciated, d/w Dr. Barrera, suspected encephalitis possibly related to immunotherapy   -ID consult appreciated, monitor off abx, s/p vanc and glenda in ED  -8/16: fever 101.1F overnight, tachy to 130s  -s/p LP 8/16  with IR  -Was started on acyclovir, now d/c  - c/w IV  solumedrol 1000mg IV daily for 5 days  -  plan for 5 days then taper as per oncology    # Weakness , lethargy due to Adrenal insufficiency  -AM cortisol 0.9   -started hydrocortisone 8/16, now d/c and started on solumedrol for encephalitis  -likely related to immunotherapy  -Pt was started on course of prednisone 8/1 for itching/arthritis    # Acute kidney injury -  resolved at baseline Cr  1.4   # Acute Urinary retention - resolved   # Metabolic acidosis - HCO3 improved   -baseline Cr ~1.2-1.4  -Cr1.6  -straight cath with ~ 400cc on 8/14, voiding spontaneously now      -s/p bicarb ggt     # Diabetes Mellitus type 2 with steroid induced hyperglycemia  -Hypoglycemia Protocol, ISS, Accuchecks AC&HS.  -Diet: Consistent Carbs w/ Evening Snack  -HbA1c 6.5%  -expect hyperglycemia with high dose steroids  -Lantus increased to 18u qhs, pre-meals 6u   -s/p IVF NS     # Metastatic  RCC s/p R nephrectomy   - s/p nivolumab and ipilimumab Q21 x 4 -> Nivo maintenance -most recent dosing 07/22/2024  -Heme/onc consult appreciated   - FU lyme and [place] n[place] virus, ticks panel    # Normocytic anemia   - B12 wnl, check iron studies, folate    # Maculopapular rash - improved   -started prior to initiation of Levaquin while in hospital in [place]    # Hallucinations steroids induced   # Insomnia  - cont to monitor   - ambien hs     # HTN   - c/w BB    # HLD   - c/w statin     # VTE ppx:   heparin for now,    59M with PMH of h/o RCC s/p  right nephrectomy on Nivolumab , last dose 3 weeks ago, PAF  not on AC, admitted on 8/14/24  with c/o lethargy for 1 week.  They went to [place] last week on Wednesday to see there new grandchild. On Thursday patient started to act bizarre, had trouble walking and speaking. The wife called EMS and he was taken to the local hospital in [place]. As per the wife pt had a CT chest which was noted for progression of malignancy and PNA, and he had an MRI done which showed a chronic stroke. Patient was in the hospital for 5 days presumably getting antibiotics, and initially improved but developed a rash during the hospital course. As per wife the patient initially improved but got worse again while coming to [place], and currently has been lethargic, spiking fevers, and has a diffuse macular rash that's getting worse.     #Acute toxic-metabolic encephalopathy possibly due to Immunotherapy ( Nivolumab ) induced encephalitis  # SIRS POA - no obvious source of infection    - In  ED, pt tachycardic, and febrile   - UA sterile, flu/covid negative.  - EEG w/o epileptiform activity, + cerebral dysfunction   -Neurology consult appreciated, d/w Dr. Barrera, suspected encephalitis possibly related to immunotherapy   - ID consult appreciated, monitor off abx, s/p vanc and glenda in ED  -s/p LP 8/16  with IR, Culture negative  - FU lyme and [place] virus, ticks panel  - c/w IV  solumedrol 1000mg IV daily for 5 days  - plan for 5 days then taper as per oncology    # Weakness , lethargy due to Adrenal insufficiency  -AM cortisol 0.9   -started hydrocortisone 8/16, now d/c and started on solumedrol for encephalitis  -likely related to immunotherapy    # Acute kidney injury -  resolved at baseline Cr  1.4   # Acute Urinary retention - resolved   # Metabolic acidosis - HCO3 improved   -baseline Cr 1.4  -Cr1.6  -straight cath with ~ 400cc on 8/14, voiding spontaneously now      -s/p bicarb ggt  - Restart Fluids    # Diabetes Mellitus type 2 with steroid induced hyperglycemia  -, ISS, Accuchecks AC&HS.  - Diet: Consistent Carbs w/ Evening Snack  - HbA1c 6.5%  -Lantus increased to 18u qhs, pre-meals 6u     # Metastatic  RCC s/p R nephrectomy   - s/p nivolumab and ipilimumab Q21 x 4   - Heme/onc consult appreciated     # Normocytic anemia   - B12 wnl, check iron studies, folate    # Maculopapular rash - improved   -started prior to initiation of Levaquin while in hospital in [place]    # Hallucinations steroids induced   # Insomnia  - cont to monitor   - ambien hs     # HTN   - c/w BB    # HLD   - c/w statin     # VTE ppx:   - heparin    59M with PMH of h/o RCC s/p  right nephrectomy on Nivolumab , last dose 3 weeks ago, PAF  not on AC, admitted on 8/14/24  with c/o lethargy for 1 week.  They went to [place] last week on Wednesday to see there new grandchild. On Thursday patient started to act bizarre, had trouble walking and speaking. The wife called EMS and he was taken to the local hospital in [place]. As per the wife pt had a CT chest which was noted for progression of malignancy and PNA, and he had an MRI done which showed a chronic stroke. Patient was in the hospital for 5 days presumably getting antibiotics, and initially improved but developed a rash during the hospital course. As per wife the patient initially improved but got worse again while coming to [place], and currently has been lethargic, spiking fevers, and has a diffuse macular rash that's getting worse.     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  - EEG w/o epileptiform activity, + cerebral dysfunction   - Neurology consult appreciated, d/w Dr. Barrera, suspected encephalitis possibly related to immunotherapy   - ID consult appreciated, monitor off abx, s/p vanc and glenda in ED  - s/p LP 8/16  with IR, Culture negative  - Culture negative to date and PCR negative   - c/w IV solumedrol 1000mg IV daily for 5 days  - plan for 5 days then taper on prednisone as per oncology.[email or web address]     # Weakness, lethargy due to Adrenal insufficiency  -AM cortisol 0.9   -started hydrocortisone 8/16, now d/c and started on solumedrol for encephalitis  -likely related to immunotherapy    # Acute kidney injury   # Acute Urinary retention - resolved   # Metabolic acidosis - HCO3 improved   -baseline Cr 1.4  -Creatinine 1.6     -s/p bicarb ggt  - Restart Fluids and monitor     # Diabetes Mellitus type 2 with steroid induced hyperglycemia  - ISS, Accuchecks AC&HS.  - HbA1c 6.5%  -Lantus increased to 18u qhs, pre-meals 6u     # Metastatic RCC s/p R nephrectomy   - s/p nivolumab and ipilimumab Q21 x 4   - Heme/onc consult appreciated     # Normocytic anemia   - B12 wnl, check iron studies, folate    # Maculopapular rash - improved   -started prior to initiation of Levaquin while in hospital in [place]    # Insomnia  - cont to monitor   - ambien hs     # HTN   - c/w BB    # HLD   - c/w statin     # VTE ppx:   - heparin    59M with PMH of h/o RCC s/p  right nephrectomy on Nivolumab , last dose 3 weeks ago, PAF  not on AC, admitted on 8/14/24  with c/o lethargy for 1 week.  They went to [place] last week on Wednesday to see there new grandchild. On Thursday patient started to act bizarre, had trouble walking and speaking. The wife called EMS and he was taken to the local hospital in [place]. As per the wife pt had a CT chest which was noted for progression of malignancy and PNA, and he had an MRI done which showed a chronic stroke. Patient was in the hospital for 5 days presumably getting antibiotics, and initially improved but developed a rash during the hospital course. As per wife the patient initially improved but got worse again while coming to [place], and currently has been lethargic, spiking fevers, and has a diffuse macular rash that's getting worse.     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  - EEG w/o epileptiform activity, + cerebral dysfunction   - Neurology consult appreciated, d/w Dr. Barrera, suspected encephalitis possibly related to immunotherapy   - ID consult appreciated, monitor off abx, s/p vanc and glenda in ED  - s/p LP 8/16  with IR, Culture negative  - Culture negative to date and PCR negative   - c/w IV solumedrol 1000mg IV daily for 5 days  - plan for 5 days then taper on prednisone as per oncology / neurology     # Weakness, lethargy due to Adrenal insufficiency  -AM cortisol 0.9   -started hydrocortisone 8/16, now d/c and started on solumedrol for encephalitis  -likely related to immunotherapy  -Covid + may be additive     # Acute kidney injury   # Acute Urinary retention - resolved   # Metabolic acidosis - HCO3 improved   -baseline Cr 1.4  -Creatinine 1.6     -s/p bicarb ggt  - Restart Fluids and monitor     # Diabetes Mellitus type 2 with steroid induced hyperglycemia  - ISS, Accuchecks AC&HS.  - HbA1c 6.5%  -Lantus increased to 18u qhs, pre-meals 6u     # Covid +  - Asymptomatic  - Monitor  - ID on board    # Metastatic RCC s/p R nephrectomy   - s/p nivolumab and ipilimumab Q21 x 4   - Heme/onc consult appreciated     # Normocytic anemia   - B12 wnl, check iron studies, folate    # Maculopapular rash - improved   -started prior to initiation of Levaquin while in hospital in [place]    # Insomnia  - ambien hs     # HTN   - c/w Metoprolol     # HLD   - c/w statin     # VTE ppx:   - heparin

## 2024-08-21 NOTE — PROGRESS NOTE ADULT - SUBJECTIVE AND OBJECTIVE BOX
Patient seen and examined at the bedside.     No acute events overnight.   Patient reports resolution of the hallucinations.    He continues to struggle with some cognitive tasks.  he says doing crossword puzzles are more challenging and frustrating than what he is used to.   Overall however, he and his wife are reporting steady progress in his cognition.    He is trying to piece together the last 2 weeks, but finds that there are some gaps in his recall.     On exam:   GEN: sitting up in bed, NAD  CV; irregularly irregulary  PULM: CTAB  NEURO:   Awake, alert, attending examiner.  He is oriented to month, year, and oriented to hospital.  He names common objects, and repeats a simple phrase.  His fluency is improved.  His attention is normal.   Pupils 3-2mm, symmetric, full Vf's, no papilledema, full EOM, conjugate gaze, no nystagmus, no facial weakness, no dysarthria, tongue midline, palate symmetric.   MOTOR: normal bulka nd tone.   5/5, wrist extension 5/5, biceps 5/5, deltoids 5/5, hip flexors 5/5, and knee extensors 5/5.    SENSRY; intact symmetrically to light touch  COORDINATION: normal FNF  GAIT: narrow based, symmetric heal strike, and ground clearance.     MRI brain images reviewed: no diffusion restriction.  FLAIR images showing R frontal white matter gliosis.

## 2024-08-21 NOTE — PROGRESS NOTE ADULT - SUBJECTIVE AND OBJECTIVE BOX
INTERVAL HPI/OVERNIGHT EVENTS:  Patient appears to be improving each day, more fluid speech, less aphasia.   Hallucinations on the steroids.       VITAL SIGNS:  T(F): 97.8 (08-21-24 @ 15:39)  HR: 67 (08-21-24 @ 15:39)  BP: 122/87 (08-21-24 @ 15:39)  RR: 18 (08-21-24 @ 15:39)  SpO2: 98% (08-21-24 @ 15:39)  Wt(kg): --    PHYSICAL EXAM:    Constitutional: NAD  Eyes: EOMI, anicteric  Neck: supple  Respiratory: grossly clear  Cardiovascular: RRR  Gastrointestinal: soft, NTND  Extremities: edema  Neurological: AAOx3      MEDICATIONS  (STANDING):  atorvastatin 20 milliGRAM(s) Oral at bedtime  chlorhexidine 4% Liquid 1 Application(s) Topical <User Schedule>  dextrose 5%. 1000 milliLiter(s) (50 mL/Hr) IV Continuous <Continuous>  dextrose 5%. 1000 milliLiter(s) (100 mL/Hr) IV Continuous <Continuous>  dextrose 50% Injectable 12.5 Gram(s) IV Push once  dextrose 50% Injectable 25 Gram(s) IV Push once  dextrose 50% Injectable 25 Gram(s) IV Push once  glucagon  Injectable 1 milliGRAM(s) IntraMuscular once  heparin   Injectable 5000 Unit(s) SubCutaneous every 8 hours  insulin glargine Injectable (LANTUS) 15 Unit(s) SubCutaneous at bedtime  insulin lispro (ADMELOG) corrective regimen sliding scale   SubCutaneous three times a day before meals  insulin lispro (ADMELOG) corrective regimen sliding scale   SubCutaneous at bedtime  insulin lispro Injectable (ADMELOG) 6 Unit(s) SubCutaneous three times a day before meals  methylPREDNISolone sodium succinate IVPB 1000 milliGRAM(s) IV Intermittent daily  metoprolol tartrate 25 milliGRAM(s) Oral two times a day  pantoprazole    Tablet 40 milliGRAM(s) Oral before breakfast  senna 2 Tablet(s) Oral at bedtime  sodium chloride 0.9%. 1000 milliLiter(s) (75 mL/Hr) IV Continuous <Continuous>    MEDICATIONS  (PRN):  acetaminophen     Tablet .. 650 milliGRAM(s) Oral every 6 hours PRN Temp greater or equal to 38C (100.4F), Mild Pain (1 - 3)  aluminum hydroxide/magnesium hydroxide/simethicone Suspension 30 milliLiter(s) Oral every 4 hours PRN Dyspepsia  artificial tears (preservative free) Ophthalmic Solution 2 Drop(s) Both EYES every 8 hours PRN Dry Eyes  dextrose Oral Gel 15 Gram(s) Oral once PRN Blood Glucose LESS THAN 70 milliGRAM(s)/deciliter  ondansetron Injectable 4 milliGRAM(s) IV Push every 8 hours PRN Nausea and/or Vomiting  zolpidem 5 milliGRAM(s) Oral at bedtime PRN Insomnia      Allergies    sulfa drugs (Unknown)  Levaquin (Rash (Mod to Severe))    Intolerances        LABS:                        9.3    12.65 )-----------( 390      ( 21 Aug 2024 06:41 )             27.6     08-21    138  |  105  |  33<H>  ----------------------------<  221<H>  3.2<L>   |  26  |  1.60<H>    Ca    8.5      21 Aug 2024 06:41  Phos  3.6     08-21  Mg     2.2     08-21    TPro  6.2  /  Alb  2.9<L>  /  TBili  0.4  /  DBili  x   /  AST  26  /  ALT  27  /  AlkPhos  68  08-21      Urinalysis Basic - ( 21 Aug 2024 06:41 )    Color: x / Appearance: x / SG: x / pH: x  Gluc: 221 mg/dL / Ketone: x  / Bili: x / Urobili: x   Blood: x / Protein: x / Nitrite: x   Leuk Esterase: x / RBC: x / WBC x   Sq Epi: x / Non Sq Epi: x / Bacteria: x        RADIOLOGY & ADDITIONAL TESTS:  Studies reviewed.

## 2024-08-21 NOTE — PROGRESS NOTE ADULT - ASSESSMENT
59 year old man with RCC, s/p R nephrectomy, on novolumib, last infusion 07/17/24, afib not on AC, presenting with lethargy on 8/14, in setting of recent arthralgias, and rash.  Now more awake, but has impaired fluency, and attention.  MRI brain not showing anything acute at this time, but there is a R frontal gliotic lesion.  CSF shows elevated WBC 8, predominantly lymphocytes, and elevated proteins.  EEG showing multifocal slowing.    Overall suspicion for encephalitic process, possibly related to immunotherapy.  Now responding well to IV steroids, today is day 4/5 of solumedrol 1g daily.    -MRi brain with contrast when able  -follow remaining CSF labs  -continue solumedrol 1000mg for 5 days, today is 4/5  -after completion of 5 day course of IV solumedrol, transition to PO prednisone 0.5-1mg/kg.  Long taper.    -monitor for infection  -GI prophylaxis.    -neurology to follow. Please page or call with any acute neuro changes, or other issues we can help address.

## 2024-08-21 NOTE — PROGRESS NOTE ADULT - ASSESSMENT
58 y/o M with known metastatic clear cell RCC on Nivolumab, s/p R nephrectomy 03/19/24, a/w acute suspected grade 2 immunotx-induced inflammatory arthritis, AMS and lethargy with rash. [place] following s/t fever and macular rash, suspected drug rash 2/2 recent Levaquin at OSH.     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   Neurology following.  Ct head negative.  CSF shows elevated WBC 8, predominantly lymphocytes, and elevated proteins.  EEG showing multifocal slowing.    He is clinically better on IV solumedrol, day 4 today. Will need to discuss with my colleagues about changing to oral hydrocortisone (equivalent dose of pred 1mg/kg/day)  on discharge with adrenal insufficiency.    MRI of his brain with contrast was negative.     CT abd/pelvis non con: bilateral lung nodules suspicious for metastatic disease-  redemonstrated paraesophageal and bulky retroperitoneal adenopathy. Right lateral abdominal metastatic nodules.     Microcytic anemia with Hb ~ 9 gm/dL. Etiology multifactorial in setting of known underlying metastatic disease with recent hospitalization / sepsis / infectious process.  No iron deficiency, no hemolysis.

## 2024-08-21 NOTE — PROGRESS NOTE ADULT - NS ATTEND AMEND GEN_ALL_CORE FT
58 y/o M with known metastatic clear cell RCC on Nivolumab, s/p R nephrectomy 03/19/24, a/w acute suspected grade 2 immunotx-induced inflammatory arthritis, AMS and lethargy with rash. [place]. ID following s/t fever and macular rash, suspected drug rash 2/2 recent Levaquin at OSH.     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   Neurology following.  Ct head negative.  CSF shows elevated WBC 8, predominantly lymphocytes, and elevated proteins.  EEG showing multifocal slowing.    He is clinically better on IV solumedrol, day 3 today. Will need to discuss with my colleagues about changing to oral hydrocortisone on discharge with adrenal insufficiency.    MRI of his brain with contrast was negative.     CT abd/pelvis non con: bilateral lung nodules suspicious for metastatic disease-  redemonstrated paraesophageal and bulky retroperitoneal adenopathy. Right lateral abdominal metastatic nodules.     Microcytic anemia with Hb ~ 9 gm/dL. Etiology multifactorial in setting of known underlying metastatic disease with recent hospitalization / sepsis / infectious process.  Check his iron studies.
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 Symptoms started while [place]. ID following s/t fever and macular rash, suspected drug rash 2/2 recent Levaquin at OSH.     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   Neurology following.  Ct head negative.  CSF shows elevated WBC 8, predominantly lymphocytes, and elevated proteins.  EEG showing multifocal slowing.    He is clinically better on IV solumedrol, day 2 today.   Awaiting MRI of his brain with contrast.     CT abd/pelvis non con: bilateral lung nodules suspicious for metastatic disease-  redemonstrated paraesophageal and bulky retroperitoneal adenopathy. Right lateral abdominal metastatic nodules.     Microcytic anemia with Hb ~ 9 gm/dL. Etiology multifactorial in setting of known underlying metastatic disease with recent hospitalization / sepsis / infectious process.  Check his iron studies.
58 y/o M with known metastatic clear cell RCC on Nivolumab, s/p R nephrectomy 03/19/24, a/w acute suspected grade 2 immunotx-induced inflammatory arthritis, AMS and lethargy with rash. Symptoms started while [place]. ID following s/t fever and macular rash, suspected drug rash 2/2 recent Levaquin at OSH.     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   Neurology following.  Ct head negative.  CSF shows elevated WBC 8, predominantly lymphocytes, and elevated proteins.  EEG showing multifocal slowing.    He is clinically better on IV solumedrol, day 3 today. Will need to discuss with my colleagues about changing to oral hydrocortisone on discharge with adrenal insufficiency.    MRI of his brain with contrast was negative.     CT abd/pelvis non con: bilateral lung nodules suspicious for metastatic disease-  redemonstrated paraesophageal and bulky retroperitoneal adenopathy. Right lateral abdominal metastatic nodules.     Microcytic anemia with Hb ~ 9 gm/dL. Etiology multifactorial in setting of known underlying metastatic disease with recent hospitalization / sepsis / infectious process.  Check his iron studies.
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 Symptoms started while [place] [facility]. ID following s/t fever and macular rash, suspected drug rash 2/2 recent Levaquin at OSH.     Ct head negative.  MRI recommended. Neurology following: ? encephalitis, subclinical seizures. Plan for LP under IR and prolonged EEG.    CT AP non con: bilateral lung nodules suspicious for metastatic disease Redemonstrated paraesophageal and bulky retroperitoneal adenopathy. Right lateral abdominal metastatic nodules.     Normocytic Anemia with Hb ~ 9 gm/dL. Etiology multifactorial in setting of known underlying metastatic disease with recent hospitalization / sepsis / infectious process.

## 2024-08-22 LAB
AMPHIPHYSIN AB TITR CSF: NEGATIVE — SIGNIFICANT CHANGE UP
ANION GAP SERPL CALC-SCNC: 9 MMOL/L — SIGNIFICANT CHANGE UP (ref 5–17)
BASOPHILS # BLD AUTO: 0.03 K/UL — SIGNIFICANT CHANGE UP (ref 0–0.2)
BASOPHILS NFR BLD AUTO: 0.2 % — SIGNIFICANT CHANGE UP (ref 0–2)
BUN SERPL-MCNC: 35 MG/DL — HIGH (ref 7–23)
CALCIUM SERPL-MCNC: 8.1 MG/DL — LOW (ref 8.5–10.1)
CHLORIDE SERPL-SCNC: 107 MMOL/L — SIGNIFICANT CHANGE UP (ref 96–108)
CO2 SERPL-SCNC: 21 MMOL/L — LOW (ref 22–31)
CREAT SERPL-MCNC: 1.4 MG/DL — HIGH (ref 0.5–1.3)
CV2 IGG TITR CSF: NEGATIVE — SIGNIFICANT CHANGE UP
EGFR: 57 ML/MIN/1.73M2 — LOW
EOSINOPHIL # BLD AUTO: 0 K/UL — SIGNIFICANT CHANGE UP (ref 0–0.5)
EOSINOPHIL NFR BLD AUTO: 0 % — SIGNIFICANT CHANGE UP (ref 0–6)
GLIAL NUC TYPE 1 AB TITR CSF: NEGATIVE — SIGNIFICANT CHANGE UP
GLUCOSE BLDC GLUCOMTR-MCNC: 110 MG/DL — HIGH (ref 70–99)
GLUCOSE BLDC GLUCOMTR-MCNC: 152 MG/DL — HIGH (ref 70–99)
GLUCOSE BLDC GLUCOMTR-MCNC: 156 MG/DL — HIGH (ref 70–99)
GLUCOSE BLDC GLUCOMTR-MCNC: 184 MG/DL — HIGH (ref 70–99)
GLUCOSE SERPL-MCNC: 188 MG/DL — HIGH (ref 70–99)
HBA1C MFR BLD HPLC: 6.9
HCT VFR BLD CALC: 27.5 % — LOW (ref 39–50)
HGB BLD-MCNC: 9.3 G/DL — LOW (ref 13–17)
HU1 AB TITR CSF IF: NEGATIVE — SIGNIFICANT CHANGE UP
HU2 AB TITR CSF IF: NEGATIVE — SIGNIFICANT CHANGE UP
HU3 AB TITR CSF: NEGATIVE — SIGNIFICANT CHANGE UP
IFA NOTES: SIGNIFICANT CHANGE UP
IMM GRANULOCYTES NFR BLD AUTO: 10.5 % — HIGH (ref 0–0.9)
LYME IGG LINE BLOT INTERP.: NEGATIVE — SIGNIFICANT CHANGE UP
LYME IGM LINE BLOT INTERP.: NEGATIVE — SIGNIFICANT CHANGE UP
LYMPHOCYTES # BLD AUTO: 1.35 K/UL — SIGNIFICANT CHANGE UP (ref 1–3.3)
LYMPHOCYTES # BLD AUTO: 8.7 % — LOW (ref 13–44)
MANUAL SMEAR VERIFICATION: SIGNIFICANT CHANGE UP
MCHC RBC-ENTMCNC: 27.6 PG — SIGNIFICANT CHANGE UP (ref 27–34)
MCHC RBC-ENTMCNC: 33.8 GM/DL — SIGNIFICANT CHANGE UP (ref 32–36)
MCV RBC AUTO: 81.6 FL — SIGNIFICANT CHANGE UP (ref 80–100)
MONOCYTES # BLD AUTO: 0.71 K/UL — SIGNIFICANT CHANGE UP (ref 0–0.9)
MONOCYTES NFR BLD AUTO: 4.6 % — SIGNIFICANT CHANGE UP (ref 2–14)
NEUTROPHILS # BLD AUTO: 11.82 K/UL — HIGH (ref 1.8–7.4)
NEUTROPHILS NFR BLD AUTO: 76 % — SIGNIFICANT CHANGE UP (ref 43–77)
OLIGOCLONAL BANDS CSF ELPH-IMP: SIGNIFICANT CHANGE UP
P18 AB. IGG: SIGNIFICANT CHANGE UP
P23 AB. IGG: SIGNIFICANT CHANGE UP
P23 AB. IGM: SIGNIFICANT CHANGE UP
P28 AB. IGG: SIGNIFICANT CHANGE UP
P30 AB. IGG: SIGNIFICANT CHANGE UP
P39 AB. IGG: SIGNIFICANT CHANGE UP
P39 AB. IGM: SIGNIFICANT CHANGE UP
P41 AB. IGG: SIGNIFICANT CHANGE UP
P41 AB. IGM: SIGNIFICANT CHANGE UP
P45 AB. IGG: SIGNIFICANT CHANGE UP
P58 AB. IGG: SIGNIFICANT CHANGE UP
P66 AB. IGG: SIGNIFICANT CHANGE UP
P93 AB. IGG: SIGNIFICANT CHANGE UP
PARANEOPLASTIC INTERPRETATION, CSF: SIGNIFICANT CHANGE UP
PCA-TR AB TITR CSF: NEGATIVE — SIGNIFICANT CHANGE UP
PLAT MORPH BLD: NORMAL — SIGNIFICANT CHANGE UP
PLATELET # BLD AUTO: 420 K/UL — HIGH (ref 150–400)
POTASSIUM SERPL-MCNC: 3.5 MMOL/L — SIGNIFICANT CHANGE UP (ref 3.5–5.3)
POTASSIUM SERPL-SCNC: 3.5 MMOL/L — SIGNIFICANT CHANGE UP (ref 3.5–5.3)
PURKINJE CELL CYTOPLASMIC AB TYPE 2: NEGATIVE — SIGNIFICANT CHANGE UP
PURKINJE CELLS AB TITR CSF IF: NEGATIVE — SIGNIFICANT CHANGE UP
RBC # BLD: 3.37 M/UL — LOW (ref 4.2–5.8)
RBC # FLD: 14.2 % — SIGNIFICANT CHANGE UP (ref 10.3–14.5)
RBC BLD AUTO: NORMAL — SIGNIFICANT CHANGE UP
SODIUM SERPL-SCNC: 137 MMOL/L — SIGNIFICANT CHANGE UP (ref 135–145)
WBC # BLD: 15.54 K/UL — HIGH (ref 3.8–10.5)
WBC # FLD AUTO: 15.54 K/UL — HIGH (ref 3.8–10.5)

## 2024-08-22 PROCEDURE — 99232 SBSQ HOSP IP/OBS MODERATE 35: CPT

## 2024-08-22 RX ORDER — PREDNISONE 10 MG
40 TABLET, DOSE PACK ORAL DAILY
Refills: 0 | Status: DISCONTINUED | OUTPATIENT
Start: 2024-08-22 | End: 2024-08-24

## 2024-08-22 RX ADMIN — Medication 5000 UNIT(S): at 21:00

## 2024-08-22 RX ADMIN — Medication 40 MILLIGRAM(S): at 05:16

## 2024-08-22 RX ADMIN — ZOLPIDEM TARTRATE 5 MILLIGRAM(S): 5 TABLET, FILM COATED ORAL at 20:54

## 2024-08-22 RX ADMIN — METOPROLOL TARTRATE 25 MILLIGRAM(S): 100 TABLET ORAL at 09:28

## 2024-08-22 RX ADMIN — INSULIN GLARGINE 15 UNIT(S): 100 INJECTION, SOLUTION SUBCUTANEOUS at 20:56

## 2024-08-22 RX ADMIN — Medication 6 UNIT(S): at 13:19

## 2024-08-22 RX ADMIN — SODIUM CHLORIDE 75 MILLILITER(S): 9 INJECTION INTRAMUSCULAR; INTRAVENOUS; SUBCUTANEOUS at 20:55

## 2024-08-22 RX ADMIN — Medication 5000 UNIT(S): at 05:16

## 2024-08-22 RX ADMIN — Medication 20 MILLIGRAM(S): at 20:57

## 2024-08-22 RX ADMIN — Medication 250 MILLIGRAM(S): at 09:15

## 2024-08-22 RX ADMIN — Medication 6 UNIT(S): at 17:51

## 2024-08-22 RX ADMIN — CHLORHEXIDINE GLUCONATE 1 APPLICATION(S): 40 SOLUTION TOPICAL at 09:36

## 2024-08-22 RX ADMIN — SODIUM CHLORIDE 75 MILLILITER(S): 9 INJECTION INTRAMUSCULAR; INTRAVENOUS; SUBCUTANEOUS at 05:16

## 2024-08-22 RX ADMIN — METOPROLOL TARTRATE 25 MILLIGRAM(S): 100 TABLET ORAL at 20:57

## 2024-08-22 RX ADMIN — Medication 5000 UNIT(S): at 13:19

## 2024-08-22 RX ADMIN — Medication 2 TABLET(S): at 20:57

## 2024-08-22 RX ADMIN — Medication 2: at 17:51

## 2024-08-22 RX ADMIN — Medication 2: at 09:23

## 2024-08-22 RX ADMIN — Medication 6 UNIT(S): at 09:25

## 2024-08-22 NOTE — PROGRESS NOTE ADULT - SUBJECTIVE AND OBJECTIVE BOX
HOSPITALIST ATTENDING PROGRESS NOTE    Chart and meds reviewed.  Patient seen and examined.    CC: AMS    Subjective: Feeling clearer, less word searching    All other systems reviewed and found to be negative with the exception of what has been described above.    MEDICATIONS  (STANDING):  atorvastatin 20 milliGRAM(s) Oral at bedtime  chlorhexidine 4% Liquid 1 Application(s) Topical <User Schedule>  dextrose 5%. 1000 milliLiter(s) (50 mL/Hr) IV Continuous <Continuous>  dextrose 5%. 1000 milliLiter(s) (100 mL/Hr) IV Continuous <Continuous>  dextrose 50% Injectable 25 Gram(s) IV Push once  dextrose 50% Injectable 25 Gram(s) IV Push once  dextrose 50% Injectable 12.5 Gram(s) IV Push once  glucagon  Injectable 1 milliGRAM(s) IntraMuscular once  heparin   Injectable 5000 Unit(s) SubCutaneous every 8 hours  insulin glargine Injectable (LANTUS) 15 Unit(s) SubCutaneous at bedtime  insulin lispro (ADMELOG) corrective regimen sliding scale   SubCutaneous three times a day before meals  insulin lispro (ADMELOG) corrective regimen sliding scale   SubCutaneous at bedtime  insulin lispro Injectable (ADMELOG) 6 Unit(s) SubCutaneous three times a day before meals  metoprolol tartrate 25 milliGRAM(s) Oral two times a day  pantoprazole    Tablet 40 milliGRAM(s) Oral before breakfast  senna 2 Tablet(s) Oral at bedtime  sodium chloride 0.9%. 1000 milliLiter(s) (75 mL/Hr) IV Continuous <Continuous>    MEDICATIONS  (PRN):  acetaminophen     Tablet .. 650 milliGRAM(s) Oral every 6 hours PRN Temp greater or equal to 38C (100.4F), Mild Pain (1 - 3)  aluminum hydroxide/magnesium hydroxide/simethicone Suspension 30 milliLiter(s) Oral every 4 hours PRN Dyspepsia  artificial tears (preservative free) Ophthalmic Solution 2 Drop(s) Both EYES every 8 hours PRN Dry Eyes  dextrose Oral Gel 15 Gram(s) Oral once PRN Blood Glucose LESS THAN 70 milliGRAM(s)/deciliter  ondansetron Injectable 4 milliGRAM(s) IV Push every 8 hours PRN Nausea and/or Vomiting  zolpidem 5 milliGRAM(s) Oral at bedtime PRN Insomnia    Vital Signs Last 24 Hrs  T(C): 36.4 (22 Aug 2024 04:21), Max: 36.7 (21 Aug 2024 21:32)  T(F): 97.6 (22 Aug 2024 04:21), Max: 98.1 (21 Aug 2024 21:32)  HR: 81 (22 Aug 2024 04:21) (64 - 82)  BP: 119/77 (22 Aug 2024 04:21) (113/62 - 139/80)  BP(mean): --  RR: 18 (22 Aug 2024 04:21) (17 - 18)  SpO2: 100% (22 Aug 2024 04:21) (97% - 100%)    Parameters below as of 22 Aug 2024 04:21  Patient On (Oxygen Delivery Method): room air    GEN: NAD  HEENT:  pupils equal and reactive, EOMI, no oropharyngeal lesions, erythema, exudates, oral thrush  NECK:   supple, no carotid bruits  CV:  +S1, +S2, regular, no murmurs  RESP:   lungs clear to auscultation bilaterally, no wheezing, rales, rhonchi, good air entry bilaterally  GI:  abdomen soft, non-tender, non-distended, normal BS  EXT:  no clubbing, no cyanosis, no edema, no calf pain, swelling or erythema  NEURO:  AAOX3, no focal neurological deficits, follows all commands, able to move extremities spontaneously  SKIN:  no ulcers, lesions or rashes    LABS:                          9.3    15.54 )-----------( 420      ( 22 Aug 2024 06:23 )             27.5     08-22    137  |  107  |  35<H>  ----------------------------<  188<H>  3.5   |  21<L>  |  1.40<H>    Ca    8.1<L>      22 Aug 2024 06:23  Phos  3.6     08-21  Mg     2.2     08-21    TPro  6.2  /  Alb  2.9<L>  /  TBili  0.4  /  DBili  x   /  AST  26  /  ALT  27  /  AlkPhos  68  08-21    LIVER FUNCTIONS - ( 21 Aug 2024 06:41 )  Alb: 2.9 g/dL / Pro: 6.2 gm/dL / ALK PHOS: 68 U/L / ALT: 27 U/L / AST: 26 U/L / GGT: x           rinalysis Basic - ( 22 Aug 2024 06:23 )  Color: x / Appearance: x / SG: x / pH: x  Gluc: 188 mg/dL / Ketone: x  / Bili: x / Urobili: x   Blood: x / Protein: x / Nitrite: x   Leuk Esterase: x / RBC: x / WBC x   Sq Epi: x / Non Sq Epi: x / Bacteria: x

## 2024-08-22 NOTE — PROGRESS NOTE ADULT - ASSESSMENT
59M with PMH of h/o RCC s/p  right nephrectomy on Nivolumab , last dose 3 weeks ago, PAF  not on AC, admitted on 8/14/24  with c/o lethargy for 1 week.  They went to [place] last week on Wednesday to see there new grandchild. On Thursday patient started to act bizarre, had trouble walking and speaking. The wife called EMS and he was taken to the local hospital in [place]. As per the wife pt had a CT chest which was noted for progression of malignancy and PNA, and he had an MRI done which showed a chronic stroke. Patient was in the hospital for 5 days presumably getting antibiotics, and initially improved but developed a rash during the hospital course. As per wife the patient initially improved but got worse again while coming to [place], and currently has been lethargic, spiking fevers, and has a diffuse macular rash that's getting worse.     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  - EEG w/o epileptiform activity, + cerebral dysfunction   - Neurology consult appreciated, d/w Dr. Barrera, suspected encephalitis possibly related to immunotherapy   - ID consult appreciated, monitor off abx, s/p vanc and glenda in ED  - s/p LP 8/16  with IR, Culture negative  - Culture negative to date and PCR negative   - c/w IV solumedrol 1000mg IV daily for 5 days  - Once solumedrol complete  taper on prednisone (1mg/kg ) as per oncology / neurology     # Weakness, lethargy due to Adrenal insufficiency  -AM cortisol 0.9   -started hydrocortisone 8/16, now d/c and started on solumedrol for encephalitis  -likely related to immunotherapy  -Covid + may be additive     # Acute kidney injury   # Acute Urinary retention - resolved   # Metabolic acidosis - HCO3 improved   -baseline Cr 1.4  -Creatinine 1.4     -s/p bicarb ggt  - Restart Fluids and monitor     # Diabetes Mellitus type 2 with steroid induced hyperglycemia  - ISS, Accuchecks AC&HS.  - HbA1c 6.5%  -Lantus increased to 18u qhs, pre-meals 6u     # Covid +  - Asymptomatic  - Monitor  - ID on board    # Metastatic RCC s/p R nephrectomy   - s/p nivolumab and ipilimumab Q21 x 4   - Heme/onc consult appreciated     # Normocytic anemia   - B12 wnl, check iron studies, folate    # Maculopapular rash - improved   -started prior to initiation of Levaquin while in hospital in [place]    # Insomnia  - ambien hs     # HTN   - c/w Metoprolol     # HLD   - c/w statin     # VTE ppx:   - heparin     59M with PMH of h/o RCC s/p  right nephrectomy on Nivolumab , last dose 3 weeks ago, PAF  not on AC, admitted on 8/14/24  with c/o lethargy for 1 week.  They went to [place] last week on Wednesday to see there new grandchild. On Thursday patient started to act bizarre, had trouble walking and speaking. The wife called EMS and he was taken to the local hospital in [place]. As per the wife pt had a CT chest which was noted for progression of malignancy and PNA, and he had an MRI done which showed a chronic stroke. Patient was in the hospital for 5 days presumably getting antibiotics, and initially improved but developed a rash during the hospital course. As per wife the patient initially improved but got worse again while coming to [place], and currently has been lethargic, spiking fevers, and has a diffuse macular rash that's getting worse.     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  - EEG w/o epileptiform activity, + cerebral dysfunction   - Neurology consult appreciated, d/w Dr. Barrera, suspected encephalitis possibly related to immunotherapy   - ID consult appreciated, monitor off abx, s/p vanc and glenda in ED  - s/p LP 8/16  with IR, Culture negative  - Culture negative to date and PCR negative   - c/w IV solumedrol 1000mg IV daily for 5 days  - Once solumedrol complete  taper on prednisone (0.5-1mg/kg ) as per oncology / neurology   - Start Prednisone 40 daily ( 0.5mg/kg )    # Weakness, lethargy due to Adrenal insufficiency  -AM cortisol 0.9   -started hydrocortisone 8/16, now d/c and started on solumedrol for encephalitis  -likely related to immunotherapy  -Covid + may be additive     # Acute kidney injury   # Acute Urinary retention - resolved   # Metabolic acidosis - HCO3 improved   -baseline Cr 1.4  -Creatinine 1.4     -s/p bicarb ggt  - Restart Fluids and monitor     # Diabetes Mellitus type 2 with steroid induced hyperglycemia  - ISS, Accuchecks AC&HS.  - HbA1c 6.5%  -Lantus increased to 18u qhs, pre-meals 6u     # Covid +  - Asymptomatic  - Monitor  - ID on board    # Metastatic RCC s/p R nephrectomy   - s/p nivolumab and ipilimumab Q21 x 4   - Heme/onc consult appreciated     # Normocytic anemia   - B12 wnl, check iron studies, folate    # Maculopapular rash - improved   -started prior to initiation of Levaquin while in hospital in [place]    # Insomnia  - ambien hs     # HTN   - c/w Metoprolol     # HLD   - c/w statin     # VTE ppx:   - heparin

## 2024-08-23 LAB
A PHAGOCYTOPH DNA BLD QL NAA+PROBE: NEGATIVE — SIGNIFICANT CHANGE UP
ALBUMIN SERPL ELPH-MCNC: 2.9 G/DL — LOW (ref 3.3–5)
ALP SERPL-CCNC: 60 U/L — SIGNIFICANT CHANGE UP (ref 40–120)
ALT FLD-CCNC: 52 U/L — SIGNIFICANT CHANGE UP (ref 12–78)
ANION GAP SERPL CALC-SCNC: 6 MMOL/L — SIGNIFICANT CHANGE UP (ref 5–17)
AST SERPL-CCNC: 41 U/L — HIGH (ref 15–37)
BILIRUB SERPL-MCNC: 0.4 MG/DL — SIGNIFICANT CHANGE UP (ref 0.2–1.2)
BUN SERPL-MCNC: 35 MG/DL — HIGH (ref 7–23)
CALCIUM SERPL-MCNC: 8 MG/DL — LOW (ref 8.5–10.1)
CHLORIDE SERPL-SCNC: 108 MMOL/L — SIGNIFICANT CHANGE UP (ref 96–108)
CO2 SERPL-SCNC: 26 MMOL/L — SIGNIFICANT CHANGE UP (ref 22–31)
CREAT SERPL-MCNC: 1.46 MG/DL — HIGH (ref 0.5–1.3)
E CHAFFEENSIS DNA BLD QL NAA+PROBE: NEGATIVE — SIGNIFICANT CHANGE UP
E EWINGII DNA SPEC QL NAA+PROBE: NEGATIVE — SIGNIFICANT CHANGE UP
EGFR: 54 ML/MIN/1.73M2 — LOW
EHRLICHIA DNA SPEC QL NAA+PROBE: NEGATIVE — SIGNIFICANT CHANGE UP
GLUCOSE BLDC GLUCOMTR-MCNC: 131 MG/DL — HIGH (ref 70–99)
GLUCOSE BLDC GLUCOMTR-MCNC: 154 MG/DL — HIGH (ref 70–99)
GLUCOSE BLDC GLUCOMTR-MCNC: 161 MG/DL — HIGH (ref 70–99)
GLUCOSE BLDC GLUCOMTR-MCNC: 199 MG/DL — HIGH (ref 70–99)
GLUCOSE SERPL-MCNC: 161 MG/DL — HIGH (ref 70–99)
HCT VFR BLD CALC: 29.5 % — LOW (ref 39–50)
HGB BLD-MCNC: 9.7 G/DL — LOW (ref 13–17)
MAGNESIUM SERPL-MCNC: 2.3 MG/DL — SIGNIFICANT CHANGE UP (ref 1.6–2.6)
MCHC RBC-ENTMCNC: 27.4 PG — SIGNIFICANT CHANGE UP (ref 27–34)
MCHC RBC-ENTMCNC: 32.9 GM/DL — SIGNIFICANT CHANGE UP (ref 32–36)
MCV RBC AUTO: 83.3 FL — SIGNIFICANT CHANGE UP (ref 80–100)
PLATELET # BLD AUTO: 389 K/UL — SIGNIFICANT CHANGE UP (ref 150–400)
POTASSIUM SERPL-MCNC: 3.6 MMOL/L — SIGNIFICANT CHANGE UP (ref 3.5–5.3)
POTASSIUM SERPL-SCNC: 3.6 MMOL/L — SIGNIFICANT CHANGE UP (ref 3.5–5.3)
PROT SERPL-MCNC: 5.9 GM/DL — LOW (ref 6–8.3)
RBC # BLD: 3.54 M/UL — LOW (ref 4.2–5.8)
RBC # FLD: 14.6 % — HIGH (ref 10.3–14.5)
SODIUM SERPL-SCNC: 140 MMOL/L — SIGNIFICANT CHANGE UP (ref 135–145)
WBC # BLD: 15.61 K/UL — HIGH (ref 3.8–10.5)
WBC # FLD AUTO: 15.61 K/UL — HIGH (ref 3.8–10.5)
WNV IGG TITR FLD: NEGATIVE — SIGNIFICANT CHANGE UP
WNV IGM SPEC QL: NEGATIVE — SIGNIFICANT CHANGE UP

## 2024-08-23 PROCEDURE — 99232 SBSQ HOSP IP/OBS MODERATE 35: CPT

## 2024-08-23 PROCEDURE — 99233 SBSQ HOSP IP/OBS HIGH 50: CPT

## 2024-08-23 RX ADMIN — Medication 5000 UNIT(S): at 13:03

## 2024-08-23 RX ADMIN — ZOLPIDEM TARTRATE 5 MILLIGRAM(S): 5 TABLET, FILM COATED ORAL at 22:09

## 2024-08-23 RX ADMIN — Medication 6 UNIT(S): at 13:05

## 2024-08-23 RX ADMIN — Medication 6 UNIT(S): at 08:51

## 2024-08-23 RX ADMIN — Medication 2: at 08:45

## 2024-08-23 RX ADMIN — Medication 2: at 17:56

## 2024-08-23 RX ADMIN — Medication 20 MILLIGRAM(S): at 22:06

## 2024-08-23 RX ADMIN — Medication 5000 UNIT(S): at 05:54

## 2024-08-23 RX ADMIN — Medication 40 MILLIGRAM(S): at 05:54

## 2024-08-23 RX ADMIN — Medication 5000 UNIT(S): at 22:06

## 2024-08-23 RX ADMIN — Medication 40 MILLIGRAM(S): at 10:20

## 2024-08-23 RX ADMIN — CHLORHEXIDINE GLUCONATE 1 APPLICATION(S): 40 SOLUTION TOPICAL at 13:08

## 2024-08-23 RX ADMIN — METOPROLOL TARTRATE 25 MILLIGRAM(S): 100 TABLET ORAL at 22:06

## 2024-08-23 RX ADMIN — METOPROLOL TARTRATE 25 MILLIGRAM(S): 100 TABLET ORAL at 10:20

## 2024-08-23 RX ADMIN — INSULIN GLARGINE 15 UNIT(S): 100 INJECTION, SOLUTION SUBCUTANEOUS at 22:06

## 2024-08-23 RX ADMIN — Medication 6 UNIT(S): at 17:57

## 2024-08-23 NOTE — PROGRESS NOTE ADULT - NUTRITIONAL ASSESSMENT
This patient has been assessed with a concern for Malnutrition and has been determined to have a diagnosis/diagnoses of Mild protein-calorie malnutrition.    This patient is being managed with:   Diet Regular-  Consistent Carbohydrate {Evening Snack} (CSTCHOSN)  DASH/TLC {Sodium & Cholesterol Restricted} (DASH)  Entered: Aug 17 2024 10:08AM  
This patient has been assessed with a concern for Malnutrition and has been determined to have a diagnosis/diagnoses of Mild protein-calorie malnutrition.    This patient is being managed with:   Diet NPO after Midnight-     NPO Start Date: 15-Aug-2024   NPO Start Time: 23:59  Entered: Aug 15 2024  5:26PM    Diet Full Liquid-  Entered: Aug 15 2024  5:25PM  
This patient has been assessed with a concern for Malnutrition and has been determined to have a diagnosis/diagnoses of Mild protein-calorie malnutrition.    This patient is being managed with:   Diet Regular-  Consistent Carbohydrate {Evening Snack} (CSTCHOSN)  DASH/TLC {Sodium & Cholesterol Restricted} (DASH)  Entered: Aug 17 2024 10:08AM  
This patient has been assessed with a concern for Malnutrition and has been determined to have a diagnosis/diagnoses of Mild protein-calorie malnutrition.    This patient is being managed with:   Diet Regular-  Consistent Carbohydrate {Evening Snack} (CSTCHOSN)  DASH/TLC {Sodium & Cholesterol Restricted} (DASH)  Entered: Aug 17 2024 10:08AM  
This patient has been assessed with a concern for Malnutrition and has been determined to have a diagnosis/diagnoses of Mild protein-calorie malnutrition.    This patient is being managed with:   Diet NPO after Midnight-     NPO Start Date: 15-Aug-2024   NPO Start Time: 23:59  Entered: Aug 15 2024  5:26PM    Diet Full Liquid-  Entered: Aug 15 2024  5:25PM  
This patient has been assessed with a concern for Malnutrition and has been determined to have a diagnosis/diagnoses of Mild protein-calorie malnutrition.    This patient is being managed with:   Diet NPO-  Except Medications  Entered: Aug 15 2024  1:01PM  
This patient has been assessed with a concern for Malnutrition and has been determined to have a diagnosis/diagnoses of Mild protein-calorie malnutrition.    This patient is being managed with:   Diet Regular-  Consistent Carbohydrate {Evening Snack} (CSTCHOSN)  DASH/TLC {Sodium & Cholesterol Restricted} (DASH)  Entered: Aug 17 2024 10:08AM  

## 2024-08-23 NOTE — PROGRESS NOTE ADULT - ASSESSMENT
59M with PMH of h/o RCC s/p  right nephrectomy on Nivolumab , last dose 3 weeks ago, PAF  not on AC, admitted on 8/14/24  with c/o lethargy for 1 week.  They went to [place] last week on Wednesday to see there new grandchild. On Thursday patient started to act bizarre, had trouble walking and speaking. The wife called EMS and he was taken to the local hospital in [place]. As per the wife pt had a CT chest which was noted for progression of malignancy and PNA, and he had an MRI done which showed a chronic stroke. Patient was in the hospital for 5 days presumably getting antibiotics, and initially improved but developed a rash during the hospital course. As per wife the patient initially improved but got worse again while coming to [place], and currently has been lethargic, spiking fevers, and has a diffuse macular rash that's getting worse.     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  - EEG w/o epileptiform activity, + cerebral dysfunction   - Neurology consult appreciated, d/w Dr. Barrera, suspected encephalitis possibly related to immunotherapy   - ID consult appreciated, monitor off abx, s/p vanc and glenda in ED  - s/p LP 8/16  with IR, Culture negative  - Culture negative to date and PCR negative   - c/w IV solumedrol 1000mg IV daily for 5 days  - Once solumedrol complete  taper on prednisone (0.5-1mg/kg ) as per oncology / neurology   - Start Prednisone 40 daily ( 0.5mg/kg )    # Weakness, lethargy due to Adrenal insufficiency  -AM cortisol 0.9   -started hydrocortisone 8/16, now d/c and started on solumedrol for encephalitis  -likely related to immunotherapy  -Covid + may be additive  - Continue po prednisone, fu AM labs  - If labs stable, potential DC 8/24    # Acute kidney injury   # Acute Urinary retention - resolved   # Metabolic acidosis - HCO3 improved   -baseline Cr 1.4  -Creatinine 1.4  and stable    # Diabetes Mellitus type 2 with steroid induced hyperglycemia  - ISS, Accuchecks AC&HS.  - HbA1c 6.5%  -Lantus increased to 18u qhs, pre-meals 6u     # Covid +  - Asymptomatic  - Monitor  - ID on board    # Metastatic RCC s/p R nephrectomy   - s/p nivolumab and ipilimumab Q21 x 4   - Heme/onc consult appreciated     # Normocytic anemia   - B12 wnl, check iron studies, folate    # Maculopapular rash - improved   -started prior to initiation of Levaquin while in hospital in [place]    # Insomnia  - ambien hs     # HTN   - c/w Metoprolol     # HLD   - c/w statin     # VTE ppx:   - heparin      59M with PMH of h/o RCC s/p  right nephrectomy on Nivolumab , last dose 3 weeks ago, PAF  not on AC, admitted on 8/14/24  with c/o lethargy for 1 week.  They went to [place] last week on Wednesday to see there new grandchild. On Thursday patient started to act bizarre, had trouble walking and speaking. The wife called EMS and he was taken to the local hospital in [place]. As per the wife pt had a CT chest which was noted for progression of malignancy and PNA, and he had an MRI done which showed a chronic stroke. Patient was in the hospital for 5 days presumably getting antibiotics, and initially improved but developed a rash during the hospital course. As per wife the patient initially improved but got worse again while coming to [place], and currently has been lethargic, spiking fevers, and has a diffuse macular rash that's getting worse.     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  - EEG w/o epileptiform activity, + cerebral dysfunction   - Neurology consult appreciated, d/w Dr. Barrera, suspected encephalitis possibly related to immunotherapy   - ID consult appreciated, monitor off abx, s/p vanc and glenda in ED  - s/p LP 8/16  with IR, Culture negative  - Culture negative to date and PCR negative   - c/w IV solumedrol 1000mg IV daily for 5 days  - Once solumedrol complete  taper on prednisone (0.5-1mg/kg ) as per oncology / neurology   - Start Prednisone 40 daily ( 0.5mg/kg )  - Patient follow up with Neuro immunology  (Dr Dickerson ([phone] )    # Weakness, lethargy due to Adrenal insufficiency  -AM cortisol 0.9   -started hydrocortisone 8/16, now d/c and started on solumedrol for encephalitis  -likely related to immunotherapy  -Covid + may be additive  - Continue po prednisone, fu AM labs  - If labs stable, potential DC 8/24    # Acute kidney injury   # Acute Urinary retention - resolved   # Metabolic acidosis - HCO3 improved   -baseline Cr 1.4  -Creatinine 1.4  and stable    # Diabetes Mellitus type 2 with steroid induced hyperglycemia  - ISS, Accuchecks AC&HS.  - HbA1c 6.5%  -Lantus increased to 18u qhs, pre-meals 6u     # Covid +  - Asymptomatic  - Monitor  - ID on board    # Metastatic RCC s/p R nephrectomy   - s/p nivolumab and ipilimumab Q21 x 4   - Heme/onc consult appreciated     # Normocytic anemia   - B12 wnl, check iron studies, folate    # Maculopapular rash - improved   -started prior to initiation of Levaquin while in hospital in [place]    # Insomnia  - ambien hs     # HTN   - c/w Metoprolol     # HLD   - c/w statin     # VTE ppx:   - heparin

## 2024-08-23 NOTE — PROGRESS NOTE ADULT - ASSESSMENT
59 year old man with RCC, s/p R nephrectomy, on novolumib, last infusion 07/17/24, afib not on AC, presenting with lethargy and hallucinations on 8/14, in setting of recent arthralgias, and rash.  Now s/p IV solumedrol x5 days, with marked improvement, essentially mental status returning to normal.  No findings on neurological exam.  MRI brain not showing anything acute at this time, but there is a R frontal gliotic lesion, and no evidence of intracranial metastatic disease.  CSF shows elevated WBC 8, predominantly lymphocytes, and elevated proteins.  EEG showing multifocal slowing.      Overall suspicion for encephalitic process, possibly related to immunotherapy.  Completed 5/5 IV solumedrol 1g daily on 8/22, now on oral taper.      -follow remaining CSF labs, serum paraneoplastic negative, but CSF still pending.    -started on oral prednisone 40mg daily today.  Will need long taper, and follow up with neuroimmunology to manage.    -monitor for infection, now COVID positive  -GI prophylaxis with PPI.    -outpatient follow up with neuroimmunology.   -no further inpatient neuro recommendations anticipated at this point.  Please page or call with any acute neuro changes, or other issues we can help address.     Discussed with Dr. Gama.

## 2024-08-23 NOTE — PROGRESS NOTE ADULT - SUBJECTIVE AND OBJECTIVE BOX
Acute illness visit note    Chief Complaint   Patient presents with   • Penis/Scrotum Problem   • Behavioral Problem   .    HISTORY OF PRESENT ILLNESS: Dyllan Keenan is a 1 year old male who presents with concerns about development, behavior and his testicles. He is here with his mom.    Dyllan has a history of language delay. He did speech therapy and had some improvement, has not continued recently. He is putting words together, he is difficult to understand. He does understand directions.     He has aggressive behavior that is worse with mom. He gets easily upset, throws tantrums and is difficult to redirect. He will spit, throw things. They do positive reinforcement when he is doing well. Mom has tried choices, redirection, consequences. They have a consistent routine, he eats regular meals and sleeps well (12-13 hours, no snoring).     He is sensory seeking. He always wants to chew on things.     He does make good eye contact, uses index finger to point and show interest, imitates, brings things to show interest.     No ,, has always been home with mom. Will be in  in the fall. He has an older sister, she is in school.     He has a history of his left testicle being palpated high in the scrotum. Mom says it has not come down. Normal urine output.    Review Of Systems:  · General:  No fever, fatigue.   · Eyes:  No eye redness, eye drainage  · ENT:  No congestion, runny nose, ear pain, sore throat.   · Respiratory:  No cough, wheeze, shortness of breath.  · Cardiovascular:  No problems with activity. No syncope.   · Gastrointestinal:  No abdominal pain, vomiting, diarrhea, constipation, bloody stools.  · Skin:  No rashes, bruising, bleeding.   · Genitourinary:  See above. Normal urine output. No hematuria, frequency.   · Musculoskeletal:  No extremity swelling, pain, limp.  · Neurological:  See above. No headaches. No weakness.        The problem list was reviewed and updated as follows:  Patient Active  Patient seen and examined at the bedside.     No acute events overnight.   No new neuro symptoms.  Hallucinations resolved when last seen on 8/21.  No recurrence of hallucinations.      Ongoing slight struggle with some cognitive tasks and language.      Overall still much improved.        On exam:   GEN: sitting up in chair, NAD  PULM: CTAB    NEURO:   Awake, alert, attending examiner.  He is oriented, he names common objects, and repeats a simple phrase.  His fluency and attention are normal.   Pupils 3-2mm, symmetric, full Vf's, no papilledema, full EOM, conjugate gaze, no nystagmus, no facial weakness, no dysarthria, tongue midline, palate symmetric.   MOTOR: normal bulka nd tone.   5/5, wrist extension 5/5, biceps 5/5, deltoids 5/5, hip flexors 5/5, and knee extensors 5/5.    SENSRY; intact symmetrically to light touch  COORDINATION: normal FNF  GAIT: narrow based, symmetric heal strike, and ground clearance.     CSF: WBC 8 (80% lymphocytes), , protein 78, glucose 77  Serum paraneoplastic panel negative.      MRI brain images reviewed: no diffusion restriction.  FLAIR images showing R frontal white matter gliosis.  No abnormal enhancement or evidence of intracranial metastatic disease.     Problem List   Diagnosis   • Lactose intolerance   • Expressive language delay     Past Medical History:   Diagnosis Date   • Gastroesophageal reflux in infants    • Lactose intolerance      Allergies as of 01/08/2021   • (No Known Allergies)       Medications:  Current Outpatient Medications   Medication Sig   • Pediatric Multiple Vit-C-FA (MULTIVITAMIN CHILDRENS PO) Take by mouth daily.   • Lactobacillus (PROBIOTIC CHILDRENS PO)    • CHILDRENS IBUPROFEN PO      No current facility-administered medications for this visit.        Physical Exam  Vitals:  Visit Vitals  BP 96/62   Pulse 88   Temp 98.1 °F (36.7 °C) (Tympanic)   Resp 26   Ht 3' 2\" (0.965 m)   Wt 14.8 kg   BMI 15.89 kg/m²   ·   · General:  Alert, no distress.  Interactive. Makes eye contact.   · Eyes:  Sclerae clear. PERRL, EOMI. Normal eyelids.   · Ears:  TMs Normal bilaterally  · Nose:  Nasal discharge:  no.  Turbinates are normal.    · Oropharynx:  Normal. Mucous membranes moist. Tonsils 2+.   · Neck: No lymphadenopathy or masses. + full range of motion.   · Lungs:  Clear to auscultation.  Respiratory effort normal  · Heart:  Regular rate and rhythm without murmur  · Abdomen:  Soft and nontender, without mass or hepatosplenomegaly  · Skin:  Warm, well perfused. No rash.  · : Puberty stage 1 male. Normal penis. Testicles - right palpable, left palpable in upper scrotum, difficult to pull down and retracts when released.    · Neuro: CN 2-12 intact. 5/5 strength. Normal gait. Patellar reflexes 2+.     Assessment:   1 year old with defiant and aggressive behavior, expressive language delay, sensory problems (sensory seeking). His left testicle is still palpable in the upper scrotum, difficult to pull down, retracts once released.       Plan:   · Discussed behavior. Continue routines, regular meals, regular activity and sleep. Continue use of positive reinforcement, choices, redirection, consequences.   · Mom can reach out to the school he will attend to  ask about [employer] this spring.   · CBC and lead ordered.   · Audiology evaluation and speech therapy orders placed for speech delay (has done speech therapy in the past, needs to resume).   · Referral to occupational therapy for evaluation and treatment given sensory concerns, behavior problems.   · Referral to urology for further evaluation / treatment given ongoing left testicle findings.  · Follow up prior to appointments if worsening symptoms or other concerns.     Influenza vaccine offered but not given today because:  The parent/guardian refuses influenza vaccination    Latrice Nayak MD

## 2024-08-23 NOTE — PROGRESS NOTE ADULT - PROVIDER SPECIALTY LIST ADULT
Hospitalist
Hospitalist
Neurology
Heme/Onc
Heme/Onc
Hospitalist
Hospitalist
Neurology
Heme/Onc
Hospitalist
Infectious Disease
Neurology
Hospitalist
Infectious Disease
Heme/Onc

## 2024-08-23 NOTE — PROGRESS NOTE ADULT - SUBJECTIVE AND OBJECTIVE BOX
HOSPITALIST ATTENDING PROGRESS NOTE    Chart and meds reviewed.  Patient seen and examined.    CC: AMS    Subjective: Close to baseline, still with some word finding deficits. No fever.     All other systems reviewed and found to be negative with the exception of what has been described above.    MEDICATIONS  (STANDING):  atorvastatin 20 milliGRAM(s) Oral at bedtime  chlorhexidine 4% Liquid 1 Application(s) Topical <User Schedule>  dextrose 5%. 1000 milliLiter(s) (50 mL/Hr) IV Continuous <Continuous>  dextrose 5%. 1000 milliLiter(s) (100 mL/Hr) IV Continuous <Continuous>  dextrose 50% Injectable 12.5 Gram(s) IV Push once  dextrose 50% Injectable 25 Gram(s) IV Push once  dextrose 50% Injectable 25 Gram(s) IV Push once  glucagon  Injectable 1 milliGRAM(s) IntraMuscular once  heparin   Injectable 5000 Unit(s) SubCutaneous every 8 hours  insulin glargine Injectable (LANTUS) 15 Unit(s) SubCutaneous at bedtime  insulin lispro (ADMELOG) corrective regimen sliding scale   SubCutaneous at bedtime  insulin lispro (ADMELOG) corrective regimen sliding scale   SubCutaneous three times a day before meals  insulin lispro Injectable (ADMELOG) 6 Unit(s) SubCutaneous three times a day before meals  metoprolol tartrate 25 milliGRAM(s) Oral two times a day  pantoprazole    Tablet 40 milliGRAM(s) Oral before breakfast  predniSONE   Tablet 40 milliGRAM(s) Oral daily  senna 2 Tablet(s) Oral at bedtime    MEDICATIONS  (PRN):  acetaminophen     Tablet .. 650 milliGRAM(s) Oral every 6 hours PRN Temp greater or equal to 38C (100.4F), Mild Pain (1 - 3)  aluminum hydroxide/magnesium hydroxide/simethicone Suspension 30 milliLiter(s) Oral every 4 hours PRN Dyspepsia  artificial tears (preservative free) Ophthalmic Solution 2 Drop(s) Both EYES every 8 hours PRN Dry Eyes  dextrose Oral Gel 15 Gram(s) Oral once PRN Blood Glucose LESS THAN 70 milliGRAM(s)/deciliter  ondansetron Injectable 4 milliGRAM(s) IV Push every 8 hours PRN Nausea and/or Vomiting  zolpidem 5 milliGRAM(s) Oral at bedtime PRN Insomnia    Vital Signs Last 24 Hrs  T(C): 36.7 (23 Aug 2024 07:50), Max: 36.7 (22 Aug 2024 20:52)  T(F): 98 (23 Aug 2024 07:50), Max: 98 (22 Aug 2024 20:52)  HR: 62 (23 Aug 2024 07:50) (62 - 78)  BP: 123/72 (23 Aug 2024 07:50) (115/80 - 126/74)  RR: 18 (23 Aug 2024 07:50) (18 - 18)  SpO2: 100% (23 Aug 2024 07:50) (98% - 100%)    Parameters below as of 23 Aug 2024 07:50  Patient On (Oxygen Delivery Method): room air    GEN: NAD  HEENT:  pupils equal and reactive, EOMI, no oropharyngeal lesions, erythema, exudates, oral thrush  NECK:   supple, no carotid bruits  CV:  +S1, +S2, regular, no murmurs  RESP:   lungs clear to auscultation bilaterally, no wheezing, rales, rhonchi, good air entry bilaterally  GI:  abdomen soft, non-tender, non-distended, normal BS  EXT:  no clubbing, no cyanosis, no edema, no calf pain, swelling or erythema  NEURO:  AAOX3, no focal neurological deficits, follows all commands, able to move extremities spontaneously  SKIN:  no ulcers, lesions or rashes    LABS:                          9.7    15.61 )-----------( 389      ( 23 Aug 2024 06:47 )             29.5     08-23    140  |  108  |  35<H>  ----------------------------<  161<H>  3.6   |  26  |  1.46<H>    Ca    8.0<L>      23 Aug 2024 06:47  Mg     2.3     08-23    TPro  5.9<L>  /  Alb  2.9<L>  /  TBili  0.4  /  DBili  x   /  AST  41<H>  /  ALT  52  /  AlkPhos  60  08-23  LIVER FUNCTIONS - ( 23 Aug 2024 06:47 )  Alb: 2.9 g/dL / Pro: 5.9 gm/dL / ALK PHOS: 60 U/L / ALT: 52 U/L / AST: 41 U/L / GGT: x           Urinalysis Basic - ( 23 Aug 2024 06:47 )  Color: x / Appearance: x / SG: x / pH: x  Gluc: 161 mg/dL / Ketone: x  / Bili: x / Urobili: x   Blood: x / Protein: x / Nitrite: x   Leuk Esterase: x / RBC: x / WBC x   Sq Epi: x / Non Sq Epi: x / Bacteria: x

## 2024-08-24 ENCOUNTER — TRANSCRIPTION ENCOUNTER (OUTPATIENT)
Age: 59
End: 2024-08-24

## 2024-08-24 VITALS
OXYGEN SATURATION: 100 % | DIASTOLIC BLOOD PRESSURE: 78 MMHG | RESPIRATION RATE: 18 BRPM | SYSTOLIC BLOOD PRESSURE: 124 MMHG | HEART RATE: 64 BPM | TEMPERATURE: 98 F

## 2024-08-24 LAB
ALBUMIN SERPL ELPH-MCNC: 2.9 G/DL — LOW (ref 3.3–5)
ALP SERPL-CCNC: 52 U/L — SIGNIFICANT CHANGE UP (ref 40–120)
ALT FLD-CCNC: 86 U/L — HIGH (ref 12–78)
ANION GAP SERPL CALC-SCNC: 5 MMOL/L — SIGNIFICANT CHANGE UP (ref 5–17)
AST SERPL-CCNC: 71 U/L — HIGH (ref 15–37)
BILIRUB SERPL-MCNC: 0.4 MG/DL — SIGNIFICANT CHANGE UP (ref 0.2–1.2)
BUN SERPL-MCNC: 37 MG/DL — HIGH (ref 7–23)
CALCIUM SERPL-MCNC: 8.8 MG/DL — SIGNIFICANT CHANGE UP (ref 8.5–10.1)
CHLORIDE SERPL-SCNC: 108 MMOL/L — SIGNIFICANT CHANGE UP (ref 96–108)
CO2 SERPL-SCNC: 27 MMOL/L — SIGNIFICANT CHANGE UP (ref 22–31)
CREAT SERPL-MCNC: 1.48 MG/DL — HIGH (ref 0.5–1.3)
EGFR: 53 ML/MIN/1.73M2 — LOW
GLUCOSE BLDC GLUCOMTR-MCNC: 103 MG/DL — HIGH (ref 70–99)
GLUCOSE SERPL-MCNC: 114 MG/DL — HIGH (ref 70–99)
HCT VFR BLD CALC: 29.7 % — LOW (ref 39–50)
HGB BLD-MCNC: 9.9 G/DL — LOW (ref 13–17)
MAGNESIUM SERPL-MCNC: 2.3 MG/DL — SIGNIFICANT CHANGE UP (ref 1.6–2.6)
MCHC RBC-ENTMCNC: 27.6 PG — SIGNIFICANT CHANGE UP (ref 27–34)
MCHC RBC-ENTMCNC: 33.3 GM/DL — SIGNIFICANT CHANGE UP (ref 32–36)
MCV RBC AUTO: 82.7 FL — SIGNIFICANT CHANGE UP (ref 80–100)
PLATELET # BLD AUTO: 363 K/UL — SIGNIFICANT CHANGE UP (ref 150–400)
POTASSIUM SERPL-MCNC: 3.8 MMOL/L — SIGNIFICANT CHANGE UP (ref 3.5–5.3)
POTASSIUM SERPL-SCNC: 3.8 MMOL/L — SIGNIFICANT CHANGE UP (ref 3.5–5.3)
PROT SERPL-MCNC: 5.7 GM/DL — LOW (ref 6–8.3)
RBC # BLD: 3.59 M/UL — LOW (ref 4.2–5.8)
RBC # FLD: 15.2 % — HIGH (ref 10.3–14.5)
SODIUM SERPL-SCNC: 140 MMOL/L — SIGNIFICANT CHANGE UP (ref 135–145)
WBC # BLD: 12.56 K/UL — HIGH (ref 3.8–10.5)
WBC # FLD AUTO: 12.56 K/UL — HIGH (ref 3.8–10.5)

## 2024-08-24 PROCEDURE — 99239 HOSP IP/OBS DSCHRG MGMT >30: CPT

## 2024-08-24 RX ORDER — LEVOFLOXACIN 5 MG/ML
0 INJECTION, SOLUTION INTRAVENOUS
Refills: 0 | DISCHARGE

## 2024-08-24 RX ORDER — INSULIN GLARGINE 100 [IU]/ML
0 INJECTION, SOLUTION SUBCUTANEOUS
Refills: 0 | DISCHARGE

## 2024-08-24 RX ORDER — INSULIN GLARGINE 100 [IU]/ML
15 INJECTION, SOLUTION SUBCUTANEOUS
Qty: 1 | Refills: 0
Start: 2024-08-24 | End: 2024-09-22

## 2024-08-24 RX ORDER — PANTOPRAZOLE SODIUM 40 MG
0 TABLET, DELAYED RELEASE (ENTERIC COATED) ORAL
Refills: 0 | DISCHARGE

## 2024-08-24 RX ORDER — PANTOPRAZOLE SODIUM 40 MG
1 TABLET, DELAYED RELEASE (ENTERIC COATED) ORAL
Qty: 30 | Refills: 0
Start: 2024-08-24 | End: 2024-09-22

## 2024-08-24 RX ORDER — PREDNISONE 10 MG
0 TABLET, DOSE PACK ORAL
Refills: 0 | DISCHARGE

## 2024-08-24 RX ORDER — PREDNISONE 10 MG
2 TABLET, DOSE PACK ORAL
Qty: 60 | Refills: 0
Start: 2024-08-24 | End: 2024-09-22

## 2024-08-24 RX ADMIN — Medication 5000 UNIT(S): at 05:31

## 2024-08-24 RX ADMIN — Medication 40 MILLIGRAM(S): at 05:31

## 2024-08-24 RX ADMIN — Medication 6 UNIT(S): at 09:03

## 2024-08-24 RX ADMIN — Medication 40 MILLIGRAM(S): at 09:03

## 2024-08-24 RX ADMIN — METOPROLOL TARTRATE 25 MILLIGRAM(S): 100 TABLET ORAL at 09:03

## 2024-08-24 NOTE — DISCHARGE NOTE PROVIDER - NSDCCPCAREPLAN_GEN_ALL_CORE_FT
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 You are doing better and you are to continue on prednisone 40 mg until you follow up with Dr. Wang. You had adrenal insuffieciency as well and currently stable on prednisone. YOu will need to fu with endocrinolgy in the next 1-2 weeks as well and the neuro immunologist.

## 2024-08-24 NOTE — DISCHARGE NOTE PROVIDER - NSDCFUSCHEDAPPT_GEN_ALL_CORE_FT
Braden Sargent  [facility] Physician [facility]  [facility] [address]  Scheduled Appointment: 08/26/2024    Twila Cosme  [facility]  ENDOCRIN [address]  Scheduled Appointment: 09/12/2024    [facility]  RHEUM [address]  Scheduled Appointment: 09/13/2024

## 2024-08-24 NOTE — DISCHARGE NOTE PROVIDER - ATTENDING DISCHARGE PHYSICAL EXAMINATION:
Gen nad  head ncat  ent perrl  neck supple  chest cta  cvs s1s2  abd soft  ext no edema  skin wdi  neuro aaox3

## 2024-08-24 NOTE — DISCHARGE NOTE PROVIDER - HOSPITAL COURSE
59M with PMH of h/o RCC s/p  right nephrectomy on Nivolumab , last dose 3 weeks ago, PAF  not on AC, admitted on 8/14/24  with c/o lethargy for 1 week.  They went to [place] last week on Wednesday to see there new grandchild. On Thursday patient started to act bizarre, had trouble walking and speaking. The wife called EMS and he was taken to the local hospital in [place]. As per the wife pt had a CT chest which was noted for progression of malignancy and PNA, and he had an MRI done which showed a chronic stroke. Patient was in the hospital for 5 days presumably getting antibiotics, and initially improved but developed a rash during the hospital course. As per wife the patient initially improved but got worse again while coming to [place], and currently has been lethargic, spiking fevers, and has a diffuse macular rash that's getting worse.     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  - EEG w/o epileptiform activity, + cerebral dysfunction   - Neurology consult appreciated, d/w Dr. Barrera, suspected encephalitis possibly related to immunotherapy   - ID consult appreciated, monitor off abx, s/p vanc and glenda in ED  - s/p LP 8/16  with IR, Culture negative  - Culture negative to date and PCR negative   - c/w IV solumedrol 1000mg IV daily for 5 days  - Once solumedrol complete  taper on prednisone (0.5-1mg/kg ) as per oncology / neurology   - Start Prednisone 40 daily ( 0.5mg/kg )  - Patient follow up with Neuro immunology  (Dr Dickerson ([phone] )    # Weakness, lethargy due to Adrenal insufficiency  -AM cortisol 0.9   -started hydrocortisone 8/16, now d/c and started on solumedrol for encephalitis  -likely related to immunotherapy  -Covid + may be additive  - Continue po prednisone, fu AM labs  - If labs stable, potential DC 8/24    # Acute kidney injury   # Acute Urinary retention - resolved   # Metabolic acidosis - HCO3 improved   -baseline Cr 1.4  -Creatinine 1.4  and stable    # Diabetes Mellitus type 2 with steroid induced hyperglycemia  - ISS, Accuchecks AC&HS.  - HbA1c 6.5%  -Lantus increased to 18u qhs, pre-meals 6u     # Covid +  - Asymptomatic  - Monitor  - ID on board    # Metastatic RCC s/p R nephrectomy   - s/p nivolumab and ipilimumab Q21 x 4   - Heme/onc consult appreciated     # Normocytic anemia   - B12 wnl, check iron studies, folate    # Maculopapular rash - improved   -started prior to initiation of Levaquin while in hospital in [place]    # Insomnia  - ambien hs     # HTN   - c/w Metoprolol     # HLD   - c/w statin

## 2024-08-24 NOTE — DISCHARGE NOTE PROVIDER - DETAILS OF MALNUTRITION DIAGNOSIS/DIAGNOSES
This patient has been assessed with a concern for Malnutrition and was treated during this hospitalization for the following Nutrition diagnosis/diagnoses:     -  08/15/2024: Mild protein-calorie malnutrition

## 2024-08-24 NOTE — DISCHARGE NOTE PROVIDER - NSDCMRMEDTOKEN_GEN_ALL_CORE_FT
atorvastatin 20 mg oral tablet: 1 tab(s) orally once a day (at bedtime)  baclofen 20 mg oral tablet: 1 tab(s) orally 1 to 3 times a day as needed for hiccups  insulin glargine 100 units/mL subcutaneous solution: 15 unit(s) subcutaneous once a day (at bedtime)  meclizine 12.5 mg oral tablet: prescribed by hospital in [place] yesterday  8/13, not started taking at home  metFORMIN 500 mg oral tablet: 1 tab(s) orally once a day  metoprolol tartrate 25 mg oral tablet: 1 tab(s) orally once a day  ondansetron 4 mg oral tablet: 1 tab(s) orally 1 to 3 times a day as needed for  nausea  ondansetron 8 mg oral tablet: 1 tab(s) orally every 8 hours as needed for  nausea  pantoprazole 40 mg oral delayed release tablet: 1 tab(s) orally once a day (before a meal)  predniSONE 20 mg oral tablet: 2 tab(s) orally once a day

## 2024-08-24 NOTE — DISCHARGE NOTE PROVIDER - CARE PROVIDER_API CALL
Floridalma Ellis  Neurology  [address], [address]  [place] [address]  Phone: [phone]  Fax: [phone]  Follow Up Time:

## 2024-08-26 ENCOUNTER — APPOINTMENT (OUTPATIENT)
Dept: FAMILY MEDICINE | Facility: CLINIC | Age: 59
End: 2024-08-26
Payer: COMMERCIAL

## 2024-08-26 VITALS
BODY MASS INDEX: 27.85 KG/M2 | TEMPERATURE: 98.2 F | HEIGHT: 69 IN | DIASTOLIC BLOOD PRESSURE: 70 MMHG | WEIGHT: 188 LBS | HEART RATE: 70 BPM | SYSTOLIC BLOOD PRESSURE: 120 MMHG | OXYGEN SATURATION: 98 %

## 2024-08-26 DIAGNOSIS — E11.9 TYPE 2 DIABETES MELLITUS W/OUT COMPLICATIONS: ICD-10-CM

## 2024-08-26 DIAGNOSIS — G92.9 UNSPECIFIED TOXIC ENCEPHALOPATHY: ICD-10-CM

## 2024-08-26 DIAGNOSIS — E78.5 HYPERLIPIDEMIA, UNSPECIFIED: ICD-10-CM

## 2024-08-26 DIAGNOSIS — Z09 ENCOUNTER FOR FOLLOW-UP EXAMINATION AFTER COMPLETED TREATMENT FOR CONDITIONS OTHER THAN MALIGNANT NEOPLASM: ICD-10-CM

## 2024-08-26 PROCEDURE — 99214 OFFICE O/P EST MOD 30 MIN: CPT

## 2024-08-26 RX ORDER — ATORVASTATIN CALCIUM 20 MG/1
20 TABLET, FILM COATED ORAL
Qty: 90 | Refills: 1 | Status: ACTIVE | COMMUNITY

## 2024-08-26 NOTE — HISTORY OF PRESENT ILLNESS
[FreeTextEntry1] : pt is here for CPE. [FreeTextEntry2] : New pt is here for HFU  discharged 08/24/2024 Presents with wife- Marah  Admitted with acute toxic encephalopathy possibly due to immunotherapy for treatment of RCC with mets  Currently stable Of note, will be consulted with [facility] as well

## 2024-08-26 NOTE — ASSESSMENT
[FreeTextEntry1] : Medications reconciled and added to chart Needs renewals on medications as he will run out before he sees a specialist -viral immunologist Reviewed hospital discharge labs, consults, images.

## 2024-08-26 NOTE — PHYSICAL EXAM
[FreeTextEntry1] : Right 0.5- 1- 2- 4-   Left 0.5- 1- 2- 4-  [Normal] : normal rate, regular rhythm, normal S1 and S2 and no murmur heard

## 2024-08-27 RX ORDER — INSULIN GLARGINE 100 [IU]/ML
100 INJECTION, SOLUTION SUBCUTANEOUS
Qty: 1 | Refills: 3 | Status: ACTIVE | COMMUNITY
Start: 1900-01-01 | End: 1900-01-01

## 2024-08-28 ENCOUNTER — APPOINTMENT (OUTPATIENT)
Dept: HEMATOLOGY ONCOLOGY | Facility: CLINIC | Age: 59
End: 2024-08-28
Payer: COMMERCIAL

## 2024-08-28 VITALS
BODY MASS INDEX: 26.96 KG/M2 | OXYGEN SATURATION: 97 % | WEIGHT: 182 LBS | TEMPERATURE: 98 F | DIASTOLIC BLOOD PRESSURE: 77 MMHG | HEART RATE: 79 BPM | SYSTOLIC BLOOD PRESSURE: 117 MMHG | HEIGHT: 69 IN

## 2024-08-28 DIAGNOSIS — E27.3 DRUG-INDUCED ADRENOCORTICAL INSUFFICIENCY: ICD-10-CM

## 2024-08-28 DIAGNOSIS — C64.1 MALIGNANT NEOPLASM OF RIGHT KIDNEY, EXCEPT RENAL PELVIS: ICD-10-CM

## 2024-08-28 PROBLEM — G04.90 ENCEPHALITIS: Status: ACTIVE | Noted: 2024-08-28

## 2024-08-28 PROCEDURE — 99215 OFFICE O/P EST HI 40 MIN: CPT

## 2024-08-28 NOTE — PHYSICAL EXAM
[Normal] : affect appropriate [de-identified] : looks fatigued  [de-identified] : no overt focal deficits  walks slowly

## 2024-08-28 NOTE — REVIEW OF SYSTEMS
[Fatigue] : fatigue [Negative] : Gastrointestinal [de-identified] : as above [de-identified] : worried

## 2024-08-28 NOTE — REVIEW OF SYSTEMS
[Fatigue] : fatigue [Negative] : Gastrointestinal [de-identified] : as above [de-identified] : worried

## 2024-08-28 NOTE — PHYSICAL EXAM
[Normal] : affect appropriate [de-identified] : looks fatigued  [de-identified] : no overt focal deficits  walks slowly

## 2024-08-28 NOTE — HISTORY OF PRESENT ILLNESS
[Disease: _____________________] : Disease: [unfilled] [T: ___] : T[unfilled] [N: ___] : N[unfilled] [M: ___] : M[unfilled] [de-identified] : BHANU REEDER is a 58 y.o. M with a PMH significant for HTN, HLD, CAD -> stent 4/2019, diabetes, who we saw in  for a renal mass alg lung nodule concerning for metastatic RCC.   2/4/24 - 2/7/24 - Admitted to  with hematuria.  CT C/A/P - Dominant PRISCILLA lobulated 1.6 cm lung nodule new since 4/10/19. 2 adjacent pulmonary nodules within the peripheral aspect of the RLL, largest measuring about 5 mm, indeterminate. Heterogeneous enhancing mass measuring 7.3 x 5.8 x 7.2 cm (AP x TRV x CC) in the right renal midpole concerning for a renal neoplasm. Mild hydroureteronephrosis to the level of the distal right ureter with moderate right perinephric inflammatory change and a delayed nephrogram. Questionable 4 mm lesion within the distal right ureter. No left hydronephrosis. Mild nonspecific left perinephric fat stranding.  Enlarged portacaval LN measuring 2.2 x 1.0 cm. Enlarged right retroperitoneal LN measuring 1.5 x 1.2 cm.  2/424 - Cystoscopy with retrograde pyelogram and ureteral stent insertion. PATH - ureter wash negative for high grade UCC.   2/5/24 - MRI Abd - 9.6 cm solid partially necrotic right renal mass compatible with renal cell carcinoma. Several retroperitoneal lymph nodes suspect for metastatic disease.  During hospitalization  he was also found to have intermittent Afib. Started on Diltiazem and metoprolol.  Eliquis recommedned but not started yet due to persistent hematuria. Due to high blood sugar levels- started on insulin and oral hypoglycemics.  IR biopsy of PRISCILLA pulmonary nodule  2/22/24 : clear cell carcinoma c/w renal primary  3/19/24 : right radical nephrectomy ( Dr Kaplan )  Path :  clear cell renal cell carcinoma  11.0 cm WHO grade 4 with extensive necrosis, extending to perinephric fat and invading renal vein and renal sinus . negative margins. 2 of 2 lymph nodes positive for metastatic cancer  pT3a pN1  Ipilimumab and nivolumab  x 4 started  4/22/24 6/24/24  next nivo maintenance   Developed inflammatory arthritis 2/2 IO  CT CAP 8/3/24 Marked worsening of metastatic disease. Multiple new metastatic lung nodules. Paraesophageal and bulky retroperitoneal lymphadenopathy. New right lateral abdominal metastatic nodules.  8/8- 8/13/24 admitted to hospital in  [place] for  change in mental status metabolic encephalopathy ? brain lesion, pneumonia   8/14- 8/24/24 Admitted to [facility]  Dgn with  immunotherapy induced encephalitis  ( Neurology Dr Barrera)  s/p iv steroids , discharged on prednisone taper  LP - negative  To follow with neuro immunology Dr Dickerson  Adrenal insufficiency  Covid positive  [de-identified] : clear cell carcinoma  [de-identified] : IMDC intermediate- poor risk disease ( one risk factor)  [de-identified] : Weak but slowly improving. Mental status  much better. Speech OK. Blood sugar spikes on prednisone- monitored closely by dora.

## 2024-08-28 NOTE — ASSESSMENT
[FreeTextEntry1] : 57 y/o male presented with hematuria and found to have large partially necrotic right renal mass concerning for RCC. Also has slightly enlarged retroperitoneal LNs and peripheral PRISCILLA 1.6 cm pulmonary nodule. CT guided biopsy of PRISCILLA nodule by IR 2/22/24 : clear cell carcinoma c/w renal primary  Because of oligometastatic disease and relatively large primary tumor- he will beneft from resection of primary- right nephrectomy. s/p radical right nephrectomy by Dr Kaplan on 3/19/24.  Surgical pathology showed large clear cell renal cancer with necrosis , vascular invasion and metastatic to 2/2 regional lymph nodes pT3 pN1  Started dual immunotherapy with nivolumab and ipilimumab  on 4/22/24.  Follow up scan in early August showed rapid disease progression.  Developed encephaitis 2/2  IO  s/p hospitalization iv steroids with clinical improvement now on prednisone taper ( 40 mg )- per neuroimmunology ( awaiting appointment ) Also adrenal insufficiency 2/2 IO  Follows with endocrinology  Slowly recovering - will start home PT.  Discussed  second line tx options. Will also look into clinical trials for near future- concern re lack of response to first line dual IO.  Plan : second line therapy Everolimus  and lenvatinib Rx Afinitor 5 mg daily  and Lenvima 18 mg once  daily   Plan to start in next ~ 2 weeks.  Long d/w patient and his wife  CC: Dr RANDALL Grimm

## 2024-08-28 NOTE — HISTORY OF PRESENT ILLNESS
[Disease: _____________________] : Disease: [unfilled] [T: ___] : T[unfilled] [N: ___] : N[unfilled] [M: ___] : M[unfilled] [de-identified] : BHANU REEDER is a 58 y.o. M with a PMH significant for HTN, HLD, CAD -> stent 4/2019, diabetes, who we saw in  for a renal mass alg lung nodule concerning for metastatic RCC.   2/4/24 - 2/7/24 - Admitted to  with hematuria.  CT C/A/P - Dominant PRISCILLA lobulated 1.6 cm lung nodule new since 4/10/19. 2 adjacent pulmonary nodules within the peripheral aspect of the RLL, largest measuring about 5 mm, indeterminate. Heterogeneous enhancing mass measuring 7.3 x 5.8 x 7.2 cm (AP x TRV x CC) in the right renal midpole concerning for a renal neoplasm. Mild hydroureteronephrosis to the level of the distal right ureter with moderate right perinephric inflammatory change and a delayed nephrogram. Questionable 4 mm lesion within the distal right ureter. No left hydronephrosis. Mild nonspecific left perinephric fat stranding.  Enlarged portacaval LN measuring 2.2 x 1.0 cm. Enlarged right retroperitoneal LN measuring 1.5 x 1.2 cm.  2/424 - Cystoscopy with retrograde pyelogram and ureteral stent insertion. PATH - ureter wash negative for high grade UCC.   2/5/24 - MRI Abd - 9.6 cm solid partially necrotic right renal mass compatible with renal cell carcinoma. Several retroperitoneal lymph nodes suspect for metastatic disease.  During hospitalization  he was also found to have intermittent Afib. Started on Diltiazem and metoprolol.  Eliquis recommedned but not started yet due to persistent hematuria. Due to high blood sugar levels- started on insulin and oral hypoglycemics.  IR biopsy of PRISCILLA pulmonary nodule  2/22/24 : clear cell carcinoma c/w renal primary  3/19/24 : right radical nephrectomy ( Dr Kaplan )  Path :  clear cell renal cell carcinoma  11.0 cm WHO grade 4 with extensive necrosis, extending to perinephric fat and invading renal vein and renal sinus . negative margins. 2 of 2 lymph nodes positive for metastatic cancer  pT3a pN1  Ipilimumab and nivolumab  x 4 started  4/22/24 6/24/24  next nivo maintenance   Developed inflammatory arthritis 2/2 IO  CT CAP 8/3/24 Marked worsening of metastatic disease. Multiple new metastatic lung nodules. Paraesophageal and bulky retroperitoneal lymphadenopathy. New right lateral abdominal metastatic nodules.  8/8- 8/13/24 admitted to hospital in  [place] for  change in mental status metabolic encephalopathy ? brain lesion, pneumonia   8/14- 8/24/24 Admitted to [facility]  Dgn with  immunotherapy induced encephalitis  ( Neurology Dr Barrera)  s/p iv steroids , discharged on prednisone taper  LP - negative  To follow with neuro immunology Dr Dickerson  Adrenal insufficiency  Covid positive  [de-identified] : clear cell carcinoma  [de-identified] : IMDC intermediate- poor risk disease ( one risk factor)  [de-identified] : Weak but slowly improving. Mental status  much better. Speech OK. Blood sugar spikes on prednisone- monitored closely by dora.

## 2024-08-29 RX ORDER — EVEROLIMUS 5 MG/1
5 TABLET ORAL
Qty: 30 | Refills: 5 | Status: ACTIVE | COMMUNITY
Start: 2024-08-28 | End: 1900-01-01

## 2024-08-29 RX ORDER — LENVATINIB 18 MG/DAY
10 MG & KIT ORAL
Qty: 1 | Refills: 5 | Status: ACTIVE | COMMUNITY
Start: 2024-08-28 | End: 1900-01-01

## 2024-08-30 DIAGNOSIS — G04.90 ENCEPHALITIS AND ENCEPHALOMYELITIS, UNSPECIFIED: ICD-10-CM

## 2024-08-30 DIAGNOSIS — E27.3 DRUG-INDUCED ADRENOCORTICAL INSUFFICIENCY: ICD-10-CM

## 2024-09-03 LAB
R RICKETTSI AB SER-ACNC: SIGNIFICANT CHANGE UP
R RICKETTSI IGM SER-ACNC: SIGNIFICANT CHANGE UP
RICK SF IGG TITR SER IF: SIGNIFICANT CHANGE UP
RICK SF IGM TITR SER IF: SIGNIFICANT CHANGE UP
ROCKY MT SPOTTED FEVER COMMENT: SIGNIFICANT CHANGE UP

## 2024-09-05 ENCOUNTER — RESULT CHARGE (OUTPATIENT)
Age: 59
End: 2024-09-05

## 2024-09-05 ENCOUNTER — NON-APPOINTMENT (OUTPATIENT)
Age: 59
End: 2024-09-05

## 2024-09-05 ENCOUNTER — RESULT REVIEW (OUTPATIENT)
Age: 59
End: 2024-09-05

## 2024-09-05 ENCOUNTER — APPOINTMENT (OUTPATIENT)
Dept: HEMATOLOGY ONCOLOGY | Facility: CLINIC | Age: 59
End: 2024-09-05

## 2024-09-05 DIAGNOSIS — Z98.890 OTHER SPECIFIED POSTPROCEDURAL STATES: ICD-10-CM

## 2024-09-05 DIAGNOSIS — C79.9 SECONDARY MALIGNANT NEOPLASM OF UNSPECIFIED SITE: ICD-10-CM

## 2024-09-05 DIAGNOSIS — Z79.82 LONG TERM (CURRENT) USE OF ASPIRIN: ICD-10-CM

## 2024-09-05 DIAGNOSIS — Z95.5 PRESENCE OF CORONARY ANGIOPLASTY IMPLANT AND GRAFT: ICD-10-CM

## 2024-09-05 DIAGNOSIS — G93.89 OTHER SPECIFIED DISORDERS OF BRAIN: ICD-10-CM

## 2024-09-05 DIAGNOSIS — E11.65 TYPE 2 DIABETES MELLITUS WITH HYPERGLYCEMIA: ICD-10-CM

## 2024-09-05 DIAGNOSIS — U07.1 COVID-19: ICD-10-CM

## 2024-09-05 DIAGNOSIS — Z79.899 OTHER LONG TERM (CURRENT) DRUG THERAPY: ICD-10-CM

## 2024-09-05 DIAGNOSIS — E27.40 UNSPECIFIED ADRENOCORTICAL INSUFFICIENCY: ICD-10-CM

## 2024-09-05 DIAGNOSIS — T38.0X5A ADVERSE EFFECT OF GLUCOCORTICOIDS AND SYNTHETIC ANALOGUES, INITIAL ENCOUNTER: ICD-10-CM

## 2024-09-05 DIAGNOSIS — Z86.73 PERSONAL HISTORY OF TRANSIENT ISCHEMIC ATTACK (TIA), AND CEREBRAL INFARCTION WITHOUT RESIDUAL DEFICITS: ICD-10-CM

## 2024-09-05 DIAGNOSIS — T36.8X5A ADVERSE EFFECT OF OTHER SYSTEMIC ANTIBIOTICS, INITIAL ENCOUNTER: ICD-10-CM

## 2024-09-05 DIAGNOSIS — E66.9 OBESITY, UNSPECIFIED: ICD-10-CM

## 2024-09-05 DIAGNOSIS — G92.8 OTHER TOXIC ENCEPHALOPATHY: ICD-10-CM

## 2024-09-05 DIAGNOSIS — R33.9 RETENTION OF URINE, UNSPECIFIED: ICD-10-CM

## 2024-09-05 DIAGNOSIS — C64.1 MALIGNANT NEOPLASM OF RIGHT KIDNEY, EXCEPT RENAL PELVIS: ICD-10-CM

## 2024-09-05 DIAGNOSIS — Z87.891 PERSONAL HISTORY OF NICOTINE DEPENDENCE: ICD-10-CM

## 2024-09-05 DIAGNOSIS — G47.00 INSOMNIA, UNSPECIFIED: ICD-10-CM

## 2024-09-05 DIAGNOSIS — R65.10 SYSTEMIC INFLAMMATORY RESPONSE SYNDROME (SIRS) OF NON-INFECTIOUS ORIGIN WITHOUT ACUTE ORGAN DYSFUNCTION: ICD-10-CM
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
## 2024-09-05 DIAGNOSIS — I48.0 PAROXYSMAL ATRIAL FIBRILLATION: ICD-10-CM

## 2024-09-05 DIAGNOSIS — I12.9 HYPERTENSIVE CHRONIC KIDNEY DISEASE WITH STAGE 1 THROUGH STAGE 4 CHRONIC KIDNEY DISEASE, OR UNSPECIFIED CHRONIC KIDNEY DISEASE: ICD-10-CM

## 2024-09-05 DIAGNOSIS — G04.81 OTHER ENCEPHALITIS AND ENCEPHALOMYELITIS: ICD-10-CM

## 2024-09-05 DIAGNOSIS — N18.4 CHRONIC KIDNEY DISEASE, STAGE 4 (SEVERE): ICD-10-CM

## 2024-09-05 DIAGNOSIS — Z79.4 LONG TERM (CURRENT) USE OF INSULIN: ICD-10-CM

## 2024-09-05 DIAGNOSIS — Z90.49 ACQUIRED ABSENCE OF OTHER SPECIFIED PARTS OF DIGESTIVE TRACT: ICD-10-CM

## 2024-09-05 DIAGNOSIS — R44.2 OTHER HALLUCINATIONS: ICD-10-CM

## 2024-09-05 DIAGNOSIS — E21.2 OTHER HYPERPARATHYROIDISM: ICD-10-CM

## 2024-09-05 DIAGNOSIS — T45.1X5A ADVERSE EFFECT OF ANTINEOPLASTIC AND IMMUNOSUPPRESSIVE DRUGS, INITIAL ENCOUNTER: ICD-10-CM
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
## 2024-09-05 DIAGNOSIS — D50.9 IRON DEFICIENCY ANEMIA, UNSPECIFIED: ICD-10-CM

## 2024-09-05 DIAGNOSIS — Z90.5 ACQUIRED ABSENCE OF KIDNEY: ICD-10-CM

## 2024-09-05 DIAGNOSIS — E78.5 HYPERLIPIDEMIA, UNSPECIFIED: ICD-10-CM

## 2024-09-05 DIAGNOSIS — E44.1 MILD PROTEIN-CALORIE MALNUTRITION: ICD-10-CM

## 2024-09-05 DIAGNOSIS — L27.0 GENERALIZED SKIN ERUPTION DUE TO DRUGS AND MEDICAMENTS TAKEN INTERNALLY: ICD-10-CM

## 2024-09-05 DIAGNOSIS — E87.20 ACIDOSIS, UNSPECIFIED: ICD-10-CM

## 2024-09-05 DIAGNOSIS — D63.0 ANEMIA IN NEOPLASTIC DISEASE: ICD-10-CM

## 2024-09-05 DIAGNOSIS — N17.9 ACUTE KIDNEY FAILURE, UNSPECIFIED: ICD-10-CM

## 2024-09-05 DIAGNOSIS — C78.00 SECONDARY MALIGNANT NEOPLASM OF UNSPECIFIED LUNG: ICD-10-CM

## 2024-09-05 DIAGNOSIS — G40.89 OTHER SEIZURES: ICD-10-CM

## 2024-09-05 LAB
ALBUMIN SERPL ELPH-MCNC: 4.4 G/DL — SIGNIFICANT CHANGE UP (ref 3.3–5)
ALP SERPL-CCNC: 64 U/L — SIGNIFICANT CHANGE UP (ref 40–120)
ALT FLD-CCNC: 23 U/L — SIGNIFICANT CHANGE UP (ref 10–45)
ANION GAP SERPL CALC-SCNC: 14 MMOL/L — SIGNIFICANT CHANGE UP (ref 5–17)
AST SERPL-CCNC: 13 U/L — SIGNIFICANT CHANGE UP (ref 10–40)
BASOPHILS # BLD AUTO: 0.02 K/UL — SIGNIFICANT CHANGE UP (ref 0–0.2)
BASOPHILS NFR BLD AUTO: 0.2 % — SIGNIFICANT CHANGE UP (ref 0–2)
BILIRUB SERPL-MCNC: 0.4 MG/DL — SIGNIFICANT CHANGE UP (ref 0.2–1.2)
BUN SERPL-MCNC: 33 MG/DL — HIGH (ref 7–23)
CALCIUM SERPL-MCNC: 9.9 MG/DL — SIGNIFICANT CHANGE UP (ref 8.4–10.5)
CHLORIDE SERPL-SCNC: 100 MMOL/L — SIGNIFICANT CHANGE UP (ref 96–108)
CO2 SERPL-SCNC: 21 MMOL/L — LOW (ref 22–31)
CREAT SERPL-MCNC: 1.27 MG/DL — SIGNIFICANT CHANGE UP (ref 0.5–1.3)
EGFR: 64 ML/MIN/1.73M2 — SIGNIFICANT CHANGE UP
EGFR: 64 ML/MIN/1.73M2 — SIGNIFICANT CHANGE UP
EOSINOPHIL # BLD AUTO: 0.04 K/UL — SIGNIFICANT CHANGE UP (ref 0–0.5)
EOSINOPHIL NFR BLD AUTO: 0.3 % — SIGNIFICANT CHANGE UP (ref 0–6)
GLUCOSE SERPL-MCNC: 221 MG/DL — HIGH (ref 70–99)
HCT VFR BLD CALC: 33.1 % — LOW (ref 39–50)
HGB BLD-MCNC: 10.9 G/DL — LOW (ref 13–17)
IMM GRANULOCYTES NFR BLD AUTO: 0.9 % — SIGNIFICANT CHANGE UP (ref 0–0.9)
LYMPHOCYTES # BLD AUTO: 1.06 K/UL — SIGNIFICANT CHANGE UP (ref 1–3.3)
LYMPHOCYTES # BLD AUTO: 8.5 % — LOW (ref 13–44)
MCHC RBC-ENTMCNC: 27.5 PG — SIGNIFICANT CHANGE UP (ref 27–34)
MCHC RBC-ENTMCNC: 32.9 GM/DL — SIGNIFICANT CHANGE UP (ref 32–36)
MCV RBC AUTO: 83.6 FL — SIGNIFICANT CHANGE UP (ref 80–100)
MONOCYTES # BLD AUTO: 0.4 K/UL — SIGNIFICANT CHANGE UP (ref 0–0.9)
MONOCYTES NFR BLD AUTO: 3.2 % — SIGNIFICANT CHANGE UP (ref 2–14)
NEUTROPHILS # BLD AUTO: 10.78 K/UL — HIGH (ref 1.8–7.4)
NEUTROPHILS NFR BLD AUTO: 86.9 % — HIGH (ref 43–77)
NRBC # BLD: 0 /100 WBCS — SIGNIFICANT CHANGE UP (ref 0–0)
NRBC BLD-RTO: 0 /100 WBCS — SIGNIFICANT CHANGE UP (ref 0–0)
PLATELET # BLD AUTO: 241 K/UL — SIGNIFICANT CHANGE UP (ref 150–400)
POTASSIUM SERPL-MCNC: 5.3 MMOL/L — SIGNIFICANT CHANGE UP (ref 3.5–5.3)
POTASSIUM SERPL-SCNC: 5.3 MMOL/L — SIGNIFICANT CHANGE UP (ref 3.5–5.3)
PROT SERPL-MCNC: 7 G/DL — SIGNIFICANT CHANGE UP (ref 6–8.3)
RBC # BLD: 3.96 M/UL — LOW (ref 4.2–5.8)
RBC # FLD: 17 % — HIGH (ref 10.3–14.5)
SODIUM SERPL-SCNC: 134 MMOL/L — LOW (ref 135–145)
T4 FREE SERPL-MCNC: 1.5 NG/DL — SIGNIFICANT CHANGE UP (ref 0.9–1.8)
T4 FREE+ TSH PNL SERPL: 4.76 UIU/ML — HIGH (ref 0.27–4.2)
WBC # BLD: 12.41 K/UL — HIGH (ref 3.8–10.5)
WBC # FLD AUTO: 12.41 K/UL — HIGH (ref 3.8–10.5)

## 2024-09-05 RX ORDER — DEXAMETHASONE 0.5 MG/5ML
0.5 SOLUTION ORAL
Qty: 1 | Refills: 5 | Status: ACTIVE | COMMUNITY
Start: 2024-09-05 | End: 1900-01-01

## 2024-09-06 ENCOUNTER — APPOINTMENT (OUTPATIENT)
Dept: NEUROLOGY | Facility: CLINIC | Age: 59
End: 2024-09-06
Payer: COMMERCIAL

## 2024-09-06 VITALS
DIASTOLIC BLOOD PRESSURE: 87 MMHG | HEIGHT: 69 IN | HEART RATE: 72 BPM | WEIGHT: 179 LBS | SYSTOLIC BLOOD PRESSURE: 137 MMHG | BODY MASS INDEX: 26.51 KG/M2

## 2024-09-06 PROCEDURE — 99215 OFFICE O/P EST HI 40 MIN: CPT

## 2024-09-06 RX ORDER — PREDNISONE 5 MG/1
5 TABLET ORAL
Qty: 196 | Refills: 0 | Status: ACTIVE | COMMUNITY
Start: 1900-01-01 | End: 1900-01-01

## 2024-09-06 NOTE — HISTORY OF PRESENT ILLNESS
[FreeTextEntry1] : HPI (initial visit Sep 06, 2024)- BHANU REEDER is a 59 year old man w/ Former smoker, T2DM, HTN, CAD, hx of metastatic RCC (dx'd 2/2024 s/p right nephrectomy 3/2024) on immunotherapy (ICI) since 4/2024 was admitted to [facility] 8/14/2024-8/24/2024 for a change in mental status with changes in speech, gait instability and diffuse macular rash. Of note, his last nivolumab dose was 2 weeks prior to onset of symptoms.  Pt was evaluated by neurologist inpatient and underwent work up.  EEG showed mild-mod slowing but no seizures.  MRI brain w/w/o contrast 8/18/24 with chronic microvascular ischemic changes and focal encephalomalacia in right frontal lobe but no acute findings. no enhancement.  LP 8/16/24 w/ TNC  8 (lymph pred), Protein 78, glucose 77, OCB absent, IgG index nl, paraneoplastic panel neg. PCR neg, Inf cx's neg. Lyme neg. VDRL neg.  Serum paraneoplastic panel neg.  Patient was treated with 5 days of IV solumedrol 1000 mg QD with clinical improvement and then transitioned to PO prednisone 40 mg QD. He reports his speech and gait improved with every day of steroids.  He was also started on hydrocortisone for adrenal insufficiency during hospitalization.  Hospital course also complicated by + COVID (asymptomatic) and MILES.  The rash was thought to be allergic reaction to levaquin for ? PNA that was prescribed at OSH. Pt has been followed by Endo since discharge and is on insulin for hyperglycemia.  He has been following with hem/onc. Immunotherapy dc'd. He has a second opinion at [facility]. Plan for Lenvatinib 18mg PO QD + Everolimus 5mg PO QD ongoing.  He reports he is 95% back to baseline. Word finding and concentration have improved. Occasionally feels mild weakness in R hip, sweetie when walking up stairs. No denies any falls.  Of note, he also reports dry mouth and joint swellings while he was on ICI, now resolved.

## 2024-09-06 NOTE — DATA REVIEWED
[de-identified] : MRI brain w/w/o contrast 8/18/24 with chronic microvascular ischemic changes and focal encephalomalacia in right frontal lobe but no acute findings. no enhancement.

## 2024-09-06 NOTE — REASON FOR VISIT
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

## 2024-09-06 NOTE — PHYSICAL EXAM
[FreeTextEntry1] : PHYSICAL EXAM Constitutional: Alert, no acute distress  Psychiatric: appropriate affect and mood Pulmonary: No respiratory distress, stable on room air  NEUROLOGICAL EXAM Mental status: The patient is alert, attentive and conversational memory intact. AO x 4. Serial 7 intact.  Recalls 3/3 words in 5 minutes. Able to spell WORLD backwards.  Speech/language: No dysarthria. No aphasia. Cranial nerves: CN II: Visual fields are full to confrontation. Pupil size equal and briskly reactive to light.  CN III, IV, VI: EOMI, no nystagmus, no ptosis CN V: Facial sensation is intact to pinprick in all 3 divisions bilaterally. CN VII: Face is symmetric with normal eye closure and smile. CN VII: Hearing is normal to rubbing fingers CN IX, X: Palate elevates symmetrically.  CN XI: Head turning and shoulder shrug are intact CN XII: Tongue is midline with normal movements and no atrophy. Motor: Strength is full bilaterally. 5/5 muscle power in bilateral UE and LE. Reflexes: R        L  Biceps    2+       2+  Patellar   2+       2+  Achilles  2+       2+ Plantar responses- R down, L down Sensory:      RUE/RLE         LUE/LLE light touch       +/+                     +/+ Pinprick           + /+                     +/+ Coordination/Cerebellar: There is no dysmetria on finger-to-nose and heel to shin.  Gait/Stance: Posture is normal. Gait is steady with normal steps, base, arm swing, and turning. Tandem gait is normal. Romberg is negative.

## 2024-09-06 NOTE — ASSESSMENT
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 MR brain w/o any acute findings and CSF and serum paraneoplastic panels were negative. He is currently on prednisone 40 mg QD and is 95% back to baseline.   Plan:- [] Continue to follow up with Hem/Onc for initiation of chemotherapy.  [] Plan to taper off prednisone by 5 mg/week. Currently on prednisone 40 mg QD.  [] Continue pantoprazole 40 mg QD for GI ppx [] Continue to follow up with Endo for hyperglycemia/insulin management [] Referral for PT given   Return to clinic 6 months  The above plan was discussed with BHANU REEDER in great detail.  BHANU REEDER verbalized understanding and agrees with plan as detailed above. Patient was provided education and counselling on current diagnosis/symptoms. He was advised to call our clinic at [phone] for any new or worsening symptoms, or with any questions or concerns. In case of acute onset of neurological symptoms or worsening presentation, patient was advised to present to nearest emergency room for further evaluation. BHANU REEDER expressed understanding and all his questions/concerns were addressed.  Floridalma Ellis M.D

## 2024-09-11 ENCOUNTER — NON-APPOINTMENT (OUTPATIENT)
Age: 59
End: 2024-09-11

## 2024-09-12 ENCOUNTER — APPOINTMENT (OUTPATIENT)
Dept: ENDOCRINOLOGY | Facility: CLINIC | Age: 59
End: 2024-09-12
Payer: COMMERCIAL

## 2024-09-12 VITALS
WEIGHT: 179 LBS | HEIGHT: 69 IN | BODY MASS INDEX: 26.51 KG/M2 | OXYGEN SATURATION: 98 % | SYSTOLIC BLOOD PRESSURE: 126 MMHG | DIASTOLIC BLOOD PRESSURE: 70 MMHG | HEART RATE: 76 BPM

## 2024-09-12 DIAGNOSIS — I10 ESSENTIAL (PRIMARY) HYPERTENSION: ICD-10-CM

## 2024-09-12 DIAGNOSIS — Z92.89 PERSONAL HISTORY OF OTHER MEDICAL TREATMENT: ICD-10-CM

## 2024-09-12 DIAGNOSIS — R79.89 OTHER SPECIFIED ABNORMAL FINDINGS OF BLOOD CHEMISTRY: ICD-10-CM

## 2024-09-12 DIAGNOSIS — G04.90 ENCEPHALITIS AND ENCEPHALOMYELITIS, UNSPECIFIED: ICD-10-CM

## 2024-09-12 DIAGNOSIS — R73.9 HYPERGLYCEMIA, UNSPECIFIED: ICD-10-CM

## 2024-09-12 DIAGNOSIS — T38.0X5A HYPERGLYCEMIA, UNSPECIFIED: ICD-10-CM

## 2024-09-12 PROCEDURE — 99215 OFFICE O/P EST HI 40 MIN: CPT

## 2024-09-12 PROCEDURE — 95251 CONT GLUC MNTR ANALYSIS I&R: CPT

## 2024-09-12 RX ORDER — INSULIN LISPRO 100 [IU]/ML
100 INJECTION, SOLUTION INTRAVENOUS; SUBCUTANEOUS
Qty: 5 | Refills: 3 | Status: ACTIVE | COMMUNITY
Start: 2024-09-12 | End: 1900-01-01

## 2024-09-12 RX ORDER — INSULIN LISPRO 100 [IU]/ML
100 INJECTION, SOLUTION INTRAVENOUS; SUBCUTANEOUS
Qty: 1 | Refills: 0 | Status: ACTIVE | COMMUNITY
Start: 2024-09-12 | End: 1900-01-01

## 2024-09-12 NOTE — HISTORY OF PRESENT ILLNESS
[FreeTextEntry1] : This is a 60 yo M /w new history of diabetes and metastatic clear cell renal cell carcinoma who presents for initial evaluation of new onset type 2 diabetes.  Interim history -Currently still undergoing immunotherapy treatment  -Recent admission to [facility] and commenced on high dose steroids for autoimmune encephalitis from immunotherapy.  1000mg IV methylpred for 1 week  Discharged on 50-80mg daily of pred with taper (started high dose steroids on mid August) -Current pred dose 35mg daily -> reducing by 5mg every Saturday  -Current insulin dose 15 units lantus pre bed   #T2DM, newly diagnosed Patient had prediabetes in 2019 with A1c 6.2% -> 6.9% August 2024 pre steroids  Patient was diagnosed with DM this past February when he presented with glucose in 300s at [facility]. He was found ot have kidney mass which was resected in March 2024 and resulted as grade 4 clear cell carcinoma A1c one month later in March down to 8.2% (hb 12.3) -> June 2024  CURRENT REGIMEN (NOT ON STEROIDS) He is now taking 10 units of lantus morning. Stopped metformin due to nose bleeds  Currently using the Markie 3 Data over last 7 days time in range 38% High 35% Very high 27% No lows  No hypoglycemia Daily trend high most of the day post pred at 8am GMI 8.3%  24hr diet recall breakfast: 2 eggs and a keto roll w/ a table spoon of butter and an apple Lunch: tuna salad sandwich on keto bread and a bowl of mixed berries. Rubin slaw dinner: roasted chicken thighs and veggies Snacks: atkins peanut butter cup Drinks: He does not drink soda or juice. Drinks water or watered down gatorade light. Drinks hot tea (peppermint and green tea  Denies any tingling/numbness in fingers or toes denies polyuria, polydipsia, or blurry vision  Reports recent ophthalmology visit after gashing his cheek on a fall. He had dilated eye exam at that time which showed some macular edema. No history of retinopathy  He had lipid checked by outside PCP in March. He thinks his LDL is 100. Reports previously on statin therapy but had muscle pain Following with his cardiologist  #Metastatic renal cell carcinoma  No further immunotherapy given side effects - last dose 6th August 2024

## 2024-09-12 NOTE — ASSESSMENT
[FreeTextEntry1] : This is a 60 yo M /w new history of diabetes and metastatic clear cell renal cell carcinoma who presents for initial evaluation of new onset type 2 diabetes. Patient reports oncology is planning immunotherapy NO STEROIDS  #Type 2 diabetes -HbA1c 6.9% August 9th 2024 (pre steroids) -Currently taking 35mg prednisone daily (5mg decrease every week) -Restarted insulin due to high steroid doses - continue lantus 15 units pre bed and start 6 units of humalog TID pre meals -Will need adjustment to doses in 1-2 weeks as pred is tapered   -will hold metformin given report of epistaxis -given recent weight loss patient is not a good candidate for a GLP1 receptor agonist at this time but may benefit in the future (does have grandmother with thyroid cancer history) -continue with CGM monitoring -routine follow up with ophthalmology - was already seen and reports no retinopathy -nutrition counseling provided in office, and patient is already doing an excellent job  #High risk for adrenal insufficiency 2/2 high dose chronic steroids  -Pt on high dose steroids for >1 month  -When pt pred dose reduced to 10mg daily  = 40mg Hydrocortisone, slow taper due to risk of adrenal insufficiency -Change to 20mg HC morning and 5mg 12PM and HC 3PM for 2 weeks  -10/5/5 2 weeks -10/5/0 2 weeks -HOLD and test axis   #HLD -Continue Atorvastatin 20mg daily   #HTN -BP at target  -On metoprolol 25mg daily   #immunotherapy -discussed the various endocrinopathies associated with immunotherapy including the possibility for hypothyroidism, adrenal insufficiency and autoimmune diabetes, as well as some of the symptoms associated with these disorders -Monitor TFTs as mildly elevated TSH (TSH 4.76, Ft4 1.5 Aug 2024)  review in 1 month

## 2024-09-13 ENCOUNTER — APPOINTMENT (OUTPATIENT)
Dept: RHEUMATOLOGY | Facility: CLINIC | Age: 59
End: 2024-09-13

## 2024-09-14 LAB
CULTURE RESULTS: SIGNIFICANT CHANGE UP
SPECIMEN SOURCE: SIGNIFICANT CHANGE UP

## 2024-09-16 ENCOUNTER — TRANSCRIPTION ENCOUNTER (OUTPATIENT)
Age: 59
End: 2024-09-16

## 2024-09-16 ENCOUNTER — APPOINTMENT (OUTPATIENT)
Dept: FAMILY MEDICINE | Facility: CLINIC | Age: 59
End: 2024-09-16

## 2024-09-16 VITALS
HEART RATE: 89 BPM | SYSTOLIC BLOOD PRESSURE: 118 MMHG | OXYGEN SATURATION: 98 % | HEIGHT: 69 IN | WEIGHT: 179 LBS | DIASTOLIC BLOOD PRESSURE: 70 MMHG | BODY MASS INDEX: 26.51 KG/M2 | TEMPERATURE: 97.6 F

## 2024-09-16 DIAGNOSIS — E11.9 TYPE 2 DIABETES MELLITUS W/OUT COMPLICATIONS: ICD-10-CM

## 2024-09-16 DIAGNOSIS — E78.5 HYPERLIPIDEMIA, UNSPECIFIED: ICD-10-CM

## 2024-09-16 DIAGNOSIS — G47.00 INSOMNIA, UNSPECIFIED: ICD-10-CM

## 2024-09-16 PROCEDURE — 99214 OFFICE O/P EST MOD 30 MIN: CPT

## 2024-09-16 RX ORDER — TRAZODONE HYDROCHLORIDE 50 MG/1
50 TABLET ORAL
Qty: 30 | Refills: 1 | Status: ACTIVE | COMMUNITY
Start: 2024-09-16 | End: 1900-01-01

## 2024-09-25 ENCOUNTER — NON-APPOINTMENT (OUTPATIENT)
Age: 59
End: 2024-09-25

## 2024-10-07 ENCOUNTER — APPOINTMENT (OUTPATIENT)
Dept: FAMILY MEDICINE | Facility: CLINIC | Age: 59
End: 2024-10-07

## 2024-10-07 VITALS
TEMPERATURE: 97.8 F | WEIGHT: 190 LBS | SYSTOLIC BLOOD PRESSURE: 122 MMHG | HEIGHT: 69 IN | HEART RATE: 88 BPM | BODY MASS INDEX: 28.14 KG/M2 | DIASTOLIC BLOOD PRESSURE: 80 MMHG | OXYGEN SATURATION: 96 %

## 2024-10-07 DIAGNOSIS — G47.00 INSOMNIA, UNSPECIFIED: ICD-10-CM

## 2024-10-07 DIAGNOSIS — Z87.898 PERSONAL HISTORY OF OTHER SPECIFIED CONDITIONS: ICD-10-CM

## 2024-10-07 DIAGNOSIS — R29.898 OTHER SYMPTOMS AND SIGNS INVOLVING THE MUSCULOSKELETAL SYSTEM: ICD-10-CM

## 2024-10-07 PROCEDURE — G2211 COMPLEX E/M VISIT ADD ON: CPT | Mod: NC

## 2024-10-07 PROCEDURE — 99214 OFFICE O/P EST MOD 30 MIN: CPT

## 2024-10-08 ENCOUNTER — APPOINTMENT (OUTPATIENT)
Dept: CARDIOLOGY | Facility: CLINIC | Age: 59
End: 2024-10-08
Payer: COMMERCIAL

## 2024-10-08 ENCOUNTER — APPOINTMENT (OUTPATIENT)
Dept: ENDOCRINOLOGY | Facility: CLINIC | Age: 59
End: 2024-10-08
Payer: COMMERCIAL

## 2024-10-08 VITALS
OXYGEN SATURATION: 98 % | DIASTOLIC BLOOD PRESSURE: 80 MMHG | WEIGHT: 194 LBS | HEIGHT: 69 IN | SYSTOLIC BLOOD PRESSURE: 112 MMHG | BODY MASS INDEX: 28.73 KG/M2 | HEART RATE: 73 BPM

## 2024-10-08 VITALS
WEIGHT: 185 LBS | HEIGHT: 69 IN | BODY MASS INDEX: 27.4 KG/M2 | SYSTOLIC BLOOD PRESSURE: 136 MMHG | DIASTOLIC BLOOD PRESSURE: 82 MMHG | OXYGEN SATURATION: 98 % | HEART RATE: 80 BPM

## 2024-10-08 DIAGNOSIS — E27.3 DRUG-INDUCED ADRENOCORTICAL INSUFFICIENCY: ICD-10-CM

## 2024-10-08 DIAGNOSIS — E11.9 TYPE 2 DIABETES MELLITUS W/OUT COMPLICATIONS: ICD-10-CM

## 2024-10-08 DIAGNOSIS — Z92.89 PERSONAL HISTORY OF OTHER MEDICAL TREATMENT: ICD-10-CM

## 2024-10-08 DIAGNOSIS — R73.9 HYPERGLYCEMIA, UNSPECIFIED: ICD-10-CM

## 2024-10-08 DIAGNOSIS — T38.0X5A HYPERGLYCEMIA, UNSPECIFIED: ICD-10-CM

## 2024-10-08 PROCEDURE — 93000 ELECTROCARDIOGRAM COMPLETE: CPT

## 2024-10-08 PROCEDURE — 99214 OFFICE O/P EST MOD 30 MIN: CPT

## 2024-10-08 PROCEDURE — G2211 COMPLEX E/M VISIT ADD ON: CPT | Mod: NC

## 2024-10-08 PROCEDURE — 99215 OFFICE O/P EST HI 40 MIN: CPT

## 2024-10-08 PROCEDURE — 95251 CONT GLUC MNTR ANALYSIS I&R: CPT

## 2024-10-08 RX ORDER — INSULIN LISPRO 100 [IU]/ML
100 INJECTION, SOLUTION INTRAVENOUS; SUBCUTANEOUS 3 TIMES DAILY
Refills: 0 | Status: ACTIVE | COMMUNITY

## 2024-10-08 RX ORDER — HYDROCORTISONE 20 MG/1
20 TABLET ORAL
Qty: 1 | Refills: 3 | Status: ACTIVE | COMMUNITY
Start: 2024-10-08 | End: 1900-01-01

## 2024-10-08 RX ORDER — HYDROCORTISONE 5 MG/1
5 TABLET ORAL
Qty: 1 | Refills: 3 | Status: ACTIVE | COMMUNITY
Start: 2024-10-08 | End: 1900-01-01

## 2024-10-08 RX ORDER — ZOLPIDEM TARTRATE 10 MG/1
10 TABLET ORAL
Qty: 30 | Refills: 0 | Status: ACTIVE | COMMUNITY
Start: 2024-10-08 | End: 1900-01-01

## 2024-10-08 RX ORDER — CABOZANTINIB 60 MG/1
60 TABLET ORAL DAILY
Refills: 0 | Status: ACTIVE | COMMUNITY

## 2024-10-09 RX ORDER — EVOLOCUMAB 140 MG/ML
140 INJECTION, SOLUTION SUBCUTANEOUS
Qty: 3 | Refills: 2 | Status: ACTIVE | COMMUNITY
Start: 2024-10-09 | End: 1900-01-01

## 2024-10-09 RX ORDER — EVOLOCUMAB 420 MG/3.5
420 KIT SUBCUTANEOUS
Qty: 1 | Refills: 3 | Status: DISCONTINUED | COMMUNITY
Start: 2024-10-08 | End: 2024-10-09

## 2024-10-13 ENCOUNTER — EMERGENCY (EMERGENCY)
Facility: HOSPITAL | Age: 59
LOS: 0 days | Discharge: HOME | End: 2024-10-13
Attending: EMERGENCY MEDICINE
Payer: COMMERCIAL

## 2024-10-13 VITALS
DIASTOLIC BLOOD PRESSURE: 67 MMHG | SYSTOLIC BLOOD PRESSURE: 100 MMHG | TEMPERATURE: 99 F | OXYGEN SATURATION: 99 % | HEART RATE: 97 BPM | RESPIRATION RATE: 18 BRPM

## 2024-10-13 VITALS — WEIGHT: 184.97 LBS | HEIGHT: 69 IN

## 2024-10-13 DIAGNOSIS — R07.9 CHEST PAIN, UNSPECIFIED: ICD-10-CM

## 2024-10-13 DIAGNOSIS — Z87.828 PERSONAL HISTORY OF OTHER (HEALED) PHYSICAL INJURY AND TRAUMA: Chronic | ICD-10-CM

## 2024-10-13 DIAGNOSIS — Z98.890 OTHER SPECIFIED POSTPROCEDURAL STATES: Chronic | ICD-10-CM

## 2024-10-13 DIAGNOSIS — Z88.1 ALLERGY STATUS TO OTHER ANTIBIOTIC AGENTS: ICD-10-CM

## 2024-10-13 DIAGNOSIS — Z95.5 PRESENCE OF CORONARY ANGIOPLASTY IMPLANT AND GRAFT: ICD-10-CM

## 2024-10-13 DIAGNOSIS — Z96.0 PRESENCE OF UROGENITAL IMPLANTS: Chronic | ICD-10-CM

## 2024-10-13 DIAGNOSIS — R53.1 WEAKNESS: ICD-10-CM

## 2024-10-13 DIAGNOSIS — R10.13 EPIGASTRIC PAIN: ICD-10-CM

## 2024-10-13 DIAGNOSIS — Z90.49 ACQUIRED ABSENCE OF OTHER SPECIFIED PARTS OF DIGESTIVE TRACT: Chronic | ICD-10-CM

## 2024-10-13 DIAGNOSIS — E78.5 HYPERLIPIDEMIA, UNSPECIFIED: ICD-10-CM

## 2024-10-13 DIAGNOSIS — I10 ESSENTIAL (PRIMARY) HYPERTENSION: ICD-10-CM

## 2024-10-13 DIAGNOSIS — R19.7 DIARRHEA, UNSPECIFIED: ICD-10-CM

## 2024-10-13 DIAGNOSIS — E11.9 TYPE 2 DIABETES MELLITUS WITHOUT COMPLICATIONS: ICD-10-CM

## 2024-10-13 DIAGNOSIS — R11.2 NAUSEA WITH VOMITING, UNSPECIFIED: ICD-10-CM

## 2024-10-13 DIAGNOSIS — I25.10 ATHEROSCLEROTIC HEART DISEASE OF NATIVE CORONARY ARTERY WITHOUT ANGINA PECTORIS: ICD-10-CM

## 2024-10-13 DIAGNOSIS — Z88.2 ALLERGY STATUS TO SULFONAMIDES: ICD-10-CM

## 2024-10-13 LAB
ALBUMIN SERPL ELPH-MCNC: 3.4 G/DL — SIGNIFICANT CHANGE UP (ref 3.3–5)
ALP SERPL-CCNC: 61 U/L — SIGNIFICANT CHANGE UP (ref 40–120)
ALT FLD-CCNC: 25 U/L — SIGNIFICANT CHANGE UP (ref 12–78)
ANION GAP SERPL CALC-SCNC: 9 MMOL/L — SIGNIFICANT CHANGE UP (ref 5–17)
APPEARANCE UR: CLEAR — SIGNIFICANT CHANGE UP
APTT BLD: 38.6 SEC — HIGH (ref 24.5–35.6)
AST SERPL-CCNC: 16 U/L — SIGNIFICANT CHANGE UP (ref 15–37)
BACTERIA # UR AUTO: NEGATIVE /HPF — SIGNIFICANT CHANGE UP
BASE EXCESS BLDV CALC-SCNC: -3.9 MMOL/L — LOW (ref -2–3)
BASOPHILS # BLD AUTO: 0.01 K/UL — SIGNIFICANT CHANGE UP (ref 0–0.2)
BASOPHILS NFR BLD AUTO: 0.1 % — SIGNIFICANT CHANGE UP (ref 0–2)
BILIRUB SERPL-MCNC: 1 MG/DL — SIGNIFICANT CHANGE UP (ref 0.2–1.2)
BILIRUB UR-MCNC: NEGATIVE — SIGNIFICANT CHANGE UP
BUN SERPL-MCNC: 22 MG/DL — SIGNIFICANT CHANGE UP (ref 7–23)
CALCIUM SERPL-MCNC: 9.2 MG/DL — SIGNIFICANT CHANGE UP (ref 8.5–10.1)
CAST: 0 /LPF — SIGNIFICANT CHANGE UP (ref 0–4)
CHLORIDE SERPL-SCNC: 105 MMOL/L — SIGNIFICANT CHANGE UP (ref 96–108)
CO2 SERPL-SCNC: 21 MMOL/L — LOW (ref 22–31)
COLOR SPEC: YELLOW — SIGNIFICANT CHANGE UP
CREAT SERPL-MCNC: 1.58 MG/DL — HIGH (ref 0.5–1.3)
DIFF PNL FLD: NEGATIVE — SIGNIFICANT CHANGE UP
EGFR: 49 ML/MIN/1.73M2 — LOW
EOSINOPHIL # BLD AUTO: 0.04 K/UL — SIGNIFICANT CHANGE UP (ref 0–0.5)
EOSINOPHIL NFR BLD AUTO: 0.6 % — SIGNIFICANT CHANGE UP (ref 0–6)
GAS PNL BLDV: SIGNIFICANT CHANGE UP
GLUCOSE SERPL-MCNC: 98 MG/DL — SIGNIFICANT CHANGE UP (ref 70–99)
GLUCOSE UR QL: NEGATIVE MG/DL — SIGNIFICANT CHANGE UP
HCO3 BLDV-SCNC: 22 MMOL/L — SIGNIFICANT CHANGE UP (ref 22–29)
HCT VFR BLD CALC: 36 % — LOW (ref 39–50)
HGB BLD-MCNC: 11.3 G/DL — LOW (ref 13–17)
IMM GRANULOCYTES NFR BLD AUTO: 1 % — HIGH (ref 0–0.9)
INR BLD: 1.1 RATIO — SIGNIFICANT CHANGE UP (ref 0.85–1.16)
KETONES UR-MCNC: 40 MG/DL
LACTATE SERPL-SCNC: 1.9 MMOL/L — SIGNIFICANT CHANGE UP (ref 0.7–2)
LEUKOCYTE ESTERASE UR-ACNC: NEGATIVE — SIGNIFICANT CHANGE UP
LIDOCAIN IGE QN: 58 U/L — SIGNIFICANT CHANGE UP (ref 13–75)
LYMPHOCYTES # BLD AUTO: 1.17 K/UL — SIGNIFICANT CHANGE UP (ref 1–3.3)
LYMPHOCYTES # BLD AUTO: 16.9 % — SIGNIFICANT CHANGE UP (ref 13–44)
MCHC RBC-ENTMCNC: 27.2 PG — SIGNIFICANT CHANGE UP (ref 27–34)
MCHC RBC-ENTMCNC: 31.4 GM/DL — LOW (ref 32–36)
MCV RBC AUTO: 86.7 FL — SIGNIFICANT CHANGE UP (ref 80–100)
MONOCYTES # BLD AUTO: 0.49 K/UL — SIGNIFICANT CHANGE UP (ref 0–0.9)
MONOCYTES NFR BLD AUTO: 7.1 % — SIGNIFICANT CHANGE UP (ref 2–14)
NEUTROPHILS # BLD AUTO: 5.15 K/UL — SIGNIFICANT CHANGE UP (ref 1.8–7.4)
NEUTROPHILS NFR BLD AUTO: 74.3 % — SIGNIFICANT CHANGE UP (ref 43–77)
NITRITE UR-MCNC: NEGATIVE — SIGNIFICANT CHANGE UP
PCO2 BLDV: 40 MMHG — LOW (ref 42–55)
PH BLDV: 7.34 — SIGNIFICANT CHANGE UP (ref 7.32–7.43)
PH UR: 5.5 — SIGNIFICANT CHANGE UP (ref 5–8)
PLATELET # BLD AUTO: 270 K/UL — SIGNIFICANT CHANGE UP (ref 150–400)
PO2 BLDV: 39 MMHG — SIGNIFICANT CHANGE UP (ref 25–45)
POTASSIUM SERPL-MCNC: 3.9 MMOL/L — SIGNIFICANT CHANGE UP (ref 3.5–5.3)
POTASSIUM SERPL-SCNC: 3.9 MMOL/L — SIGNIFICANT CHANGE UP (ref 3.5–5.3)
PROT SERPL-MCNC: 7.2 GM/DL — SIGNIFICANT CHANGE UP (ref 6–8.3)
PROT UR-MCNC: 30 MG/DL
PROTHROM AB SERPL-ACNC: 13 SEC — SIGNIFICANT CHANGE UP (ref 9.9–13.4)
RBC # BLD: 4.15 M/UL — LOW (ref 4.2–5.8)
RBC # FLD: 15.6 % — HIGH (ref 10.3–14.5)
RBC CASTS # UR COMP ASSIST: 0 /HPF — SIGNIFICANT CHANGE UP (ref 0–4)
SAO2 % BLDV: 66 % — LOW (ref 67–88)
SODIUM SERPL-SCNC: 135 MMOL/L — SIGNIFICANT CHANGE UP (ref 135–145)
SP GR SPEC: 1.02 — SIGNIFICANT CHANGE UP (ref 1–1.03)
SQUAMOUS # UR AUTO: 1 /HPF — SIGNIFICANT CHANGE UP (ref 0–5)
UROBILINOGEN FLD QL: 1 MG/DL — SIGNIFICANT CHANGE UP (ref 0.2–1)
WBC # BLD: 6.93 K/UL — SIGNIFICANT CHANGE UP (ref 3.8–10.5)
WBC # FLD AUTO: 6.93 K/UL — SIGNIFICANT CHANGE UP (ref 3.8–10.5)
WBC UR QL: 1 /HPF — SIGNIFICANT CHANGE UP (ref 0–5)

## 2024-10-13 PROCEDURE — 85610 PROTHROMBIN TIME: CPT

## 2024-10-13 PROCEDURE — 99284 EMERGENCY DEPT VISIT MOD MDM: CPT

## 2024-10-13 PROCEDURE — 81001 URINALYSIS AUTO W/SCOPE: CPT

## 2024-10-13 PROCEDURE — 71045 X-RAY EXAM CHEST 1 VIEW: CPT | Mod: 26

## 2024-10-13 PROCEDURE — 83690 ASSAY OF LIPASE: CPT

## 2024-10-13 PROCEDURE — 71045 X-RAY EXAM CHEST 1 VIEW: CPT

## 2024-10-13 PROCEDURE — 85730 THROMBOPLASTIN TIME PARTIAL: CPT

## 2024-10-13 PROCEDURE — 80053 COMPREHEN METABOLIC PANEL: CPT

## 2024-10-13 PROCEDURE — 96374 THER/PROPH/DIAG INJ IV PUSH: CPT

## 2024-10-13 PROCEDURE — 93005 ELECTROCARDIOGRAM TRACING: CPT

## 2024-10-13 PROCEDURE — 85025 COMPLETE CBC W/AUTO DIFF WBC: CPT

## 2024-10-13 PROCEDURE — 93010 ELECTROCARDIOGRAM REPORT: CPT

## 2024-10-13 PROCEDURE — 82803 BLOOD GASES ANY COMBINATION: CPT

## 2024-10-13 PROCEDURE — 87040 BLOOD CULTURE FOR BACTERIA: CPT

## 2024-10-13 PROCEDURE — 99285 EMERGENCY DEPT VISIT HI MDM: CPT | Mod: 25

## 2024-10-13 PROCEDURE — 83605 ASSAY OF LACTIC ACID: CPT

## 2024-10-13 PROCEDURE — 96375 TX/PRO/DX INJ NEW DRUG ADDON: CPT

## 2024-10-13 PROCEDURE — 36415 COLL VENOUS BLD VENIPUNCTURE: CPT

## 2024-10-13 RX ORDER — FAMOTIDINE 40 MG
20 TABLET ORAL ONCE
Refills: 0 | Status: COMPLETED | OUTPATIENT
Start: 2024-10-13 | End: 2024-10-13

## 2024-10-13 RX ORDER — SODIUM CHLORIDE 0.9 % (FLUSH) 0.9 %
500 SYRINGE (ML) INJECTION ONCE
Refills: 0 | Status: COMPLETED | OUTPATIENT
Start: 2024-10-13 | End: 2024-10-13

## 2024-10-13 RX ORDER — SODIUM CHLORIDE 0.9 % (FLUSH) 0.9 %
1000 SYRINGE (ML) INJECTION ONCE
Refills: 0 | Status: COMPLETED | OUTPATIENT
Start: 2024-10-13 | End: 2024-10-13

## 2024-10-13 RX ORDER — ONDANSETRON HCL/PF 4 MG/2 ML
4 VIAL (ML) INJECTION ONCE
Refills: 0 | Status: COMPLETED | OUTPATIENT
Start: 2024-10-13 | End: 2024-10-13

## 2024-10-13 RX ADMIN — Medication 500 MILLILITER(S): at 14:46

## 2024-10-13 RX ADMIN — Medication 4 MILLIGRAM(S): at 12:01

## 2024-10-13 RX ADMIN — Medication 1000 MILLILITER(S): at 10:39

## 2024-10-13 RX ADMIN — Medication 20 MILLIGRAM(S): at 12:01

## 2024-10-13 NOTE — ED ADULT NURSE NOTE - CHIEF COMPLAINT QUOTE
Pt presents to [facility] complaining of chest pain and vomiting. Pt kidney CA pt on steroids due to begin chemo Monday but was unable to. Pt endorsing persistent chest pain along with vomiting and weakness. Pt Is A & O x4, GCS 15.

## 2024-10-13 NOTE — ED STATDOCS - ATTENDING APP SHARED VISIT CONTRIBUTION OF CARE
I,Iván Prescott MD,  performed the initial face to face bedside interview with this patient regarding history of present illness, review of symptoms and relevant past medical, social and family history.  I completed an independent physical examination.  I was the initial provider who evaluated this patient. I have signed out the follow up of any pending tests (i.e. labs, radiological studies) to the ACP.  I have communicated the patient’s plan of care and disposition with the ACP.  The history, relevant review of systems, past medical and surgical history, medical decision making, and physical examination was documented by the scribe in my presence and I attest to the accuracy of the documentation.

## 2024-10-13 NOTE — ED ADULT NURSE NOTE - OBJECTIVE STATEMENT
pt newly pt newly DX this year with kidney CA, on meds and reporting about to start chemo, but pt unable to keep his meds down d/t persistent nausea despite home meds. Pt reporting chest "pain", described as burning, onset this morning, no radiation. Denies cardiac HX

## 2024-10-13 NOTE — ED PROVIDER NOTE - PROGRESS NOTE DETAILS
Patient already p.o., pressures stable.  Feels well enough to go home.  Has Zofran at home for nausea or vomiting.  Will follow-up with his oncologist, and GP.  Strict turn precaution given worsening.  Patient verbalized understanding agrees plan this time. Schuyler Queen D.O.

## 2024-10-13 NOTE — ED STATDOCS - PROGRESS NOTE DETAILS
Patricia Nelson for attending Dr. Prescott  59 year old male with PMHx of HTN, HLD, CAD s/p stents, DM, diverticulitis, renal cell carcinoma; presents to ED c/o nausea, vomiting, diarrhea x2 days. Reports recent episode of encephalitis. Previously on immunotherapy for renal cell carcinoma. Patient states he was supposed to start oral chemo on Friday but has been unable to tolerate PO. Patient hypotensive, sending to the MAIN for further evaluation.

## 2024-10-13 NOTE — ED PROVIDER NOTE - PATIENT PORTAL LINK FT
You can access the FollowMyHealth Patient Portal offered by [facility] by registering at the following website: [email or web address]. By joining [facility]’s FollowMyHealth portal, you will also be able to view your health information using other applications (apps) compatible with our system.

## 2024-10-13 NOTE — ED PROVIDER NOTE - OBJECTIVE STATEMENT
59 year old male with PMHx of HTN, HLD, CAD s/p stents, DM, diverticulitis, renal cell carcinoma treated in spring/early summer '24 with immunotherapy; presents to ED c/o nausea, vomiting, diarrhea x2 days w/ asocaited general weakness/fatigue, FTT. Reports recent episode of encephalitis believed d/t the immunotx.  Patient states he was supposed to start oral chemo on Friday but was unable to tolerate PO, so has not yet started it. Poor po intake yesterday, though no V.  Today, + V when reattempted po fluids trial.  + Tactile F, no temp taken.  Mild cough, denies SOB.  Mild burning cp. No known ill contact.   Patient hypotensive upon ED arrival.  PCP: Arie.    Onc: HAY Hare at [facility] 59 year old male with PMHx of HTN, HLD, CAD s/p stents, DM, diverticulitis, renal cell carcinoma treated in spring/early summer '24 with immunotherapy; BIB pvt car to ED c/o nausea, vomiting, diarrhea x2 days w/ poor po tolerance, associated general weakness/fatigue, FTT. Reports recent episode of encephalitis believed d/t the immunotx.  Patient states he was supposed to start oral chemo on Friday but was unable to tolerate PO, so has not yet started it. Poor po intake yesterday, though no V.  Today, + V when reattempted po fluids trial.  + Tactile F, no temp taken.  Mild cough, denies SOB.  Mild burning cp. No known ill contact.   Patient hypotensive upon ED arrival.  PCP: Arie.    [place] [facility]

## 2024-10-13 NOTE — ED PROVIDER NOTE - CLINICAL SUMMARY MEDICAL DECISION MAKING FREE TEXT BOX
Plan: labs incl. lipase, U/A & culture, BCs/lactate, IVF, CXR, IV Zofran/Pepcid. Consider Ct A/P.  Monitor, observe, reassess. Plan: labs incl. lipase, U/A & culture, BCs/lactate, IVF, CXR, IV Zofran/Pepcid. Consider Ct A/P.  Monitor, observe, reassess.    15:30, C MD Laron:  Labs results unremarkable including U/A negative, normal lipase, lactate, & WBC cnt.  BP soft in 90s, improved w/ 1 liter IV NS.   Patient reexamined, feels somewhat improved s/p IVF and IV meds given.  Abdomen reexamination without any focal tenderness.  Patient and wife informed of study results negative,  expressed their understanding and agree with 500 mL further IVF and p.o. trial, DC home for outpatient follow-up this week if BP improves with IVF and patient passes p.o. trial.  Otherwise, they are aware patient will require Medicine admission.  Case signed over to Dr. Queen to follow-up BP checks and whether passes p.o. trial. 59 year old male with PMHx of HTN, HLD, CAD s/p stents, DM, diverticulitis, renal cell carcinoma treated in spring/early summer '24 with immunotherapy; BIB pvt car to ED c/o nausea, vomiting, diarrhea x2 days w/ poor po tolerance, associated general weakness/fatigue, FTT.  Plan: labs incl. lipase, U/A & culture, BCs/lactate, IVF, CXR, IV Zofran/Pepcid. Consider CT A/P if abnl labs or still epig. tender on rpt eval.  Monitor, observe, reassess.    15:30, DANNIELLE Larry MD:  Labs results unremarkable including U/A negative, normal lipase, lactate, & WBC cnt.  BP soft in 90s, improved w/ 1 liter IV NS.   Patient re-examined, feels somewhat improved s/p IVF and IV meds given.  Abdomen re-examination without any focal tenderness.  Patient and wife informed of study results negative, expressed their understanding and agree with 500 mL further IVF and p.o. trial, DC home for outpatient follow-up this week if BP improves with IVF and patient passes p.o. trial.  Otherwise, they are aware patient will require Medicine admission.  Case signed over to Dr. Queen to follow-up BP checks and whether passes p.o. trial.    See Progress Note for case progression incl. pt re-exam, discharge disposition.

## 2024-10-13 NOTE — ED ADULT TRIAGE NOTE - CHIEF COMPLAINT QUOTE
Pt presents to [facility] complaining of chest pain and vomiting. Pt kidney CA pt on steroids due to begin chemo Monday but was unable to. Pt endorsing persistent chest pain along with vomiting and weakness. Pt Is A & O x4, GCS 15.

## 2024-10-13 NOTE — ED ADULT NURSE NOTE - PAIN RATING/NUMBER SCALE (0-10): ACTIVITY
[No studies available for review at this time.] : No studies available for review at this time.
2 (mild pain)

## 2024-10-13 NOTE — ED PROVIDER NOTE - PHYSICAL EXAMINATION
Gen'l: Older M adult, no respiratory discomfort, no sentence shortening, + ill-appearing, non-toxic.  Head: NC/AT  Eyes: PERRL, EOMI  ENT: O/P clear, mm mildly dry  CV: RRR, normal radial pulse  Lungs: CTA, normal respirations  GI: soft, BS hypoactive, normal pitch, + epigastric TTP, no G/R/mass, Douglass's negative  : Deferred, no flank nor CVAT  Neck: NT, supple w/o pain, no stiffness nor meningismus  MSK: NEFF x 4, no focal extremity swelling nor tenderness, B/L SLR 35 degrees w/o pain, normal motor  Skin: +/- tactile warmth, no rash  Neuro: A+O x 4, CN 2 -12 intact, normal speech, no focal motor/sensory deficits Gen'l: Older M adult, no respiratory discomfort, no sentence shortening, + ill-appearing, non-toxic.  Head: NC/AT  Eyes: PERRL, EOMI  ENT: O/P clear, mm mildly dry  CV: RRR, normal radial pulse  Lungs: CTA, normal respirations  GI: soft, BS hypoactive, normal pitch, + mild epigastric TTP, no G/R/mass, Douglass's negative  : Deferred, no flank nor CVAT  Neck: NT, supple w/o pain, no stiffness nor meningismus  MSK: NEFF x 4, no focal extremity swelling nor tenderness, B/L SLR 35 degrees w/o pain, normal motor  Skin: +/- tactile warmth, no rash  Neuro: A+O x 4, CN 2 -12 intact, normal speech, no focal motor/sensory deficits

## 2024-10-15 NOTE — ASU PATIENT PROFILE, ADULT - CAREGIVER
In an effort to ensure that our patients [employer], a Team Member has reviewed your chart and identified an opportunity to provide the best care possible. An attempt was made to discuss or schedule due or overdue Preventive or Chronic Condition care.Care Gaps identified: Hypertension.    The Outcome was Contact was not made, letter/portal message sent due to invalid phone number. We are attempting to schedule a follow up office visit. If you have any questions or need help with scheduling, contact your primary care provider..   Type of Appointment needed: Primary Care Visit     Yes

## 2024-10-16 ENCOUNTER — NON-APPOINTMENT (OUTPATIENT)
Age: 59
End: 2024-10-16

## 2024-10-16 LAB — CORTIS SERPL-MCNC: 0.5 UG/DL

## 2024-10-18 LAB
CULTURE RESULTS: SIGNIFICANT CHANGE UP
CULTURE RESULTS: SIGNIFICANT CHANGE UP
SPECIMEN SOURCE: SIGNIFICANT CHANGE UP
SPECIMEN SOURCE: SIGNIFICANT CHANGE UP

## 2024-11-05 ENCOUNTER — APPOINTMENT (OUTPATIENT)
Dept: CARDIOLOGY | Facility: CLINIC | Age: 59
End: 2024-11-05

## 2024-12-02 RX ORDER — INSULIN LISPRO 100 [IU]/ML
100 INJECTION, SOLUTION INTRAVENOUS; SUBCUTANEOUS
Qty: 4 | Refills: 1 | Status: ACTIVE | COMMUNITY
Start: 2024-12-02 | End: 1900-01-01

## 2024-12-10 ENCOUNTER — APPOINTMENT (OUTPATIENT)
Dept: ENDOCRINOLOGY | Facility: CLINIC | Age: 59
End: 2024-12-10
Payer: COMMERCIAL

## 2024-12-10 VITALS
HEIGHT: 69 IN | SYSTOLIC BLOOD PRESSURE: 149 MMHG | WEIGHT: 198 LBS | BODY MASS INDEX: 29.33 KG/M2 | HEART RATE: 76 BPM | OXYGEN SATURATION: 98 % | DIASTOLIC BLOOD PRESSURE: 100 MMHG

## 2024-12-10 DIAGNOSIS — E27.3 DRUG-INDUCED ADRENOCORTICAL INSUFFICIENCY: ICD-10-CM

## 2024-12-10 DIAGNOSIS — I10 ESSENTIAL (PRIMARY) HYPERTENSION: ICD-10-CM

## 2024-12-10 DIAGNOSIS — E11.9 TYPE 2 DIABETES MELLITUS W/OUT COMPLICATIONS: ICD-10-CM

## 2024-12-10 LAB — HBA1C MFR BLD HPLC: 6.3

## 2024-12-10 PROCEDURE — 83036 HEMOGLOBIN GLYCOSYLATED A1C: CPT | Mod: QW

## 2024-12-10 PROCEDURE — 99214 OFFICE O/P EST MOD 30 MIN: CPT

## 2024-12-10 PROCEDURE — 95251 CONT GLUC MNTR ANALYSIS I&R: CPT

## 2024-12-18 ENCOUNTER — APPOINTMENT (OUTPATIENT)
Dept: CARDIOLOGY | Facility: CLINIC | Age: 59
End: 2024-12-18

## 2024-12-18 VITALS
BODY MASS INDEX: 29.03 KG/M2 | HEIGHT: 69 IN | SYSTOLIC BLOOD PRESSURE: 134 MMHG | WEIGHT: 196 LBS | DIASTOLIC BLOOD PRESSURE: 92 MMHG | OXYGEN SATURATION: 100 % | HEART RATE: 77 BPM

## 2024-12-18 PROCEDURE — G2211 COMPLEX E/M VISIT ADD ON: CPT | Mod: NC

## 2024-12-18 PROCEDURE — 93000 ELECTROCARDIOGRAM COMPLETE: CPT

## 2024-12-18 PROCEDURE — 99214 OFFICE O/P EST MOD 30 MIN: CPT

## 2024-12-18 RX ORDER — HYDROCORTISONE 5 MG/1
5 TABLET ORAL
Refills: 0 | Status: ACTIVE | COMMUNITY

## 2024-12-18 RX ORDER — AMLODIPINE BESYLATE 10 MG/1
10 TABLET ORAL DAILY
Refills: 0 | Status: ACTIVE | COMMUNITY

## 2024-12-19 ENCOUNTER — RX RENEWAL (OUTPATIENT)
Age: 59
End: 2024-12-19

## 2024-12-19 RX ORDER — CARVEDILOL 6.25 MG/1
6.25 TABLET, FILM COATED ORAL TWICE DAILY
Qty: 180 | Refills: 3 | Status: ACTIVE | COMMUNITY
Start: 2024-12-18 | End: 1900-01-01

## 2025-01-16 ENCOUNTER — APPOINTMENT (OUTPATIENT)
Dept: CARDIOLOGY | Facility: CLINIC | Age: 60
End: 2025-01-16

## 2025-01-21 ENCOUNTER — RX RENEWAL (OUTPATIENT)
Age: 60
End: 2025-01-21

## 2025-01-27 ENCOUNTER — RX RENEWAL (OUTPATIENT)
Age: 60
End: 2025-01-27

## 2025-02-26 ENCOUNTER — APPOINTMENT (OUTPATIENT)
Dept: CARDIOLOGY | Facility: CLINIC | Age: 60
End: 2025-02-26
Payer: COMMERCIAL

## 2025-02-26 VITALS
DIASTOLIC BLOOD PRESSURE: 60 MMHG | WEIGHT: 180 LBS | HEIGHT: 69 IN | SYSTOLIC BLOOD PRESSURE: 102 MMHG | HEART RATE: 102 BPM | BODY MASS INDEX: 26.66 KG/M2 | OXYGEN SATURATION: 98 %

## 2025-02-26 PROCEDURE — 99214 OFFICE O/P EST MOD 30 MIN: CPT | Mod: 25

## 2025-02-26 PROCEDURE — 93000 ELECTROCARDIOGRAM COMPLETE: CPT

## 2025-02-26 RX ORDER — HYDROCORTISONE 5 MG/1
TABLET ORAL
Refills: 0 | Status: ACTIVE | COMMUNITY

## 2025-02-26 RX ORDER — HYDROCORTISONE 20 MG/1
20 TABLET ORAL EVERY MORNING
Refills: 0 | Status: ACTIVE | COMMUNITY

## 2025-02-27 RX ORDER — CARVEDILOL 6.25 MG/1
6.25 TABLET, FILM COATED ORAL TWICE DAILY
Qty: 180 | Refills: 1 | Status: ACTIVE | COMMUNITY
Start: 2025-02-26 | End: 1900-01-01

## 2025-02-27 RX ORDER — APIXABAN 5 MG/1
5 TABLET, FILM COATED ORAL TWICE DAILY
Qty: 60 | Refills: 3 | Status: ACTIVE | COMMUNITY
Start: 2025-02-26 | End: 1900-01-01

## 2025-03-20 ENCOUNTER — APPOINTMENT (OUTPATIENT)
Dept: CARDIOLOGY | Facility: CLINIC | Age: 60
End: 2025-03-20
Payer: COMMERCIAL

## 2025-03-20 ENCOUNTER — NON-APPOINTMENT (OUTPATIENT)
Age: 60
End: 2025-03-20

## 2025-03-20 VITALS
WEIGHT: 183 LBS | DIASTOLIC BLOOD PRESSURE: 92 MMHG | OXYGEN SATURATION: 100 % | SYSTOLIC BLOOD PRESSURE: 126 MMHG | HEART RATE: 75 BPM | BODY MASS INDEX: 27.11 KG/M2 | HEIGHT: 69 IN

## 2025-03-20 PROCEDURE — G2211 COMPLEX E/M VISIT ADD ON: CPT | Mod: NC

## 2025-03-20 PROCEDURE — 99215 OFFICE O/P EST HI 40 MIN: CPT

## 2025-03-20 RX ORDER — CABOZANTINIB 40 MG/1
40 TABLET ORAL
Refills: 0 | Status: ACTIVE | COMMUNITY

## 2025-03-27 ENCOUNTER — NON-APPOINTMENT (OUTPATIENT)
Age: 60
End: 2025-03-27

## 2025-03-27 ENCOUNTER — APPOINTMENT (OUTPATIENT)
Dept: NEUROLOGY | Facility: CLINIC | Age: 60
End: 2025-03-27
Payer: COMMERCIAL

## 2025-03-27 VITALS
WEIGHT: 183 LBS | HEART RATE: 76 BPM | HEIGHT: 69 IN | BODY MASS INDEX: 27.11 KG/M2 | SYSTOLIC BLOOD PRESSURE: 129 MMHG | DIASTOLIC BLOOD PRESSURE: 83 MMHG

## 2025-03-27 DIAGNOSIS — M21.372 FOOT DROP, LEFT FOOT: ICD-10-CM

## 2025-03-27 DIAGNOSIS — G04.90 ENCEPHALITIS AND ENCEPHALOMYELITIS, UNSPECIFIED: ICD-10-CM

## 2025-03-27 PROCEDURE — 99215 OFFICE O/P EST HI 40 MIN: CPT

## 2025-03-27 PROCEDURE — G2211 COMPLEX E/M VISIT ADD ON: CPT | Mod: NC

## 2025-04-02 ENCOUNTER — OUTPATIENT (OUTPATIENT)
Dept: OUTPATIENT SERVICES | Facility: HOSPITAL | Age: 60
LOS: 1 days | End: 2025-04-02
Payer: COMMERCIAL

## 2025-04-02 ENCOUNTER — APPOINTMENT (OUTPATIENT)
Dept: MRI IMAGING | Facility: CLINIC | Age: 60
End: 2025-04-02
Payer: COMMERCIAL

## 2025-04-02 DIAGNOSIS — Z96.0 PRESENCE OF UROGENITAL IMPLANTS: Chronic | ICD-10-CM

## 2025-04-02 DIAGNOSIS — Z90.49 ACQUIRED ABSENCE OF OTHER SPECIFIED PARTS OF DIGESTIVE TRACT: Chronic | ICD-10-CM

## 2025-04-02 DIAGNOSIS — Z00.8 ENCOUNTER FOR OTHER GENERAL EXAMINATION: ICD-10-CM

## 2025-04-02 DIAGNOSIS — Z98.890 OTHER SPECIFIED POSTPROCEDURAL STATES: Chronic | ICD-10-CM

## 2025-04-02 DIAGNOSIS — Z87.828 PERSONAL HISTORY OF OTHER (HEALED) PHYSICAL INJURY AND TRAUMA: Chronic | ICD-10-CM

## 2025-04-02 DIAGNOSIS — M21.372 FOOT DROP, LEFT FOOT: ICD-10-CM

## 2025-04-02 PROCEDURE — A9585: CPT

## 2025-04-02 PROCEDURE — 72158 MRI LUMBAR SPINE W/O & W/DYE: CPT

## 2025-04-02 PROCEDURE — 72158 MRI LUMBAR SPINE W/O & W/DYE: CPT | Mod: 26

## 2025-04-08 ENCOUNTER — APPOINTMENT (OUTPATIENT)
Dept: ENDOCRINOLOGY | Facility: CLINIC | Age: 60
End: 2025-04-08

## 2025-04-10 ENCOUNTER — APPOINTMENT (OUTPATIENT)
Dept: CARDIOLOGY | Facility: CLINIC | Age: 60
End: 2025-04-10

## 2025-04-10 ENCOUNTER — APPOINTMENT (OUTPATIENT)
Dept: CARDIOLOGY | Facility: CLINIC | Age: 60
End: 2025-04-10
Payer: COMMERCIAL

## 2025-04-10 PROCEDURE — G2211 COMPLEX E/M VISIT ADD ON: CPT | Mod: NC,93

## 2025-04-10 PROCEDURE — 99213 OFFICE O/P EST LOW 20 MIN: CPT | Mod: 93

## 2025-05-29 ENCOUNTER — RX RENEWAL (OUTPATIENT)
Age: 60
End: 2025-05-29

## 2025-06-09 NOTE — H&P ADULT - NSICDXPASTMEDICALHX_GEN_ALL_CORE_FT
PAST MEDICAL HISTORY:  CAD (coronary artery disease)     Cough     Diabetes     Diverticulitis     Former smoker     H/O pulmonary hypertension     HLD (hyperlipidemia)     HTN (hypertension)     Lung nodule     Metastasis to lung     Obesity (BMI 30-39.9)     Paroxysmal atrial fibrillation     Pneumonia     Renal calculi     Renal mass     Stented coronary artery     Subclinical hyperthyroidism      Yes

## 2025-07-09 ENCOUNTER — APPOINTMENT (OUTPATIENT)
Dept: ENDOCRINOLOGY | Facility: CLINIC | Age: 60
End: 2025-07-09

## 2025-08-27 ENCOUNTER — OFFICE (OUTPATIENT)
Dept: URBAN - METROPOLITAN AREA CLINIC 102 | Facility: CLINIC | Age: 60
Setting detail: OPHTHALMOLOGY
End: 2025-08-27
Payer: COMMERCIAL

## 2025-08-27 DIAGNOSIS — H35.373: ICD-10-CM

## 2025-08-27 DIAGNOSIS — E11.9: ICD-10-CM

## 2025-08-27 DIAGNOSIS — H25.13: ICD-10-CM

## 2025-08-27 PROCEDURE — 99204 OFFICE O/P NEW MOD 45 MIN: CPT | Performed by: OPHTHALMOLOGY

## 2025-08-27 PROCEDURE — 92134 CPTRZ OPH DX IMG PST SGM RTA: CPT | Performed by: OPHTHALMOLOGY

## 2025-08-27 ASSESSMENT — REFRACTION_CURRENTRX
OD_AXIS: 093
OS_AXIS: 078
OD_ADD: +2.75
OS_CYLINDER: -0.75
OS_SPHERE: +2.75
OD_VPRISM_DIRECTION: PROGS
OS_OVR_VA: 20/
OD_OVR_VA: 20/
OS_ADD: +2.75
OD_CYLINDER: -1.00
OD_SPHERE: +2.25
OS_VPRISM_DIRECTION: PROGS

## 2025-08-27 ASSESSMENT — VISUAL ACUITY
OS_BCVA: 20/100+2
OD_BCVA: 20/25+2

## 2025-08-27 ASSESSMENT — KERATOMETRY
OD_K1POWER_DIOPTERS: 41.75
OD_K2POWER_DIOPTERS: 42.00
OS_AXISANGLE_DEGREES: 176
OD_AXISANGLE_DEGREES: 158
OS_K1POWER_DIOPTERS: 41.00
OS_K2POWER_DIOPTERS: 41.50

## 2025-08-27 ASSESSMENT — REFRACTION_AUTOREFRACTION
OD_AXIS: 081
OS_AXIS: 076
OD_CYLINDER: -1.25
OS_CYLINDER: -1.25
OD_SPHERE: +1.50
OS_SPHERE: +2.75

## 2025-08-27 ASSESSMENT — CONFRONTATIONAL VISUAL FIELD TEST (CVF)
OS_FINDINGS: FULL
OD_FINDINGS: FULL

## 2025-08-27 ASSESSMENT — TONOMETRY
OD_IOP_MMHG: 12
OS_IOP_MMHG: 14